# Patient Record
Sex: MALE | Race: WHITE | NOT HISPANIC OR LATINO | Employment: OTHER | URBAN - NONMETROPOLITAN AREA
[De-identification: names, ages, dates, MRNs, and addresses within clinical notes are randomized per-mention and may not be internally consistent; named-entity substitution may affect disease eponyms.]

---

## 2017-01-06 RX ORDER — LISINOPRIL 5 MG/1
TABLET ORAL
Qty: 30 TABLET | Refills: 3 | Status: SHIPPED | OUTPATIENT
Start: 2017-01-06 | End: 2017-04-10 | Stop reason: SDUPTHER

## 2017-01-16 RX ORDER — MELATONIN
1000 DAILY
COMMUNITY
End: 2020-09-25 | Stop reason: SDUPTHER

## 2017-01-16 RX ORDER — ASCORBIC ACID 500 MG
1000 TABLET ORAL DAILY
COMMUNITY
End: 2020-09-24

## 2017-01-16 RX ORDER — TAMSULOSIN HYDROCHLORIDE 0.4 MG/1
1 CAPSULE ORAL DAILY
COMMUNITY
End: 2021-09-20

## 2017-01-16 RX ORDER — LISINOPRIL 10 MG/1
10 TABLET ORAL DAILY
COMMUNITY
End: 2021-12-09

## 2017-01-16 RX ORDER — LOVASTATIN 40 MG/1
40 TABLET ORAL NIGHTLY
COMMUNITY
End: 2022-06-17 | Stop reason: ALTCHOICE

## 2017-01-19 ENCOUNTER — OFFICE VISIT (OUTPATIENT)
Dept: UROLOGY | Facility: CLINIC | Age: 77
End: 2017-01-19

## 2017-01-19 VITALS
DIASTOLIC BLOOD PRESSURE: 75 MMHG | RESPIRATION RATE: 16 BRPM | HEART RATE: 84 BPM | TEMPERATURE: 98.7 F | SYSTOLIC BLOOD PRESSURE: 136 MMHG | OXYGEN SATURATION: 97 %

## 2017-01-19 DIAGNOSIS — G47.33 OBSTRUCTIVE SLEEP APNEA: ICD-10-CM

## 2017-01-19 DIAGNOSIS — N40.0 ENLARGED PROSTATE: Primary | ICD-10-CM

## 2017-01-19 DIAGNOSIS — R35.1 NOCTURIA MORE THAN TWICE PER NIGHT: ICD-10-CM

## 2017-01-19 DIAGNOSIS — R60.0 LOCALIZED EDEMA: ICD-10-CM

## 2017-01-19 DIAGNOSIS — N13.8 BPH (BENIGN PROSTATIC HYPERTROPHY) WITH URINARY OBSTRUCTION: ICD-10-CM

## 2017-01-19 DIAGNOSIS — N40.1 BPH (BENIGN PROSTATIC HYPERTROPHY) WITH URINARY OBSTRUCTION: ICD-10-CM

## 2017-01-19 LAB
BILIRUB BLD-MCNC: NEGATIVE MG/DL
CLARITY, POC: CLEAR
COLOR UR: YELLOW
GLUCOSE UR STRIP-MCNC: NEGATIVE MG/DL
KETONES UR QL: NEGATIVE
LEUKOCYTE EST, POC: NEGATIVE
NITRITE UR-MCNC: NEGATIVE MG/ML
PH UR: 6 [PH] (ref 5–8)
PROT UR STRIP-MCNC: NEGATIVE MG/DL
PSA SERPL-MCNC: 3.9 NG/ML (ref 0–4)
RBC # UR STRIP: NEGATIVE /UL
SP GR UR: 1.02 (ref 1–1.03)
UROBILINOGEN UR QL: NORMAL

## 2017-01-19 PROCEDURE — 84153 ASSAY OF PSA TOTAL: CPT | Performed by: UROLOGY

## 2017-01-19 PROCEDURE — 99213 OFFICE O/P EST LOW 20 MIN: CPT | Performed by: UROLOGY

## 2017-01-19 PROCEDURE — 81003 URINALYSIS AUTO W/O SCOPE: CPT | Performed by: UROLOGY

## 2017-01-19 PROCEDURE — 36415 COLL VENOUS BLD VENIPUNCTURE: CPT | Performed by: UROLOGY

## 2017-01-19 RX ORDER — FINASTERIDE 5 MG/1
5 TABLET, FILM COATED ORAL DAILY
Qty: 30 TABLET | Refills: 2 | Status: SHIPPED | OUTPATIENT
Start: 2017-01-19 | End: 2018-01-19

## 2017-01-19 NOTE — PROGRESS NOTES
Chief Complaint  Follow-up (PATIENT IS HERE FOR  A YEARLY FUP.)        KUSUM Camp is a 76 y.o. male who returns today for annual checkup with a known enlarged prostate and difficulty voiding.  He derived inadequate benefit from oral medication but is been voiding better during the day after the microwave treatment to shrink his prostate.  He is currently taking Flomax but is asking about Superbeta  prostate he saw ever advertised and I suggested Proscar which would be more effective and less expensive.    Vitals:    01/19/17 0922   BP: 136/75   Pulse: 84   Resp: 16   Temp: 98.7 °F (37.1 °C)   SpO2: 97%       Past Medical History  Past Medical History   Diagnosis Date   • ED (erectile dysfunction)    • Enlarged prostate with lower urinary tract symptoms (LUTS)    • Renal disorder    • Stone in kidney        Past Surgical History  Past Surgical History   Procedure Laterality Date   • Nephrectomy     • Knee surgery         Medications  has a current medication list which includes the following prescription(s): amlodipine besy-benazepril hcl, amlodipine besylate, aspirin, cholecalciferol, hydrocodone-homatropine, lisinopril, lovastatin, nifedipine, oxybutynin, tamsulosin, and vitamin c.      Allergies  No Known Allergies    Social History  Social History     Social History Narrative       Family History  He has no family history of bladder or kidney cancer  He has no family history of kidney stones      AUA Symptom Score:      Review of Systems  Review of Systems    Physical Exam  Physical Exam   Constitutional: He is oriented to person, place, and time. He appears well-developed and well-nourished.   HENT:   Head: Normocephalic and atraumatic.   Pulmonary/Chest: Effort normal. No respiratory distress.   Abdominal: Soft. He exhibits no distension and no mass. There is no tenderness. No hernia.   Genitourinary: Rectum normal, prostate normal and penis normal.   Musculoskeletal: Normal range of motion. He exhibits  edema.   Neurological: He is alert and oriented to person, place, and time.   Skin: Skin is warm and dry.   Psychiatric: He has a normal mood and affect. His behavior is normal.   Vitals reviewed.      Labs Recent and today in the office:  Results for orders placed or performed in visit on 01/19/17   POC Urinalysis Dipstick, Automated   Result Value Ref Range    Color Yellow Yellow, Straw, Dark Yellow, Vibha    Clarity, UA Clear Clear    Glucose, UA Negative Negative, 1000 mg/dL (3+) mg/dL    Bilirubin Negative Negative    Ketones, UA Negative Negative    Specific Gravity  1.025 1.005 - 1.030    Blood, UA Negative Negative    pH, Urine 6.0 5.0 - 8.0    Protein, POC Negative Negative mg/dL    Urobilinogen, UA Normal Normal    Leukocytes Negative Negative    Nitrite, UA Negative Negative         Assessment & Plan  His chief complaint today is nocturia.  He voids adequately during the day on Flomax.  He admits to snoring since his wife has been complaining but he doesn't want to go to sleep clinic because he says he knows he cannot tolerate the CPAP device.  This is certainly a component of his nocturia.  He also has 2+ edema in his ankles at 9:00 in the morning so this is a contributing factor to his nocturia as well.  He is encouraged to limit the salt in his diet and keep his feet propped up in the evenings.  Proscar as prescribed and a PSA is obtained.  He can return on an annual basis.

## 2017-01-19 NOTE — MR AVS SNAPSHOT
Ankur Camp   1/19/2017 9:00 AM   Office Visit    Dept Phone:  [phone]   Encounter #:  [id number]    Provider:  Tomás Leyva MD   Department:  [facility] UROLOGY                Your Full Care Plan              Your Updated Medication List          This list is accurate as of: 1/19/17  9:47 AM.  Always use your most recent med list.                AMLODIPINE BESYLATE PO       ASPIRIN EC LO-DOSE PO       cholecalciferol 1000 UNITS tablet   Commonly known as:  VITAMIN D3       HYDROcodone-homatropine 5-1.5 MG tablet tablet   Commonly known as:  HYCODAN       lisinopril 20 MG tablet   Commonly known as:  PRINIVIL,ZESTRIL       LOTREL PO       lovastatin 40 MG tablet   Commonly known as:  MEVACOR       NIFEDIPINE PO       oxybutynin 5 MG tablet   Commonly known as:  DITROPAN   TAKE ONE TABLET BY MOUTH DAILY AT BEDTIME       tamsulosin 0.4 MG capsule 24 hr capsule   Commonly known as:  FLOMAX       vitamin C 500 MG tablet   Commonly known as:  ASCORBIC ACID               We Performed the Following     POC Urinalysis Dipstick, Automated       You Were Diagnosed With        Codes Comments    Enlarged prostate    -  Primary ICD-10-CM: N40.0  ICD-9-CM: 600.00     BPH (benign prostatic hypertrophy) with urinary obstruction     ICD-10-CM: N40.1, N13.8  ICD-9-CM: 600.01, 599.69     Localized edema     ICD-10-CM: R60.0  ICD-9-CM: 782.3     Obstructive sleep apnea     ICD-10-CM: G47.33  ICD-9-CM: 327.23     Nocturia more than twice per night     ICD-10-CM: R35.1  ICD-9-CM: 788.43       Instructions     None    Patient Instructions History      Upcoming Appointments     Visit Type Date Time Department    FOLLOW UP 1/19/2017  9:00 AM [facility] UROLOGY [place]      [employer] Signup     [facility] [employer] allows you to send messages to your doctor, view your test results, renew your prescriptions, schedule appointments, and more. To sign up, go to [employer] and  click on the Sign Up Now link in the New User? box. Enter your [employer] Activation Code exactly as it appears below along with the last four digits of your Social Security Number and your Date of Birth () to complete the sign-up process. If you do not sign up before the expiration date, you must request a new code.    [employer] Activation Code: [email or web address]  Expires: 2017  9:47 AM    If you have questions, you can email [email or web address] or call [phone] to talk to our [employer] staff. Remember, [employer] is NOT to be used for urgent needs. For medical emergencies, dial 911.               Other Info from Your Visit           Allergies     No Known Allergies      Reason for Visit     Follow-up PATIENT IS HERE FOR  A YEARLY FUP.      Vital Signs     Blood Pressure Pulse Temperature Respirations Oxygen Saturation Smoking Status    136/75 84 98.7 °F (37.1 °C) (Temporal Artery ) 16 97% Never Smoker      Problems and Diagnoses Noted     Benign prostatic hypertrophy without urinary obstruction    High cholesterol or triglycerides    High blood pressure    Type 2 diabetes    Vitamin D deficiency    Enlarged prostate    -  Primary    Enlarged prostate with urinary obstruction        Accumulation of fluid in tissues        Sleep apnea        Nocturia more than twice per night          Results     POC Urinalysis Dipstick, Automated      Component Value Standard Range & Units    Color Yellow Yellow, Straw, Dark Yellow, Vibha    Clarity, UA Clear Clear    Glucose, UA Negative Negative, 1000 mg/dL (3+) mg/dL    Bilirubin Negative Negative    Ketones, UA Negative Negative    Specific Gravity  1.025 1.005 - 1.030    Blood, UA Negative Negative    pH, Urine 6.0 5.0 - 8.0    Protein, POC Negative Negative mg/dL    Urobilinogen, UA Normal Normal    Leukocytes Negative Negative    Nitrite, UA Negative Negative

## 2017-04-04 ENCOUNTER — HOSPITAL ENCOUNTER (OUTPATIENT)
Dept: OTHER | Age: 77
Discharge: HOME | End: 2017-04-04
Attending: NURSE PRACTITIONER | Admitting: NURSE PRACTITIONER

## 2017-04-04 DIAGNOSIS — E78.00 PURE HYPERCHOLESTEROLEMIA: ICD-10-CM

## 2017-04-04 DIAGNOSIS — E11.9 TYPE 2 DIABETES MELLITUS WITHOUT COMPLICATION, WITHOUT LONG-TERM CURRENT USE OF INSULIN (HCC): ICD-10-CM

## 2017-04-04 DIAGNOSIS — E55.9 VITAMIN D DEFICIENCY: ICD-10-CM

## 2017-04-04 LAB
A/G RATIO: 2.3 (ref 0.8–2)
ALBUMIN SERPL-MCNC: 4.4 G/DL (ref 3.4–4.8)
ALP BLD-CCNC: 96 U/L (ref 25–100)
ALT SERPL-CCNC: 15 U/L (ref 4–36)
ANION GAP SERPL CALCULATED.3IONS-SCNC: 12 MMOL/L (ref 3–16)
AST SERPL-CCNC: 18 U/L (ref 8–33)
BILIRUB SERPL-MCNC: 0.6 MG/DL (ref 0.3–1.2)
BUN BLDV-MCNC: 17 MG/DL (ref 6–20)
CALCIUM SERPL-MCNC: 9.3 MG/DL (ref 8.5–10.5)
CHLORIDE BLD-SCNC: 104 MMOL/L (ref 98–107)
CHOLESTEROL, TOTAL: 168 MG/DL (ref 0–200)
CO2: 29 MMOL/L (ref 20–30)
CREAT SERPL-MCNC: 1 MG/DL (ref 0.4–1.2)
CREATININE URINE: 141.3 MG/DL (ref 1.5–300)
FOLATE: >20 NG/ML
GFR AFRICAN AMERICAN: >59
GFR NON-AFRICAN AMERICAN: >59
GLOBULIN: 1.9 G/DL
GLUCOSE BLD-MCNC: 153 MG/DL (ref 74–106)
HBA1C MFR BLD: 6.5 %
HCT VFR BLD CALC: 45.9 % (ref 40–54)
HDLC SERPL-MCNC: 49 MG/DL (ref 40–60)
HEMOGLOBIN: 16.1 G/DL (ref 13–18)
LDL CHOLESTEROL CALCULATED: 80 MG/DL
MCH RBC QN AUTO: 32.5 PG (ref 27–32)
MCHC RBC AUTO-ENTMCNC: 35.1 G/DL (ref 31–35)
MCV RBC AUTO: 92.5 FL (ref 80–100)
MICROALBUMIN UR-MCNC: <1.2 MG/DL (ref 0–22)
MICROALBUMIN/CREAT UR-RTO: NORMAL MG/G (ref 0–30)
PDW BLD-RTO: 14.1 % (ref 11–16)
PLATELET # BLD: 160 K/UL (ref 150–400)
PMV BLD AUTO: 11.6 FL (ref 6–10)
POTASSIUM SERPL-SCNC: 4.2 MMOL/L (ref 3.4–5.1)
RBC # BLD: 4.96 M/UL (ref 4.5–6)
SODIUM BLD-SCNC: 145 MMOL/L (ref 136–145)
TOTAL PROTEIN: 6.3 G/DL (ref 6.4–8.3)
TRIGL SERPL-MCNC: 193 MG/DL (ref 0–249)
VITAMIN B-12: 1458 PG/ML (ref 211–911)
VITAMIN D 25-HYDROXY: 49.8 (ref 32–100)
VLDLC SERPL CALC-MCNC: 39 MG/DL
WBC # BLD: 5.9 K/UL (ref 4–11)

## 2017-04-10 ENCOUNTER — OFFICE VISIT (OUTPATIENT)
Dept: PRIMARY CARE CLINIC | Age: 77
End: 2017-04-10
Payer: MEDICARE

## 2017-04-10 VITALS
HEIGHT: 72 IN | SYSTOLIC BLOOD PRESSURE: 120 MMHG | BODY MASS INDEX: 25.19 KG/M2 | WEIGHT: 186 LBS | HEART RATE: 74 BPM | DIASTOLIC BLOOD PRESSURE: 74 MMHG | RESPIRATION RATE: 20 BRPM | OXYGEN SATURATION: 97 %

## 2017-04-10 DIAGNOSIS — Z13.29 THYROID DISORDER SCREEN: ICD-10-CM

## 2017-04-10 DIAGNOSIS — N40.0 BENIGN PROSTATIC HYPERPLASIA WITHOUT LOWER URINARY TRACT SYMPTOMS, UNSPECIFIED MORPHOLOGY: ICD-10-CM

## 2017-04-10 DIAGNOSIS — E78.00 PURE HYPERCHOLESTEROLEMIA: ICD-10-CM

## 2017-04-10 DIAGNOSIS — E11.9 TYPE 2 DIABETES MELLITUS WITHOUT COMPLICATION, WITHOUT LONG-TERM CURRENT USE OF INSULIN (HCC): Primary | ICD-10-CM

## 2017-04-10 DIAGNOSIS — Z23 NEED FOR PNEUMOCOCCAL VACCINATION: ICD-10-CM

## 2017-04-10 DIAGNOSIS — I10 ESSENTIAL HYPERTENSION: ICD-10-CM

## 2017-04-10 PROCEDURE — 3017F COLORECTAL CA SCREEN DOC REV: CPT | Performed by: NURSE PRACTITIONER

## 2017-04-10 PROCEDURE — 4004F PT TOBACCO SCREEN RCVD TLK: CPT | Performed by: NURSE PRACTITIONER

## 2017-04-10 PROCEDURE — G8427 DOCREV CUR MEDS BY ELIG CLIN: HCPCS | Performed by: NURSE PRACTITIONER

## 2017-04-10 PROCEDURE — G8420 CALC BMI NORM PARAMETERS: HCPCS | Performed by: NURSE PRACTITIONER

## 2017-04-10 PROCEDURE — 1123F ACP DISCUSS/DSCN MKR DOCD: CPT | Performed by: NURSE PRACTITIONER

## 2017-04-10 PROCEDURE — 90670 PCV13 VACCINE IM: CPT | Performed by: NURSE PRACTITIONER

## 2017-04-10 PROCEDURE — 4040F PNEUMOC VAC/ADMIN/RCVD: CPT | Performed by: NURSE PRACTITIONER

## 2017-04-10 PROCEDURE — G0009 ADMIN PNEUMOCOCCAL VACCINE: HCPCS | Performed by: NURSE PRACTITIONER

## 2017-04-10 PROCEDURE — 99213 OFFICE O/P EST LOW 20 MIN: CPT | Performed by: NURSE PRACTITIONER

## 2017-04-10 PROCEDURE — 3044F HG A1C LEVEL LT 7.0%: CPT | Performed by: NURSE PRACTITIONER

## 2017-04-10 RX ORDER — LOVASTATIN 40 MG/1
40 TABLET ORAL NIGHTLY
Qty: 90 TABLET | Refills: 3 | Status: SHIPPED | OUTPATIENT
Start: 2017-04-10 | End: 2018-05-07 | Stop reason: SDUPTHER

## 2017-04-10 RX ORDER — AMLODIPINE BESYLATE 10 MG/1
10 TABLET ORAL NIGHTLY
Qty: 90 TABLET | Refills: 3 | Status: SHIPPED | OUTPATIENT
Start: 2017-04-10 | End: 2018-05-16 | Stop reason: SDUPTHER

## 2017-04-10 RX ORDER — TAMSULOSIN HYDROCHLORIDE 0.4 MG/1
CAPSULE ORAL
Qty: 90 CAPSULE | Refills: 3 | Status: SHIPPED | OUTPATIENT
Start: 2017-04-10 | End: 2018-05-31 | Stop reason: SDUPTHER

## 2017-04-10 ASSESSMENT — ENCOUNTER SYMPTOMS
RHINORRHEA: 0
EYE DISCHARGE: 0
SHORTNESS OF BREATH: 0
CONSTIPATION: 0
EYE ITCHING: 0
VOMITING: 0
NAUSEA: 0
EYE REDNESS: 0
DIARRHEA: 0
SORE THROAT: 0
ABDOMINAL PAIN: 0
COUGH: 0

## 2017-05-04 RX ORDER — LISINOPRIL 5 MG/1
TABLET ORAL
Qty: 30 TABLET | Refills: 3 | Status: SHIPPED | OUTPATIENT
Start: 2017-05-04 | End: 2017-10-11 | Stop reason: SDUPTHER

## 2017-06-07 DIAGNOSIS — N32.81 OAB (OVERACTIVE BLADDER): Primary | ICD-10-CM

## 2017-06-07 RX ORDER — OXYBUTYNIN CHLORIDE 5 MG/1
TABLET ORAL
Qty: 30 TABLET | Refills: 6 | Status: SHIPPED | OUTPATIENT
Start: 2017-06-07 | End: 2018-03-20 | Stop reason: SDUPTHER

## 2017-10-02 ENCOUNTER — HOSPITAL ENCOUNTER (OUTPATIENT)
Dept: OTHER | Age: 77
Discharge: HOME | End: 2017-10-02
Attending: NURSE PRACTITIONER | Admitting: NURSE PRACTITIONER

## 2017-10-02 DIAGNOSIS — E78.00 PURE HYPERCHOLESTEROLEMIA: ICD-10-CM

## 2017-10-02 DIAGNOSIS — E11.9 TYPE 2 DIABETES MELLITUS WITHOUT COMPLICATION, WITHOUT LONG-TERM CURRENT USE OF INSULIN (HCC): ICD-10-CM

## 2017-10-02 DIAGNOSIS — Z13.29 THYROID DISORDER SCREEN: ICD-10-CM

## 2017-10-02 LAB
A/G RATIO: 1.9 (ref 0.8–2)
ALBUMIN SERPL-MCNC: 4.1 G/DL (ref 3.4–4.8)
ALP BLD-CCNC: 77 U/L (ref 25–100)
ALT SERPL-CCNC: 12 U/L (ref 4–36)
ANION GAP SERPL CALCULATED.3IONS-SCNC: 10 MMOL/L (ref 3–16)
AST SERPL-CCNC: 14 U/L (ref 8–33)
BILIRUB SERPL-MCNC: 0.6 MG/DL (ref 0.3–1.2)
BUN BLDV-MCNC: 20 MG/DL (ref 6–20)
CALCIUM SERPL-MCNC: 9.2 MG/DL (ref 8.5–10.5)
CHLORIDE BLD-SCNC: 106 MMOL/L (ref 98–107)
CHOLESTEROL, TOTAL: 162 MG/DL (ref 0–200)
CO2: 29 MMOL/L (ref 20–30)
CREAT SERPL-MCNC: 0.9 MG/DL (ref 0.4–1.2)
GFR AFRICAN AMERICAN: >59
GFR NON-AFRICAN AMERICAN: >59
GLOBULIN: 2.2 G/DL
GLUCOSE BLD-MCNC: 130 MG/DL (ref 74–106)
HBA1C MFR BLD: 6.1 %
HCT VFR BLD CALC: 44.1 % (ref 40–54)
HDLC SERPL-MCNC: 54 MG/DL (ref 40–60)
HEMOGLOBIN: 15.1 G/DL (ref 13–18)
LDL CHOLESTEROL CALCULATED: 88 MG/DL
MCH RBC QN AUTO: 32.4 PG (ref 27–32)
MCHC RBC AUTO-ENTMCNC: 34.2 G/DL (ref 31–35)
MCV RBC AUTO: 94.6 FL (ref 80–100)
PDW BLD-RTO: 13.4 % (ref 11–16)
PLATELET # BLD: 164 K/UL (ref 150–400)
PMV BLD AUTO: 11.2 FL (ref 6–10)
POTASSIUM SERPL-SCNC: 4.4 MMOL/L (ref 3.4–5.1)
RBC # BLD: 4.66 M/UL (ref 4.5–6)
SODIUM BLD-SCNC: 145 MMOL/L (ref 136–145)
TOTAL PROTEIN: 6.3 G/DL (ref 6.4–8.3)
TRIGL SERPL-MCNC: 100 MG/DL (ref 0–249)
TSH SERPL DL<=0.05 MIU/L-ACNC: 1.17 UIU/ML (ref 0.35–5.5)
VLDLC SERPL CALC-MCNC: 20 MG/DL
WBC # BLD: 5.5 K/UL (ref 4–11)

## 2017-10-11 ENCOUNTER — OFFICE VISIT (OUTPATIENT)
Dept: PRIMARY CARE CLINIC | Age: 77
End: 2017-10-11
Payer: MEDICARE

## 2017-10-11 VITALS
HEIGHT: 72 IN | DIASTOLIC BLOOD PRESSURE: 72 MMHG | OXYGEN SATURATION: 97 % | BODY MASS INDEX: 24.03 KG/M2 | RESPIRATION RATE: 20 BRPM | SYSTOLIC BLOOD PRESSURE: 120 MMHG | WEIGHT: 177.4 LBS | HEART RATE: 74 BPM

## 2017-10-11 DIAGNOSIS — E78.00 PURE HYPERCHOLESTEROLEMIA: ICD-10-CM

## 2017-10-11 DIAGNOSIS — I10 ESSENTIAL HYPERTENSION: ICD-10-CM

## 2017-10-11 DIAGNOSIS — E55.9 VITAMIN D DEFICIENCY: ICD-10-CM

## 2017-10-11 DIAGNOSIS — E11.9 TYPE 2 DIABETES MELLITUS WITHOUT COMPLICATION, WITHOUT LONG-TERM CURRENT USE OF INSULIN (HCC): Primary | ICD-10-CM

## 2017-10-11 PROCEDURE — 4004F PT TOBACCO SCREEN RCVD TLK: CPT | Performed by: NURSE PRACTITIONER

## 2017-10-11 PROCEDURE — 1123F ACP DISCUSS/DSCN MKR DOCD: CPT | Performed by: NURSE PRACTITIONER

## 2017-10-11 PROCEDURE — 3017F COLORECTAL CA SCREEN DOC REV: CPT | Performed by: NURSE PRACTITIONER

## 2017-10-11 PROCEDURE — 3046F HEMOGLOBIN A1C LEVEL >9.0%: CPT | Performed by: NURSE PRACTITIONER

## 2017-10-11 PROCEDURE — 4040F PNEUMOC VAC/ADMIN/RCVD: CPT | Performed by: NURSE PRACTITIONER

## 2017-10-11 PROCEDURE — G8427 DOCREV CUR MEDS BY ELIG CLIN: HCPCS | Performed by: NURSE PRACTITIONER

## 2017-10-11 PROCEDURE — G8420 CALC BMI NORM PARAMETERS: HCPCS | Performed by: NURSE PRACTITIONER

## 2017-10-11 PROCEDURE — G8484 FLU IMMUNIZE NO ADMIN: HCPCS | Performed by: NURSE PRACTITIONER

## 2017-10-11 PROCEDURE — 99214 OFFICE O/P EST MOD 30 MIN: CPT | Performed by: NURSE PRACTITIONER

## 2017-10-11 ASSESSMENT — ENCOUNTER SYMPTOMS
RESPIRATORY NEGATIVE: 1
EYES NEGATIVE: 1
GASTROINTESTINAL NEGATIVE: 1

## 2017-10-11 ASSESSMENT — PATIENT HEALTH QUESTIONNAIRE - PHQ9
SUM OF ALL RESPONSES TO PHQ9 QUESTIONS 1 & 2: 1
2. FEELING DOWN, DEPRESSED OR HOPELESS: 0
1. LITTLE INTEREST OR PLEASURE IN DOING THINGS: 1
SUM OF ALL RESPONSES TO PHQ QUESTIONS 1-9: 1

## 2017-10-11 NOTE — PROGRESS NOTES
SUBJECTIVE:    Patient ID: Ramirez Olmos is a 77 y.o. male. Chief Complaint   Patient presents with    Diabetes    Hypertension    Discuss Labs         HPI:  He returns about his dm,htn and had labs. He says his feet are doing well but his knees give out some time. He says his sugar was 130 the last he checked it. He is taking hsi metformin once a day. He takes his blood pressure med without any problems. He goes next week for a shot in his right eye for retinapathy. Patient's medications, allergies, past medical, surgical, social and family histories were reviewed and updated as appropriate. Current Outpatient Prescriptions:     tamsulosin (FLOMAX) 0.4 MG capsule, TAKE 1 CAPSULE BY MOUTH DAILY, Disp: 90 capsule, Rfl: 3    amLODIPine (NORVASC) 10 MG tablet, Take 1 tablet by mouth nightly, Disp: 90 tablet, Rfl: 3    metFORMIN (GLUCOPHAGE) 500 MG tablet, TAKE 1 TABLET BY MOUTH DAILY (WITH BREAKFAST), Disp: 90 tablet, Rfl: 3    lovastatin (MEVACOR) 40 MG tablet, Take 1 tablet by mouth nightly, Disp: 90 tablet, Rfl: 3    oxybutynin (DITROPAN) 5 MG tablet, Take 5 mg by mouth daily, Disp: , Rfl: 6    fluticasone (FLONASE) 50 MCG/ACT nasal spray, 1 spray by Nasal route daily, Disp: 1 Bottle, Rfl: 3    Multiple Vitamins-Minerals (RA VISION-STONEY PRESERVE PO), Take by mouth 2 times daily Drops bid, Disp: , Rfl:     glucose blood VI test strips (EXACTECH TEST) strip, 1 each by In Vitro route daily Daily testing, DX:250.00, Disp: 100 each, Rfl: 3    lisinopril (PRINIVIL;ZESTRIL) 5 MG tablet, Take 1 tablet by mouth daily, Disp: 90 tablet, Rfl: 3    aspirin 81 MG tablet, Take 81 mg by mouth daily. , Disp: , Rfl:     vitamin B-12 (CYANOCOBALAMIN) 1000 MCG tablet, Take 1,000 mcg by mouth daily. , Disp: , Rfl:     Vitamin D (CHOLECALCIFEROL) 1000 UNITS CAPS capsule, Take 1,000 Units by mouth daily. , Disp: , Rfl:     Ascorbic Acid (VITAMIN C) 500 MG tablet, Take 1,000 mg by mouth daily. , Disp: , Rfl: Review of Systems   Constitutional: Negative. HENT: Negative. Eyes: Negative. Respiratory: Negative. Cardiovascular: Negative. Gastrointestinal: Negative. Genitourinary: Negative. Musculoskeletal: Positive for arthralgias (knee weakness). Skin: Negative. Neurological: Negative. Psychiatric/Behavioral: Negative. OBJECTIVE:  /72   Pulse 74   Resp 20   Ht 6' (1.829 m)   Wt 177 lb 6.4 oz (80.5 kg)   SpO2 97%   BMI 24.06 kg/m²    Physical Exam   Constitutional: He is oriented to person, place, and time. He appears well-developed and well-nourished. HENT:   Head: Normocephalic and atraumatic. Eyes: Conjunctivae and EOM are normal. Pupils are equal, round, and reactive to light. Neck: Normal range of motion. Neck supple. No JVD present. No thyromegaly present. Cardiovascular: Normal rate and regular rhythm. Exam reveals no gallop and no friction rub. No murmur heard. Pulmonary/Chest: Effort normal and breath sounds normal. No respiratory distress. He has no wheezes. He has no rales. Abdominal: Soft. Bowel sounds are normal. He exhibits no distension. There is no tenderness. There is no guarding. Musculoskeletal: He exhibits no edema or tenderness. Neurological: He is alert and oriented to person, place, and time. Skin: Skin is warm and dry. No rash noted. Psychiatric: He has a normal mood and affect. Judgment normal.   Nursing note and vitals reviewed.       Results in Past 30 Days  Result Component Current Result Ref Range Previous Result Ref Range   Alb 4.1 (10/2/2017) 3.4 - 4.8 g/dL Not in Time Range    Albumin/Globulin Ratio 1.9 (10/2/2017) 0.8 - 2.0 Not in Time Range    Alkaline Phosphatase 77 (10/2/2017) 25 - 100 U/L Not in Time Range    ALT 12 (10/2/2017) 4 - 36 U/L Not in Time Range    AST 14 (10/2/2017) 8 - 33 U/L Not in Time Range    BUN 20 (10/2/2017) 6 - 20 mg/dL Not in Time Range    Calcium 9.2 (10/2/2017) 8.5 - 10.5 mg/dL Not in Time Range    Chloride 106 (10/2/2017) 98 - 107 mmol/L Not in Time Range    CO2 29 (10/2/2017) 20 - 30 mmol/L Not in Time Range    CREATININE 0.9 (10/2/2017) 0.4 - 1.2 mg/dL Not in Time Range    GFR  >59 (10/2/2017) >59 Not in Time Range    GFR Non- >59 (10/2/2017) >59 Not in Time Range    Globulin 2.2 (10/2/2017) g/dL Not in Time Range    Glucose 130 (H) (10/2/2017) 74 - 106 mg/dL Not in Time Range    Potassium 4.4 (10/2/2017) 3.4 - 5.1 mmol/L Not in Time Range    Sodium 145 (10/2/2017) 136 - 145 mmol/L Not in Time Range    Total Bilirubin 0.6 (10/2/2017) 0.3 - 1.2 mg/dL Not in Time Range    Total Protein 6.3 (L) (10/2/2017) 6.4 - 8.3 g/dL Not in Time Range        Hemoglobin A1C (%)   Date Value   10/02/2017 6.1 (H)     MICROALBUMIN, RANDOM URINE (mg/dL)   Date Value   04/04/2017 <1.20     LDL Calculated (mg/dL)   Date Value   10/02/2017 88         Lab Results   Component Value Date    WBC 5.5 10/02/2017    HGB 15.1 10/02/2017    HCT 44.1 10/02/2017    MCV 94.6 10/02/2017     10/02/2017       Lab Results   Component Value Date    TSH 1.17 10/02/2017         ASSESSMENT/PLAN:     1. Type 2 diabetes mellitus without complication, without long-term current use of insulin (HCC)  Stable. I advised him regarding diabetic diet, exercise and weight control. Also, I advised him to stay on the current medication, monitor his fingerstick closely. I am going to check the A1c every few months. I will check microalbumin on a yearly basis. I have also advised him to have a yearly eye exam and to monitor his feet closely. Along with instruction of possible complications related to diabetes, even with good control. A1C will be checked  in few months. Lab Results   Component Value Date    LABA1C 6.1 (H) 10/02/2017          -  DIABETES FOOT EXAM  - CBC; Future  - Comprehensive Metabolic Panel; Future  - Hemoglobin A1C; Future    2.  Pure hypercholesterolemia  I have advised him on low-fat

## 2017-10-11 NOTE — PROGRESS NOTES
Have you seen any other physician or provider since your last visit? no    Have you had any other diagnostic tests since your last visit? yes - labs    Have you changed or stopped any medications since your last visit including any over-the-counter medicines, vitamins, or herbal medicines? no     Are you taking all your prescribed medications? Yes  If NO, why? -  N/A    Patient here for follow up on dm, htn and he had labs to discuss. He states that he is doing well.

## 2017-11-25 ENCOUNTER — HOSPITAL ENCOUNTER (EMERGENCY)
Facility: HOSPITAL | Age: 77
Discharge: HOME | End: 2017-11-25
Attending: EMERGENCY MEDICINE | Admitting: EMERGENCY MEDICINE

## 2017-11-25 ENCOUNTER — APPOINTMENT (OUTPATIENT)
Dept: GENERAL RADIOLOGY | Facility: HOSPITAL | Age: 77
End: 2017-11-25

## 2017-11-25 ENCOUNTER — APPOINTMENT (OUTPATIENT)
Dept: CT IMAGING | Facility: HOSPITAL | Age: 77
End: 2017-11-25

## 2017-11-25 VITALS
OXYGEN SATURATION: 96 % | HEART RATE: 70 BPM | SYSTOLIC BLOOD PRESSURE: 139 MMHG | WEIGHT: 170 LBS | HEIGHT: 72 IN | TEMPERATURE: 97.9 F | DIASTOLIC BLOOD PRESSURE: 85 MMHG | RESPIRATION RATE: 18 BRPM | BODY MASS INDEX: 23.03 KG/M2

## 2017-11-25 DIAGNOSIS — S40.012A CONTUSION OF LEFT SHOULDER, INITIAL ENCOUNTER: Primary | ICD-10-CM

## 2017-11-25 DIAGNOSIS — S00.83XA CONTUSION OF FACE, INITIAL ENCOUNTER: ICD-10-CM

## 2017-11-25 PROCEDURE — 99283 EMERGENCY DEPT VISIT LOW MDM: CPT

## 2017-11-25 PROCEDURE — 73060 X-RAY EXAM OF HUMERUS: CPT

## 2017-11-25 PROCEDURE — 25010000002 TDAP 5-2.5-18.5 LF-MCG/0.5 SUSPENSION: Performed by: PHYSICIAN ASSISTANT

## 2017-11-25 PROCEDURE — 73030 X-RAY EXAM OF SHOULDER: CPT

## 2017-11-25 PROCEDURE — 70450 CT HEAD/BRAIN W/O DYE: CPT

## 2017-11-25 PROCEDURE — 90715 TDAP VACCINE 7 YRS/> IM: CPT | Performed by: PHYSICIAN ASSISTANT

## 2017-11-25 PROCEDURE — 72125 CT NECK SPINE W/O DYE: CPT

## 2017-11-25 PROCEDURE — 90471 IMMUNIZATION ADMIN: CPT | Performed by: PHYSICIAN ASSISTANT

## 2017-11-25 RX ADMIN — TETANUS TOXOID, REDUCED DIPHTHERIA TOXOID AND ACELLULAR PERTUSSIS VACCINE, ADSORBED 0.5 ML: 5; 2.5; 8; 8; 2.5 SUSPENSION INTRAMUSCULAR at 15:21

## 2017-11-25 NOTE — ED PROVIDER NOTES
Subjective   HPI Comments: Patient is 77-year-old male who is here today complaining of left shoulder pain.  Patient a proximally 3 hours prior fell from the second rung of a ladder landing on his left shoulder.  Patient does not complain of back pain, headache, denies any loss of consciousness.  Does not complain of hip pain shortness of breath, chest pain.  Mechanical fall.  No Previous history of left shoulder injury      History provided by:  Patient and spouse      Review of Systems   Musculoskeletal:        Shoulder pain, left   Skin:        Abrasion of skin left upper eyebrow   All other systems reviewed and are negative.      Past Medical History:   Diagnosis Date   • Diabetes mellitus    • ED (erectile dysfunction)    • Enlarged prostate with lower urinary tract symptoms (LUTS)    • Renal disorder    • Stone in kidney        No Known Allergies    Past Surgical History:   Procedure Laterality Date   • KNEE SURGERY     • NEPHRECTOMY         History reviewed. No pertinent family history.    Social History     Social History   • Marital status:      Spouse name: N/A   • Number of children: N/A   • Years of education: N/A     Social History Main Topics   • Smoking status: Never Smoker   • Smokeless tobacco: Never Used   • Alcohol use No   • Drug use: No   • Sexual activity: Defer     Other Topics Concern   • None     Social History Narrative   • None           Objective   Physical Exam   Constitutional: He is oriented to person, place, and time. He appears well-developed and well-nourished.   HENT:   Soft tissue swelling over the left eyebrow.  Midface stable, no stepoffs rodríguez structure of face.  +dentures, no hemotympanum bilaterally. No soft tissue swelling of the scalp.   Eyes: Conjunctivae and EOM are normal. Pupils are equal, round, and reactive to light.   Neck: Normal range of motion. Neck supple.   No tenderness of the cervical, thoracic, lumbar spine    Cardiovascular: Normal rate.     Pulmonary/Chest: Effort normal.   Abdominal: Soft.   Musculoskeletal:   Decreased and painful motion at the left shoulder, tender to palpation over the head of the humerus.  No hip pain, no hand pain. No snuffbox tenderness   Neurological: He is alert and oriented to person, place, and time. No cranial nerve deficit. Coordination normal.   Skin: Skin is warm.   Abrasion left eyebrow, bilateral hands   Psychiatric: He has a normal mood and affect.   Nursing note and vitals reviewed.      Procedures         ED Course  ED Course      Patient was stable throughout the emergency room course.  Pain mainly with movement.  We will give the patient a sling for comfort.  Patient will take ibuprofen and Tylenol for muscle tenderness.  Patient will follow with primary care next week.  Return for worsening symptoms.            MDM    Final diagnoses:   Contusion of left shoulder, initial encounter   Contusion of face, initial encounter            Zakiya Patel PA-C  11/25/17 5568

## 2017-11-25 NOTE — DISCHARGE INSTRUCTIONS
600 mg of ibuprofen q6 hours, tylonel.  Warm compresses.  Sling for comfort.  Follow with primary care next week.  Return for worsening symptoms.

## 2017-11-28 ENCOUNTER — OFFICE VISIT (OUTPATIENT)
Dept: ORTHOPEDIC SURGERY | Facility: CLINIC | Age: 77
End: 2017-11-28

## 2017-11-28 VITALS — BODY MASS INDEX: 24.87 KG/M2 | RESPIRATION RATE: 16 BRPM | HEIGHT: 72 IN | WEIGHT: 183.6 LBS

## 2017-11-28 DIAGNOSIS — S46.912A SHOULDER STRAIN, LEFT, INITIAL ENCOUNTER: Primary | ICD-10-CM

## 2017-11-28 PROCEDURE — 99204 OFFICE O/P NEW MOD 45 MIN: CPT | Performed by: ORTHOPAEDIC SURGERY

## 2017-11-28 RX ORDER — AMLODIPINE BESYLATE 10 MG/1
10 TABLET ORAL DAILY
COMMUNITY
Start: 2017-04-10 | End: 2020-09-24

## 2017-11-28 RX ORDER — FAMOTIDINE 20 MG
TABLET ORAL
COMMUNITY
End: 2018-02-19 | Stop reason: SDUPTHER

## 2017-11-28 RX ORDER — TAMSULOSIN HYDROCHLORIDE 0.4 MG/1
CAPSULE ORAL
COMMUNITY
Start: 2017-04-10 | End: 2018-02-19 | Stop reason: SDUPTHER

## 2017-11-28 RX ORDER — LOVASTATIN 40 MG/1
40 TABLET ORAL
COMMUNITY
Start: 2017-04-10 | End: 2019-02-21 | Stop reason: SDUPTHER

## 2017-11-28 RX ORDER — LISINOPRIL 5 MG/1
TABLET ORAL
Refills: 3 | COMMUNITY
Start: 2017-11-18 | End: 2018-02-19 | Stop reason: SDUPTHER

## 2017-11-28 RX ORDER — FLUTICASONE PROPIONATE 50 MCG
SPRAY, SUSPENSION (ML) NASAL
COMMUNITY
Start: 2016-12-21 | End: 2019-04-03

## 2017-11-28 RX ORDER — AMLODIPINE BESYLATE 10 MG/1
TABLET ORAL
Refills: 3 | COMMUNITY
Start: 2017-11-06 | End: 2018-02-19 | Stop reason: SDUPTHER

## 2017-11-28 RX ORDER — LANOLIN ALCOHOL/MO/W.PET/CERES
1000 CREAM (GRAM) TOPICAL DAILY
COMMUNITY

## 2017-11-28 RX ORDER — LISINOPRIL 5 MG/1
5 TABLET ORAL DAILY
COMMUNITY
Start: 2015-05-13 | End: 2020-09-24

## 2017-11-28 RX ORDER — ASCORBIC ACID 500 MG
1000 TABLET ORAL
COMMUNITY
End: 2018-02-19 | Stop reason: SDUPTHER

## 2017-11-28 RX ORDER — OXYBUTYNIN CHLORIDE 5 MG/1
5 TABLET ORAL
COMMUNITY
Start: 2016-12-17 | End: 2018-02-19 | Stop reason: SDUPTHER

## 2017-11-28 NOTE — PROGRESS NOTES
Subjective   Patient ID: Ankur Camp is a 77 y.o. male  Pain of the Left Shoulder (Patient sustained injury on 11/25/17 while working at home fell off ladder. Patient is right hand dominant.)             History of Present Illness    Right-hand-dominant male who was working on the siding of his building fell over landing on his left side directly hitting the pavement had pain initially laterally at the upper shoulder on the left denies neck pain did hit his face dental laceration above his eyebrow and bruising around his eye but denies any visual disturbance or significant neck pain loss of consciousness gait disturbance or headache.    He does of a history many years ago for right shoulder surgery does not currently complain of any right shoulder pain but has chronic weakness and loss of use in the right shoulder ever since his shoulder surgery was done.    has history of some arthritis in his right knee he thinks that the reason why he fell off the second step of a ladder landing on left side of the shoulder in this recent fall.    Review of Systems   Constitutional: Negative for diaphoresis, fever and unexpected weight change.   HENT: Negative for dental problem and sore throat.    Eyes: Negative for visual disturbance.   Respiratory: Negative for shortness of breath.    Cardiovascular: Negative for chest pain.   Gastrointestinal: Negative for abdominal pain, constipation, diarrhea, nausea and vomiting.   Genitourinary: Negative for difficulty urinating and frequency.   Musculoskeletal: Positive for arthralgias.   Neurological: Negative for headaches.   Hematological: Does not bruise/bleed easily.   All other systems reviewed and are negative.      Past Medical History:   Diagnosis Date   • Diabetes mellitus    • ED (erectile dysfunction)    • Enlarged prostate with lower urinary tract symptoms (LUTS)    • Renal disorder    • Stone in kidney         Past Surgical History:   Procedure Laterality Date   •  "KNEE SURGERY     • NEPHRECTOMY         Family History   Problem Relation Age of Onset   • No Known Problems Mother    • No Known Problems Father        Social History     Social History   • Marital status:      Spouse name: N/A   • Number of children: N/A   • Years of education: N/A     Occupational History   • Not on file.     Social History Main Topics   • Smoking status: Never Smoker   • Smokeless tobacco: Never Used   • Alcohol use No   • Drug use: No   • Sexual activity: Defer     Other Topics Concern   • Not on file     Social History Narrative       I have reviewed all of the above social hx, family hx, surgical hx, medications, allergies & ROS and confirm that it is accurate.    No Known Allergies    Objective   Resp 16  Ht 72\" (182.9 cm)  Wt 183 lb 9.6 oz (83.3 kg)  BMI 24.9 kg/m2   Physical Exam  Constitutional: He is oriented to person, place, and time. He appears well-developed and well-nourished.   HENT:   Head: Normocephalic and atraumatic.   Eyes: EOM are normal. Pupils are equal, round, and reactive to light.   Neck: Normal range of motion. Neck supple.   Cardiovascular: Normal rate.    Pulmonary/Chest: Effort normal and breath sounds normal.   Abdominal: Soft.   Neurological: He is alert and oriented to person, place, and time.   Skin: Skin is warm and dry.   Psychiatric: He has a normal mood and affect.   Nursing note and vitals reviewed.       Ortho Exam   Left shoulder with slight tenderness the proximal humeral area minimal swelling no ecchymosis, no tenderness acromial clavicle joint, biceps tendon appears clinically intact, minimal supraspinatus atrophy, left elbow nontender, left arm forearm wrist and hand neurovascularly intact    Assessment/Plan   Review of Radiographic Studies:    No new imaging done today.      Procedures     Ankur was seen today for pain.    Diagnoses and all orders for this visit:    Shoulder strain, left, initial encounter     Risk, benefits, and merits of " treatment alternatives reviewed with the patient and questions answered and Use brace as instructed      Recommendations/Plan:   Work/Activity Status: No use of involved extremity    Patient agreeable to call or return sooner for any concerns.             Impression:  Left shoulder strain possible rotator cuff tear and/or occult impaction humeral head  Plan:  Sling ice continue use of OTC NSAIDs recheck 2 weeks if not improved refer for MRI at that time

## 2017-12-12 ENCOUNTER — OFFICE VISIT (OUTPATIENT)
Dept: ORTHOPEDIC SURGERY | Facility: CLINIC | Age: 77
End: 2017-12-12

## 2017-12-12 VITALS — WEIGHT: 186 LBS | HEIGHT: 72 IN | BODY MASS INDEX: 25.19 KG/M2 | RESPIRATION RATE: 16 BRPM

## 2017-12-12 DIAGNOSIS — S46.912A SHOULDER STRAIN, LEFT, INITIAL ENCOUNTER: Primary | ICD-10-CM

## 2017-12-12 PROCEDURE — 99213 OFFICE O/P EST LOW 20 MIN: CPT | Performed by: ORTHOPAEDIC SURGERY

## 2017-12-12 NOTE — PROGRESS NOTES
Subjective   Patient ID: Ankur Camp is a 77 y.o. male  Follow-up of the Left Shoulder             History of Present Illness    Pain continues at the left upper shoulder with decreased use, has difficulty lifting the arm beyond chest level, does not have night pain, bruising has improved ice did not help the pain currently taking Aleve and Tylenol.    Review of Systems   Constitutional: Negative for diaphoresis, fever and unexpected weight change.   HENT: Negative for dental problem and sore throat.    Eyes: Negative for visual disturbance.   Respiratory: Negative for shortness of breath.    Cardiovascular: Negative for chest pain.   Gastrointestinal: Negative for abdominal pain, constipation, diarrhea, nausea and vomiting.   Genitourinary: Negative for difficulty urinating and frequency.   Musculoskeletal: Positive for arthralgias.   Neurological: Negative for headaches.   Hematological: Does not bruise/bleed easily.       Past Medical History:   Diagnosis Date   • Diabetes mellitus    • ED (erectile dysfunction)    • Enlarged prostate with lower urinary tract symptoms (LUTS)    • Renal disorder    • Stone in kidney         Past Surgical History:   Procedure Laterality Date   • KNEE SURGERY     • NEPHRECTOMY         Family History   Problem Relation Age of Onset   • No Known Problems Mother    • No Known Problems Father        Social History     Social History   • Marital status:      Spouse name: N/A   • Number of children: N/A   • Years of education: N/A     Occupational History   • Not on file.     Social History Main Topics   • Smoking status: Never Smoker   • Smokeless tobacco: Never Used   • Alcohol use No   • Drug use: No   • Sexual activity: Defer     Other Topics Concern   • Not on file     Social History Narrative       I have reviewed all of the above social hx, family hx, surgical hx, medications, allergies & ROS and confirm that it is accurate.    No Known Allergies    Objective   Resp  "16  Ht 182.9 cm (72\")  Wt 84.4 kg (186 lb)  BMI 25.23 kg/m2   Physical Exam  Constitutional: He is oriented to person, place, and time. He appears well-developed and well-nourished.   HENT:   Head: Normocephalic and atraumatic.   Eyes: EOM are normal. Pupils are equal, round, and reactive to light.   Neck: Normal range of motion. Neck supple.   Cardiovascular: Normal rate.    Pulmonary/Chest: Effort normal and breath sounds normal.   Abdominal: Soft.   Neurological: He is alert and oriented to person, place, and time.   Skin: Skin is warm and dry.   Psychiatric: He has a normal mood and affect.   Nursing note and vitals reviewed.       Ortho Exam     Left shoulder positive tenderness the proximal humeral area, forward active elevation only 60°, active abduction only 60° with pain anterolaterally at the left shoulder, no tenderness acromial clavicular joint arm is neurovascularly intact  Assessment/Plan   Review of Radiographic Studies:    No new imaging done today.      Procedures     Ankur was seen today for follow-up.    Diagnoses and all orders for this visit:    Shoulder strain, left, initial encounter  -     MRI Shoulder Left Without Contrast; Future     Physical therapy referral given      Recommendations/Plan:   Test/Studies: MRI left shoulder    Patient agreeable to call or return sooner for any concerns.             Impression:  Continued pain with loss of active motion left shoulder rule out rotator cuff tear and/or fracture  Plan:  Continue with ice Tylenol Aleve MRI to rule out fracture or rotator cuff tear  "

## 2017-12-15 ENCOUNTER — HOSPITAL ENCOUNTER (OUTPATIENT)
Dept: MRI IMAGING | Facility: HOSPITAL | Age: 77
Discharge: HOME | End: 2017-12-15
Attending: ORTHOPAEDIC SURGERY | Admitting: ORTHOPAEDIC SURGERY

## 2017-12-15 DIAGNOSIS — S46.912A SHOULDER STRAIN, LEFT, INITIAL ENCOUNTER: ICD-10-CM

## 2017-12-15 PROCEDURE — 73221 MRI JOINT UPR EXTREM W/O DYE: CPT

## 2017-12-19 ENCOUNTER — OFFICE VISIT (OUTPATIENT)
Dept: ORTHOPEDIC SURGERY | Facility: CLINIC | Age: 77
End: 2017-12-19

## 2017-12-19 VITALS — RESPIRATION RATE: 16 BRPM | HEIGHT: 72 IN | BODY MASS INDEX: 25.15 KG/M2 | WEIGHT: 185.7 LBS

## 2017-12-19 DIAGNOSIS — S46.912A SHOULDER STRAIN, LEFT, INITIAL ENCOUNTER: Primary | ICD-10-CM

## 2017-12-19 DIAGNOSIS — M75.122 COMPLETE TEAR OF LEFT ROTATOR CUFF: ICD-10-CM

## 2017-12-19 PROCEDURE — 99214 OFFICE O/P EST MOD 30 MIN: CPT | Performed by: ORTHOPAEDIC SURGERY

## 2017-12-19 NOTE — PROGRESS NOTES
Subjective   Patient ID: Ankur Camp is a 77 y.o. male  Follow-up and Results of the Left Shoulder (MRI results)             History of Present Illness     Right-hand-dominant male with improving the left shoulder pain, no rest pain during the day, only has pain at night laying on his left side, no change in active range of motion left shoulder, recent MR study showed chronic rotator cuff arthropathic changes with complete tears and retraction super space in for space and degenerative labral findings.    Review of Systems   Constitutional: Negative for diaphoresis, fever and unexpected weight change.   HENT: Negative for dental problem and sore throat.    Eyes: Negative for visual disturbance.   Respiratory: Negative for shortness of breath.    Cardiovascular: Negative for chest pain.   Gastrointestinal: Negative for abdominal pain, constipation, diarrhea, nausea and vomiting.   Genitourinary: Negative for difficulty urinating and frequency.   Musculoskeletal: Positive for arthralgias.   Neurological: Negative for headaches.   Hematological: Does not bruise/bleed easily.   All other systems reviewed and are negative.      Past Medical History:   Diagnosis Date   • Diabetes mellitus    • ED (erectile dysfunction)    • Enlarged prostate with lower urinary tract symptoms (LUTS)    • Renal disorder    • Stone in kidney         Past Surgical History:   Procedure Laterality Date   • KNEE SURGERY     • NEPHRECTOMY         Family History   Problem Relation Age of Onset   • No Known Problems Mother    • No Known Problems Father        Social History     Social History   • Marital status:      Spouse name: N/A   • Number of children: N/A   • Years of education: N/A     Occupational History   • Not on file.     Social History Main Topics   • Smoking status: Never Smoker   • Smokeless tobacco: Never Used   • Alcohol use No   • Drug use: No   • Sexual activity: Defer     Other Topics Concern   • Not on file  "    Social History Narrative       I have reviewed all of the above social hx, family hx, surgical hx, medications, allergies & ROS and confirm that it is accurate.    No Known Allergies    Objective   Resp 16  Ht 182.9 cm (72.01\")  Wt 84.2 kg (185 lb 11.2 oz)  BMI 25.18 kg/m2   Physical Exam  Constitutional: Patient is oriented to person, place, and time. Patient appears well-developed and well-nourished.   HENT:Head: Normocephalic and atraumatic.   Eyes: EOM are normal. Pupils are equal, round, and reactive to light.   Neck: Normal range of motion. Neck supple.   Cardiovascular: Normal rate.    Pulmonary/Chest: Effort normal and breath sounds normal.   Abdominal: Soft.   Neurological: Patient is alert and oriented to person, place, and time.   Skin: Skin is warm and dry.   Psychiatric: Patient has a normal mood and affect.   Nursing note and vitals reviewed.       Ortho Exam   Left shoulder forward active elevation 150° active abduction only 50° minimal pain of the left anterolateral acromial area no change in neurovascular status    Assessment/Plan   Review of Radiographic Studies:    No new imaging done today.      Procedures     Ankur was seen today for follow-up and results.    Diagnoses and all orders for this visit:    Shoulder strain, left, initial encounter  -     Ambulatory Referral to Physical Therapy Evaluate and treat    Complete tear of left rotator cuff  -     Ambulatory Referral to Physical Therapy Evaluate and treat     Physical therapy referral given      Recommendations/Plan:   Exercise, medications, injections, other patient advice, and return appointment as noted.    Patient agreeable to call or return sooner for any concerns.       Discussed findings of MRI with physical exam findings left shoulder with patient and his wife, discussed options of surgical care versus nonsurgical care and treatment alternatives pros and cons risks and complications and expectations for improvement pain level " and functional use.  Given the size of his tear chronicity of the problem and associated arthropathic changes recommendation made for total shoulder reverse type arthroplasty left shoulder in the future if pain level worsens or functional use of the left arm is not acceptable for daily living.  He does do repetitive lifting of heavy 50 pound bags and I am concerned about postoperative complications with his activity level should he undergo a left reverse shoulder arthroplasty.      Impression:  Rotator cuff arthropathy left nondominant shoulder with acute strain superimposed, recent MR confirm complete tear supraspinous infraspinous and degenerative labral tearing  Plan:  Nonsurgical treatment advised this time, rehabilitation to maximize deltoid function, consider left reverse total shoulder arthroplasty in 4-6 months if functional use of the arm not improved or pain at  rest occur

## 2017-12-27 ENCOUNTER — HOSPITAL ENCOUNTER (OUTPATIENT)
Dept: PHYSICAL THERAPY | Age: 77
Discharge: HOME | End: 2017-12-31

## 2017-12-27 ASSESSMENT — PAIN DESCRIPTION - PAIN TYPE: TYPE: ACUTE PAIN

## 2017-12-27 ASSESSMENT — PAIN DESCRIPTION - ONSET: ONSET: ON-GOING

## 2017-12-27 ASSESSMENT — PAIN DESCRIPTION - PROGRESSION: CLINICAL_PROGRESSION: NOT CHANGED

## 2017-12-27 ASSESSMENT — PAIN DESCRIPTION - FREQUENCY: FREQUENCY: INTERMITTENT

## 2017-12-27 ASSESSMENT — PAIN DESCRIPTION - ORIENTATION: ORIENTATION: LEFT

## 2017-12-27 ASSESSMENT — PAIN DESCRIPTION - LOCATION: LOCATION: SHOULDER

## 2017-12-27 ASSESSMENT — PAIN SCALES - GENERAL: PAINLEVEL_OUTOF10: 3

## 2017-12-27 ASSESSMENT — PAIN DESCRIPTION - DESCRIPTORS: DESCRIPTORS: ACHING;DULL;SHARP

## 2017-12-27 NOTE — PROGRESS NOTES
Physical Therapy  Initial Assessment  Date: 2017  Patient Name: Elmer Motta  MRN: [id number]  : 1940     Treatment Diagnosis: Left RTC treat; pain, stiffness, muscle weakness    Restrictions  Restrictions/Precautions  Restrictions/Precautions: General Precautions  Required Braces or Orthoses?: No    Subjective   General  Chart Reviewed: No  Patient assessed for rehabilitation services?: Yes  Family / Caregiver Present: No  Referring Practitioner: Bin Snow MD  Diagnosis: Left shoulder pain  Follows Commands: Within Functional Limits  PT Visit Information  PT Insurance Information: Medicare, [facility] [place]  Subjective  Subjective: Injury to left shoulder - had a fall off a ladder about 3 weeks ago; went to ER = x-rays = no fractures; had MRI = left RTC complete tear + severe OA; complaints consist of pain with movement, AROM; unable to reach or lift. Pain Screening  Patient Currently in Pain: Yes  Pain Assessment  Pain Assessment: 0-10  Pain Level: 3  Pain Type: Acute pain  Pain Location: Shoulder  Pain Orientation: Left  Pain Descriptors: Aching;Dull; Sharp  Pain Frequency: Intermittent  Pain Onset: On-going  Clinical Progression: Not changed  Vital Signs  Patient Currently in Pain: Yes    Vision/Hearing  Vision  Vision: Within Functional Limits  Hearing  Hearing: Within functional limits    Orientation  Orientation  Overall Orientation Status: Within Normal Limits    Social/Functional History  Social/Functional History  Lives With: Spouse  ADL Assistance: Independent  Ambulation Assistance: Independent  Transfer Assistance: Independent  Active : Yes  Mode of Transportation: Car  Occupation: Unemployed; Retired  Leisure & Hobbies: tries to stay active  Objective     Observation/Palpation  Posture: Fair  Palpation: Tenderness left shoulder: proximal biceps, lateral deltoid, upper trap/supraspinatus  Observation: rounded shoulders, increased thoracic kyphosis, GH, SC, AC joints intact    AROM LUE (degrees)  L Shoulder Flexion 0-180: 40 / 65  L Shoulder Extension 0-45: 20  L Shoulder ABduction 0-180: 45 / 50  L Shoulder Int Rotation  0-70: 60  L Shoulder Ext Rotation  0-90: 30 / 40    Strength LUE  L Shoulder Flexion: 2-/5  L Shoulder Extension: 4-/5  L Shoulder ABduction: 2-/5  L Shoulder Internal Rotation: 3+/5  L Shoulder External Rotation: 2+/5  Tone RLE  RLE Tone: Normotonic  Tone LLE  LLE Tone: Normotonic  Motor Control  Gross Motor?: WFL  Additional Measures  Special Tests: (+) drop arm test  Other: Quick DASH = 36           Ambulation  Ambulation?: Yes  Ambulation 1  Surface: level tile  Device: No Device  Assistance: Independent     Exercises  Exercise 1: scap squeezes x 15  Exercise 2: gentle bent over row x 15  Exercise 3: supine self FF AAROM x 15  Exercise 4: FF table slide OR big ball roll on table x 15  Exercise 6: Begin: gentle pulleys: FF  Exercise 7: Manual: grade 1-3 mobs, PROM left shoulder                      Assessment   Conditions Requiring Skilled Therapeutic Intervention  Body structures, Functions, Activity limitations: Decreased ADL status; Decreased ROM; Decreased strength;Decreased high-level IADLs  Assessment: Left RTC treat; pain, stiffness, muscle weakness  Treatment Diagnosis: Left RTC treat; pain, stiffness, muscle weakness  Prognosis: Good  Decision Making: Low Complexity  Patient Education: HEP, activity modification, self-care.   REQUIRES PT FOLLOW UP: Yes  Treatment Initiated : manual therapy, HEP  Activity Tolerance  Activity Tolerance: Patient Tolerated treatment well         Plan   Plan  Times per week: 2 x week  Plan weeks: 8 weeks  Current Treatment Recommendations: Strengthening, ROM, Manual Therapy - Joint Manipulation, Neuromuscular Re-education, Home Exercise Program, Modalities, Manual Therapy - Soft Tissue Mobilization    G-Code  PT G-Codes  Functional Assessment Tool Used: Quick DASH  Score: 36  Functional Limitation: Carrying, moving and handling Date:

## 2018-01-01 ENCOUNTER — HOSPITAL ENCOUNTER (OUTPATIENT)
Dept: OTHER | Age: 78
Discharge: HOME | End: 2018-01-31

## 2018-01-02 ENCOUNTER — HOSPITAL ENCOUNTER (OUTPATIENT)
Dept: PHYSICAL THERAPY | Age: 78
Discharge: HOME | End: 2018-01-02

## 2018-01-02 NOTE — FLOWSHEET NOTE
Physical Therapy Daily Treatment Note   Date:  2018    TIme In:    6504                  Time Out: 517    Patient Name:  Mckayla Notice    :  1940  MRN: [id number]    Restrictions/Precautions:    Pertinent Medical History:  Medical/Treatment Diagnosis Information:    Left RTC treat; pain, stiffness, muscle weakness  ·    ·    Insurance/Certification information:    Medicare, [facility] [place]   Physician Information:    Severiano Charnley, MD  Plan of care signed (Y/N):    Visit# / total visits:     2/    G-Code (if applicable):      Date / Visit # G-Code Applied:         Progress Note: []  Yes  [x]  No  Next due by: Visit #10      Pain level:   0/10  Subjective: Pt reports his sh done ok after last visit but it did feel irritated after todays session. Objective:  Observation:   Test measurements:    Palpation:    Exercises:  Exercise Resistance/Repetitions Other comments   Pulley: FF x3' 2   Scap squeezes x15 2   FF table slides or big ball roll on table  x15 2   Bent over row x15 2   Supine self FF AROM x15 2                                        Other Therapeutic Activities:      Manual Treatments:  grade 1-3 mobs, PROM left shoulder x15'. Modalities:        Timed Code Treatment Minutes:  35       Total Treatment Minutes:  40    Treatment/Activity Tolerance:  [x] Patient tolerated treatment well [] Patient limited by fatigue  [] Patient limited by pain  [] Patient limited by other medical complications  [x] Other: Pt completed tx with mild irritation and tolerated manual tx well with verbal cueing to relax throughout.     Pain after treatment:      \"its in there\"/10    Prognosis: [x] Good [] Fair  [] Poor    Patient Requires Follow-up: [x] Yes  [] No    Plan:   [x] Continue per plan of care [] Alter current plan (see comments)  [] Plan of care initiated [] Hold pending MD visit [] Discharge    Plan for Next Session:        Electronically signed by:  Rosa Maria Saleh PTA

## 2018-01-04 ENCOUNTER — HOSPITAL ENCOUNTER (OUTPATIENT)
Dept: PHYSICAL THERAPY | Age: 78
Discharge: HOME | End: 2018-01-04

## 2018-01-09 ENCOUNTER — HOSPITAL ENCOUNTER (OUTPATIENT)
Dept: PHYSICAL THERAPY | Age: 78
Discharge: HOME | End: 2018-01-09

## 2018-01-11 ENCOUNTER — HOSPITAL ENCOUNTER (OUTPATIENT)
Dept: PHYSICAL THERAPY | Age: 78
Discharge: HOME | End: 2018-01-11

## 2018-01-16 ENCOUNTER — HOSPITAL ENCOUNTER (OUTPATIENT)
Dept: PHYSICAL THERAPY | Age: 78
Discharge: HOME | End: 2018-01-16

## 2018-01-17 ENCOUNTER — LAB (OUTPATIENT)
Dept: UROLOGY | Facility: CLINIC | Age: 78
End: 2018-01-17

## 2018-01-17 DIAGNOSIS — N40.0 BENIGN PROSTATIC HYPERPLASIA WITHOUT URINARY OBSTRUCTION: Primary | ICD-10-CM

## 2018-01-17 LAB — PSA SERPL-MCNC: 5.71 NG/ML (ref 0.06–4)

## 2018-01-18 ENCOUNTER — HOSPITAL ENCOUNTER (OUTPATIENT)
Dept: PHYSICAL THERAPY | Age: 78
Discharge: HOME | End: 2018-01-18

## 2018-01-18 ENCOUNTER — OFFICE VISIT (OUTPATIENT)
Dept: PRIMARY CARE CLINIC | Age: 78
End: 2018-01-18
Payer: MEDICARE

## 2018-01-18 VITALS
OXYGEN SATURATION: 98 % | WEIGHT: 184.2 LBS | SYSTOLIC BLOOD PRESSURE: 146 MMHG | RESPIRATION RATE: 18 BRPM | BODY MASS INDEX: 24.98 KG/M2 | TEMPERATURE: 97.9 F | DIASTOLIC BLOOD PRESSURE: 72 MMHG | HEART RATE: 82 BPM

## 2018-01-18 DIAGNOSIS — T33.90XA FROSTBITE, INITIAL ENCOUNTER: Primary | ICD-10-CM

## 2018-01-18 PROCEDURE — G8420 CALC BMI NORM PARAMETERS: HCPCS | Performed by: NURSE PRACTITIONER

## 2018-01-18 PROCEDURE — 4040F PNEUMOC VAC/ADMIN/RCVD: CPT | Performed by: NURSE PRACTITIONER

## 2018-01-18 PROCEDURE — G8427 DOCREV CUR MEDS BY ELIG CLIN: HCPCS | Performed by: NURSE PRACTITIONER

## 2018-01-18 PROCEDURE — 4004F PT TOBACCO SCREEN RCVD TLK: CPT | Performed by: NURSE PRACTITIONER

## 2018-01-18 PROCEDURE — 1123F ACP DISCUSS/DSCN MKR DOCD: CPT | Performed by: NURSE PRACTITIONER

## 2018-01-18 PROCEDURE — G8484 FLU IMMUNIZE NO ADMIN: HCPCS | Performed by: NURSE PRACTITIONER

## 2018-01-18 PROCEDURE — 99213 OFFICE O/P EST LOW 20 MIN: CPT | Performed by: NURSE PRACTITIONER

## 2018-01-18 NOTE — FLOWSHEET NOTE
Physical Therapy Daily Treatment Note   Date:  2018    TIme In:   1015                   Time Out:     3085    Patient Name:  Nikita Graham    :  1940  MRN: [id number]    Restrictions/Precautions:    Pertinent Medical History:  Medical/Treatment Diagnosis Information:    Left RTC treat; pain, stiffness, muscle weakness  ·       Insurance/Certification information:    Medicare, [facility] [place]   Physician Information:    Pernell Milan MD  Plan of care signed (Y/N):    Visit# / total visits:     7/    G-Code (if applicable):      Date / Visit # G-Code Applied:         Progress Note: []  Yes  [x]  No  Next due by: Visit #10      Pain level:   0/10    Subjective:    Pt reports his shoulder seems to be doing better; no new c/o. Objective:  Observation:   Test measurements:    Palpation:     Exercises:  Exercise Resistance/Repetitions Other comments   Pulley: FF x3' 18   Scap squeezes 2x10 18   FF table slides or big ball roll on table  x15 18   Bent over row x15 18   Mid/Low rows x20 GTB 18   Wand ER/abd x15 18   Wand flex supine x15 18   Pendulum(cw/ccw) 2x15 18   UBE 5' 18   TB mid/low rows 2x10 each Red 18               Other Therapeutic Activities:      Manual Treatments:  grade 1-3 mobs, PROM left shoulder x15'. Modalities:        Timed Code Treatment Minutes:  55       Total Treatment Minutes:  60    Treatment/Activity Tolerance:  [x] Patient tolerated treatment well [] Patient limited by fatigue  [] Patient limited by pain  [] Patient limited by other medical complications  [x] Other: Pt demonstrated an increase in ROM today during manual tx.     Pain after treatment:    0/10    Prognosis: [x] Good [] Fair  [] Poor    Patient Requires Follow-up: [x] Yes  [] No    Plan:   [x] Continue per plan of care [] Alter current plan (see comments)  [] Plan of care initiated [] Hold pending MD visit [] Discharge    Plan for Next Session:        Electronically signed by:  Electronically signed by Pedro Florez Emilee, PTA on 1/18/2018 at 10:49 AM

## 2018-01-18 NOTE — PROGRESS NOTES
Have you seen any other physician or provider since your last visit ? Eye exam    Have you had any other diagnostic tests since your last visit? no    Have you changed or stopped any medications since your last visit? no         Pt is here co of  fingers on right hand burning and tingling since Tuesday he was salting the driveway thinks they are frost bit.
MCV 94.6 10/02/2017     10/02/2017       Lab Results   Component Value Date    TSH 1.17 10/02/2017         ASSESSMENT/PLAN:     1. Frostbite, initial encounter  Treated with burn cream and dressing applied. Advised to change dressing bid. Return if sign or symptoms of infection. - silver sulfADIAZINE (SILVADENE) 1 % cream; Apply topically daily.   Dispense: 50 g; Refill: 0

## 2018-01-19 ENCOUNTER — OFFICE VISIT (OUTPATIENT)
Dept: UROLOGY | Facility: CLINIC | Age: 78
End: 2018-01-19

## 2018-01-19 VITALS
HEIGHT: 72 IN | OXYGEN SATURATION: 98 % | SYSTOLIC BLOOD PRESSURE: 130 MMHG | HEART RATE: 76 BPM | BODY MASS INDEX: 25.06 KG/M2 | TEMPERATURE: 98.4 F | WEIGHT: 185 LBS | DIASTOLIC BLOOD PRESSURE: 60 MMHG

## 2018-01-19 DIAGNOSIS — N40.0 ENLARGED PROSTATE: Primary | ICD-10-CM

## 2018-01-19 DIAGNOSIS — R97.20 ELEVATED PROSTATE SPECIFIC ANTIGEN (PSA): ICD-10-CM

## 2018-01-19 LAB
BILIRUB BLD-MCNC: NEGATIVE MG/DL
CLARITY, POC: CLEAR
COLOR UR: YELLOW
GLUCOSE UR STRIP-MCNC: ABNORMAL MG/DL
KETONES UR QL: NEGATIVE
LEUKOCYTE EST, POC: NEGATIVE
NITRITE UR-MCNC: NEGATIVE MG/ML
PH UR: 7 [PH] (ref 5–8)
PROT UR STRIP-MCNC: NEGATIVE MG/DL
RBC # UR STRIP: NEGATIVE /UL
SP GR UR: 1.02 (ref 1–1.03)
UROBILINOGEN UR QL: NORMAL

## 2018-01-19 PROCEDURE — 81003 URINALYSIS AUTO W/O SCOPE: CPT | Performed by: UROLOGY

## 2018-01-19 PROCEDURE — 99214 OFFICE O/P EST MOD 30 MIN: CPT | Performed by: UROLOGY

## 2018-01-19 NOTE — PROGRESS NOTES
Chief Complaint  Benign Prostatic Hypertrophy (yearly exam)        KUSUM Camp is a 77 y.o. male who returns today with a known enlarged prostate and difficulty voiding.  He had moderate symptoms of outlet obstruction on Flomax and Proscar was added on his last visit.  He is a little vague on the details but I think he is improved.  He continues to take Ditropan at night to prevent nocturia but this is actually based on lower extremity edema and sleep apnea within prostatic obstruction.  He's declined the offer of referral to sleep clinic see states he is not wearing his CPAP device.    A new problem today is elevated PSA that shows a significant risk of prostate cancer.  At the age of 77 he states he's already outlived most members of his family.       Vitals:    01/19/18 0918   BP: 130/60   Pulse: 76   Temp: 98.4 °F (36.9 °C)   SpO2: 98%       Past Medical History  Past Medical History:   Diagnosis Date   • Diabetes mellitus    • ED (erectile dysfunction)    • Enlarged prostate with lower urinary tract symptoms (LUTS)    • Renal disorder    • Stone in kidney        Past Surgical History  Past Surgical History:   Procedure Laterality Date   • KNEE SURGERY     • NEPHRECTOMY         Medications  has a current medication list which includes the following prescription(s): amlodipine, amlodipine, amlodipine besy-benazepril hcl, amlodipine besylate, aspirin, aspirin, cholecalciferol, finasteride, fluticasone, glucose blood, hydrocodone-homatropine, lisinopril, lisinopril, lisinopril, lovastatin, lovastatin, metformin, metformin, multiple vitamins-minerals, nifedipine, oxybutynin, oxybutynin, tamsulosin, tamsulosin, vitamin b-12, vitamin c, vitamin c, and vitamin d (cholecalciferol).      Allergies  No Known Allergies    Social History  Social History     Social History Narrative       Family History  He has no family history of bladder or kidney cancer  He has no family history of kidney stones      AUA Symptom  Score:      Review of Systems  Review of Systems    Physical Exam  Physical Exam   Constitutional: He is oriented to person, place, and time. He appears well-developed and well-nourished.   HENT:   Head: Normocephalic and atraumatic.   Neck: Normal range of motion.   Pulmonary/Chest: Effort normal. No respiratory distress.   Abdominal: Soft. He exhibits no distension and no mass. There is no tenderness. No hernia.   Genitourinary: Rectum normal and prostate normal.   Musculoskeletal: Normal range of motion.   Lymphadenopathy:     He has no cervical adenopathy.   Neurological: He is alert and oriented to person, place, and time.   Skin: Skin is warm and dry.   Psychiatric: He has a normal mood and affect. His behavior is normal.   Vitals reviewed.      Labs Recent and today in the office:  Results for orders placed or performed in visit on 01/19/18   POC Urinalysis Dipstick, Automated   Result Value Ref Range    Color Yellow Yellow, Straw, Dark Yellow, [place]    Clarity, UA Clear Clear    Glucose, UA Trace (A) Negative, 1000 mg/dL (3+) mg/dL    Bilirubin Negative Negative    Ketones, UA Negative Negative    Specific Gravity  1.020 1.005 - 1.030    Blood, UA Negative Negative    pH, Urine 7.0 5.0 - 8.0    Protein, POC Negative Negative mg/dL    Urobilinogen, UA Normal Normal    Leukocytes Negative Negative    Nitrite, UA Negative Negative         Assessment & Plan  BPH: Currently voiding adequately on Proscar Flomax and Ditropan    Elevated PSA: Digital rectal exam today reveals modest enlargement but no suspicious nodularity.  I suggested a total to free PSA ratio and if in a high risk category we need to proceed with a biopsy.  Therefore stool culture is submitted to rule out organism was resistant to Cipro.  He will return in 1 month for decision on biopsy.

## 2018-01-20 LAB
PSA FREE MFR SERPL: 15.9 %
PSA FREE SERPL-MCNC: 0.97 NG/ML
PSA SERPL-MCNC: 6.1 NG/ML (ref 0–4)

## 2018-01-23 ENCOUNTER — HOSPITAL ENCOUNTER (OUTPATIENT)
Dept: PHYSICAL THERAPY | Age: 78
Discharge: HOME | End: 2018-01-23

## 2018-01-23 LAB
BACTERIA ANAL CULT: NORMAL
BACTERIA ANAL CULT: NORMAL

## 2018-01-25 ENCOUNTER — HOSPITAL ENCOUNTER (OUTPATIENT)
Dept: PHYSICAL THERAPY | Age: 78
Discharge: HOME | End: 2018-01-25

## 2018-01-27 DIAGNOSIS — E11.9 TYPE 2 DIABETES MELLITUS WITHOUT COMPLICATION, WITHOUT LONG-TERM CURRENT USE OF INSULIN (HCC): ICD-10-CM

## 2018-01-28 ASSESSMENT — ENCOUNTER SYMPTOMS
EYES NEGATIVE: 1
GASTROINTESTINAL NEGATIVE: 1
COLOR CHANGE: 1
RESPIRATORY NEGATIVE: 1

## 2018-01-29 ENCOUNTER — OFFICE VISIT (OUTPATIENT)
Dept: PRIMARY CARE CLINIC | Age: 78
End: 2018-01-29
Payer: MEDICARE

## 2018-01-29 VITALS
WEIGHT: 184 LBS | TEMPERATURE: 99 F | SYSTOLIC BLOOD PRESSURE: 136 MMHG | BODY MASS INDEX: 24.92 KG/M2 | DIASTOLIC BLOOD PRESSURE: 78 MMHG | HEART RATE: 76 BPM | RESPIRATION RATE: 20 BRPM | HEIGHT: 72 IN | OXYGEN SATURATION: 94 %

## 2018-01-29 DIAGNOSIS — J10.1 INFLUENZA B: Primary | ICD-10-CM

## 2018-01-29 DIAGNOSIS — R52 BODY ACHES: ICD-10-CM

## 2018-01-29 LAB
INFLUENZA A ANTIBODY: ABNORMAL
INFLUENZA B ANTIBODY: POSITIVE

## 2018-01-29 PROCEDURE — G8420 CALC BMI NORM PARAMETERS: HCPCS | Performed by: FAMILY MEDICINE

## 2018-01-29 PROCEDURE — 4040F PNEUMOC VAC/ADMIN/RCVD: CPT | Performed by: FAMILY MEDICINE

## 2018-01-29 PROCEDURE — 1123F ACP DISCUSS/DSCN MKR DOCD: CPT | Performed by: FAMILY MEDICINE

## 2018-01-29 PROCEDURE — 4004F PT TOBACCO SCREEN RCVD TLK: CPT | Performed by: FAMILY MEDICINE

## 2018-01-29 PROCEDURE — 87804 INFLUENZA ASSAY W/OPTIC: CPT | Performed by: FAMILY MEDICINE

## 2018-01-29 PROCEDURE — G8427 DOCREV CUR MEDS BY ELIG CLIN: HCPCS | Performed by: FAMILY MEDICINE

## 2018-01-29 PROCEDURE — G8484 FLU IMMUNIZE NO ADMIN: HCPCS | Performed by: FAMILY MEDICINE

## 2018-01-29 PROCEDURE — 99213 OFFICE O/P EST LOW 20 MIN: CPT | Performed by: FAMILY MEDICINE

## 2018-01-29 RX ORDER — OSELTAMIVIR PHOSPHATE 75 MG/1
75 CAPSULE ORAL 2 TIMES DAILY
Qty: 10 CAPSULE | Refills: 0 | Status: SHIPPED | OUTPATIENT
Start: 2018-01-29 | End: 2018-02-03

## 2018-01-29 ASSESSMENT — ENCOUNTER SYMPTOMS
CONSTIPATION: 0
RHINORRHEA: 0
COUGH: 1
EYE ITCHING: 0
DIARRHEA: 0
ABDOMINAL PAIN: 0
NAUSEA: 0
EYE REDNESS: 0
SORE THROAT: 0
VOMITING: 0
SHORTNESS OF BREATH: 0
EYE DISCHARGE: 0

## 2018-01-29 NOTE — PATIENT INSTRUCTIONS
· Continue christina selzter and tylenol when needed with tamiflu  · Keep a list of your medicines with you. List all of the prescription medicines, nonprescription medicines, supplements, natural remedies, and vitamins that you take. Tell your healthcare providers who treat you about all of the products you are taking. Your provider can provide you with a form to keep track of them. Just ask. · Follow the directions that come with your medicine, including information about food or alcohol. Make sure you know how and when to take your medicine. Do not take more or less than you are supposed to take. · Keep all medicines out of the reach of children. · Store medicines according to the directions on the label. · Monitor yourself. Learn to know how your body reacts to your new medicine and keep track of how it makes you feel before attempting (If your provider has allowed you to do so) to drive or go to work. · Seek emergency medical attention if you think you have used too much of this medicine. An overdose of any prescription medicine can be fatal. Overdose symptoms may include extreme drowsiness, muscle weakness, confusion, cold and clammy skin, pinpoint pupils, shallow breathing, slow heart rate, fainting, or coma. · Don't share prescription medicines with others, even when they seem to have the same symptoms. What may be good for you may be harmful to others. · If you are no longer taking a prescribed medication and you have pills left please take your pills out of their original containers. Mix crushed pills with an undesirable substance, such as cat litter or used coffee grounds. Put the mixture into a disposable container with a lid, such as an empty margarine tub, or into a sealable bag. Cover up or remove any of your personal information on the empty containers by covering it with black permanent marker or duct tape.   Place the sealed container with the mixture, and the empty drug containers, in the

## 2018-01-30 ENCOUNTER — OFFICE VISIT (OUTPATIENT)
Dept: ORTHOPEDIC SURGERY | Facility: CLINIC | Age: 78
End: 2018-01-30

## 2018-01-30 VITALS — RESPIRATION RATE: 16 BRPM | WEIGHT: 183.9 LBS | BODY MASS INDEX: 24.91 KG/M2 | HEIGHT: 72 IN

## 2018-01-30 DIAGNOSIS — M75.122 COMPLETE TEAR OF LEFT ROTATOR CUFF: Primary | ICD-10-CM

## 2018-01-30 PROCEDURE — 99213 OFFICE O/P EST LOW 20 MIN: CPT | Performed by: ORTHOPAEDIC SURGERY

## 2018-01-30 RX ORDER — OSELTAMIVIR PHOSPHATE 75 MG/1
75 CAPSULE ORAL
COMMUNITY
Start: 2018-01-29 | End: 2018-02-03

## 2018-01-30 RX ORDER — OSELTAMIVIR PHOSPHATE 75 MG/1
CAPSULE ORAL
COMMUNITY
Start: 2018-01-29 | End: 2018-02-19
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
## 2018-01-31 ENCOUNTER — HOSPITAL ENCOUNTER (OUTPATIENT)
Dept: PHYSICAL THERAPY | Age: 78
Discharge: HOME | End: 2018-02-03

## 2018-01-31 NOTE — FLOWSHEET NOTE
Physical Therapy Daily Treatment Note   Date:  2018    TIme In:   08                  Time Out:   940     Patient Name:  Arnoldo Macias    :  1940  MRN: [id number]    Restrictions/Precautions:    Pertinent Medical History:  Medical/Treatment Diagnosis Information:  ·   Left RTC treat; pain, stiffness, muscle weakness      Insurance/Certification information:    Medicare, [de-identified] [place]   Physician Information:    Dewitte Pallas, MD  Plan of care signed (Y/N):    Visit# / total visits:     9/    G-Code (if applicable):      Date / Visit # G-Code Applied:         Progress Note: []  Yes  [x]  No  Next due by: Visit #10      Pain level:  0/10    Subjective:    Pt reports he saw his orthopedic surgeon and was instructed to perform 6 more weeks of PT. Pt presents with a new script with diagnosis of complete tear of L RC. Objective:  Observation:   Test measurements:  Quick DASH: 55%, Left sh PROM: flex:83 deg, abd: 134 deg, ER:56 deg, IR: 60 deg. Right sh MMT: flex/abd/ER: 2+/5 with limited range and pain, IR: 4+/5. Palpation:     Exercises:  Exercise Resistance/Repetitions Other comments   Pulley: FF x3' 31   Scap squeezes 2x10 31   FF table slides or ball roll on table  x20 31   Bent over row x15 31   Mid/Low rows x20 GTB 31   Wand ER/abd x15 25   Wand flex supine x20  1# bar 31   Pendulum(cw/ccw) 2x15 31   UBE L3x6' 25   Wall posture 15x3\" 25                    Other Therapeutic Activities:      Manual Treatments:  grade 1-3 mobs, PROM left shoulder, gravity eliminated shoulder flexion in sidelying with manual cues x 20 on side table. 90 deg stabilization holds, 3x30\"   x25 min. Pt was issued a written HEP today and instructed to perform 1-2x/day. Modalities:  -    Goals  Short term goals  Time Frame for Short term goals: 4 weeks  Short term goal 1: Achieve left shoulder pain at or less than 3/10 with routine ADL's and I-ADL's.   Short term goal 2: Achieve left shoulder AROM: FF = 75, ABD

## 2018-02-01 ENCOUNTER — HOSPITAL ENCOUNTER (OUTPATIENT)
Dept: OTHER | Age: 78
Discharge: HOME | End: 2018-02-28

## 2018-02-05 ENCOUNTER — HOSPITAL ENCOUNTER (OUTPATIENT)
Dept: PHYSICAL THERAPY | Age: 78
Discharge: HOME | End: 2018-02-08

## 2018-02-07 ENCOUNTER — HOSPITAL ENCOUNTER (OUTPATIENT)
Dept: PHYSICAL THERAPY | Age: 78
Discharge: HOME | End: 2018-02-10

## 2018-02-07 NOTE — FLOWSHEET NOTE
Physical Therapy Daily Treatment Note   Date:  2018    TIme In:       5869              Time Out:   9159    Patient Name:  Yesy Barahona    :  1940  MRN: [id number]    Restrictions/Precautions:    Pertinent Medical History:  Medical/Treatment Diagnosis Information:  ·   Left RTC treat; pain, stiffness, muscle weakness      Insurance/Certification information:    Medicare, [de-identified] [place]   Physician Information:    Kaci Pandya MD  Plan of care signed (Y/N):    Visit# / total visits:     11/    G-Code (if applicable):      Date / Visit # G-Code Applied:         Progress Note: []  Yes  [x]  No  Next due by: Visit #10      Pain level:  0/10    Subjective:    Pt with no new complaints; expresses frustration with functional limitations of left UE. Objective:  Observation:   Test measurements:  Quick DASH: 55%, Left sh PROM: flex:83 deg, abd: 134 deg, ER:56 deg, IR: 60 deg. Right sh MMT: flex/abd/ER: 2+/5 with limited range and pain, IR: 4+/5. Palpation:     Exercises:  Exercise Resistance/Repetitions Other comments   Pulley: FF x3' 6   Scap squeezes 2x10 6   FF table slides or ball roll on table  x20 6   Bent over row x15 6   Mid/Low rows x20 GTB 6   Wand ER/abd x15 6   Wand flex supine x20  1# bar 6   Pendulum(cw/ccw) 2x15 6   UBE L3x6' 6   Wall posture 15x3\" 6                    Other Therapeutic Activities:      Manual Treatments:  grade 1-3 mobs, PROM left shoulder, gravity eliminated shoulder flexion in sidelying with manual cues x 20 on side table. 90 deg stabilization holds, 3x30\"   x15 min. .     Modalities:  - declined    Goals  Short term goals  Time Frame for Short term goals: 4 weeks  Short term goal 1: Achieve left shoulder pain at or less than 3/10 with routine ADL's and I-ADL's. Short term goal 2: Achieve left shoulder AROM: FF = 75, ABD = 65, ER = 60. Short term goal 3: Achieve a Quick DASH score of 30 or less. Short term goal 4: Independent with HEP.   Long term goals  Time Frame

## 2018-02-12 ENCOUNTER — HOSPITAL ENCOUNTER (OUTPATIENT)
Dept: PHYSICAL THERAPY | Age: 78
Discharge: HOME | End: 2018-02-15

## 2018-02-12 NOTE — PROGRESS NOTES
Treatments:  grade 1-3 mobs, PROM left shoulder, gravity eliminated shoulder flexion in sidelying with manual cues x 20 on side table. 90 deg stabilization holds, 3x30\"   x15 min. .     Modalities:  - declined    Goals  Short term goals  Time Frame for Short term goals: 4 weeks  Short term goal 1: Achieve left shoulder pain at or less than 3/10 with routine ADL's and I-ADL's. - met  Short term goal 2: Achieve left shoulder AROM: FF = 75, ABD = 65, ER = 60. - not met  Short term goal 3: Achieve a Quick DASH score of 30 or less. - not met, but improved  Short term goal 4: Independent with HEP. - met  Long term goals  Time Frame for Long term goals : 8 weeks  Long term goal 1: Achieve left shoulder pain at or less than 3/10 with routine ADL's and I-ADL's. Long term goal 2: Achieve left shoulder AROM: FF = 75, ABD = 65, ER = 60. Long term goal 3: Achieve 4-/5 left shoulder strength in all planes for use with ADL's and I-ADL's. Long term goal 4: Achieve a Quick DASH score of 25 or less. Timed Code Treatment Minutes:  61'        Total Treatment Minutes:    67'    Treatment/Activity Tolerance:  [x] Patient tolerated treatment well [] Patient limited by fatigue  [] Patient limited by pain  [] Patient limited by other medical complications  [x] Other:  Pt tolerated tx well. Pain after treatment:    0/10     Prognosis: [x] Good [] Fair  [] Poor    Patient Requires Follow-up: [x] Yes  [] No    Plan:   [x] Continue per plan of care [x] Alter current plan (see comments)  [] Plan of care initiated [] Hold pending MD visit [] Discharge    After this week, will decrease frequency of treatment to 1 x week.     Plan for Next Session:        Electronically signed by:    Electronically signed by Tata Caballero PT on 2/12/2018 at 1:11 PM

## 2018-02-14 ENCOUNTER — HOSPITAL ENCOUNTER (OUTPATIENT)
Dept: PHYSICAL THERAPY | Age: 78
Discharge: HOME | End: 2018-02-17

## 2018-02-19 ENCOUNTER — OFFICE VISIT (OUTPATIENT)
Dept: UROLOGY | Facility: CLINIC | Age: 78
End: 2018-02-19

## 2018-02-19 VITALS
HEART RATE: 71 BPM | HEIGHT: 72 IN | BODY MASS INDEX: 24.79 KG/M2 | OXYGEN SATURATION: 97 % | WEIGHT: 183 LBS | DIASTOLIC BLOOD PRESSURE: 74 MMHG | SYSTOLIC BLOOD PRESSURE: 132 MMHG | TEMPERATURE: 98.5 F

## 2018-02-19 DIAGNOSIS — N13.8 BPH WITH URINARY OBSTRUCTION: ICD-10-CM

## 2018-02-19 DIAGNOSIS — R97.20 ELEVATED PROSTATE SPECIFIC ANTIGEN (PSA): ICD-10-CM

## 2018-02-19 DIAGNOSIS — N40.0 ENLARGED PROSTATE: Primary | ICD-10-CM

## 2018-02-19 DIAGNOSIS — N40.1 BPH WITH URINARY OBSTRUCTION: ICD-10-CM

## 2018-02-19 LAB
BILIRUB BLD-MCNC: NEGATIVE MG/DL
CLARITY, POC: CLEAR
COLOR UR: YELLOW
GLUCOSE UR STRIP-MCNC: NEGATIVE MG/DL
KETONES UR QL: NEGATIVE
LEUKOCYTE EST, POC: NEGATIVE
NITRITE UR-MCNC: NEGATIVE MG/ML
PH UR: 6 [PH] (ref 5–8)
PROT UR STRIP-MCNC: NEGATIVE MG/DL
RBC # UR STRIP: NEGATIVE /UL
SP GR UR: 1.03 (ref 1–1.03)
UROBILINOGEN UR QL: NORMAL

## 2018-02-19 PROCEDURE — 81003 URINALYSIS AUTO W/O SCOPE: CPT | Performed by: UROLOGY

## 2018-02-19 PROCEDURE — 99213 OFFICE O/P EST LOW 20 MIN: CPT | Performed by: UROLOGY

## 2018-02-19 NOTE — PROGRESS NOTES
Chief Complaint  Benign Prostatic Hypertrophy (enlarged prostate.)        KUSUM Camp is a 77 y.o. male who returns today for follow-up of his prostate after a total free ratio suggested about a 25-30% chance of prostate cancer.  He has benign prostate on digital rectal exam but it is significantly enlarged and most likely explains the elevated PSA.  He's informed that if he has a longevity of 10 years it would be best for the biopsy to rule out any high-grade cancer.  Even if he has cancer if his low-grade he could be monitored on a program of active surveillance.    Currently his chief complaint is pain and immobility in his shoulder and left arm.  He is probably facing a total joint replacement in the near future since he's had 2 different rounds of physical therapy without much benefit.    Vitals:    02/19/18 1011   BP: 132/74   Pulse: 71   Temp: 98.5 °F (36.9 °C)   SpO2: 97%       Past Medical History  Past Medical History:   Diagnosis Date   • Diabetes mellitus    • ED (erectile dysfunction)    • Enlarged prostate with lower urinary tract symptoms (LUTS)    • Renal disorder    • Stone in kidney        Past Surgical History  Past Surgical History:   Procedure Laterality Date   • KNEE SURGERY     • NEPHRECTOMY         Medications  has a current medication list which includes the following prescription(s): amlodipine, aspirin, cholecalciferol, fluticasone, glucose blood, hydrocodone-homatropine, lisinopril, lisinopril, lovastatin, lovastatin, multiple vitamins-minerals, nifedipine, oxybutynin, silver sulfadiazine, tamsulosin, vitamin b-12, vitamin c, amlodipine besy-benazepril hcl, and metformin.      Allergies  No Known Allergies    Social History  Social History     Social History Narrative       Family History  He has no family history of bladder or kidney cancer  He has no family history of kidney stones      AUA Symptom Score:      Review of Systems  Review of Systems   Constitutional: Negative.     Genitourinary: Negative.    Musculoskeletal: Positive for arthralgias.   All other systems reviewed and are negative.      Physical Exam  Physical Exam    Labs Recent and today in the office:  Results for orders placed or performed in visit on 02/19/18   POC Urinalysis Dipstick, Automated   Result Value Ref Range    Color Yellow Yellow, Straw, Dark Yellow, [place]    Clarity, UA Clear Clear    Glucose, UA Negative Negative, 1000 mg/dL (3+) mg/dL    Bilirubin Negative Negative    Ketones, UA Negative Negative    Specific Gravity  1.030 1.005 - 1.030    Blood, UA Negative Negative    pH, Urine 6.0 5.0 - 8.0    Protein, POC Negative Negative mg/dL    Urobilinogen, UA Normal Normal    Leukocytes Negative Negative    Nitrite, UA Negative Negative         Assessment & Plan  #1 BPH: He is currently voiding adequately on Proscar Flomax and Ditropan except for nocturia and this is associated with lower extremity edema and obstructive sleep apnea in a patient who cannot wear the CPAP device.      #2 elevated PSA: I've suggested a prostate biopsy be performed electively but he wants to have the shoulder repaired first.  He'll return here in 6 months.

## 2018-03-01 ENCOUNTER — HOSPITAL ENCOUNTER (OUTPATIENT)
Dept: OTHER | Age: 78
Discharge: HOME | End: 2018-03-31

## 2018-03-05 ENCOUNTER — OFFICE VISIT (OUTPATIENT)
Dept: ORTHOPEDIC SURGERY | Facility: CLINIC | Age: 78
End: 2018-03-05

## 2018-03-05 VITALS — BODY MASS INDEX: 24.88 KG/M2 | WEIGHT: 183.7 LBS | RESPIRATION RATE: 16 BRPM | HEIGHT: 72 IN

## 2018-03-05 DIAGNOSIS — M75.122 COMPLETE TEAR OF LEFT ROTATOR CUFF: Primary | ICD-10-CM

## 2018-03-05 DIAGNOSIS — M19.012 PRIMARY LOCALIZED OSTEOARTHROSIS OF LEFT SHOULDER REGION: ICD-10-CM

## 2018-03-05 PROCEDURE — 99214 OFFICE O/P EST MOD 30 MIN: CPT | Performed by: ORTHOPAEDIC SURGERY

## 2018-03-05 NOTE — PROGRESS NOTES
Subjective   Patient ID: Ankur Camp is a 77 y.o. male  Follow-up of the Left Shoulder             History of Present Illness    He continues to have pain at rest limited use of the left arm pain occurs anteriorly posteriorly at the left shoulder he has difficulty putting on shirts and positive pain at night, is followed by Dr. Leyva for kidney issues.  As PSA levels rising, recent medical valve was positive for basal cell carcinoma on his nose, he is due to be seen by PCP April 11.    Review of Systems   Constitutional: Negative for diaphoresis, fever and unexpected weight change.   HENT: Negative for dental problem and sore throat.    Eyes: Negative for visual disturbance.   Respiratory: Negative for shortness of breath.    Cardiovascular: Negative for chest pain.   Gastrointestinal: Negative for abdominal pain, constipation, diarrhea, nausea and vomiting.   Genitourinary: Negative for difficulty urinating and frequency.   Musculoskeletal: Positive for arthralgias.   Neurological: Negative for headaches.   Hematological: Does not bruise/bleed easily.   All other systems reviewed and are negative.      Past Medical History:   Diagnosis Date   • Diabetes mellitus    • ED (erectile dysfunction)    • Enlarged prostate with lower urinary tract symptoms (LUTS)    • Renal disorder    • Stone in kidney         Past Surgical History:   Procedure Laterality Date   • KNEE SURGERY     • NEPHRECTOMY         Family History   Problem Relation Age of Onset   • No Known Problems Mother    • No Known Problems Father        Social History     Social History   • Marital status:      Spouse name: N/A   • Number of children: N/A   • Years of education: N/A     Occupational History   • Not on file.     Social History Main Topics   • Smoking status: Never Smoker   • Smokeless tobacco: Never Used   • Alcohol use No   • Drug use: No   • Sexual activity: Defer     Other Topics Concern   • Not on file     Social History  "Narrative       I have reviewed all of the above social hx, family hx, surgical hx, medications, allergies & ROS and confirm that it is accurate.    No Known Allergies    Objective   Resp 16  Ht 182.9 cm (72.01\")  Wt 83.3 kg (183 lb 11.2 oz)  BMI 24.91 kg/m2   Physical Exam  Constitutional: Patient is oriented to person, place, and time. Patient appears well-developed and well-nourished.   HENT:Head: Normocephalic and atraumatic.   Eyes: EOM are normal. Pupils are equal, round, and reactive to light.   Neck: Normal range of motion. Neck supple.   Cardiovascular: Normal rate.    Pulmonary/Chest: Effort normal and breath sounds normal.   Abdominal: Soft.   Neurological: Patient is alert and oriented to person, place, and time.   Skin: Skin is warm and dry.   Psychiatric: Patient has a normal mood and affect.   Nursing note and vitals reviewed.       Ortho Exam   Left shoulder with pain with forward elevation limited actively to 30° active abduction only 30° pain with internal/external rotation left shoulder consistent with rotator cuff arthropathy otherwise left arm neurovascularly intact    Assessment/Plan   Review of Radiographic Studies:    No new imaging done today.      Procedures     Ankur was seen today for follow-up.    Diagnoses and all orders for this visit:    Complete tear of left rotator cuff    Primary localized osteoarthrosis of left shoulder region     Risk, benefits, and merits of treatment alternatives reviewed with the patient and questions answered and The nature of the proposed surgery reviewed with the patient including risks, benefits, rehabilitation, recovery timeframe, and outcome expectations      Recommendations/Plan:   Work/Activity Status: May perform usual activities as tolerated    Patient agreeable to call or return sooner for any concerns.         Reviewed options of treatment for left shoulder rotator cuff arthropathy reviewed pros and cons of arthroscopic surgery versus " arthroplasty options.  Given his demands for pain relief and function have recommended arthroplasty option as surgical preference.  He understands risks competitions associate with surgery.  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      Impression: Recheck end of April, continue with medical care for elevated PSA level, PCP to follow for associated medical comorbidities    Plan:

## 2018-03-20 DIAGNOSIS — N32.81 OAB (OVERACTIVE BLADDER): ICD-10-CM

## 2018-03-22 RX ORDER — OXYBUTYNIN CHLORIDE 5 MG/1
TABLET ORAL
Qty: 30 TABLET | Refills: 6 | Status: SHIPPED | OUTPATIENT
Start: 2018-03-22 | End: 2018-11-27 | Stop reason: SDUPTHER

## 2018-04-02 ENCOUNTER — HOSPITAL ENCOUNTER (OUTPATIENT)
Dept: OTHER | Age: 78
Discharge: HOME | End: 2018-04-02
Attending: NURSE PRACTITIONER | Admitting: NURSE PRACTITIONER

## 2018-04-02 DIAGNOSIS — I10 ESSENTIAL HYPERTENSION: ICD-10-CM

## 2018-04-02 DIAGNOSIS — E55.9 VITAMIN D DEFICIENCY: ICD-10-CM

## 2018-04-02 DIAGNOSIS — E11.9 TYPE 2 DIABETES MELLITUS WITHOUT COMPLICATION, WITHOUT LONG-TERM CURRENT USE OF INSULIN (HCC): ICD-10-CM

## 2018-04-02 LAB
A/G RATIO: 2.4 (ref 0.8–2)
ALBUMIN SERPL-MCNC: 4.3 G/DL (ref 3.4–4.8)
ALP BLD-CCNC: 87 U/L (ref 25–100)
ALT SERPL-CCNC: 13 U/L (ref 4–36)
ANION GAP SERPL CALCULATED.3IONS-SCNC: 14 MMOL/L (ref 3–16)
AST SERPL-CCNC: 14 U/L (ref 8–33)
BILIRUB SERPL-MCNC: 0.7 MG/DL (ref 0.3–1.2)
BUN BLDV-MCNC: 17 MG/DL (ref 6–20)
CALCIUM SERPL-MCNC: 9.1 MG/DL (ref 8.5–10.5)
CHLORIDE BLD-SCNC: 103 MMOL/L (ref 98–107)
CHOLESTEROL, TOTAL: 162 MG/DL (ref 0–200)
CO2: 28 MMOL/L (ref 20–30)
CREAT SERPL-MCNC: 0.9 MG/DL (ref 0.4–1.2)
FOLATE: >20 NG/ML
GFR AFRICAN AMERICAN: >59
GFR NON-AFRICAN AMERICAN: >59
GLOBULIN: 1.8 G/DL
GLUCOSE BLD-MCNC: 145 MG/DL (ref 74–106)
HBA1C MFR BLD: 6.7 %
HCT VFR BLD CALC: 44.4 % (ref 40–54)
HDLC SERPL-MCNC: 60 MG/DL (ref 40–60)
HEMOGLOBIN: 14.6 G/DL (ref 13–18)
LDL CHOLESTEROL CALCULATED: 83 MG/DL
MCH RBC QN AUTO: 31.4 PG (ref 27–32)
MCHC RBC AUTO-ENTMCNC: 32.9 G/DL (ref 31–35)
MCV RBC AUTO: 95.5 FL (ref 80–100)
PDW BLD-RTO: 13.4 % (ref 11–16)
PLATELET # BLD: 169 K/UL (ref 150–400)
PMV BLD AUTO: 11 FL (ref 6–10)
POTASSIUM SERPL-SCNC: 4 MMOL/L (ref 3.4–5.1)
RBC # BLD: 4.65 M/UL (ref 4.5–6)
SODIUM BLD-SCNC: 145 MMOL/L (ref 136–145)
TOTAL PROTEIN: 6.1 G/DL (ref 6.4–8.3)
TRIGL SERPL-MCNC: 93 MG/DL (ref 0–249)
VITAMIN B-12: 1165 PG/ML (ref 211–911)
VITAMIN D 25-HYDROXY: 50.3 (ref 32–100)
VLDLC SERPL CALC-MCNC: 19 MG/DL
WBC # BLD: 5.2 K/UL (ref 4–11)

## 2018-04-11 ENCOUNTER — OFFICE VISIT (OUTPATIENT)
Dept: PRIMARY CARE CLINIC | Age: 78
End: 2018-04-11
Payer: MEDICARE

## 2018-04-11 VITALS
DIASTOLIC BLOOD PRESSURE: 76 MMHG | TEMPERATURE: 97.7 F | WEIGHT: 186.2 LBS | HEART RATE: 67 BPM | RESPIRATION RATE: 16 BRPM | OXYGEN SATURATION: 97 % | BODY MASS INDEX: 25.22 KG/M2 | SYSTOLIC BLOOD PRESSURE: 132 MMHG | HEIGHT: 72 IN

## 2018-04-11 DIAGNOSIS — E55.9 VITAMIN D DEFICIENCY: ICD-10-CM

## 2018-04-11 DIAGNOSIS — I10 ESSENTIAL HYPERTENSION: ICD-10-CM

## 2018-04-11 DIAGNOSIS — E78.00 PURE HYPERCHOLESTEROLEMIA: ICD-10-CM

## 2018-04-11 DIAGNOSIS — E11.9 TYPE 2 DIABETES MELLITUS WITHOUT COMPLICATION, WITHOUT LONG-TERM CURRENT USE OF INSULIN (HCC): Primary | ICD-10-CM

## 2018-04-11 PROCEDURE — 99214 OFFICE O/P EST MOD 30 MIN: CPT | Performed by: NURSE PRACTITIONER

## 2018-04-11 PROCEDURE — 1123F ACP DISCUSS/DSCN MKR DOCD: CPT | Performed by: NURSE PRACTITIONER

## 2018-04-11 PROCEDURE — 4004F PT TOBACCO SCREEN RCVD TLK: CPT | Performed by: NURSE PRACTITIONER

## 2018-04-11 PROCEDURE — 4040F PNEUMOC VAC/ADMIN/RCVD: CPT | Performed by: NURSE PRACTITIONER

## 2018-04-11 PROCEDURE — G8427 DOCREV CUR MEDS BY ELIG CLIN: HCPCS | Performed by: NURSE PRACTITIONER

## 2018-04-11 PROCEDURE — G8419 CALC BMI OUT NRM PARAM NOF/U: HCPCS | Performed by: NURSE PRACTITIONER

## 2018-04-11 ASSESSMENT — ENCOUNTER SYMPTOMS
RESPIRATORY NEGATIVE: 1
GASTROINTESTINAL NEGATIVE: 1
EYES NEGATIVE: 1

## 2018-04-19 ENCOUNTER — OFFICE VISIT (OUTPATIENT)
Dept: PRIMARY CARE CLINIC | Age: 78
End: 2018-04-19
Payer: MEDICARE

## 2018-04-19 VITALS
TEMPERATURE: 97.9 F | BODY MASS INDEX: 25.44 KG/M2 | WEIGHT: 187.8 LBS | HEART RATE: 64 BPM | HEIGHT: 72 IN | DIASTOLIC BLOOD PRESSURE: 68 MMHG | RESPIRATION RATE: 16 BRPM | OXYGEN SATURATION: 97 % | SYSTOLIC BLOOD PRESSURE: 126 MMHG

## 2018-04-19 DIAGNOSIS — J20.9 ACUTE BRONCHITIS, UNSPECIFIED ORGANISM: Primary | ICD-10-CM

## 2018-04-19 PROCEDURE — 4004F PT TOBACCO SCREEN RCVD TLK: CPT | Performed by: NURSE PRACTITIONER

## 2018-04-19 PROCEDURE — G8427 DOCREV CUR MEDS BY ELIG CLIN: HCPCS | Performed by: NURSE PRACTITIONER

## 2018-04-19 PROCEDURE — 1123F ACP DISCUSS/DSCN MKR DOCD: CPT | Performed by: NURSE PRACTITIONER

## 2018-04-19 PROCEDURE — 4040F PNEUMOC VAC/ADMIN/RCVD: CPT | Performed by: NURSE PRACTITIONER

## 2018-04-19 PROCEDURE — 99213 OFFICE O/P EST LOW 20 MIN: CPT | Performed by: NURSE PRACTITIONER

## 2018-04-19 PROCEDURE — G8419 CALC BMI OUT NRM PARAM NOF/U: HCPCS | Performed by: NURSE PRACTITIONER

## 2018-04-19 RX ORDER — AMOXICILLIN 500 MG/1
500 CAPSULE ORAL 3 TIMES DAILY
Qty: 21 CAPSULE | Refills: 0 | Status: SHIPPED | OUTPATIENT
Start: 2018-04-19 | End: 2018-04-26

## 2018-04-19 RX ORDER — DEXTROMETHORPHAN HYDROBROMIDE AND PROMETHAZINE HYDROCHLORIDE 15; 6.25 MG/5ML; MG/5ML
5 SYRUP ORAL 4 TIMES DAILY PRN
Qty: 120 ML | Refills: 0 | Status: SHIPPED | OUTPATIENT
Start: 2018-04-19 | End: 2018-04-26

## 2018-04-19 ASSESSMENT — ENCOUNTER SYMPTOMS
EYES NEGATIVE: 1
GASTROINTESTINAL NEGATIVE: 1
COUGH: 1

## 2018-04-21 ENCOUNTER — OFFICE VISIT (OUTPATIENT)
Dept: PRIMARY CARE CLINIC | Age: 78
End: 2018-04-21
Payer: MEDICARE

## 2018-04-21 VITALS — HEART RATE: 87 BPM | OXYGEN SATURATION: 99 % | BODY MASS INDEX: 25.09 KG/M2 | WEIGHT: 185 LBS | TEMPERATURE: 98.8 F

## 2018-04-21 DIAGNOSIS — H10.32 ACUTE BACTERIAL CONJUNCTIVITIS OF LEFT EYE: Primary | ICD-10-CM

## 2018-04-21 PROCEDURE — G8427 DOCREV CUR MEDS BY ELIG CLIN: HCPCS | Performed by: NURSE PRACTITIONER

## 2018-04-21 PROCEDURE — 99173 VISUAL ACUITY SCREEN: CPT | Performed by: NURSE PRACTITIONER

## 2018-04-21 PROCEDURE — G8419 CALC BMI OUT NRM PARAM NOF/U: HCPCS | Performed by: NURSE PRACTITIONER

## 2018-04-21 PROCEDURE — 99213 OFFICE O/P EST LOW 20 MIN: CPT | Performed by: NURSE PRACTITIONER

## 2018-04-21 PROCEDURE — 4004F PT TOBACCO SCREEN RCVD TLK: CPT | Performed by: NURSE PRACTITIONER

## 2018-04-21 PROCEDURE — 1123F ACP DISCUSS/DSCN MKR DOCD: CPT | Performed by: NURSE PRACTITIONER

## 2018-04-21 PROCEDURE — 4040F PNEUMOC VAC/ADMIN/RCVD: CPT | Performed by: NURSE PRACTITIONER

## 2018-04-21 RX ORDER — POLYMYXIN B SULFATE AND TRIMETHOPRIM 1; 10000 MG/ML; [USP'U]/ML
1 SOLUTION OPHTHALMIC EVERY 6 HOURS
Qty: 10 ML | Refills: 0 | Status: SHIPPED | OUTPATIENT
Start: 2018-04-21 | End: 2018-04-28

## 2018-04-21 ASSESSMENT — ENCOUNTER SYMPTOMS
BLURRED VISION: 1
PHOTOPHOBIA: 1
EYE REDNESS: 1
SORE THROAT: 1
EYE DISCHARGE: 1
COUGH: 1
EYE PAIN: 0

## 2018-04-26 ENCOUNTER — OFFICE VISIT (OUTPATIENT)
Dept: ORTHOPEDIC SURGERY | Facility: CLINIC | Age: 78
End: 2018-04-26

## 2018-04-26 VITALS — BODY MASS INDEX: 24.79 KG/M2 | RESPIRATION RATE: 18 BRPM | WEIGHT: 183 LBS | HEIGHT: 72 IN

## 2018-04-26 DIAGNOSIS — M19.012 PRIMARY LOCALIZED OSTEOARTHROSIS OF LEFT SHOULDER REGION: ICD-10-CM

## 2018-04-26 DIAGNOSIS — M75.122 COMPLETE TEAR OF LEFT ROTATOR CUFF: Primary | ICD-10-CM

## 2018-04-26 PROCEDURE — 99214 OFFICE O/P EST MOD 30 MIN: CPT | Performed by: ORTHOPAEDIC SURGERY

## 2018-04-26 RX ORDER — DEXTROMETHORPHAN HYDROBROMIDE AND PROMETHAZINE HYDROCHLORIDE 15; 6.25 MG/5ML; MG/5ML
5 SYRUP ORAL
COMMUNITY
Start: 2018-04-19 | End: 2018-04-26

## 2018-04-26 RX ORDER — AMOXICILLIN 500 MG/1
500 CAPSULE ORAL
COMMUNITY
Start: 2018-04-19 | End: 2018-04-26

## 2018-04-26 RX ORDER — POLYMYXIN B SULFATE AND TRIMETHOPRIM 1; 10000 MG/ML; [USP'U]/ML
SOLUTION OPHTHALMIC
COMMUNITY
Start: 2018-04-21 | End: 2018-04-28

## 2018-04-26 NOTE — PROGRESS NOTES
Subjective   Patient ID: Ankur Camp is a 77 y.o. male  Follow-up and Pain of the Left Shoulder (Patient states his arm is not better and won't go up unless he makes it go up, but his shoulder will go out some. 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  He is able to sleep has no lateral shoulder pain he's able to passively move his left arm with minimal pain today as well.      Review of Systems   Constitutional: Negative for fever.   HENT: Negative for voice change.    Eyes: Negative for visual disturbance.   Respiratory: Negative for shortness of breath.    Cardiovascular: Negative for chest pain.   Gastrointestinal: Negative for abdominal distention and abdominal pain.   Genitourinary: Negative for dysuria.   Musculoskeletal: Positive for arthralgias. Negative for gait problem and joint swelling.   Skin: Negative for rash.   Neurological: Negative for speech difficulty.   Hematological: Does not bruise/bleed easily.   Psychiatric/Behavioral: Negative for confusion.       Past Medical History:   Diagnosis Date   • Diabetes mellitus    • ED (erectile dysfunction)    • Enlarged prostate with lower urinary tract symptoms (LUTS)    • Renal disorder    • Stone in kidney         Past Surgical History:   Procedure Laterality Date   • KNEE SURGERY     • NEPHRECTOMY         Family History   Problem Relation Age of Onset   • No Known Problems Mother    • No Known Problems Father        Social History     Social History   • Marital status:      Spouse name: N/A   • Number of children: N/A   • Years of education: N/A     Occupational History   • Not on file.     Social History Main Topics   • Smoking status: Never Smoker   • Smokeless tobacco: Never Used   • Alcohol use No   • Drug use: No   • Sexual  "activity: Defer     Other Topics Concern   • Not on file     Social History Narrative   • No narrative on file       I have reviewed all of the above social hx, family hx, surgical hx, medications, allergies & ROS and confirm that it is accurate.    No Known Allergies    Objective   Resp 18   Ht 182.9 cm (72.01\")   Wt 83 kg (183 lb)   BMI 24.81 kg/m²    Physical Exam  Constitutional: Patient is oriented to person, place, and time. Patient appears well-developed and well-nourished.   HENT:Head: Normocephalic and atraumatic.   Eyes: EOM are normal. Pupils are equal, round, and reactive to light.   Neck: Normal range of motion. Neck supple.   Cardiovascular: Normal rate.    Pulmonary/Chest: Effort normal and breath sounds normal.   Abdominal: Soft.   Neurological: Patient is alert and oriented to person, place, and time.   Skin: Skin is warm and dry.   Psychiatric: Patient has a normal mood and affect.   Nursing note and vitals reviewed.       Ortho Exam     Left shoulder passive external rotation 20/30 degrees with no pain forward elevation E 90° passively with no pain active forward elevation 40° abduction actively 30°.  Neurovascularly intact  Assessment/Plan   Review of Radiographic Studies:    No new imaging done today.      Procedures     Ankur was seen today for follow-up and pain.    Diagnoses and all orders for this visit:    Complete tear of left rotator cuff    Primary localized osteoarthrosis of left shoulder region       Orthopedic activities reviewed and patient expressed appreciation and Risk, benefits, and merits of treatment alternatives reviewed with the patient and questions answered      Recommendations/Plan:   Work/Activity Status: May perform usual activities as tolerated    Patient agreeable to call or return sooner for any concerns.         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  Given his current lack of shoulder pain I do not recommend shoulder arthroplasty, I did offer to refer him to a specialist in [place] for second opinion purposes but he is comfortable with the idea of continuing with nonsurgical care at this time.    Impression:  Rotator cuff arthropathy left shoulder with loss of active range of motion but no rest pain or pain with movement left shoulder  Plan:  Nonsurgical treatment advised at this time, advised to modify home activities, recheck with me in the summer

## 2018-05-06 ASSESSMENT — ENCOUNTER SYMPTOMS
SINUS PRESSURE: 0
SINUS PAIN: 0

## 2018-05-07 DIAGNOSIS — I10 ESSENTIAL HYPERTENSION: ICD-10-CM

## 2018-05-10 RX ORDER — LOVASTATIN 40 MG/1
TABLET ORAL
Qty: 90 TABLET | Refills: 3 | Status: SHIPPED | OUTPATIENT
Start: 2018-05-10 | End: 2019-03-18 | Stop reason: SDUPTHER

## 2018-05-15 DIAGNOSIS — I10 ESSENTIAL HYPERTENSION: ICD-10-CM

## 2018-05-15 RX ORDER — AMLODIPINE BESYLATE 10 MG/1
TABLET ORAL
Qty: 90 TABLET | Refills: 5 | OUTPATIENT
Start: 2018-05-15

## 2018-05-16 DIAGNOSIS — I10 ESSENTIAL HYPERTENSION: ICD-10-CM

## 2018-05-16 RX ORDER — AMLODIPINE BESYLATE 10 MG/1
TABLET ORAL
Qty: 90 TABLET | Refills: 3 | Status: SHIPPED | OUTPATIENT
Start: 2018-05-16 | End: 2019-03-25 | Stop reason: SDUPTHER

## 2018-05-31 DIAGNOSIS — N40.0 BENIGN PROSTATIC HYPERPLASIA WITHOUT LOWER URINARY TRACT SYMPTOMS: ICD-10-CM

## 2018-05-31 RX ORDER — LISINOPRIL 5 MG/1
TABLET ORAL
Qty: 30 TABLET | Refills: 0 | Status: SHIPPED | OUTPATIENT
Start: 2018-05-31 | End: 2018-06-25 | Stop reason: SDUPTHER

## 2018-06-01 RX ORDER — TAMSULOSIN HYDROCHLORIDE 0.4 MG/1
CAPSULE ORAL
Qty: 90 CAPSULE | Refills: 3 | Status: SHIPPED | OUTPATIENT
Start: 2018-06-01 | End: 2019-03-18 | Stop reason: SDUPTHER

## 2018-06-28 RX ORDER — LISINOPRIL 5 MG/1
TABLET ORAL
Qty: 30 TABLET | Refills: 0 | Status: SHIPPED | OUTPATIENT
Start: 2018-06-28 | End: 2018-08-03 | Stop reason: SDUPTHER

## 2018-08-03 RX ORDER — LISINOPRIL 5 MG/1
TABLET ORAL
Qty: 30 TABLET | Refills: 0 | Status: SHIPPED | OUTPATIENT
Start: 2018-08-03 | End: 2018-09-13 | Stop reason: SDUPTHER

## 2018-08-20 ENCOUNTER — OFFICE VISIT (OUTPATIENT)
Dept: UROLOGY | Facility: CLINIC | Age: 78
End: 2018-08-20

## 2018-08-20 VITALS
TEMPERATURE: 97.7 F | SYSTOLIC BLOOD PRESSURE: 132 MMHG | OXYGEN SATURATION: 96 % | HEIGHT: 72 IN | WEIGHT: 183 LBS | DIASTOLIC BLOOD PRESSURE: 68 MMHG | BODY MASS INDEX: 24.79 KG/M2 | HEART RATE: 64 BPM

## 2018-08-20 DIAGNOSIS — N40.0 BENIGN PROSTATIC HYPERPLASIA WITHOUT URINARY OBSTRUCTION: Primary | ICD-10-CM

## 2018-08-20 DIAGNOSIS — R97.20 ELEVATED PROSTATE SPECIFIC ANTIGEN (PSA): ICD-10-CM

## 2018-08-20 LAB
BILIRUB BLD-MCNC: NEGATIVE MG/DL
CLARITY, POC: CLEAR
COLOR UR: YELLOW
GLUCOSE UR STRIP-MCNC: NEGATIVE MG/DL
KETONES UR QL: NEGATIVE
LEUKOCYTE EST, POC: NEGATIVE
NITRITE UR-MCNC: NEGATIVE MG/ML
PH UR: 6 [PH] (ref 5–8)
PROT UR STRIP-MCNC: ABNORMAL MG/DL
RBC # UR STRIP: NEGATIVE /UL
SP GR UR: 1.03 (ref 1–1.03)
UROBILINOGEN UR QL: NORMAL

## 2018-08-20 PROCEDURE — 81003 URINALYSIS AUTO W/O SCOPE: CPT | Performed by: UROLOGY

## 2018-08-20 PROCEDURE — 99213 OFFICE O/P EST LOW 20 MIN: CPT | Performed by: UROLOGY

## 2018-08-20 NOTE — PROGRESS NOTES
Chief Complaint  Elevated PSA (6 month fup with PSA.) and Benign Prostatic Hypertrophy        HPI  Zoë is a 77 y.o. male who returns today for surveillance of his prostate in light of an elevated PSA.  There was a significant jump last year since then it's been fairly stable.  He is always had a benign prostate on digital rectal exam and has minimal difficulty voiding.  Total free PSA ratio indicated a 23% chance of cancer but of course this includes low-grade ones that don't require treatment at his age.  He continues to have a very slow clinical decline with more trouble falling and difficulty walking.  He is decided to postpone his shoulder surgery since currently he is in no pain even know he can't use his left arm.    Vitals:    08/20/18 0848   BP: 132/68   Pulse: 64   Temp: 97.7 °F (36.5 °C)   SpO2: 96%       Past Medical History  Past Medical History:   Diagnosis Date   • Diabetes mellitus (CMS/HCC)    • ED (erectile dysfunction)    • Enlarged prostate with lower urinary tract symptoms (LUTS)    • Renal disorder    • Stone in kidney        Past Surgical History  Past Surgical History:   Procedure Laterality Date   • KNEE SURGERY     • NEPHRECTOMY         Medications  has a current medication list which includes the following prescription(s): amlodipine, amlodipine besy-benazepril hcl, aspirin, cholecalciferol, fluticasone, glucose blood, lisinopril, lisinopril, lovastatin, lovastatin, metformin, multiple vitamins-minerals, nifedipine, oxybutynin, silver sulfadiazine, tamsulosin, vitamin b-12, vitamin c, and hydrocodone-homatropine.      Allergies  No Known Allergies    Social History  Social History     Social History Narrative   • No narrative on file       Family History  He has no family history of bladder or kidney cancer  He has no family history of kidney stones      AUA Symptom Score:      Review of Systems  Review of Systems    Physical Exam  Physical Exam   Constitutional: He is oriented to  person, place, and time. He appears well-developed and well-nourished.   HENT:   Head: Normocephalic and atraumatic.   Neck: Normal range of motion.   Pulmonary/Chest: Effort normal. No respiratory distress.   Abdominal: Soft. He exhibits no distension and no mass. There is no tenderness. No hernia.   Genitourinary: Rectum normal.         Musculoskeletal: Normal range of motion.   Lymphadenopathy:     He has no cervical adenopathy.   Neurological: He is alert and oriented to person, place, and time.   Skin: Skin is warm and dry.   Psychiatric: He has a normal mood and affect. His behavior is normal.   Vitals reviewed.      Labs Recent and today in the office:  Results for orders placed or performed in visit on 08/20/18   POC Urinalysis Dipstick, Automated   Result Value Ref Range    Color Yellow Yellow, Straw, Dark Yellow, Vibha    Clarity, UA Clear Clear    Specific Gravity  1.030 1.005 - 1.030    pH, Urine 6.0 5.0 - 8.0    Leukocytes Negative Negative    Nitrite, UA Negative Negative    Protein, POC Trace (A) Negative mg/dL    Glucose, UA Negative Negative, 1000 mg/dL (3+) mg/dL    Ketones, UA Negative Negative    Urobilinogen, UA Normal Normal    Bilirubin Negative Negative    Blood, UA Negative Negative         Assessment & Plan  Elevated PSA: We've discussed proceeding with prostate biopsy to know for sure if there is any cancer but he decides on close surveillance at this time.  He'll return in 6 months with PSA.

## 2018-09-13 RX ORDER — LISINOPRIL 5 MG/1
TABLET ORAL
Qty: 30 TABLET | Refills: 0 | Status: SHIPPED | OUTPATIENT
Start: 2018-09-13 | End: 2018-10-11 | Stop reason: SDUPTHER

## 2018-09-26 NOTE — DISCHARGE SUMMARY
Physical Therapy Discharge Note   Date:  2018    TIme In:       0901              Time Out:    1013    Patient Name:  Santos Peterson    :  1940  MRN: [id number]    Restrictions/Precautions:    Pertinent Medical History:  Medical/Treatment Diagnosis Information:  ·   Left RTC treat; pain, stiffness, muscle weakness      Insurance/Certification information:    Medicare, [de-identified] [employer]   Physician Information:    Alanis Chatterjee MD  Plan of care signed (Y/N):    Visit# / total visits:     12/    G-Code (if applicable):      Date / Visit # G-Code Applied:    PT G-Codes  Functional Assessment Tool Used: Quick DASH  Score: 32  Functional Limitation: Carrying, moving and handling objects  Carrying, Moving and Handling Objects Current Status (): At least 40 percent but less than 60 percent impaired, limited or restricted  Carrying, Moving and Handling Objects Goal Status (): At least 1 percent but less than 20 percent impaired, limited or restricted    Progress Note: [x]  Yes  []  No  Next due by: Visit #10      Pain level:  0/10    Subjective:    Pt with no new complaints; expresses frustration with functional limitations of left UE, such as inability to lift it on his own; has pain essentially when trying to raise their arm, noting it \"catches\"; states functionally he is not any better. Objective:  Observation:   Test measurements:  Quick DASH: 32 deg. Right sh MMT: flex/abd/ER: 2+/5 with limited range and pain, IR: 4+/5. Left shoulder A/PROM: FF = 55/130, ABD = 45 / 75, ER = 55/65, IR = 60/70.   Palpation:     Exercises:  Exercise Resistance/Repetitions Other comments   Pulley: FF x3' 12   Scap squeezes 2x10 12   FF table slides or ball roll on table  x20 12   Bent over row x15 12   Mid/Low rows x20 GTB 12   Wand ER/abd x15 12   Wand flex supine x20  1# bar 12   Pendulum(cw/ccw) 2x15 12   UBE L3x6' 12   Wall posture 15x3\" 12                    Other Therapeutic Activities:      Manual Treatments:  grade 1-3 mobs, PROM left shoulder, gravity eliminated shoulder flexion in sidelying with manual cues x 20 on side table. 90 deg stabilization holds, 3x30\"   x15 min. .     Modalities:  - declined    Goals  Short term goals  Time Frame for Short term goals: 4 weeks  Short term goal 1: Achieve left shoulder pain at or less than 3/10 with routine ADL's and I-ADL's. - met  Short term goal 2: Achieve left shoulder AROM: FF = 75, ABD = 65, ER = 60. - not met  Short term goal 3: Achieve a Quick DASH score of 30 or less. - not met, but improved  Short term goal 4: Independent with HEP. - met  Long term goals  Time Frame for Long term goals : 8 weeks  Long term goal 1: Achieve left shoulder pain at or less than 3/10 with routine ADL's and I-ADL's. Long term goal 2: Achieve left shoulder AROM: FF = 75, ABD = 65, ER = 60. Long term goal 3: Achieve 4-/5 left shoulder strength in all planes for use with ADL's and I-ADL's. Long term goal 4: Achieve a Quick DASH score of 25 or less. Timed Code Treatment Minutes:  61'        Total Treatment Minutes:    67'    Treatment/Activity Tolerance:  [x] Patient tolerated treatment well [] Patient limited by fatigue  [] Patient limited by pain  [] Patient limited by other medical complications  [x] Other:  Pt tolerated tx well. Pain after treatment:    0/10     Prognosis: [x] Good [] Fair  [] Poor    Patient Requires Follow-up: [x] Yes  [] No    Plan:   [x] Continue per plan of care [x] Alter current plan (see comments)  [] Plan of care initiated [] Hold pending MD visit [] Discharge    After this week, will decrease frequency of treatment to 1 x week.     Plan for Next Session:        Electronically signed by:    Electronically signed by Ezio Myers PT on 9/26/2018 at 4:26 PM

## 2018-10-11 ENCOUNTER — OFFICE VISIT (OUTPATIENT)
Dept: PRIMARY CARE CLINIC | Age: 78
End: 2018-10-11
Payer: MEDICARE

## 2018-10-11 ENCOUNTER — HOSPITAL ENCOUNTER (OUTPATIENT)
Facility: HOSPITAL | Age: 78
Discharge: HOME | End: 2018-10-11
Payer: MEDICARE

## 2018-10-11 VITALS
OXYGEN SATURATION: 94 % | RESPIRATION RATE: 16 BRPM | WEIGHT: 173.6 LBS | DIASTOLIC BLOOD PRESSURE: 70 MMHG | HEIGHT: 72 IN | TEMPERATURE: 97.6 F | HEART RATE: 59 BPM | SYSTOLIC BLOOD PRESSURE: 128 MMHG | BODY MASS INDEX: 23.51 KG/M2

## 2018-10-11 DIAGNOSIS — E78.00 PURE HYPERCHOLESTEROLEMIA: ICD-10-CM

## 2018-10-11 DIAGNOSIS — E11.9 TYPE 2 DIABETES MELLITUS WITHOUT COMPLICATION, WITHOUT LONG-TERM CURRENT USE OF INSULIN (HCC): ICD-10-CM

## 2018-10-11 DIAGNOSIS — I10 ESSENTIAL HYPERTENSION: ICD-10-CM

## 2018-10-11 DIAGNOSIS — E11.9 TYPE 2 DIABETES MELLITUS WITHOUT COMPLICATION, WITHOUT LONG-TERM CURRENT USE OF INSULIN (HCC): Primary | ICD-10-CM

## 2018-10-11 LAB
A/G RATIO: 2.1 (ref 0.8–2)
ALBUMIN SERPL-MCNC: 4.5 G/DL (ref 3.4–4.8)
ALP BLD-CCNC: 86 U/L (ref 25–100)
ALT SERPL-CCNC: 10 U/L (ref 4–36)
ANION GAP SERPL CALCULATED.3IONS-SCNC: 13 MMOL/L (ref 3–16)
AST SERPL-CCNC: 16 U/L (ref 8–33)
BILIRUB SERPL-MCNC: 0.7 MG/DL (ref 0.3–1.2)
BUN BLDV-MCNC: 17 MG/DL (ref 6–20)
CALCIUM SERPL-MCNC: 9.5 MG/DL (ref 8.5–10.5)
CHLORIDE BLD-SCNC: 105 MMOL/L (ref 98–107)
CHOLESTEROL, TOTAL: 141 MG/DL (ref 0–200)
CO2: 27 MMOL/L (ref 20–30)
CREAT SERPL-MCNC: 0.8 MG/DL (ref 0.4–1.2)
CREATININE URINE: 174.5 MG/DL (ref 1.5–300)
GFR AFRICAN AMERICAN: >59
GFR NON-AFRICAN AMERICAN: >59
GLOBULIN: 2.1 G/DL
GLUCOSE BLD-MCNC: 122 MG/DL (ref 74–106)
HBA1C MFR BLD: 6.1 %
HCT VFR BLD CALC: 44.6 % (ref 40–54)
HDLC SERPL-MCNC: 57 MG/DL (ref 40–60)
HEMOGLOBIN: 14.6 G/DL (ref 13–18)
LDL CHOLESTEROL CALCULATED: 67 MG/DL
MCH RBC QN AUTO: 31.7 PG (ref 27–32)
MCHC RBC AUTO-ENTMCNC: 32.7 G/DL (ref 31–35)
MCV RBC AUTO: 97 FL (ref 80–100)
MICROALBUMIN UR-MCNC: <1.2 MG/DL (ref 0–22)
MICROALBUMIN/CREAT UR-RTO: NORMAL MG/G (ref 0–30)
PDW BLD-RTO: 13.1 % (ref 11–16)
PLATELET # BLD: 178 K/UL (ref 150–400)
PMV BLD AUTO: 11 FL (ref 6–10)
POTASSIUM SERPL-SCNC: 3.6 MMOL/L (ref 3.4–5.1)
RBC # BLD: 4.6 M/UL (ref 4.5–6)
SODIUM BLD-SCNC: 145 MMOL/L (ref 136–145)
TOTAL PROTEIN: 6.6 G/DL (ref 6.4–8.3)
TRIGL SERPL-MCNC: 86 MG/DL (ref 0–249)
VLDLC SERPL CALC-MCNC: 17 MG/DL
WBC # BLD: 6.3 K/UL (ref 4–11)

## 2018-10-11 PROCEDURE — 1123F ACP DISCUSS/DSCN MKR DOCD: CPT | Performed by: NURSE PRACTITIONER

## 2018-10-11 PROCEDURE — 36415 COLL VENOUS BLD VENIPUNCTURE: CPT

## 2018-10-11 PROCEDURE — 85027 COMPLETE CBC AUTOMATED: CPT

## 2018-10-11 PROCEDURE — 82043 UR ALBUMIN QUANTITATIVE: CPT

## 2018-10-11 PROCEDURE — 1101F PT FALLS ASSESS-DOCD LE1/YR: CPT | Performed by: NURSE PRACTITIONER

## 2018-10-11 PROCEDURE — 4040F PNEUMOC VAC/ADMIN/RCVD: CPT | Performed by: NURSE PRACTITIONER

## 2018-10-11 PROCEDURE — 4004F PT TOBACCO SCREEN RCVD TLK: CPT | Performed by: NURSE PRACTITIONER

## 2018-10-11 PROCEDURE — 82570 ASSAY OF URINE CREATININE: CPT

## 2018-10-11 PROCEDURE — 83036 HEMOGLOBIN GLYCOSYLATED A1C: CPT

## 2018-10-11 PROCEDURE — 80053 COMPREHEN METABOLIC PANEL: CPT

## 2018-10-11 PROCEDURE — G8420 CALC BMI NORM PARAMETERS: HCPCS | Performed by: NURSE PRACTITIONER

## 2018-10-11 PROCEDURE — 80061 LIPID PANEL: CPT

## 2018-10-11 PROCEDURE — G8484 FLU IMMUNIZE NO ADMIN: HCPCS | Performed by: NURSE PRACTITIONER

## 2018-10-11 PROCEDURE — G8427 DOCREV CUR MEDS BY ELIG CLIN: HCPCS | Performed by: NURSE PRACTITIONER

## 2018-10-11 PROCEDURE — 99213 OFFICE O/P EST LOW 20 MIN: CPT | Performed by: NURSE PRACTITIONER

## 2018-10-11 ASSESSMENT — PATIENT HEALTH QUESTIONNAIRE - PHQ9
SUM OF ALL RESPONSES TO PHQ9 QUESTIONS 1 & 2: 0
1. LITTLE INTEREST OR PLEASURE IN DOING THINGS: 0
2. FEELING DOWN, DEPRESSED OR HOPELESS: 0
SUM OF ALL RESPONSES TO PHQ QUESTIONS 1-9: 0
SUM OF ALL RESPONSES TO PHQ QUESTIONS 1-9: 0

## 2018-10-11 ASSESSMENT — ENCOUNTER SYMPTOMS
GASTROINTESTINAL NEGATIVE: 1
RESPIRATORY NEGATIVE: 1
EYES NEGATIVE: 1

## 2018-10-16 RX ORDER — LISINOPRIL 5 MG/1
TABLET ORAL
Qty: 30 TABLET | Refills: 0 | Status: SHIPPED | OUTPATIENT
Start: 2018-10-16 | End: 2018-11-12 | Stop reason: SDUPTHER

## 2018-11-12 RX ORDER — LISINOPRIL 5 MG/1
TABLET ORAL
Qty: 90 TABLET | Refills: 3 | Status: SHIPPED | OUTPATIENT
Start: 2018-11-12 | End: 2019-04-26 | Stop reason: ALTCHOICE

## 2018-11-27 DIAGNOSIS — N32.81 OAB (OVERACTIVE BLADDER): ICD-10-CM

## 2018-11-27 RX ORDER — OXYBUTYNIN CHLORIDE 5 MG/1
TABLET ORAL
Qty: 30 TABLET | Refills: 6 | Status: SHIPPED | OUTPATIENT
Start: 2018-11-27 | End: 2019-01-02 | Stop reason: SDUPTHER

## 2018-12-05 ENCOUNTER — HOSPITAL ENCOUNTER (OUTPATIENT)
Dept: GENERAL RADIOLOGY | Facility: HOSPITAL | Age: 78
Discharge: HOME | End: 2018-12-05
Payer: MEDICARE

## 2018-12-05 ENCOUNTER — OFFICE VISIT (OUTPATIENT)
Dept: PRIMARY CARE CLINIC | Age: 78
End: 2018-12-05
Payer: MEDICARE

## 2018-12-05 VITALS
BODY MASS INDEX: 23.86 KG/M2 | OXYGEN SATURATION: 97 % | HEART RATE: 72 BPM | RESPIRATION RATE: 16 BRPM | DIASTOLIC BLOOD PRESSURE: 73 MMHG | TEMPERATURE: 96.9 F | SYSTOLIC BLOOD PRESSURE: 132 MMHG | HEIGHT: 73 IN | WEIGHT: 180 LBS

## 2018-12-05 DIAGNOSIS — Z91.81 STATUS POST FALL: ICD-10-CM

## 2018-12-05 DIAGNOSIS — R07.89 CHEST WALL PAIN: ICD-10-CM

## 2018-12-05 DIAGNOSIS — Z91.81 AT HIGH RISK FOR FALLS: ICD-10-CM

## 2018-12-05 DIAGNOSIS — Z91.81 PERSONAL HISTORY OF FALL: ICD-10-CM

## 2018-12-05 DIAGNOSIS — R07.89 CHEST WALL PAIN: Primary | ICD-10-CM

## 2018-12-05 DIAGNOSIS — Z23 NEED FOR PROPHYLACTIC VACCINATION AGAINST STREPTOCOCCUS PNEUMONIAE (PNEUMOCOCCUS): ICD-10-CM

## 2018-12-05 PROCEDURE — G0009 ADMIN PNEUMOCOCCAL VACCINE: HCPCS | Performed by: NURSE PRACTITIONER

## 2018-12-05 PROCEDURE — 71111 X-RAY EXAM RIBS/CHEST4/> VWS: CPT

## 2018-12-05 PROCEDURE — 71046 X-RAY EXAM CHEST 2 VIEWS: CPT

## 2018-12-05 PROCEDURE — 1101F PT FALLS ASSESS-DOCD LE1/YR: CPT | Performed by: NURSE PRACTITIONER

## 2018-12-05 PROCEDURE — G8484 FLU IMMUNIZE NO ADMIN: HCPCS | Performed by: NURSE PRACTITIONER

## 2018-12-05 PROCEDURE — G8427 DOCREV CUR MEDS BY ELIG CLIN: HCPCS | Performed by: NURSE PRACTITIONER

## 2018-12-05 PROCEDURE — 4004F PT TOBACCO SCREEN RCVD TLK: CPT | Performed by: NURSE PRACTITIONER

## 2018-12-05 PROCEDURE — G8420 CALC BMI NORM PARAMETERS: HCPCS | Performed by: NURSE PRACTITIONER

## 2018-12-05 PROCEDURE — 4040F PNEUMOC VAC/ADMIN/RCVD: CPT | Performed by: NURSE PRACTITIONER

## 2018-12-05 PROCEDURE — 90732 PPSV23 VACC 2 YRS+ SUBQ/IM: CPT | Performed by: NURSE PRACTITIONER

## 2018-12-05 PROCEDURE — 1123F ACP DISCUSS/DSCN MKR DOCD: CPT | Performed by: NURSE PRACTITIONER

## 2018-12-05 PROCEDURE — 99213 OFFICE O/P EST LOW 20 MIN: CPT | Performed by: NURSE PRACTITIONER

## 2018-12-05 ASSESSMENT — ENCOUNTER SYMPTOMS
GASTROINTESTINAL NEGATIVE: 1
EYES NEGATIVE: 1

## 2018-12-05 NOTE — PROGRESS NOTES
SUBJECTIVE:    Patient ID: Abdiaziz Crowley is a 69 y.o. male. Chief Complaint   Patient presents with    Fall     3 weeks ago    Chest Pain     rib pain x 3 weeks         HPI:  He had a recent fall. He was trying to walk his daughter dog. He tripped and hit his chest into a picnic table. He did not get any x rays. He says as long as he doesn't do a lot he is ok. He says he can not lay on his side. It happened 2 weeks ago. He feel like he cant breath deep. He took some aleve for the pain. He is taking his blood pressure medication. He is not going to have surgery on his shoulder. He is doing well with his sugar. Patient's medications,allergies, past medical, surgical, social and family histories were reviewed and updated as appropriate. .  Current Outpatient Prescriptions on File Prior to Visit   Medication Sig Dispense Refill    lisinopril (PRINIVIL;ZESTRIL) 5 MG tablet TAKE ONE TABLET BY MOUTH EVERY DAY 90 tablet 3    tamsulosin (FLOMAX) 0.4 MG capsule TAKE ONE CAPSULE BY MOUTH EVERY DAY 90 capsule 3    amLODIPine (NORVASC) 10 MG tablet TAKE ONE TABLET BY MOUTH NIGHTLY 90 tablet 3    lovastatin (MEVACOR) 40 MG tablet TAKE ONE TABLET BY MOUTH NIGHTLY 90 tablet 3    metFORMIN (GLUCOPHAGE) 500 MG tablet TAKE ONE TABLET BY MOUTH DAILY WITH BREAKFAST 90 tablet 3    oxybutynin (DITROPAN) 5 MG tablet Take 5 mg by mouth daily  6    Multiple Vitamins-Minerals (RA VISION-STONEY PRESERVE PO) Take by mouth 2 times daily Drops bid      glucose blood VI test strips (EXACTECH TEST) strip 1 each by In Vitro route daily Daily testing, DX:250.00 100 each 3    lisinopril (PRINIVIL;ZESTRIL) 5 MG tablet Take 1 tablet by mouth daily 90 tablet 3    aspirin 81 MG tablet Take 81 mg by mouth daily.  vitamin B-12 (CYANOCOBALAMIN) 1000 MCG tablet Take 1,000 mcg by mouth daily.  Vitamin D (CHOLECALCIFEROL) 1000 UNITS CAPS capsule Take 1,000 Units by mouth daily.       Ascorbic Acid (VITAMIN C) 500 MG

## 2018-12-05 NOTE — PATIENT INSTRUCTIONS
yourself or someone else. Estimate the cost in terms of packs of cigarettes, and put the money aside to buy these presents. Keep very busy on the big day. Go to the movies, exercise, take long walks, or go bike riding. Remind your family and friends that this is your quit date, and ask them to help you over the rough spots of the first couple of days and weeks. Buy yourself a treat or do something special to celebrate. Telephone and Internet Support   Telephone, web-, and computer-based programs can offer you the support that you need to quit and to stay smoke-free. You can find many programs online, like the [employer] Association's [place] from Smoking . Immediately After Quitting   Develop a clean, fresh, nonsmoking environment around yourselfat work and at home. Buy yourself flowersyou may be surprised how much you can enjoy their scent now. The first few days after you quit, spend as much free time as possible in places where smoking isn't allowed, such as [address] [place] [address], museums, theaters, department stores, and churches. Drink large quantities of water and fruit juice (but avoid sodas that contain caffeine). Try to avoid alcohol, coffee, and other beverages that you associate with cigarette smoking. Strike up conversation instead of a match for a cigarette. If you miss the sensation of having a cigarette in your hand, play with something elsea pencil, a paper clip, a marble. If you miss having something in your mouth, try toothpicks or a fake cigarette.

## 2018-12-15 ASSESSMENT — ENCOUNTER SYMPTOMS: SHORTNESS OF BREATH: 1

## 2018-12-29 DIAGNOSIS — N32.81 OAB (OVERACTIVE BLADDER): ICD-10-CM

## 2019-01-02 DIAGNOSIS — N32.81 OAB (OVERACTIVE BLADDER): ICD-10-CM

## 2019-01-02 RX ORDER — OXYBUTYNIN CHLORIDE 5 MG/1
TABLET ORAL
Qty: 30 TABLET | Refills: 6 | Status: SHIPPED | OUTPATIENT
Start: 2019-01-02 | End: 2020-09-24

## 2019-01-02 RX ORDER — OXYBUTYNIN CHLORIDE 5 MG/1
5 TABLET ORAL
Qty: 90 TABLET | Refills: 3 | Status: SHIPPED | OUTPATIENT
Start: 2019-01-02 | End: 2019-02-21 | Stop reason: SDUPTHER

## 2019-01-26 DIAGNOSIS — E11.9 TYPE 2 DIABETES MELLITUS WITHOUT COMPLICATION, WITHOUT LONG-TERM CURRENT USE OF INSULIN (HCC): ICD-10-CM

## 2019-02-21 ENCOUNTER — OFFICE VISIT (OUTPATIENT)
Dept: UROLOGY | Facility: CLINIC | Age: 79
End: 2019-02-21

## 2019-02-21 VITALS
HEART RATE: 57 BPM | DIASTOLIC BLOOD PRESSURE: 74 MMHG | HEIGHT: 72 IN | OXYGEN SATURATION: 96 % | BODY MASS INDEX: 24.79 KG/M2 | SYSTOLIC BLOOD PRESSURE: 136 MMHG | TEMPERATURE: 98.5 F | WEIGHT: 183 LBS

## 2019-02-21 DIAGNOSIS — R97.20 ELEVATED PROSTATE SPECIFIC ANTIGEN (PSA): Primary | ICD-10-CM

## 2019-02-21 DIAGNOSIS — N40.0 BENIGN NON-NODULAR PROSTATIC HYPERPLASIA WITHOUT LOWER URINARY TRACT SYMPTOMS: ICD-10-CM

## 2019-02-21 LAB
BILIRUB BLD-MCNC: NEGATIVE MG/DL
CLARITY, POC: CLEAR
COLOR UR: YELLOW
GLUCOSE UR STRIP-MCNC: NEGATIVE MG/DL
KETONES UR QL: NEGATIVE
LEUKOCYTE EST, POC: NEGATIVE
NITRITE UR-MCNC: NEGATIVE MG/ML
PH UR: 6.5 [PH] (ref 5–8)
PROT UR STRIP-MCNC: NEGATIVE MG/DL
RBC # UR STRIP: NEGATIVE /UL
SP GR UR: 1.02 (ref 1–1.03)
UROBILINOGEN UR QL: NORMAL

## 2019-02-21 PROCEDURE — 99213 OFFICE O/P EST LOW 20 MIN: CPT | Performed by: UROLOGY

## 2019-02-21 PROCEDURE — 81003 URINALYSIS AUTO W/O SCOPE: CPT | Performed by: UROLOGY

## 2019-02-21 RX ORDER — HEPATITIS A VACCINE 1440 [IU]/ML
INJECTION, SUSPENSION INTRAMUSCULAR
Refills: 1 | COMMUNITY
Start: 2018-11-21 | End: 2020-09-24

## 2019-02-21 NOTE — PROGRESS NOTES
Chief Complaint  Elevated PSA (6 month fup with PSA.)        KUSUM Camp is a 78 y.o. male who returns today for follow-up of his prostate in light of an elevated PSA.  He is always had a benign prostate on digital rectal exam and has very little difficulty voiding.  He has clear urine today without blood.    He is probably close to a candidate for palliative therapy only.  He's lost the use of his left shoulder but declines surgery because it is not causing him a lot of pain.        Vitals:    02/21/19 0847   BP: 136/74   Pulse: 57   Temp: 98.5 °F (36.9 °C)   SpO2: 96%       Past Medical History  Past Medical History:   Diagnosis Date   • Diabetes mellitus (CMS/HCC)    • ED (erectile dysfunction)    • Enlarged prostate with lower urinary tract symptoms (LUTS)    • Renal disorder    • Stone in kidney        Past Surgical History  Past Surgical History:   Procedure Laterality Date   • KNEE SURGERY     • NEPHRECTOMY         Medications  has a current medication list which includes the following prescription(s): amlodipine besy-benazepril hcl, aspirin, cholecalciferol, fluticasone, glucose blood, havrix, lisinopril, lovastatin, metformin, multiple vitamins-minerals, nifedipine, oxybutynin, tamsulosin, vitamin b-12, vitamin c, amlodipine, hydrocodone-homatropine, lisinopril, and silver sulfadiazine.      Allergies  No Known Allergies    Social History  Social History     Social History Narrative   • Not on file       Family History  He has no family history of bladder or kidney cancer  He has no family history of kidney stones      AUA Symptom Score:      Review of Systems  Review of Systems    Physical Exam  Physical Exam   Constitutional: He is oriented to person, place, and time. He appears well-developed and well-nourished.   HENT:   Head: Normocephalic and atraumatic.   Neck: Normal range of motion.   Pulmonary/Chest: Effort normal. No respiratory distress.   Abdominal: Soft. He exhibits no distension and no mass.  There is no tenderness. No hernia.   Genitourinary: Rectum normal and prostate normal.   Musculoskeletal: Normal range of motion.   Lymphadenopathy:     He has no cervical adenopathy.   Neurological: He is alert and oriented to person, place, and time.   Skin: Skin is warm and dry.   Psychiatric: He has a normal mood and affect. His behavior is normal.   Vitals reviewed.      Labs Recent and today in the office:  Results for orders placed or performed in visit on 02/21/19   POC Urinalysis Dipstick, Automated   Result Value Ref Range    Color Yellow Yellow, Straw, Dark Yellow, Vibha    Clarity, UA Clear Clear    Specific Gravity  1.020 (A) 1.005 - 1.030    pH, Urine 6.5 5.0 - 8.0    Leukocytes Negative Negative    Nitrite, UA Negative Negative    Protein, POC Negative Negative mg/dL    Glucose, UA Negative Negative, 1000 mg/dL (3+) mg/dL    Ketones, UA Negative Negative    Urobilinogen, UA Normal Normal    Bilirubin Negative Negative    Blood, UA Negative Negative         Assessment & Plan  #1 elevated PSA: His PSA is actually declined during the last 6 months from 5.93 down to 5.7.  Digital rectal exam once again reveals no suspicious nodularity.  I suggested there is a 25% chance that cancer is present and have discussed prostate biopsy.  He declines however and will refer to return in 6 months for surveillance.

## 2019-02-26 ENCOUNTER — TELEPHONE (OUTPATIENT)
Dept: SURGERY | Facility: CLINIC | Age: 79
End: 2019-02-26

## 2019-02-26 RX ORDER — GLUCOSAMINE HCL/CHONDROITIN SU 500-400 MG
CAPSULE ORAL
Qty: 100 STRIP | Refills: 3 | Status: SHIPPED | OUTPATIENT
Start: 2019-02-26

## 2019-03-01 RX ORDER — POLYETHYLENE GLYCOL 3350 17 G/17G
238 POWDER, FOR SOLUTION ORAL ONCE
Qty: 238 EACH | Refills: 0 | Status: SHIPPED | OUTPATIENT
Start: 2019-03-01 | End: 2019-03-01

## 2019-03-01 RX ORDER — BISACODYL 5 MG/1
TABLET, DELAYED RELEASE ORAL
Qty: 4 TABLET | Refills: 0 | Status: SHIPPED | OUTPATIENT
Start: 2019-03-01 | End: 2020-09-25

## 2019-03-04 ENCOUNTER — PREP FOR SURGERY (OUTPATIENT)
Dept: OTHER | Facility: HOSPITAL | Age: 79
End: 2019-03-04

## 2019-03-04 DIAGNOSIS — Z12.11 COLON CANCER SCREENING: Primary | ICD-10-CM

## 2019-03-18 DIAGNOSIS — N40.0 BENIGN PROSTATIC HYPERPLASIA WITHOUT LOWER URINARY TRACT SYMPTOMS: ICD-10-CM

## 2019-03-18 DIAGNOSIS — I10 ESSENTIAL HYPERTENSION: ICD-10-CM

## 2019-03-18 RX ORDER — TAMSULOSIN HYDROCHLORIDE 0.4 MG/1
CAPSULE ORAL
Qty: 90 CAPSULE | Refills: 3 | Status: SHIPPED | OUTPATIENT
Start: 2019-03-18 | End: 2020-06-23

## 2019-03-18 RX ORDER — LOVASTATIN 40 MG/1
TABLET ORAL
Qty: 90 TABLET | Refills: 3 | Status: SHIPPED | OUTPATIENT
Start: 2019-03-18 | End: 2020-03-26

## 2019-03-25 DIAGNOSIS — I10 ESSENTIAL HYPERTENSION: ICD-10-CM

## 2019-03-25 RX ORDER — AMLODIPINE BESYLATE 10 MG/1
TABLET ORAL
Qty: 90 TABLET | Refills: 3 | Status: SHIPPED | OUTPATIENT
Start: 2019-03-25 | End: 2020-02-14 | Stop reason: ALTCHOICE

## 2019-04-04 ENCOUNTER — TELEPHONE (OUTPATIENT)
Dept: SURGERY | Facility: CLINIC | Age: 79
End: 2019-04-04

## 2019-04-08 ENCOUNTER — ANESTHESIA EVENT (OUTPATIENT)
Dept: GASTROENTEROLOGY | Facility: HOSPITAL | Age: 79
End: 2019-04-08

## 2019-04-08 ENCOUNTER — HOSPITAL ENCOUNTER (OUTPATIENT)
Facility: HOSPITAL | Age: 79
Setting detail: HOSPITAL OUTPATIENT SURGERY
Discharge: HOME | End: 2019-04-08
Attending: SURGERY | Admitting: SURGERY

## 2019-04-08 ENCOUNTER — ANESTHESIA (OUTPATIENT)
Dept: GASTROENTEROLOGY | Facility: HOSPITAL | Age: 79
End: 2019-04-08

## 2019-04-08 VITALS
OXYGEN SATURATION: 99 % | HEIGHT: 72 IN | HEART RATE: 54 BPM | SYSTOLIC BLOOD PRESSURE: 145 MMHG | TEMPERATURE: 97.6 F | BODY MASS INDEX: 24.38 KG/M2 | WEIGHT: 180 LBS | RESPIRATION RATE: 18 BRPM | DIASTOLIC BLOOD PRESSURE: 77 MMHG

## 2019-04-08 LAB — GLUCOSE BLDC GLUCOMTR-MCNC: 145 MG/DL (ref 70–130)

## 2019-04-08 PROCEDURE — 82962 GLUCOSE BLOOD TEST: CPT

## 2019-04-08 PROCEDURE — 25010000002 PROPOFOL 200 MG/20ML EMULSION: Performed by: NURSE ANESTHETIST, CERTIFIED REGISTERED

## 2019-04-08 PROCEDURE — S0260 H&P FOR SURGERY: HCPCS | Performed by: SURGERY

## 2019-04-08 RX ORDER — MAGNESIUM HYDROXIDE 1200 MG/15ML
LIQUID ORAL AS NEEDED
Status: DISCONTINUED | OUTPATIENT
Start: 2019-04-08 | End: 2019-04-08 | Stop reason: HOSPADM

## 2019-04-08 RX ORDER — SODIUM CHLORIDE 0.9 % (FLUSH) 0.9 %
3 SYRINGE (ML) INJECTION AS NEEDED
Status: DISCONTINUED | OUTPATIENT
Start: 2019-04-08 | End: 2019-04-08 | Stop reason: HOSPADM

## 2019-04-08 RX ORDER — KETAMINE HYDROCHLORIDE 50 MG/ML
INJECTION, SOLUTION, CONCENTRATE INTRAMUSCULAR; INTRAVENOUS AS NEEDED
Status: DISCONTINUED | OUTPATIENT
Start: 2019-04-08 | End: 2019-04-08 | Stop reason: SURG

## 2019-04-08 RX ORDER — ONDANSETRON 2 MG/ML
4 INJECTION INTRAMUSCULAR; INTRAVENOUS ONCE AS NEEDED
Status: DISCONTINUED | OUTPATIENT
Start: 2019-04-08 | End: 2019-04-08 | Stop reason: HOSPADM

## 2019-04-08 RX ORDER — PROPOFOL 10 MG/ML
INJECTION, EMULSION INTRAVENOUS AS NEEDED
Status: DISCONTINUED | OUTPATIENT
Start: 2019-04-08 | End: 2019-04-08 | Stop reason: SURG

## 2019-04-08 RX ORDER — SODIUM CHLORIDE, SODIUM LACTATE, POTASSIUM CHLORIDE, CALCIUM CHLORIDE 600; 310; 30; 20 MG/100ML; MG/100ML; MG/100ML; MG/100ML
1000 INJECTION, SOLUTION INTRAVENOUS CONTINUOUS
Status: DISCONTINUED | OUTPATIENT
Start: 2019-04-08 | End: 2019-04-08 | Stop reason: HOSPADM

## 2019-04-08 RX ADMIN — PROPOFOL 100 MG: 10 INJECTION, EMULSION INTRAVENOUS at 10:01

## 2019-04-08 RX ADMIN — PROPOFOL 50 MG: 10 INJECTION, EMULSION INTRAVENOUS at 10:10

## 2019-04-08 RX ADMIN — SODIUM CHLORIDE, POTASSIUM CHLORIDE, SODIUM LACTATE AND CALCIUM CHLORIDE 1000 ML: 600; 310; 30; 20 INJECTION, SOLUTION INTRAVENOUS at 07:59

## 2019-04-08 RX ADMIN — KETAMINE HYDROCHLORIDE 20 MG: 50 INJECTION, SOLUTION INTRAMUSCULAR; INTRAVENOUS at 10:02

## 2019-04-08 NOTE — ANESTHESIA PREPROCEDURE EVALUATION
Anesthesia Evaluation     Patient summary reviewed and Nursing notes reviewed   no history of anesthetic complications:  NPO Solid Status: > 8 hours  NPO Liquid Status: > 8 hours           Airway   Mallampati: II  TM distance: >3 FB  Neck ROM: full  no difficulty expected  Dental - normal exam     Pulmonary - normal exam   (+) a smoker Current Smoked day of surgery,   Cardiovascular - normal exam    Rhythm: regular  Rate: normal    (+) hypertension 2 medications or greater, hyperlipidemia,       Neuro/Psych- negative ROS  GI/Hepatic/Renal/Endo    (+)   diabetes mellitus type 2,     Musculoskeletal     Abdominal    Substance History - negative use     OB/GYN negative ob/gyn ROS         Other   (+) arthritis                     Anesthesia Plan    ASA 3     MAC   (Pt told that intravenous sedation will be used as the primary anesthetic along with local anesthesia if necessary. Every effort will be made to make sure the patient is comfortable.     The patient was told they may or may not have recall for the procedure. It was further explained that if the MAC was not adequate that a general anesthetic with either an LMA or endotracheal tube would be required.     Will proceed with the plan of care.)  intravenous induction   Anesthetic plan, all risks, benefits, and alternatives have been provided, discussed and informed consent has been obtained with: patient.

## 2019-04-08 NOTE — H&P
[facility]   HISTORY AND PHYSICAL      Name:  Ankur Camp   Age:  78 y.o.  Sex:  male  :  1940  MRN:  [id number]   Visit Number:  [id number]  Admission Date:  2019  Date Of Service:  19  Primary Care Physician:  Magaly Prabhakar APRN    Chief Complaint:     Colon cancer screening    History Of Presenting Illness:      Patient here for routine colonoscopy, last one 5 years ago.  No bleeding or symptoms.  Personal history of colon polyps.    Review Of Systems:     General ROS: Patient denies any fevers, chills or loss of consciousness.  No complaints of generalized weakness  Psychological ROS: Denies any hallucinations and delusions.  Ophthalmic ROS: no transient loss of vision.  ENT ROS: Denies sore throat, nasal congestion or ear pain.   Allergy and Immunology ROS: Denies rash or itching.  Hematological and Lymphatic ROS: Denies neck swelling or easy bleeding.  Endocrine ROS: Denies any recent unintentional weight gain or loss.  Breast ROS: Denies any pain or swelling.  Respiratory ROS: Denies cough or shortness of breath.   Cardiovascular ROS: Denies chest pain or palpitations. No history of exertional chest pain.   Gastrointestinal ROS: Denies nausea and vomiting. Denies any abdominal pain. No diarrhea.   Genito-Urinary ROS: Denies dysuria or hematuria.  Musculoskeletal ROS: no back pain. No muscle pain. No calf pain.   Neurological ROS: Denies any focal weakness. No loss of consciousness. Denies any numbness.   Dermatological ROS: Denies any redness or pruritis.     Past Medical History:    Past Medical History:   Diagnosis Date   • Arthritis    • Diabetes mellitus (CMS/HCC)    • ED (erectile dysfunction)    • Enlarged prostate with lower urinary tract symptoms (LUTS)    • Full dentures    • History of stress test    • [place] (hard of hearing)     Bilateral hearing aids    • Renal disorder    • Stone in kidney        Past Surgical history:    Past Surgical  History:   Procedure Laterality Date   • COLONOSCOPY     • EYE SURGERY Right     cataract removal with IOL implant   • HERNIA REPAIR Right     multiple inguinal hernia repairs   • KNEE SURGERY Right    • NEPHRECTOMY Right     partial right nephrectomy due to kidney stone       Social History:    Social History     Socioeconomic History   • Marital status:      Spouse name: Not on file   • Number of children: Not on file   • Years of education: Not on file   • Highest education level: Not on file   Tobacco Use   • Smoking status: Current Every Day Smoker     Years: 20.00     Types: Pipe   • Smokeless tobacco: Never Used   Substance and Sexual Activity   • Alcohol use: No   • Drug use: No   • Sexual activity: Defer       Family History:    Family History   Problem Relation Age of Onset   • No Known Problems Mother    • No Known Problems Father        Allergies:      Patient has no known allergies.    Home Medications:    Prior to Admission Medications     Prescriptions Last Dose Informant Patient Reported? Taking?    amLODIPine (NORVASC) 10 MG tablet 4/7/2019 Spouse/Significant Other Yes Yes    Take 10 mg by mouth Daily.    aspirin 81 MG tablet 4/6/2019 Spouse/Significant Other Yes Yes    Take 81 mg by mouth Daily.    bisacodyl (DULCOLAX) 5 MG EC tablet 4/7/2019  No Yes    As directed/colon prep    cholecalciferol (VITAMIN D3) 1000 UNITS tablet 4/7/2019 Spouse/Significant Other Yes Yes    Take 1,000 Units by mouth Daily.    glucose blood test strip Past Week  Yes Yes    1 each by In Vitro route daily Daily testing, DX:250.00    lisinopril (PRINIVIL,ZESTRIL) 5 MG tablet 4/7/2019 Spouse/Significant Other Yes Yes    Take 5 mg by mouth Daily.    lovastatin (MEVACOR) 40 MG tablet 4/7/2019 Spouse/Significant Other Yes Yes    Take 40 mg by mouth Every Night.    metFORMIN (GLUCOPHAGE) 500 MG tablet 4/7/2019 Spouse/Significant Other Yes Yes    Take 500 mg by mouth Daily With Breakfast.    MULTIPLE VITAMINS-MINERALS ER  PO 4/7/2019 Spouse/Significant Other Yes Yes    Take 1 capsule by mouth 2 (Two) Times a Day With Meals.    oxybutynin (DITROPAN) 5 MG tablet 4/7/2019 Spouse/Significant Other No Yes    TAKE 1 TABLET BY MOUTH AT BEDTIME    tamsulosin (FLOMAX) 0.4 MG capsule 24 hr capsule 4/7/2019 Spouse/Significant Other Yes Yes    Take 1 capsule by mouth Daily.    vitamin B-12 (CYANOCOBALAMIN) 1000 MCG tablet 4/7/2019 Spouse/Significant Other Yes Yes    Take 1,000 mcg by mouth Daily.    vitamin C (ASCORBIC ACID) 500 MG tablet 4/7/2019 Spouse/Significant Other Yes Yes    Take 1,000 mg by mouth Daily.    Amlodipine Besy-Benazepril HCl (LOTREL PO)   Yes No    Take  by mouth.    HAVRIX 1440 EL U/ML vaccine More than a month  Yes No    PER MD    lisinopril (PRINIVIL,ZESTRIL) 20 MG tablet   Yes No    Take 20 mg by mouth Daily.    NIFEDIPINE PO   Yes No    Take  by mouth.             ED Medications:    Medications   lactated ringers infusion 1,000 mL ( Intravenous Anesthesia Volume Adjustment 4/8/19 1016)       Vital Signs:    Temp:  [98.3 °F (36.8 °C)] 98.3 °F (36.8 °C)  Heart Rate:  [71] 71  Resp:  [18] 18  BP: (145)/(75) 145/75        04/03/19  1118   Weight: 81.6 kg (180 lb)       Body mass index is 24.41 kg/m².    Physical Exam:      General Appearance:  Alert and cooperative, not in any acute distress.   Head:  Atraumatic and normocephalic, without obvious abnormality.   Eyes:          PERRLA, conjunctivae and sclerae normal, no Icterus. No pallor. Extra-occular movements are within normal limits.   Ears:  Ears appear intact with no abnormalities noted.   Throat: No oral lesions, no thrush, oral mucosa moist.   Neck: Supple, trachea midline, no thyromegaly, no carotid bruit.       Respiratory/Lungs:   Breath sounds heard bilaterally equally.  No crackles or wheezing. No Pleural rub or bronchial breathing. Normal respiratory effort.    Cardiovascular/Heart:  Normal S1 and S2, no murmur. No edema   GI/Abdomen:   No masses, no  hepatosplenomegaly. Soft, non-tender, non-distended, no hernia                 Musculoskeletal/ Extremities:   Moves all extremities well   Pulses: Pulses palpable and equal bilaterally   Skin: No bleeding, bruising or rash, no induration   Lymph nodes: No palpable adenopathy   Psychiatric : Alert and oriented ×3.  No depression or anxiety            EKG:      None    Labs:    Lab Results (last 24 hours)     Procedure Component Value Units Date/Time    POC Glucose Once [[id number]]  (Abnormal) Collected:  04/08/19 0736    Specimen:  Blood Updated:  04/08/19 0740     Glucose 145 mg/dL      Comment: Serial Number: [id number]:  [id number]             Radiology:    Imaging Results (last 72 hours)     ** No results found for the last 72 hours. **          Assessment:    Colon cancer screening, history of polyps    Plan:     Colonoscopy today, risk of bleeding perforation discussed and patient agreeable    Onesimo Arnold MD  04/08/19  10:19 AM

## 2019-04-08 NOTE — ANESTHESIA POSTPROCEDURE EVALUATION
Patient: Ankur Camp    Procedure Summary     Date:  04/08/19 Room / Location:  [facility] ENDOSCOPY 2 / [facility] ENDOSCOPY    Anesthesia Start:  0958 Anesthesia Stop:  1018    Procedure:  COLONOSCOPY (N/A ) Diagnosis:       Colon cancer screening      (Colon cancer screening [Z12.11])    Surgeon:  Onesimo Arnold MD Provider:  Librado Cook CRNA    Anesthesia Type:  MAC ASA Status:  3          Anesthesia Type: MAC  Last vitals  BP   145/77 (04/08/19 1055)   Temp   97.6 °F (36.4 °C) (04/08/19 1020)   Pulse   54 (04/08/19 1055)   Resp   18 (04/08/19 1055)     SpO2   99 % (04/08/19 1055)     Post Anesthesia Care and Evaluation    Patient location during evaluation: bedside  Patient participation: complete - patient participated  Level of consciousness: awake and alert  Pain score: 0  Pain management: satisfactory to patient  Airway patency: patent  Anesthetic complications: No anesthetic complications  PONV Status: none  Cardiovascular status: acceptable and hemodynamically stable  Respiratory status: acceptable  Hydration status: acceptable

## 2019-04-08 NOTE — DISCHARGE INSTRUCTIONS
Rest today  No pushing,pulling,tugging,heavy lifting, or strenuous activity   No major decision making,driving,or drinking alcoholic beverages for 24 hours due to the sedation you received  Always use good hand hygiene/washing technique  No driving on pain medication.    To assist you in voiding:  Drink plenty of fluids  Listen to running water while attempting to void.    If you are unable to urinate and you have an uncomfortable urge to void or it has been   6 hours since you were discharged, return to the Emergency Room.

## 2019-04-22 ENCOUNTER — HOSPITAL ENCOUNTER (OUTPATIENT)
Facility: HOSPITAL | Age: 79
Discharge: HOME | End: 2019-04-22
Payer: MEDICARE

## 2019-04-22 DIAGNOSIS — E78.00 PURE HYPERCHOLESTEROLEMIA: ICD-10-CM

## 2019-04-22 DIAGNOSIS — E11.9 TYPE 2 DIABETES MELLITUS WITHOUT COMPLICATION, WITHOUT LONG-TERM CURRENT USE OF INSULIN (HCC): Primary | ICD-10-CM

## 2019-04-22 DIAGNOSIS — I10 ESSENTIAL HYPERTENSION: ICD-10-CM

## 2019-04-22 DIAGNOSIS — E11.9 TYPE 2 DIABETES MELLITUS WITHOUT COMPLICATION, WITHOUT LONG-TERM CURRENT USE OF INSULIN (HCC): ICD-10-CM

## 2019-04-22 LAB
A/G RATIO: 2.2 (ref 0.8–2)
ALBUMIN SERPL-MCNC: 4.3 G/DL (ref 3.4–4.8)
ALP BLD-CCNC: 95 U/L (ref 25–100)
ALT SERPL-CCNC: 15 U/L (ref 4–36)
ANION GAP SERPL CALCULATED.3IONS-SCNC: 9 MMOL/L (ref 3–16)
AST SERPL-CCNC: 17 U/L (ref 8–33)
BILIRUB SERPL-MCNC: 0.6 MG/DL (ref 0.3–1.2)
BUN BLDV-MCNC: 18 MG/DL (ref 6–20)
CALCIUM SERPL-MCNC: 9.1 MG/DL (ref 8.5–10.5)
CHLORIDE BLD-SCNC: 107 MMOL/L (ref 98–107)
CHOLESTEROL, TOTAL: 132 MG/DL (ref 0–200)
CO2: 27 MMOL/L (ref 20–30)
CREAT SERPL-MCNC: 0.9 MG/DL (ref 0.4–1.2)
CREATININE URINE: 156.6 MG/DL (ref 1.5–300)
GFR AFRICAN AMERICAN: >59
GFR NON-AFRICAN AMERICAN: >59
GLOBULIN: 2 G/DL
GLUCOSE BLD-MCNC: 150 MG/DL (ref 74–106)
HBA1C MFR BLD: 6.7 %
HCT VFR BLD CALC: 44.3 % (ref 40–54)
HDLC SERPL-MCNC: 47 MG/DL (ref 40–60)
HEMOGLOBIN: 14.8 G/DL (ref 13–18)
LDL CHOLESTEROL CALCULATED: 63 MG/DL
MCH RBC QN AUTO: 31.7 PG (ref 27–32)
MCHC RBC AUTO-ENTMCNC: 33.4 G/DL (ref 31–35)
MCV RBC AUTO: 94.9 FL (ref 80–100)
MICROALBUMIN UR-MCNC: <1.2 MG/DL (ref 0–22)
MICROALBUMIN/CREAT UR-RTO: NORMAL MG/G (ref 0–30)
PDW BLD-RTO: 13.2 % (ref 11–16)
PLATELET # BLD: 162 K/UL (ref 150–400)
PMV BLD AUTO: 11.2 FL (ref 6–10)
POTASSIUM SERPL-SCNC: 3.9 MMOL/L (ref 3.4–5.1)
RBC # BLD: 4.67 M/UL (ref 4.5–6)
SODIUM BLD-SCNC: 143 MMOL/L (ref 136–145)
TOTAL PROTEIN: 6.3 G/DL (ref 6.4–8.3)
TRIGL SERPL-MCNC: 111 MG/DL (ref 0–249)
VLDLC SERPL CALC-MCNC: 22 MG/DL
WBC # BLD: 5.2 K/UL (ref 4–11)

## 2019-04-22 PROCEDURE — 82570 ASSAY OF URINE CREATININE: CPT

## 2019-04-22 PROCEDURE — 80053 COMPREHEN METABOLIC PANEL: CPT

## 2019-04-22 PROCEDURE — 82043 UR ALBUMIN QUANTITATIVE: CPT

## 2019-04-22 PROCEDURE — 83036 HEMOGLOBIN GLYCOSYLATED A1C: CPT

## 2019-04-22 PROCEDURE — 80061 LIPID PANEL: CPT

## 2019-04-22 PROCEDURE — 85027 COMPLETE CBC AUTOMATED: CPT

## 2019-04-26 ENCOUNTER — OFFICE VISIT (OUTPATIENT)
Dept: PRIMARY CARE CLINIC | Age: 79
End: 2019-04-26
Payer: MEDICARE

## 2019-04-26 VITALS
TEMPERATURE: 97.7 F | SYSTOLIC BLOOD PRESSURE: 129 MMHG | BODY MASS INDEX: 23.33 KG/M2 | RESPIRATION RATE: 18 BRPM | HEART RATE: 65 BPM | OXYGEN SATURATION: 96 % | DIASTOLIC BLOOD PRESSURE: 63 MMHG | WEIGHT: 176.8 LBS

## 2019-04-26 DIAGNOSIS — E78.00 PURE HYPERCHOLESTEROLEMIA: ICD-10-CM

## 2019-04-26 DIAGNOSIS — M17.0 PRIMARY OSTEOARTHRITIS OF BOTH KNEES: ICD-10-CM

## 2019-04-26 DIAGNOSIS — E11.9 TYPE 2 DIABETES MELLITUS WITHOUT COMPLICATION, WITHOUT LONG-TERM CURRENT USE OF INSULIN (HCC): Primary | ICD-10-CM

## 2019-04-26 DIAGNOSIS — Z87.891 PERSONAL HISTORY OF TOBACCO USE: ICD-10-CM

## 2019-04-26 DIAGNOSIS — I10 ESSENTIAL HYPERTENSION: ICD-10-CM

## 2019-04-26 PROCEDURE — 99214 OFFICE O/P EST MOD 30 MIN: CPT | Performed by: NURSE PRACTITIONER

## 2019-04-26 PROCEDURE — G8427 DOCREV CUR MEDS BY ELIG CLIN: HCPCS | Performed by: NURSE PRACTITIONER

## 2019-04-26 PROCEDURE — 4040F PNEUMOC VAC/ADMIN/RCVD: CPT | Performed by: NURSE PRACTITIONER

## 2019-04-26 PROCEDURE — G8420 CALC BMI NORM PARAMETERS: HCPCS | Performed by: NURSE PRACTITIONER

## 2019-04-26 PROCEDURE — G0296 VISIT TO DETERM LDCT ELIG: HCPCS | Performed by: NURSE PRACTITIONER

## 2019-04-26 PROCEDURE — 4004F PT TOBACCO SCREEN RCVD TLK: CPT | Performed by: NURSE PRACTITIONER

## 2019-04-26 PROCEDURE — 1123F ACP DISCUSS/DSCN MKR DOCD: CPT | Performed by: NURSE PRACTITIONER

## 2019-04-26 ASSESSMENT — PATIENT HEALTH QUESTIONNAIRE - PHQ9
SUM OF ALL RESPONSES TO PHQ QUESTIONS 1-9: 0
SUM OF ALL RESPONSES TO PHQ QUESTIONS 1-9: 0
SUM OF ALL RESPONSES TO PHQ9 QUESTIONS 1 & 2: 0
1. LITTLE INTEREST OR PLEASURE IN DOING THINGS: 0
2. FEELING DOWN, DEPRESSED OR HOPELESS: 0

## 2019-04-26 ASSESSMENT — ENCOUNTER SYMPTOMS
GASTROINTESTINAL NEGATIVE: 1
EYES NEGATIVE: 1
RESPIRATORY NEGATIVE: 1

## 2019-04-26 NOTE — PROGRESS NOTES
current facility-administered medications on file prior to visit. Review of Systems   Constitutional: Negative. HENT: Negative. Eyes: Negative. Respiratory: Negative. Cardiovascular: Negative. Gastrointestinal: Negative. Genitourinary: Negative. Musculoskeletal: Positive for arthralgias (knee pain). Skin: Negative. Neurological: Negative. Psychiatric/Behavioral: Negative. OBJECTIVE:  /63 (Site: Right Upper Arm, Position: Sitting, Cuff Size: Medium Adult)   Pulse 65   Temp 97.7 °F (36.5 °C) (Oral)   Resp 18   Wt 176 lb 12.8 oz (80.2 kg)   SpO2 96%   BMI 23.33 kg/m²    Physical Exam   Constitutional: He is oriented to person, place, and time. He appears well-developed and well-nourished. HENT:   Head: Normocephalic and atraumatic. Eyes: Pupils are equal, round, and reactive to light. Conjunctivae and EOM are normal.   Neck: Normal range of motion. Neck supple. No JVD present. No thyromegaly present. Cardiovascular: Normal rate and regular rhythm. Exam reveals no gallop and no friction rub. No murmur heard. Pulmonary/Chest: Effort normal and breath sounds normal. No respiratory distress. He has no wheezes. He has no rales. Abdominal: Soft. Bowel sounds are normal. He exhibits no distension. There is no tenderness. There is no guarding. Musculoskeletal: He exhibits no edema. Right knee: Tenderness found. Medial joint line tenderness noted. Left knee: Tenderness found. Medial joint line tenderness noted. Neurological: He is alert and oriented to person, place, and time. Skin: Skin is warm and dry. No rash noted. Psychiatric: He has a normal mood and affect. Judgment normal.   Nursing note and vitals reviewed.       Results in Past 30 Days  Result Component Current Result Ref Range Previous Result Ref Range   Alb 4.3 (4/22/2019) 3.4 - 4.8 g/dL Not in Time Range    Albumin/Globulin Ratio 2.2 (H) (4/22/2019) 0.8 - 2.0 Not in Time Range Alkaline Phosphatase 95 (4/22/2019) 25 - 100 U/L Not in Time Range    ALT 15 (4/22/2019) 4 - 36 U/L Not in Time Range    AST 17 (4/22/2019) 8 - 33 U/L Not in Time Range    BUN 18 (4/22/2019) 6 - 20 mg/dL Not in Time Range    Calcium 9.1 (4/22/2019) 8.5 - 10.5 mg/dL Not in Time Range    Chloride 107 (4/22/2019) 98 - 107 mmol/L Not in Time Range    CO2 27 (4/22/2019) 20 - 30 mmol/L Not in Time Range    CREATININE 0.9 (4/22/2019) 0.4 - 1.2 mg/dL Not in Time Range    GFR  >59 (4/22/2019) >59 Not in Time Range    GFR Non- >59 (4/22/2019) >59 Not in Time Range    Globulin 2.0 (4/22/2019) g/dL Not in Time Range    Glucose 150 (H) (4/22/2019) 74 - 106 mg/dL Not in Time Range    Potassium 3.9 (4/22/2019) 3.4 - 5.1 mmol/L Not in Time Range    Sodium 143 (4/22/2019) 136 - 145 mmol/L Not in Time Range    Total Bilirubin 0.6 (4/22/2019) 0.3 - 1.2 mg/dL Not in Time Range    Total Protein 6.3 (L) (4/22/2019) 6.4 - 8.3 g/dL Not in Time Range        Hemoglobin A1C (%)   Date Value   04/22/2019 6.7 (H)     Microalbumin, Random Urine (mg/dL)   Date Value   04/22/2019 <1.20     LDL Calculated (mg/dL)   Date Value   04/22/2019 63         Lab Results   Component Value Date    WBC 5.2 04/22/2019    HGB 14.8 04/22/2019    HCT 44.3 04/22/2019    MCV 94.9 04/22/2019     04/22/2019       Lab Results   Component Value Date    TSH 1.17 10/02/2017         ASSESSMENT/PLAN:     Ivonne Kumar was seen today for diabetes. Diagnoses and all orders for this visit:    Type 2 diabetes mellitus without complication, without long-term current use of insulin ([facility])  -     CBC; Future  -     Comprehensive Metabolic Panel; Future  -     Hemoglobin A1C; Future  Stable. I advised him regarding diabetic diet, exercise and weight control. Also, I advised him to stay on the current medication, monitor his fingerstick closely. I am going to check the A1c every few months. I will check microalbumin on a yearly basis.   I have also advised him to have a yearly eye exam and to monitor his feet closely. Along with instruction of possible complications related to diabetes, even with good control. A1C will be checked  in few months. Lab Results   Component Value Date    LABA1C 6.7 (H) 04/22/2019       Essential hypertension  Stable on current medication. Pure hypercholesterolemia  -     Lipid Panel; Future    Primary osteoarthritis of both knees  He is going to the specialist for injections. Personal history of tobacco use  -     HI VISIT TO DISCUSS LUNG CA SCREEN W LDCT  -     CT Lung Screening; Future      Medications Discontinued During This Encounter   Medication Reason    lisinopril (PRINIVIL;ZESTRIL) 5 MG tablet Therapy completed     Low Dose CT (LDCT) Lung Screening criteria met   Age 50-69   Pack year smoking >30   Still smoking or less than 15 year since quit   No sign or symptoms of lung cancer   > 11 months since last LDCT     Risks and benefits of lung cancer screening with LDCT scans discussed:    Significance of positive screen - False-positive LDCT results often occur. 95% of all positive results do not lead to a diagnosis of cancer. Usually further imaging can resolve most false-positive results; however, some patients may require invasive procedures. Over diagnosis risk - 10% to 12% of screen-detected lung cancer cases are over diagnosed--that is, the cancer would not have been detected in the patient's lifetime without the screening. Need for follow up screens annually to continue lung cancer screening effectiveness     Risks associated with radiation from annual LDCT- Radiation exposure is about the same as for a mammogram, which is about 1/3 of the annual background radiation exposure from everyday life. Starting screening at age 54 is not likely to increase cancer risk from radiation exposure.     Patients with comorbidities resulting in life expectancy of < 10 years, or that would preclude treatment of an

## 2019-05-03 ENCOUNTER — HOSPITAL ENCOUNTER (OUTPATIENT)
Dept: CT IMAGING | Facility: HOSPITAL | Age: 79
Discharge: HOME | End: 2019-05-03
Payer: MEDICARE

## 2019-05-03 ENCOUNTER — TELEPHONE (OUTPATIENT)
Dept: PRIMARY CARE CLINIC | Age: 79
End: 2019-05-03

## 2019-05-03 DIAGNOSIS — Z87.891 PERSONAL HISTORY OF TOBACCO USE: ICD-10-CM

## 2019-05-03 PROCEDURE — G0297 LDCT FOR LUNG CA SCREEN: HCPCS

## 2019-05-03 NOTE — TELEPHONE ENCOUNTER
Called and left message for patient to return our call. Please inform if patient calls back.        Patient has an appointment 05/09/19

## 2019-05-09 ENCOUNTER — OFFICE VISIT (OUTPATIENT)
Dept: PRIMARY CARE CLINIC | Age: 79
End: 2019-05-09
Payer: MEDICARE

## 2019-05-09 VITALS
HEART RATE: 69 BPM | RESPIRATION RATE: 16 BRPM | SYSTOLIC BLOOD PRESSURE: 128 MMHG | BODY MASS INDEX: 23.72 KG/M2 | DIASTOLIC BLOOD PRESSURE: 68 MMHG | HEIGHT: 73 IN | OXYGEN SATURATION: 97 % | WEIGHT: 179 LBS | TEMPERATURE: 98.1 F

## 2019-05-09 DIAGNOSIS — Z00.00 ROUTINE GENERAL MEDICAL EXAMINATION AT A HEALTH CARE FACILITY: Primary | ICD-10-CM

## 2019-05-09 DIAGNOSIS — Z72.0 TOBACCO ABUSE: ICD-10-CM

## 2019-05-09 PROCEDURE — G0438 PPPS, INITIAL VISIT: HCPCS | Performed by: NURSE PRACTITIONER

## 2019-05-09 PROCEDURE — 1123F ACP DISCUSS/DSCN MKR DOCD: CPT | Performed by: NURSE PRACTITIONER

## 2019-05-09 PROCEDURE — 4040F PNEUMOC VAC/ADMIN/RCVD: CPT | Performed by: NURSE PRACTITIONER

## 2019-05-09 ASSESSMENT — PATIENT HEALTH QUESTIONNAIRE - PHQ9
SUM OF ALL RESPONSES TO PHQ QUESTIONS 1-9: 0
SUM OF ALL RESPONSES TO PHQ QUESTIONS 1-9: 0

## 2019-05-09 ASSESSMENT — LIFESTYLE VARIABLES: HOW OFTEN DO YOU HAVE A DRINK CONTAINING ALCOHOL: 0

## 2019-05-09 ASSESSMENT — ANXIETY QUESTIONNAIRES: GAD7 TOTAL SCORE: 0

## 2019-05-09 NOTE — PROGRESS NOTES
Medicare Annual Wellness Visit  Name: Sandoval Coates Date: 2019   MRN: [id number] Sex: Male   Age: 69 y.o. Ethnicity: Non-/Non    : 1940 Race: Annie Carrillo is here for Pepper DENNISON  he presents for wellness visit  He had 5 shots in his right knee. So far he states he is moving better. He denies any falls. Screenings for behavioral, psychosocial and functional/safety risks, and cognitive dysfunction are all negative except as indicated below. These results, as well as other patient data from the [address] form, are documented in Flowsheets linked to this Encounter. No Known Allergies    Prior to Visit Medications    Medication Sig Taking? Authorizing Provider   amLODIPine (NORVASC) 10 MG tablet TAKE ONE TABLET BY MOUTH EVERY EVENING AT BEDTIME Yes SINDY Alfaro   lovastatin (MEVACOR) 40 MG tablet TAKE ONE TABLET BY MOUTH NIGHTLY Yes SINDY Alfaro   tamsulosin (FLOMAX) 0.4 MG capsule TAKE ONE CAPSULE BY MOUTH EVERY DAY Yes SINDY Alfaro   blood glucose monitor strips Test 3 times a day & as needed for symptoms of irregular blood glucose. Dx E11. 9. Breeze 2 Yes SINDY Mcrae   metFORMIN (GLUCOPHAGE) 500 MG tablet TAKE ONE TABLET BY MOUTH DAILY WITH BREAKFAST Yes SINDY Alfaro   oxybutynin (DITROPAN) 5 MG tablet Take 5 mg by mouth daily Yes Historical Provider, MD   Multiple Vitamins-Minerals (RA VISION-STONEY PRESERVE PO) Take by mouth 2 times daily Drops bid Yes Historical Provider, MD   glucose blood VI test strips (EXACTECH TEST) strip 1 each by In Vitro route daily Daily testing, DX:250.00 Yes SINDY Alfaro   lisinopril (PRINIVIL;ZESTRIL) 5 MG tablet Take 1 tablet by mouth daily Yes SINDY Alfaro   aspirin 81 MG tablet Take 81 mg by mouth daily. Yes Historical Provider, MD   vitamin B-12 (CYANOCOBALAMIN) 1000 MCG tablet Take 1,000 mcg by mouth daily.  Yes Historical Provider, MD   Vitamin D (CHOLECALCIFEROL) 1000 UNITS CAPS capsule Take 1,000 Units by mouth daily. Yes Historical Provider, MD   Ascorbic Acid (VITAMIN C) 500 MG tablet Take 1,000 mg by mouth daily. 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       Family History   Problem Relation Age of Onset    Cancer Mother         breast    Diabetes Father     High Blood Pressure Father     Diabetes Brother        CareTeam (Including outside providers/suppliers regularly involved in providing care):   Patient Care Team:  SINDY Duncan as PCP - General (Family Nurse Practitioner)  SINDY Duncan as PCP - [facility] Attributed Provider    Wt Readings from Last 3 Encounters:   05/09/19 179 lb (81.2 kg)   04/26/19 176 lb 12.8 oz (80.2 kg)   12/05/18 180 lb (81.6 kg)     Vitals:    05/09/19 1540   BP: 128/68   Site: Right Upper Arm   Position: Sitting   Cuff Size: Medium Adult   Pulse: 69   Resp: 16   Temp: 98.1 °F (36.7 °C)   TempSrc: Oral   SpO2: 97%   Weight: 179 lb (81.2 kg)   Height: 6' 1\" (1.854 m)     Body mass index is 23.62 kg/m². Based upon direct observation of the patient, evaluation of cognition reveals recent and remote memory intact.     General Appearance: alert and oriented to person, place and time, well developed and well- nourished, in no acute distress  Skin: warm and dry, no rash or erythema  Head: normocephalic and atraumatic  Eyes: pupils equal, round, and reactive to light, extraocular eye movements intact, conjunctivae normal  ENT: tympanic membrane, external ear and ear canal normal bilaterally, nose without deformity, nasal mucosa and turbinates normal without polyps  Neck: supple and non-tender without mass, no thyromegaly or thyroid nodules, no cervical lymphadenopathy  Pulmonary/Chest: clear to auscultation bilaterally- no wheezes, rales or rhonchi, normal air movement, no respiratory distress  Cardiovascular: normal rate, regular rhythm, normal S1 and S2, no murmurs, rubs, clicks, or gallops, distal pulses intact, no carotid bruits  Abdomen: soft, non-tender, non-distended, normal bowel sounds, no masses or organomegaly  Extremities: no cyanosis, clubbing or edema  Musculoskeletal: normal range of motion, no joint swelling, deformity or tenderness  Neurologic: reflexes normal and symmetric, no cranial nerve deficit, gait, coordination and speech normal    Patient's complete Health Risk Assessment and screening values have been reviewed and are found in Flowsheets. The following problems were reviewed today and where indicated follow up appointments were made and/or referrals ordered. Positive Risk Factor Screenings with Interventions:     Substance Abuse:  Social History     Tobacco History     Smoking Status  Current Every Day Smoker Smoking Frequency  1 pack/day for 40 years (40 pk yrs) Smoking Tobacco Type  Pipe, Cigars    Smokeless Tobacco Use  Never Used          Alcohol History     Alcohol Use Status  No          Drug Use     Drug Use Status  No          Sexual Activity     Sexually Active  Not Asked               Audit Questionnaire: Screen for Alcohol Misuse  How often do you have a drink containing alcohol?: Never  Substance Abuse Interventions:  · Tobacco abuse:  he dod get his ct lung screen but is not ready to quit. Health Habits/Nutrition:  Health Habits/Nutrition  Do you exercise for at least 20 minutes 2-3 times per week?: (!) No  Have you lost any weight without trying in the past 3 months?: No  Do you eat fewer than 2 meals per day?: No  Have you seen a dentist within the past year?: Yes  Body mass index is 23.62 kg/m².   Health Habits/Nutrition Interventions:  · he has dentures on the

## 2019-05-09 NOTE — PATIENT INSTRUCTIONS
· Keep a list of your medicines with you. List all of the prescription medicines, nonprescription medicines, supplements, natural remedies, and vitamins that you take. Tell your healthcare providers who treat you about all of the products you are taking. Your provider can provide you with a form to keep track of them. Just ask. · Follow the directions that come with your medicine, including information about food or alcohol. Make sure you know how and when to take your medicine. Do not take more or less than you are supposed to take. · Keep all medicines out of the reach of children. · Store medicines according to the directions on the label. · Monitor yourself. Learn to know how your body reacts to your new medicine and keep track of how it makes you feel before attempting (If your provider has allowed you to do so) to drive or go to work. · Seek emergency medical attention if you think you have used too much of this medicine. An overdose of any prescription medicine can be fatal. Overdose symptoms may include extreme drowsiness, muscle weakness, confusion, cold and clammy skin, pinpoint pupils, shallow breathing, slow heart rate, fainting, or coma. · Don't share prescription medicines with others, even when they seem to have the same symptoms. What may be good for you may be harmful to others. · If you are no longer taking a prescribed medication and you have pills left please take your pills out of their original containers. Mix crushed pills with an undesirable substance, such as cat litter or used coffee grounds. Put the mixture into a disposable container with a lid, such as an empty margarine tub, or into a sealable bag. Cover up or remove any of your personal information on the empty containers by covering it with black permanent marker or duct tape. Place the sealed container with the mixture, and the empty drug containers, in the trash.    · If you use a medication that is in the form of a patch, dispose of used patches by folding them in half so that the sticky sides meet, and then flushing them down a toilet. They should not be placed in the household trash where children or pets can find them. · If you have any questions, ask your provider or pharmacist for more information. · Be sure to keep all appointments for provider visits or tests. We are committed to providing you with the best care possible. In order to help us achieve these goals please remember to bring all medications, herbal products, and over the counter supplements with you to each visit. If your provider has ordered testing for you, please be sure to follow up with our office if you have not received results within 7 days after the testing took place. *If you receive a survey after visiting one of our offices, please take time to share your experience concerning your physician office visit. These surveys are confidential and no health information about you is shared. We are eager to improve for you and we are counting on your feedback to help make that happen. ips to Help You Stop Smoking       Cigarette smoking is a preventable cause of death in the [place]. If you have thought about quitting but haven't been able to, here are some reasons why you should and some ways to do it. Here's Why   Quitting smoking now can decrease your risk of getting smoking-related illnesses like:   Heart disease   Stroke   Several types of cancer, including:   Lung   Mouth   Esophagus   Larynx   Bladder   Pancreas   Kidney   Chronic lung diseases:   Bronchitis   Emphysema   Asthma   Cataracts   Macular degeneration   Thyroid conditions   Hearing loss   Erectile dysfunction   Dementia   Osteoporosis   Here's How   Once you've decided to quit smoking, set your target quit date a few weeks away.  In the time leading up to your quit day, try some of these ideas offered by the [address] [place] [address] [place] to help you successfully quit smoking. For the best results, work with your doctor. Together, you can test your lung function and compare the results to those of a nonsmoking person. The results can be given to you as your lung age. Finding out your lung age right after having the test done may help you to stop smoking. Your doctor can also discuss with you all of your options and refer you to smoking-cessation support groups. You may wish to use nicotine replacement (gum, patches, inhaler) or one of the prescription medications that have been shown to increase quit rates and prolong abstinence from smoking. But whatever you and your doctor decide on these matters, it will still be you who decides when an how to quit. Here are some techniques:   Switch Brands   Switch to a brand you find distasteful. Change to a brand that is low in tar and nicotine a couple of weeks before your target quit date. This will help change your smoking behavior. However, do not smoke more cigarettes, inhale them more often or more deeply, or place your fingertips over the holes in the filters. All of these actions will increase your nicotine intake, and the idea is to get your body used to functioning without nicotine. Cut Down the Number of Cigarettes You Smoke   Smoke only half of each cigarette. Each day, postpone the lighting of your first cigarette by one hour. Decide you'll only smoke during odd or even hours of the day. Decide beforehand how many cigarettes you'll smoke during the day. For each additional cigarette, give a dollar to your favorite aamir. Change your eating habits to help you cut down. For example, drink milk, which many people consider incompatible with smoking. End meals or snacks with something that won't lead to a cigarette. Reach for a glass of juice instead of a cigarette for a \"pick-me-up. \"   Remember: Cutting down can help you quit, but it's not a substitute for quitting.  If you're down to about seven cigarettes a day, it's time to set your target quit date, and get ready to stick to it. Don't Smoke \"Automatically\"   Smoke only those cigarettes you really want. Catch yourself before you light up a cigarette out of pure habit. Don't empty your ashtrays. This will remind you of how many cigarettes you've smoked each day, and the sight and the smell of stale cigarettes butts will be very unpleasant. Make yourself aware of each cigarette by using the opposite hand or putting cigarettes in an unfamiliar location or a different pocket to break the automatic reach. If you light up many times during the day without even thinking about it, try to look in a mirror each time you put a match to your cigarette. You may decide you don't need it. Make Smoking Inconvenient   Stop buying cigarettes by the carton. Wait until one pack is empty before you buy another. Stop carrying cigarettes with you at home or at work. Make them difficult to get to. Make Smoking Unpleasant   Smoke only under circumstances that aren't especially pleasurable for you. If you like to smoke with others, smoke alone. Turn your chair to an empty corner and focus only on the cigarette you are smoking and all its many negative effects. Collect all your cigarette butts in one large glass container as a visual reminder of the filth made by smoking. Just Before Quitting   Practice going without cigarettes. Don't think of never smoking again. Think of quitting in terms of one day at a time . Tell yourself you won't smoke today, and then don't. Clean your clothes to rid them of the cigarette smell, which can linger a long time. On the Day You Quit   Throw away all your cigarettes and matches. Hide your lighters and ashtrays. Visit the dentist and have your teeth cleaned to get rid of tobacco stains. Notice how nice they look and resolve to keep them that way.    Make a list of things you'd like to buy for yourself or someone else. Estimate the cost in terms of packs of cigarettes, and put the money aside to buy these presents. Keep very busy on the big day. Go to the movies, exercise, take long walks, or go bike riding. Remind your family and friends that this is your quit date, and ask them to help you over the rough spots of the first couple of days and weeks. Buy yourself a treat or do something special to celebrate. Telephone and Internet Support   Telephone, web-, and computer-based programs can offer you the support that you need to quit and to stay smoke-free. You can find many programs online, like the American [employer] Association's [place] from Smoking . Immediately After Quitting   Develop a clean, fresh, nonsmoking environment around yourselfat work and at home. Buy yourself flowersyou may be surprised how much you can enjoy their scent now. The first few days after you quit, spend as much free time as possible in places where smoking isn't allowed, such as [address] [place] [address], museums, theaters, department stores, and churches. Drink large quantities of water and fruit juice (but avoid sodas that contain caffeine). Try to avoid alcohol, coffee, and other beverages that you associate with cigarette smoking. Strike up conversation instead of a match for a cigarette. If you miss the sensation of having a cigarette in your hand, play with something elsea pencil, a paper clip, a marble. If you miss having something in your mouth, try toothpicks or a fake cigarette. Personalized Preventive Plan for Jatinder Corona - 5/9/2019  Medicare offers a range of preventive health benefits. Some of the tests and screenings are paid in full while other may be subject to a deductible, co-insurance, and/or copay.     Some of these benefits include a comprehensive review of your medical history including lifestyle, illnesses that may run in your family, and various assessments and screenings as appropriate. After reviewing your medical record and screening and assessments performed today your provider may have ordered immunizations, labs, imaging, and/or referrals for you. A list of these orders (if applicable) as well as your Preventive Care list are included within your After Visit Summary for your review. Other Preventive Recommendations:    · A preventive eye exam performed by an eye specialist is recommended every 1-2 years to screen for glaucoma; cataracts, macular degeneration, and other eye disorders. · A preventive dental visit is recommended every 6 months. · Try to get at least 150 minutes of exercise per week or 10,000 steps per day on a pedometer . · Order or download the FREE \"Exercise & Physical Activity: Your Everyday Guide\" from [employer]. Call 5-113.397.3501 or search [employer] online. · You need 6435-9564 mg of calcium and 7226-5531 IU of vitamin D per day. It is possible to meet your calcium requirement with diet alone, but a vitamin D supplement is usually necessary to meet this goal.  · When exposed to the sun, use a sunscreen that protects against both UVA and UVB radiation with an SPF of 30 or greater. Reapply every 2 to 3 hours or after sweating, drying off with a towel, or swimming. · Always wear a seat belt when traveling in a car. Always wear a helmet when riding a bicycle or motorcycle.

## 2019-05-09 NOTE — PROGRESS NOTES
Chief Complaint   Patient presents with    Medicare AWV         Have you seen any other physician or provider since your last visit yes Dr Alondra Gallegos eye doctor    Have you had any other diagnostic tests since your last visit? Yes lung screening    Have you changed or stopped any medications since your last visit? no   Goals      Blood Pressure < 140/90      HEMOGLOBIN A1C < 8.0      LDL CALC < 100         Patient is here for his AWV. He is still having problems with stress incontinence and prostate. He gets up 3 times a night to urinate. Patient had an injection in his right eye.

## 2019-05-15 ENCOUNTER — TELEPHONE (OUTPATIENT)
Dept: PRIMARY CARE CLINIC | Age: 79
End: 2019-05-15

## 2019-05-15 ENCOUNTER — HOSPITAL ENCOUNTER (OUTPATIENT)
Dept: ULTRASOUND IMAGING | Facility: HOSPITAL | Age: 79
Discharge: HOME | End: 2019-05-15
Payer: MEDICARE

## 2019-05-15 DIAGNOSIS — Z72.0 TOBACCO ABUSE: ICD-10-CM

## 2019-05-15 DIAGNOSIS — Z00.00 ROUTINE GENERAL MEDICAL EXAMINATION AT A HEALTH CARE FACILITY: ICD-10-CM

## 2019-05-15 PROCEDURE — 76775 US EXAM ABDO BACK WALL LIM: CPT

## 2019-06-28 DIAGNOSIS — N40.0 BENIGN PROSTATIC HYPERPLASIA WITHOUT LOWER URINARY TRACT SYMPTOMS: ICD-10-CM

## 2019-06-28 RX ORDER — TAMSULOSIN HYDROCHLORIDE 0.4 MG/1
CAPSULE ORAL
Qty: 90 CAPSULE | Refills: 3 | Status: SHIPPED | OUTPATIENT
Start: 2019-06-28 | End: 2019-09-18 | Stop reason: SDUPTHER

## 2019-07-23 DIAGNOSIS — N32.81 OAB (OVERACTIVE BLADDER): ICD-10-CM

## 2019-07-23 RX ORDER — OXYBUTYNIN CHLORIDE 5 MG/1
TABLET ORAL
Qty: 30 TABLET | Refills: 6 | Status: SHIPPED | OUTPATIENT
Start: 2019-07-23 | End: 2020-06-10

## 2019-08-20 ENCOUNTER — HOSPITAL ENCOUNTER (OUTPATIENT)
Facility: HOSPITAL | Age: 79
Discharge: HOME | End: 2019-08-20
Payer: MEDICARE

## 2019-08-20 DIAGNOSIS — E78.00 PURE HYPERCHOLESTEROLEMIA: ICD-10-CM

## 2019-08-20 DIAGNOSIS — E11.9 TYPE 2 DIABETES MELLITUS WITHOUT COMPLICATION, WITHOUT LONG-TERM CURRENT USE OF INSULIN (HCC): ICD-10-CM

## 2019-08-20 DIAGNOSIS — R97.20 ELEVATED PROSTATE SPECIFIC ANTIGEN (PSA): Primary | ICD-10-CM

## 2019-08-20 LAB
A/G RATIO: 2.3 (ref 0.8–2)
ALBUMIN SERPL-MCNC: 4.3 G/DL (ref 3.4–4.8)
ALP BLD-CCNC: 85 U/L (ref 25–100)
ALT SERPL-CCNC: 11 U/L (ref 4–36)
ANION GAP SERPL CALCULATED.3IONS-SCNC: 11 MMOL/L (ref 3–16)
AST SERPL-CCNC: 13 U/L (ref 8–33)
BILIRUB SERPL-MCNC: 0.7 MG/DL (ref 0.3–1.2)
BUN BLDV-MCNC: 21 MG/DL (ref 6–20)
CALCIUM SERPL-MCNC: 9.4 MG/DL (ref 8.5–10.5)
CHLORIDE BLD-SCNC: 106 MMOL/L (ref 98–107)
CHOLESTEROL, TOTAL: 135 MG/DL (ref 0–200)
CO2: 27 MMOL/L (ref 20–30)
CREAT SERPL-MCNC: 0.9 MG/DL (ref 0.4–1.2)
GFR AFRICAN AMERICAN: >59
GFR NON-AFRICAN AMERICAN: >59
GLOBULIN: 1.9 G/DL
GLUCOSE BLD-MCNC: 133 MG/DL (ref 74–106)
HBA1C MFR BLD: 6.6 %
HCT VFR BLD CALC: 41.9 % (ref 40–54)
HDLC SERPL-MCNC: 46 MG/DL (ref 40–60)
HEMOGLOBIN: 14.2 G/DL (ref 13–18)
LDL CHOLESTEROL CALCULATED: 68 MG/DL
MCH RBC QN AUTO: 32.3 PG (ref 27–32)
MCHC RBC AUTO-ENTMCNC: 33.9 G/DL (ref 31–35)
MCV RBC AUTO: 95.4 FL (ref 80–100)
PDW BLD-RTO: 13.1 % (ref 11–16)
PLATELET # BLD: 171 K/UL (ref 150–400)
PMV BLD AUTO: 11.1 FL (ref 6–10)
POTASSIUM SERPL-SCNC: 3.9 MMOL/L (ref 3.4–5.1)
RBC # BLD: 4.39 M/UL (ref 4.5–6)
SODIUM BLD-SCNC: 144 MMOL/L (ref 136–145)
TOTAL PROTEIN: 6.2 G/DL (ref 6.4–8.3)
TRIGL SERPL-MCNC: 107 MG/DL (ref 0–249)
VLDLC SERPL CALC-MCNC: 21 MG/DL
WBC # BLD: 5.8 K/UL (ref 4–11)

## 2019-08-20 PROCEDURE — 85027 COMPLETE CBC AUTOMATED: CPT

## 2019-08-20 PROCEDURE — 80061 LIPID PANEL: CPT

## 2019-08-20 PROCEDURE — 83036 HEMOGLOBIN GLYCOSYLATED A1C: CPT

## 2019-08-20 PROCEDURE — 80053 COMPREHEN METABOLIC PANEL: CPT

## 2019-08-21 LAB — PSA SERPL-MCNC: 7.51 NG/ML (ref 0–4)

## 2019-08-22 ENCOUNTER — OFFICE VISIT (OUTPATIENT)
Dept: UROLOGY | Facility: CLINIC | Age: 79
End: 2019-08-22

## 2019-08-22 VITALS
HEIGHT: 72 IN | BODY MASS INDEX: 24.38 KG/M2 | SYSTOLIC BLOOD PRESSURE: 126 MMHG | WEIGHT: 180 LBS | HEART RATE: 61 BPM | DIASTOLIC BLOOD PRESSURE: 66 MMHG | OXYGEN SATURATION: 97 %

## 2019-08-22 DIAGNOSIS — R97.20 ELEVATED PROSTATE SPECIFIC ANTIGEN (PSA): Primary | ICD-10-CM

## 2019-08-22 LAB
BILIRUB BLD-MCNC: NEGATIVE MG/DL
CLARITY, POC: CLEAR
COLOR UR: YELLOW
GLUCOSE UR STRIP-MCNC: NEGATIVE MG/DL
KETONES UR QL: NEGATIVE
LEUKOCYTE EST, POC: NEGATIVE
NITRITE UR-MCNC: NEGATIVE MG/ML
PH UR: 5.5 [PH] (ref 5–8)
PROT UR STRIP-MCNC: NEGATIVE MG/DL
RBC # UR STRIP: NEGATIVE /UL
SP GR UR: 1.03 (ref 1–1.03)
UROBILINOGEN UR QL: NORMAL

## 2019-08-22 PROCEDURE — 81003 URINALYSIS AUTO W/O SCOPE: CPT | Performed by: UROLOGY

## 2019-08-22 PROCEDURE — 99213 OFFICE O/P EST LOW 20 MIN: CPT | Performed by: UROLOGY

## 2019-08-22 NOTE — PROGRESS NOTES
Chief Complaint  Elevated PSA (6 month follow up.)        KUSUM Camp is a 78 y.o. male who returns today for follow-up with a known enlarged prostate and an elevated PSA that is progressive.  He is been informed in the past he has a 25% chance of prostate cancer but is always declined my recommendation of prostate biopsy.  He states most of his family members  about the same age he is now and does not expect longevity.  He continues to use tobacco.  He if states he tries to quit he goes crazy.  He denies any difficulty voiding and has clear urine today.    Vitals:    19 0844   BP: 126/66   Pulse: 61   SpO2: 97%       Past Medical History  Past Medical History:   Diagnosis Date   • Arthritis    • Diabetes mellitus (CMS/HCC)    • ED (erectile dysfunction)    • Enlarged prostate with lower urinary tract symptoms (LUTS)    • Full dentures    • History of stress test    • [place] (hard of hearing)     Bilateral hearing aids    • Renal disorder    • Stone in kidney        Past Surgical History  Past Surgical History:   Procedure Laterality Date   • COLONOSCOPY     • COLONOSCOPY N/A 2019    Procedure: COLONOSCOPY;  Surgeon: Onesimo Arnold MD;  Location: [facility] ENDOSCOPY;  Service: Gastroenterology   • EYE SURGERY Right     cataract removal with IOL implant   • HERNIA REPAIR Right     multiple inguinal hernia repairs   • KNEE SURGERY Right    • NEPHRECTOMY Right     partial right nephrectomy due to kidney stone       Medications  has a current medication list which includes the following prescription(s): amlodipine, amlodipine besy-benazepril hcl, aspirin, bisacodyl, cholecalciferol, cholecalciferol, glucose blood, havrix, lisinopril, lovastatin, metformin, multiple vitamins-minerals, nifedipine, oxybutynin, oxybutynin, tamsulosin, vitamin b-12, vitamin c, and lisinopril.      Allergies  No Known Allergies    Social History  Social History     Social History Narrative   • Not on file       Family History  He  has no family history of bladder or kidney cancer  He has no family history of kidney stones      AUA Symptom Score:      Review of Systems  Review of Systems   Constitutional: Negative for activity change, appetite change, chills, fatigue, fever, unexpected weight gain and unexpected weight loss.   Respiratory: Negative for apnea, cough, chest tightness, shortness of breath, wheezing and stridor.    Cardiovascular: Negative for chest pain, palpitations and leg swelling.   Gastrointestinal: Negative for abdominal distention, abdominal pain, anal bleeding, blood in stool, constipation, diarrhea, nausea, rectal pain, vomiting, GERD and indigestion.   Genitourinary: Negative for decreased libido, decreased urine volume, difficulty urinating, discharge, dysuria, flank pain, frequency, genital sores, hematuria, nocturia, penile pain, erectile dysfunction, penile swelling, scrotal swelling, testicular pain, urgency and urinary incontinence.   Musculoskeletal: Negative for back pain and joint swelling.   Neurological: Negative for tremors, seizures, speech difficulty, weakness and numbness.   Psychiatric/Behavioral: Negative for agitation, decreased concentration, sleep disturbance, depressed mood and stress. The patient is not nervous/anxious.    All other systems reviewed and are negative.      Physical Exam  Physical Exam   Constitutional: He is oriented to person, place, and time. He appears well-developed and well-nourished.   HENT:   Head: Normocephalic and atraumatic.   Neck: Normal range of motion.   Pulmonary/Chest: Effort normal. No respiratory distress.   Abdominal: Soft. He exhibits no distension and no mass. There is no tenderness. No hernia.   Genitourinary: Rectum normal.       Prostate is enlarged.   Musculoskeletal: Normal range of motion.   Lymphadenopathy:     He has no cervical adenopathy.   Neurological: He is alert and oriented to person, place, and time.   Skin: Skin is warm and dry.   Psychiatric:  He has a normal mood and affect. His behavior is normal.   Vitals reviewed.      Labs Recent and today in the office:  Results for orders placed or performed in visit on 08/22/19   POC Urinalysis Dipstick, Automated   Result Value Ref Range    Color Yellow Yellow, Straw, Dark Yellow, V[place]ha    Clarity, UA Clear Clear    Specific Gravity  1.030 1.005 - 1.030    pH, Urine 5.5 5.0 - 8.0    Leukocytes Negative Negative    Nitrite, UA Negative Negative    Protein, POC Negative Negative mg/dL    Glucose, UA Negative Negative, 1000 mg/dL (3+) mg/dL    Ketones, UA Negative Negative    Urobilinogen, UA Normal Normal    Bilirubin Negative Negative    Blood, UA Negative Negative         Assessment & Plan  Elevated PSA: Digital rectal exam reveals no obvious changes of cancer but with a progressive PSA I have recommended prostate biopsy to be sure.  If he has an aggressive disease I would then recommend treatment with radiation therapy.  He seems to be rather fatalistic and declines additional work-up.  He does agree to return in 6 months.

## 2019-09-17 NOTE — PATIENT INSTRUCTIONS
[place] to help you successfully quit smoking. For the best results, work with your doctor. Together, you can test your lung function and compare the results to those of a nonsmoking person. The results can be given to you as your lung age. Finding out your lung age right after having the test done may help you to stop smoking. Your doctor can also discuss with you all of your options and refer you to smoking-cessation support groups. You may wish to use nicotine replacement (gum, patches, inhaler) or one of the prescription medications that have been shown to increase quit rates and prolong abstinence from smoking. But whatever you and your doctor decide on these matters, it will still be you who decides when an how to quit. Here are some techniques:   Switch Brands   Switch to a brand you find distasteful. Change to a brand that is low in tar and nicotine a couple of weeks before your target quit date. This will help change your smoking behavior. However, do not smoke more cigarettes, inhale them more often or more deeply, or place your fingertips over the holes in the filters. All of these actions will increase your nicotine intake, and the idea is to get your body used to functioning without nicotine. Cut Down the Number of Cigarettes You Smoke   Smoke only half of each cigarette. Each day, postpone the lighting of your first cigarette by one hour. Decide you'll only smoke during odd or even hours of the day. Decide beforehand how many cigarettes you'll smoke during the day. For each additional cigarette, give a dollar to your favorite aamir. Change your eating habits to help you cut down. For example, drink milk, which many people consider incompatible with smoking. End meals or snacks with something that won't lead to a cigarette. Reach for a glass of juice instead of a cigarette for a \"pick-me-up. \"   Remember: Cutting down can help you quit, but it's not a substitute for quitting.  If you're down to about seven cigarettes a day, it's time to set your target quit date, and get ready to stick to it. Don't Smoke \"Automatically\"   Smoke only those cigarettes you really want. Catch yourself before you light up a cigarette out of pure habit. Don't empty your ashtrays. This will remind you of how many cigarettes you've smoked each day, and the sight and the smell of stale cigarettes butts will be very unpleasant. Make yourself aware of each cigarette by using the opposite hand or putting cigarettes in an unfamiliar location or a different pocket to break the automatic reach. If you light up many times during the day without even thinking about it, try to look in a mirror each time you put a match to your cigarette. You may decide you don't need it. Make Smoking Inconvenient   Stop buying cigarettes by the carton. Wait until one pack is empty before you buy another. Stop carrying cigarettes with you at home or at work. Make them difficult to get to. Make Smoking Unpleasant   Smoke only under circumstances that aren't especially pleasurable for you. If you like to smoke with others, smoke alone. Turn your chair to an empty corner and focus only on the cigarette you are smoking and all its many negative effects. Collect all your cigarette butts in one large glass container as a visual reminder of the filth made by smoking. Just Before Quitting   Practice going without cigarettes. Don't think of never smoking again. Think of quitting in terms of one day at a time . Tell yourself you won't smoke today, and then don't. Clean your clothes to rid them of the cigarette smell, which can linger a long time. On the Day You Quit   Throw away all your cigarettes and matches. Hide your lighters and ashtrays. Visit the dentist and have your teeth cleaned to get rid of tobacco stains. Notice how nice they look and resolve to keep them that way.    Make a list of things you'd like to buy for

## 2019-09-18 ENCOUNTER — OFFICE VISIT (OUTPATIENT)
Dept: PRIMARY CARE CLINIC | Age: 79
End: 2019-09-18
Payer: MEDICARE

## 2019-09-18 VITALS
HEART RATE: 59 BPM | BODY MASS INDEX: 23.17 KG/M2 | WEIGHT: 174.8 LBS | OXYGEN SATURATION: 97 % | SYSTOLIC BLOOD PRESSURE: 114 MMHG | HEIGHT: 73 IN | DIASTOLIC BLOOD PRESSURE: 59 MMHG | RESPIRATION RATE: 16 BRPM | TEMPERATURE: 97 F

## 2019-09-18 DIAGNOSIS — E78.00 PURE HYPERCHOLESTEROLEMIA: ICD-10-CM

## 2019-09-18 DIAGNOSIS — E11.9 TYPE 2 DIABETES MELLITUS WITHOUT COMPLICATION, WITHOUT LONG-TERM CURRENT USE OF INSULIN (HCC): Primary | ICD-10-CM

## 2019-09-18 DIAGNOSIS — I10 ESSENTIAL HYPERTENSION: ICD-10-CM

## 2019-09-18 DIAGNOSIS — E55.9 VITAMIN D DEFICIENCY: ICD-10-CM

## 2019-09-18 DIAGNOSIS — Z12.5 ENCOUNTER FOR SCREENING FOR MALIGNANT NEOPLASM OF PROSTATE: ICD-10-CM

## 2019-09-18 DIAGNOSIS — R35.1 NOCTURIA: ICD-10-CM

## 2019-09-18 PROCEDURE — G8420 CALC BMI NORM PARAMETERS: HCPCS | Performed by: NURSE PRACTITIONER

## 2019-09-18 PROCEDURE — 4004F PT TOBACCO SCREEN RCVD TLK: CPT | Performed by: NURSE PRACTITIONER

## 2019-09-18 PROCEDURE — 99214 OFFICE O/P EST MOD 30 MIN: CPT | Performed by: NURSE PRACTITIONER

## 2019-09-18 PROCEDURE — 1123F ACP DISCUSS/DSCN MKR DOCD: CPT | Performed by: NURSE PRACTITIONER

## 2019-09-18 PROCEDURE — 4040F PNEUMOC VAC/ADMIN/RCVD: CPT | Performed by: NURSE PRACTITIONER

## 2019-09-18 PROCEDURE — G8427 DOCREV CUR MEDS BY ELIG CLIN: HCPCS | Performed by: NURSE PRACTITIONER

## 2019-09-18 ASSESSMENT — ENCOUNTER SYMPTOMS
RESPIRATORY NEGATIVE: 1
GASTROINTESTINAL NEGATIVE: 1
EYES NEGATIVE: 1

## 2019-09-18 NOTE — PROGRESS NOTES
Follow up lab results from 08/20/19
(CHOLECALCIFEROL) 1000 UNITS CAPS capsule Take 1,000 Units by mouth daily. No current facility-administered medications on file prior to visit. Review of Systems   Constitutional: Negative. HENT: Negative. Eyes: Negative. Respiratory: Negative. Cardiovascular: Negative. Gastrointestinal: Negative. Genitourinary: Negative. Musculoskeletal: Negative. Skin: Negative. Neurological: Negative. Psychiatric/Behavioral: Negative. OBJECTIVE:  BP (!) 114/59 (Site: Right Upper Arm, Position: Sitting, Cuff Size: Medium Adult)   Pulse 59   Temp 97 °F (36.1 °C) (Oral)   Resp 16   Ht 6' 1\" (1.854 m)   Wt 174 lb 12.8 oz (79.3 kg)   SpO2 97% Comment: room air  BMI 23.06 kg/m²    Physical Exam   Constitutional: He is oriented to person, place, and time. He appears well-developed and well-nourished. HENT:   Head: Normocephalic and atraumatic. Eyes: Pupils are equal, round, and reactive to light. Conjunctivae and EOM are normal.   Neck: Normal range of motion. Neck supple. No JVD present. No thyromegaly present. Cardiovascular: Normal rate and regular rhythm. Exam reveals no gallop and no friction rub. No murmur heard. Pulmonary/Chest: Effort normal and breath sounds normal. No respiratory distress. He has no wheezes. He has no rales. Abdominal: Soft. Bowel sounds are normal. He exhibits no distension. There is no tenderness. There is no guarding. Musculoskeletal: He exhibits no edema or tenderness. Neurological: He is alert and oriented to person, place, and time. Skin: Skin is warm and dry. No rash noted. Psychiatric: He has a normal mood and affect. Judgment normal.   Nursing note and vitals reviewed.       Results in Past 30 Days  Result Component Current Result Ref Range Previous Result Ref Range   Alb 4.3 (8/20/2019) 3.4 - 4.8 g/dL Not in Time Range    Albumin/Globulin Ratio 2.3 (H) (8/20/2019) 0.8 - 2.0 Not in Time Range    Alkaline Phosphatase 85

## 2019-11-14 ENCOUNTER — APPOINTMENT (OUTPATIENT)
Dept: GENERAL RADIOLOGY | Facility: HOSPITAL | Age: 79
End: 2019-11-14
Payer: MEDICARE

## 2019-11-14 ENCOUNTER — APPOINTMENT (OUTPATIENT)
Dept: CT IMAGING | Facility: HOSPITAL | Age: 79
End: 2019-11-14
Payer: MEDICARE

## 2019-11-14 ENCOUNTER — HOSPITAL ENCOUNTER (EMERGENCY)
Facility: HOSPITAL | Age: 79
Discharge: ANOTHER HOSPITAL | End: 2019-11-14
Attending: FAMILY MEDICINE
Payer: MEDICARE

## 2019-11-14 VITALS
RESPIRATION RATE: 20 BRPM | HEART RATE: 110 BPM | OXYGEN SATURATION: 97 % | DIASTOLIC BLOOD PRESSURE: 57 MMHG | SYSTOLIC BLOOD PRESSURE: 100 MMHG | TEMPERATURE: 97 F

## 2019-11-14 DIAGNOSIS — S22.41XA CLOSED TRAUMATIC FRACTURE OF RIBS OF RIGHT SIDE WITH PNEUMOTHORAX, INITIAL ENCOUNTER: Primary | ICD-10-CM

## 2019-11-14 DIAGNOSIS — R07.9 CHEST PAIN, UNSPECIFIED TYPE: ICD-10-CM

## 2019-11-14 DIAGNOSIS — S32.509A CLOSED FRACTURE OF PUBIS, UNSPECIFIED PORTION OF PUBIS, UNSPECIFIED LATERALITY, INITIAL ENCOUNTER (HCC): ICD-10-CM

## 2019-11-14 DIAGNOSIS — R10.9 ABDOMINAL PAIN, UNSPECIFIED ABDOMINAL LOCATION: ICD-10-CM

## 2019-11-14 DIAGNOSIS — S09.90XA CLOSED HEAD INJURY, INITIAL ENCOUNTER: ICD-10-CM

## 2019-11-14 DIAGNOSIS — S27.0XXA CLOSED TRAUMATIC FRACTURE OF RIBS OF RIGHT SIDE WITH PNEUMOTHORAX, INITIAL ENCOUNTER: Primary | ICD-10-CM

## 2019-11-14 LAB
A/G RATIO: 1.8 (ref 0.8–2)
ALBUMIN SERPL-MCNC: 4 G/DL (ref 3.4–4.8)
ALP BLD-CCNC: 94 U/L (ref 25–100)
ALT SERPL-CCNC: 147 U/L (ref 4–36)
ANION GAP SERPL CALCULATED.3IONS-SCNC: 20 MMOL/L (ref 3–16)
AST SERPL-CCNC: 202 U/L (ref 8–33)
BASOPHILS ABSOLUTE: 0 K/UL (ref 0–0.1)
BASOPHILS RELATIVE PERCENT: 0.2 %
BILIRUB SERPL-MCNC: 0.5 MG/DL (ref 0.3–1.2)
BUN BLDV-MCNC: 23 MG/DL (ref 6–20)
CALCIUM SERPL-MCNC: 9.4 MG/DL (ref 8.5–10.5)
CHLORIDE BLD-SCNC: 103 MMOL/L (ref 98–107)
CO2: 20 MMOL/L (ref 20–30)
CREAT SERPL-MCNC: 1.4 MG/DL (ref 0.4–1.2)
EOSINOPHILS ABSOLUTE: 0 K/UL (ref 0–0.4)
EOSINOPHILS RELATIVE PERCENT: 0.1 %
GFR AFRICAN AMERICAN: >59
GFR NON-AFRICAN AMERICAN: 49
GLOBULIN: 2.2 G/DL
GLUCOSE BLD-MCNC: 245 MG/DL (ref 74–106)
GLUCOSE BLD-MCNC: 247 MG/DL (ref 74–106)
HCT VFR BLD CALC: 40.6 % (ref 40–54)
HEMOGLOBIN: 13.4 G/DL (ref 13–18)
IMMATURE GRANULOCYTES #: 0.2 K/UL
IMMATURE GRANULOCYTES %: 1 % (ref 0–5)
LYMPHOCYTES ABSOLUTE: 1.2 K/UL (ref 1.5–4)
LYMPHOCYTES RELATIVE PERCENT: 6.9 %
MCH RBC QN AUTO: 32.3 PG (ref 27–32)
MCHC RBC AUTO-ENTMCNC: 33 G/DL (ref 31–35)
MCV RBC AUTO: 97.8 FL (ref 80–100)
MONOCYTES ABSOLUTE: 1.1 K/UL (ref 0.2–0.8)
MONOCYTES RELATIVE PERCENT: 5.9 %
NEUTROPHILS ABSOLUTE: 15.3 K/UL (ref 2–7.5)
NEUTROPHILS RELATIVE PERCENT: 85.9 %
PDW BLD-RTO: 13.2 % (ref 11–16)
PERFORMED ON: ABNORMAL
PLATELET # BLD: 167 K/UL (ref 150–400)
PMV BLD AUTO: 10.3 FL (ref 6–10)
POTASSIUM SERPL-SCNC: 3.5 MMOL/L (ref 3.4–5.1)
RBC # BLD: 4.15 M/UL (ref 4.5–6)
SODIUM BLD-SCNC: 143 MMOL/L (ref 136–145)
TOTAL PROTEIN: 6.2 G/DL (ref 6.4–8.3)
TROPONIN: <0.3 NG/ML
WBC # BLD: 17.9 K/UL (ref 4–11)

## 2019-11-14 PROCEDURE — 6360000002 HC RX W HCPCS: Performed by: FAMILY MEDICINE

## 2019-11-14 PROCEDURE — 71045 X-RAY EXAM CHEST 1 VIEW: CPT

## 2019-11-14 PROCEDURE — 99291 CRITICAL CARE FIRST HOUR: CPT

## 2019-11-14 PROCEDURE — 96374 THER/PROPH/DIAG INJ IV PUSH: CPT

## 2019-11-14 PROCEDURE — 96361 HYDRATE IV INFUSION ADD-ON: CPT

## 2019-11-14 PROCEDURE — 6360000002 HC RX W HCPCS

## 2019-11-14 PROCEDURE — 96375 TX/PRO/DX INJ NEW DRUG ADDON: CPT

## 2019-11-14 PROCEDURE — 71250 CT THORAX DX C-: CPT

## 2019-11-14 PROCEDURE — 74176 CT ABD & PELVIS W/O CONTRAST: CPT

## 2019-11-14 PROCEDURE — 36415 COLL VENOUS BLD VENIPUNCTURE: CPT

## 2019-11-14 PROCEDURE — 2580000003 HC RX 258: Performed by: FAMILY MEDICINE

## 2019-11-14 PROCEDURE — 2500000003 HC RX 250 WO HCPCS

## 2019-11-14 PROCEDURE — 32551 INSERTION OF CHEST TUBE: CPT

## 2019-11-14 PROCEDURE — 96376 TX/PRO/DX INJ SAME DRUG ADON: CPT

## 2019-11-14 PROCEDURE — 80053 COMPREHEN METABOLIC PANEL: CPT

## 2019-11-14 PROCEDURE — 72170 X-RAY EXAM OF PELVIS: CPT

## 2019-11-14 PROCEDURE — 70450 CT HEAD/BRAIN W/O DYE: CPT

## 2019-11-14 PROCEDURE — 85025 COMPLETE CBC W/AUTO DIFF WBC: CPT

## 2019-11-14 PROCEDURE — 84484 ASSAY OF TROPONIN QUANT: CPT

## 2019-11-14 RX ORDER — MIDAZOLAM HYDROCHLORIDE 5 MG/ML
INJECTION INTRAMUSCULAR; INTRAVENOUS
Status: COMPLETED
Start: 2019-11-14 | End: 2019-11-14

## 2019-11-14 RX ORDER — LIDOCAINE HYDROCHLORIDE 10 MG/ML
5 INJECTION, SOLUTION INFILTRATION; PERINEURAL ONCE
Status: COMPLETED | OUTPATIENT
Start: 2019-11-14 | End: 2019-11-14

## 2019-11-14 RX ORDER — MIDAZOLAM HYDROCHLORIDE 1 MG/ML
1 INJECTION INTRAMUSCULAR; INTRAVENOUS ONCE
Status: DISCONTINUED | OUTPATIENT
Start: 2019-11-14 | End: 2019-11-15 | Stop reason: HOSPADM

## 2019-11-14 RX ORDER — ONDANSETRON 2 MG/ML
4 INJECTION INTRAMUSCULAR; INTRAVENOUS ONCE
Status: COMPLETED | OUTPATIENT
Start: 2019-11-14 | End: 2019-11-14

## 2019-11-14 RX ORDER — 0.9 % SODIUM CHLORIDE 0.9 %
1000 INTRAVENOUS SOLUTION INTRAVENOUS ONCE
Status: COMPLETED | OUTPATIENT
Start: 2019-11-14 | End: 2019-11-14

## 2019-11-14 RX ORDER — FENTANYL CITRATE 50 UG/ML
25 INJECTION, SOLUTION INTRAMUSCULAR; INTRAVENOUS ONCE
Status: COMPLETED | OUTPATIENT
Start: 2019-11-14 | End: 2019-11-14

## 2019-11-14 RX ORDER — SODIUM CHLORIDE 9 MG/ML
INJECTION, SOLUTION INTRAVENOUS CONTINUOUS
Status: DISCONTINUED | OUTPATIENT
Start: 2019-11-14 | End: 2019-11-15 | Stop reason: HOSPADM

## 2019-11-14 RX ORDER — FENTANYL CITRATE 50 UG/ML
INJECTION, SOLUTION INTRAMUSCULAR; INTRAVENOUS
Status: COMPLETED
Start: 2019-11-14 | End: 2019-11-14

## 2019-11-14 RX ORDER — LIDOCAINE HYDROCHLORIDE 10 MG/ML
INJECTION, SOLUTION INFILTRATION; PERINEURAL
Status: COMPLETED
Start: 2019-11-14 | End: 2019-11-14

## 2019-11-14 RX ADMIN — FENTANYL CITRATE 25 MCG: 50 INJECTION INTRAMUSCULAR; INTRAVENOUS at 21:57

## 2019-11-14 RX ADMIN — MIDAZOLAM HYDROCHLORIDE 1 MG: 5 INJECTION INTRAMUSCULAR; INTRAVENOUS at 21:56

## 2019-11-14 RX ADMIN — LIDOCAINE HYDROCHLORIDE 5 ML: 10 INJECTION, SOLUTION INFILTRATION; PERINEURAL at 22:35

## 2019-11-14 RX ADMIN — SODIUM CHLORIDE 1000 ML: 9 INJECTION, SOLUTION INTRAVENOUS at 20:47

## 2019-11-14 RX ADMIN — SODIUM CHLORIDE: 9 INJECTION, SOLUTION INTRAVENOUS at 21:30

## 2019-11-14 RX ADMIN — FENTANYL CITRATE 25 MCG: 50 INJECTION, SOLUTION INTRAMUSCULAR; INTRAVENOUS at 21:57

## 2019-11-14 RX ADMIN — ONDANSETRON 4 MG: 2 INJECTION INTRAMUSCULAR; INTRAVENOUS at 20:53

## 2019-11-14 RX ADMIN — FENTANYL CITRATE 25 MCG: 50 INJECTION INTRAMUSCULAR; INTRAVENOUS at 20:52

## 2019-11-14 ASSESSMENT — ENCOUNTER SYMPTOMS
ABDOMINAL PAIN: 1
BACK PAIN: 1
ROS SKIN COMMENTS: HEAD LACERATION

## 2019-11-14 ASSESSMENT — PAIN SCALES - GENERAL
PAINLEVEL_OUTOF10: 10

## 2019-12-20 ENCOUNTER — CARE COORDINATION (OUTPATIENT)
Dept: CARE COORDINATION | Age: 79
End: 2019-12-20

## 2019-12-26 ENCOUNTER — OFFICE VISIT (OUTPATIENT)
Dept: PRIMARY CARE CLINIC | Age: 79
End: 2019-12-26
Payer: MEDICARE

## 2019-12-26 VITALS
RESPIRATION RATE: 16 BRPM | TEMPERATURE: 97.4 F | HEIGHT: 72 IN | HEART RATE: 90 BPM | OXYGEN SATURATION: 98 % | WEIGHT: 167 LBS | BODY MASS INDEX: 22.62 KG/M2 | DIASTOLIC BLOOD PRESSURE: 64 MMHG | SYSTOLIC BLOOD PRESSURE: 126 MMHG

## 2019-12-26 DIAGNOSIS — J01.90 ACUTE BACTERIAL SINUSITIS: ICD-10-CM

## 2019-12-26 DIAGNOSIS — R39.15 URINARY URGENCY: ICD-10-CM

## 2019-12-26 DIAGNOSIS — S32.811A MULTIPLE CLOSED FRACTURES OF PELVIS WITH UNSTABLE DISRUPTION OF PELVIC RING, INITIAL ENCOUNTER (HCC): Primary | ICD-10-CM

## 2019-12-26 DIAGNOSIS — N30.00 ACUTE CYSTITIS WITHOUT HEMATURIA: ICD-10-CM

## 2019-12-26 DIAGNOSIS — B96.89 ACUTE BACTERIAL SINUSITIS: ICD-10-CM

## 2019-12-26 LAB
BILIRUBIN, POC: ABNORMAL
BLOOD URINE, POC: ABNORMAL
CLARITY, POC: ABNORMAL
COLOR, POC: YELLOW
GLUCOSE URINE, POC: ABNORMAL
KETONES, POC: ABNORMAL
LEUKOCYTE EST, POC: ABNORMAL
NITRITE, POC: POSITIVE
PH, POC: 6
PROTEIN, POC: ABNORMAL
SPECIFIC GRAVITY, POC: 1.02
UROBILINOGEN, POC: 0.2

## 2019-12-26 PROCEDURE — 87186 SC STD MICRODIL/AGAR DIL: CPT

## 2019-12-26 PROCEDURE — 99214 OFFICE O/P EST MOD 30 MIN: CPT | Performed by: NURSE PRACTITIONER

## 2019-12-26 PROCEDURE — 81002 URINALYSIS NONAUTO W/O SCOPE: CPT | Performed by: NURSE PRACTITIONER

## 2019-12-26 PROCEDURE — 87086 URINE CULTURE/COLONY COUNT: CPT

## 2019-12-26 PROCEDURE — G8427 DOCREV CUR MEDS BY ELIG CLIN: HCPCS | Performed by: NURSE PRACTITIONER

## 2019-12-26 PROCEDURE — 4040F PNEUMOC VAC/ADMIN/RCVD: CPT | Performed by: NURSE PRACTITIONER

## 2019-12-26 PROCEDURE — 1123F ACP DISCUSS/DSCN MKR DOCD: CPT | Performed by: NURSE PRACTITIONER

## 2019-12-26 PROCEDURE — 4004F PT TOBACCO SCREEN RCVD TLK: CPT | Performed by: NURSE PRACTITIONER

## 2019-12-26 PROCEDURE — G8420 CALC BMI NORM PARAMETERS: HCPCS | Performed by: NURSE PRACTITIONER

## 2019-12-26 PROCEDURE — 87077 CULTURE AEROBIC IDENTIFY: CPT

## 2019-12-26 PROCEDURE — G8484 FLU IMMUNIZE NO ADMIN: HCPCS | Performed by: NURSE PRACTITIONER

## 2019-12-26 RX ORDER — FUROSEMIDE 40 MG/1
40 TABLET ORAL DAILY
COMMUNITY
Start: 2019-12-16 | End: 2020-02-14 | Stop reason: ALTCHOICE

## 2019-12-26 RX ORDER — CIPROFLOXACIN 500 MG/1
500 TABLET, FILM COATED ORAL 2 TIMES DAILY
Qty: 14 TABLET | Refills: 0 | Status: SHIPPED | OUTPATIENT
Start: 2019-12-26 | End: 2020-01-02

## 2019-12-26 RX ORDER — LANOLIN ALCOHOL/MO/W.PET/CERES
3 CREAM (GRAM) TOPICAL NIGHTLY PRN
COMMUNITY
End: 2020-02-14 | Stop reason: ALTCHOICE

## 2019-12-26 RX ORDER — METHOCARBAMOL 750 MG/1
750 TABLET, FILM COATED ORAL 4 TIMES DAILY PRN
COMMUNITY
End: 2020-01-17 | Stop reason: ALTCHOICE

## 2019-12-26 ASSESSMENT — ENCOUNTER SYMPTOMS
BACK PAIN: 1
EYES NEGATIVE: 1
RHINORRHEA: 1
GASTROINTESTINAL NEGATIVE: 1
RESPIRATORY NEGATIVE: 1

## 2019-12-27 ENCOUNTER — HOSPITAL ENCOUNTER (OUTPATIENT)
Facility: HOSPITAL | Age: 79
Discharge: HOME | End: 2019-12-27
Payer: MEDICARE

## 2019-12-27 DIAGNOSIS — N30.00 ACUTE CYSTITIS WITHOUT HEMATURIA: ICD-10-CM

## 2019-12-30 LAB
ORGANISM: ABNORMAL
URINE CULTURE, ROUTINE: ABNORMAL

## 2020-01-03 ENCOUNTER — TELEPHONE (OUTPATIENT)
Dept: UROLOGY | Facility: CLINIC | Age: 80
End: 2020-01-03

## 2020-01-03 NOTE — TELEPHONE ENCOUNTER
Home health nurse called stating patient has had multiple fractures and while in the hospital ditropan was stopped, they want to know if patient can go back on it? Patient is having frequency and incontinence and also very unsteady per home health nurse.  Please Advise.

## 2020-01-17 ENCOUNTER — OFFICE VISIT (OUTPATIENT)
Dept: PRIMARY CARE CLINIC | Age: 80
End: 2020-01-17
Payer: MEDICARE

## 2020-01-17 VITALS
OXYGEN SATURATION: 99 % | TEMPERATURE: 97.8 F | RESPIRATION RATE: 16 BRPM | SYSTOLIC BLOOD PRESSURE: 134 MMHG | DIASTOLIC BLOOD PRESSURE: 88 MMHG | WEIGHT: 169.2 LBS | HEIGHT: 72 IN | BODY MASS INDEX: 22.92 KG/M2 | HEART RATE: 61 BPM

## 2020-01-17 LAB
BILIRUBIN, POC: NORMAL
BLOOD URINE, POC: NORMAL
CLARITY, POC: CLEAR
COLOR, POC: NORMAL
GLUCOSE URINE, POC: NORMAL
KETONES, POC: NORMAL
LEUKOCYTE EST, POC: NORMAL
NITRITE, POC: NORMAL
PH, POC: 7.5
PROTEIN, POC: NORMAL
SPECIFIC GRAVITY, POC: 1.01
UROBILINOGEN, POC: 0.2

## 2020-01-17 PROCEDURE — G8484 FLU IMMUNIZE NO ADMIN: HCPCS | Performed by: NURSE PRACTITIONER

## 2020-01-17 PROCEDURE — 99214 OFFICE O/P EST MOD 30 MIN: CPT | Performed by: NURSE PRACTITIONER

## 2020-01-17 PROCEDURE — 4040F PNEUMOC VAC/ADMIN/RCVD: CPT | Performed by: NURSE PRACTITIONER

## 2020-01-17 PROCEDURE — 1123F ACP DISCUSS/DSCN MKR DOCD: CPT | Performed by: NURSE PRACTITIONER

## 2020-01-17 PROCEDURE — G8420 CALC BMI NORM PARAMETERS: HCPCS | Performed by: NURSE PRACTITIONER

## 2020-01-17 PROCEDURE — G8427 DOCREV CUR MEDS BY ELIG CLIN: HCPCS | Performed by: NURSE PRACTITIONER

## 2020-01-17 PROCEDURE — 81002 URINALYSIS NONAUTO W/O SCOPE: CPT | Performed by: NURSE PRACTITIONER

## 2020-01-17 PROCEDURE — 4004F PT TOBACCO SCREEN RCVD TLK: CPT | Performed by: NURSE PRACTITIONER

## 2020-01-17 ASSESSMENT — PATIENT HEALTH QUESTIONNAIRE - PHQ9
1. LITTLE INTEREST OR PLEASURE IN DOING THINGS: 0
SUM OF ALL RESPONSES TO PHQ QUESTIONS 1-9: 0
SUM OF ALL RESPONSES TO PHQ9 QUESTIONS 1 & 2: 0
SUM OF ALL RESPONSES TO PHQ QUESTIONS 1-9: 0
2. FEELING DOWN, DEPRESSED OR HOPELESS: 0

## 2020-01-17 ASSESSMENT — ENCOUNTER SYMPTOMS
EYES NEGATIVE: 1
RESPIRATORY NEGATIVE: 1
GASTROINTESTINAL NEGATIVE: 1

## 2020-01-17 NOTE — PROGRESS NOTES
Chief Complaint   Patient presents with    Follow-up     3 weeks    Medication Management    Diabetes          Have you seen any other physician or provider since your last visit yes - orthopedic,eye doctor    Have you had any other diagnostic tests since your last visit? yes - x ray at orthopedic    Have you changed or stopped any medications since your last visit? no     Patient is here for a 3 week follow up on medication management. He was seen by orthopedic surgery and had an x ray. He was told his pelvic was healing. He no longer needs to take pain medication or stool softener. He had a right pneumothorax and thinks he may still have a stitch. He has an appointment with Dr Guillermo Valerio next month and c/o urine frequency.

## 2020-01-17 NOTE — PROGRESS NOTES
SUBJECTIVE:    Patient ID: Fadia Cr is a 67 y.o. male. Chief Complaint   Patient presents with    Follow-up     3 weeks    Medication Management    Diabetes         HPI:  He is following up after he had been hospitalized for a fall. He says he feels a lot better since taking the antibiotic for UTI. He is back on the oxybutinin for bladder spasms. He has an appointment with the Urologist Dr. Faith Bradshaw. He is still not driving. He is feeling a lot better. He is still walking with a walker and not driving yet. He did go get a steroid injection in his right knee yesterday. He says he an area from where he had a chest tube that he thinks is a stitch. He is doing well with his blood pressure medication  Patient's medications,allergies, past medical, surgical, social and family histories were reviewed and updated as appropriate. .  Current Outpatient Medications on File Prior to Visit   Medication Sig Dispense Refill    furosemide (LASIX) 40 MG tablet Take 40 mg by mouth daily       melatonin 3 MG TABS tablet Take 3 mg by mouth nightly as needed      amLODIPine (NORVASC) 10 MG tablet TAKE ONE TABLET BY MOUTH EVERY EVENING AT BEDTIME 90 tablet 3    lovastatin (MEVACOR) 40 MG tablet TAKE ONE TABLET BY MOUTH NIGHTLY 90 tablet 3    tamsulosin (FLOMAX) 0.4 MG capsule TAKE ONE CAPSULE BY MOUTH EVERY DAY 90 capsule 3    blood glucose monitor strips Test 3 times a day & as needed for symptoms of irregular blood glucose. Dx E11. 9. Breeze 2 100 strip 3    metFORMIN (GLUCOPHAGE) 500 MG tablet TAKE ONE TABLET BY MOUTH DAILY WITH BREAKFAST 90 tablet 3    oxybutynin (DITROPAN) 5 MG tablet Take 5 mg by mouth daily  6    Multiple Vitamins-Minerals (RA VISION-STONEY PRESERVE PO) Take by mouth 2 times daily Drops bid      glucose blood VI test strips (EXACTECH TEST) strip 1 each by In Vitro route daily Daily testing, DX:250.00 100 each 3    lisinopril (PRINIVIL;ZESTRIL) 5 MG tablet Take 1 tablet by mouth daily 90 tablet 3    aspirin 81 MG tablet Take 81 mg by mouth daily.  vitamin B-12 (CYANOCOBALAMIN) 1000 MCG tablet Take 1,000 mcg by mouth daily.  Vitamin D (CHOLECALCIFEROL) 1000 UNITS CAPS capsule Take 1,000 Units by mouth daily.  Ascorbic Acid (VITAMIN C) 500 MG tablet Take 1,000 mg by mouth daily. No current facility-administered medications on file prior to visit. Review of Systems   Constitutional: Negative. HENT: Negative. Eyes: Negative. Respiratory: Negative. Cardiovascular: Negative. Gastrointestinal: Negative. Genitourinary: Negative. Musculoskeletal: Negative. Skin: Negative. Stitch     Neurological: Negative. Psychiatric/Behavioral: Negative. OBJECTIVE:  /88 (Site: Left Upper Arm, Position: Sitting, Cuff Size: Medium Adult)   Pulse 61   Temp 97.8 °F (36.6 °C) (Oral)   Resp 16   Ht 6' (1.829 m)   Wt 169 lb 3.2 oz (76.7 kg)   SpO2 99% Comment: ROOM AIR  BMI 22.95 kg/m²    Physical Exam  Vitals signs and nursing note reviewed. Constitutional:       Appearance: He is well-developed. HENT:      Head: Normocephalic and atraumatic. Eyes:      Conjunctiva/sclera: Conjunctivae normal.      Pupils: Pupils are equal, round, and reactive to light. Neck:      Musculoskeletal: Normal range of motion and neck supple. Thyroid: No thyromegaly. Vascular: No JVD. Cardiovascular:      Rate and Rhythm: Normal rate and regular rhythm. Heart sounds: No murmur. No friction rub. No gallop. Pulmonary:      Effort: Pulmonary effort is normal. No respiratory distress. Breath sounds: Normal breath sounds. No wheezing or rales. Abdominal:      General: Bowel sounds are normal. There is no distension. Palpations: Abdomen is soft. Tenderness: There is no tenderness. There is no guarding. Musculoskeletal:         General: No tenderness. Comments: Walking with a walker but much better.     Skin:     General: Skin is warm and dry. Findings: No rash. Neurological:      Mental Status: He is alert and oriented to person, place, and time. Psychiatric:         Judgment: Judgment normal.         No results found for requested labs within last 30 days. Hemoglobin A1C (%)   Date Value   08/20/2019 6.6 (H)     Microalbumin, Random Urine (mg/dL)   Date Value   04/22/2019 <1.20     LDL Calculated (mg/dL)   Date Value   08/20/2019 68           Lab Results   Component Value Date    TSH 1.17 10/02/2017         ASSESSMENT/PLAN:     Iggy Cardona was seen today for follow-up, medication management and diabetes. Diagnoses and all orders for this visit:    Multiple closed fractures of pelvis with unstable disruption of pelvic ring, initial encounter [facility])  Doing better   Stitch was removed without difficulty. Essential hypertension  Blood pressure is table. At high risk for falls  Continue with physical therapy and continue with walker. Urine frequency  -     POCT Urinalysis no Micro  Urine was clear. Medications Discontinued During This Encounter   Medication Reason    methocarbamol (ROBAXIN) 750 MG tablet Therapy completed     On the basis of positive falls risk screening, assessment and plan is as follows: referral to physical therapy provided for strength and balance training.

## 2020-02-14 ENCOUNTER — TELEPHONE (OUTPATIENT)
Dept: PRIMARY CARE CLINIC | Age: 80
End: 2020-02-14

## 2020-02-14 ENCOUNTER — HOSPITAL ENCOUNTER (OUTPATIENT)
Facility: HOSPITAL | Age: 80
Discharge: HOME | End: 2020-02-14
Payer: MEDICARE

## 2020-02-14 ENCOUNTER — OFFICE VISIT (OUTPATIENT)
Dept: PRIMARY CARE CLINIC | Age: 80
End: 2020-02-14
Payer: MEDICARE

## 2020-02-14 VITALS
TEMPERATURE: 97 F | OXYGEN SATURATION: 98 % | SYSTOLIC BLOOD PRESSURE: 154 MMHG | HEART RATE: 57 BPM | HEIGHT: 72 IN | RESPIRATION RATE: 16 BRPM | WEIGHT: 173.5 LBS | DIASTOLIC BLOOD PRESSURE: 77 MMHG | BODY MASS INDEX: 23.5 KG/M2

## 2020-02-14 LAB — HBA1C MFR BLD: 6.8 %

## 2020-02-14 PROCEDURE — 99213 OFFICE O/P EST LOW 20 MIN: CPT | Performed by: NURSE PRACTITIONER

## 2020-02-14 PROCEDURE — G8484 FLU IMMUNIZE NO ADMIN: HCPCS | Performed by: NURSE PRACTITIONER

## 2020-02-14 PROCEDURE — 4040F PNEUMOC VAC/ADMIN/RCVD: CPT | Performed by: NURSE PRACTITIONER

## 2020-02-14 PROCEDURE — 83036 HEMOGLOBIN GLYCOSYLATED A1C: CPT | Performed by: NURSE PRACTITIONER

## 2020-02-14 PROCEDURE — 1123F ACP DISCUSS/DSCN MKR DOCD: CPT | Performed by: NURSE PRACTITIONER

## 2020-02-14 PROCEDURE — G8420 CALC BMI NORM PARAMETERS: HCPCS | Performed by: NURSE PRACTITIONER

## 2020-02-14 PROCEDURE — 4004F PT TOBACCO SCREEN RCVD TLK: CPT | Performed by: NURSE PRACTITIONER

## 2020-02-14 PROCEDURE — G8427 DOCREV CUR MEDS BY ELIG CLIN: HCPCS | Performed by: NURSE PRACTITIONER

## 2020-02-14 ASSESSMENT — ENCOUNTER SYMPTOMS
RESPIRATORY NEGATIVE: 1
EYES NEGATIVE: 1
GASTROINTESTINAL NEGATIVE: 1

## 2020-02-14 NOTE — PATIENT INSTRUCTIONS
dispose of used patches by folding them in half so that the sticky sides meet, and then flushing them down a toilet. They should not be placed in the household trash where children or pets can find them. · If you have any questions, ask your provider or pharmacist for more information. · Be sure to keep all appointments for provider visits or tests. We are committed to providing you with the best care possible. In order to help us achieve these goals please remember to bring all medications, herbal products, and over the counter supplements with you to each visit. If your provider has ordered testing for you, please be sure to follow up with our office if you have not received results within 7 days after the testing took place. *If you receive a survey after visiting one of our offices, please take time to share your experience concerning your physician office visit. These surveys are confidential and no health information about you is shared. We are eager to improve for you and we are counting on your feedback to help make that happen. ips to Help You Stop Smoking       Cigarette smoking is a preventable cause of death in the [place]. If you have thought about quitting but haven't been able to, here are some reasons why you should and some ways to do it. Here's Why   Quitting smoking now can decrease your risk of getting smoking-related illnesses like:   Heart disease   Stroke   Several types of cancer, including:   Lung   Mouth   Esophagus   Larynx   Bladder   Pancreas   Kidney   Chronic lung diseases:   Bronchitis   Emphysema   Asthma   Cataracts   Macular degeneration   Thyroid conditions   Hearing loss   Erectile dysfunction   Dementia   Osteoporosis   Here's How   Once you've decided to quit smoking, set your target quit date a few weeks away.  In the time leading up to your quit day, try some of these ideas offered by the [address] [place] [address] [place] to help you successfully quit smoking. For the best results, work with your doctor. Together, you can test your lung function and compare the results to those of a nonsmoking person. The results can be given to you as your lung age. Finding out your lung age right after having the test done may help you to stop smoking. Your doctor can also discuss with you all of your options and refer you to smoking-cessation support groups. You may wish to use nicotine replacement (gum, patches, inhaler) or one of the prescription medications that have been shown to increase quit rates and prolong abstinence from smoking. But whatever you and your doctor decide on these matters, it will still be you who decides when an how to quit. Here are some techniques:   Switch Brands   Switch to a brand you find distasteful. Change to a brand that is low in tar and nicotine a couple of weeks before your target quit date. This will help change your smoking behavior. However, do not smoke more cigarettes, inhale them more often or more deeply, or place your fingertips over the holes in the filters. All of these actions will increase your nicotine intake, and the idea is to get your body used to functioning without nicotine. Cut Down the Number of Cigarettes You Smoke   Smoke only half of each cigarette. Each day, postpone the lighting of your first cigarette by one hour. Decide you'll only smoke during odd or even hours of the day. Decide beforehand how many cigarettes you'll smoke during the day. For each additional cigarette, give a dollar to your favorite aamir. Change your eating habits to help you cut down. For example, drink milk, which many people consider incompatible with smoking. End meals or snacks with something that won't lead to a cigarette. Reach for a glass of juice instead of a cigarette for a \"pick-me-up. \"   Remember: Cutting down can help you quit, but it's not a substitute for quitting.  If yourself or someone else. Estimate the cost in terms of packs of cigarettes, and put the money aside to buy these presents. Keep very busy on the big day. Go to the movies, exercise, take long walks, or go bike riding. Remind your family and friends that this is your quit date, and ask them to help you over the rough spots of the first couple of days and weeks. Buy yourself a treat or do something special to celebrate. Telephone and Internet Support   Telephone, web-, and computer-based programs can offer you the support that you need to quit and to stay smoke-free. You can find many programs online, like the American [employer] Association's [place] from Smoking . Immediately After Quitting   Develop a clean, fresh, nonsmoking environment around yourselfat work and at home. Buy yourself flowersyou may be surprised how much you can enjoy their scent now. The first few days after you quit, spend as much free time as possible in places where smoking isn't allowed, such as [address] [place] [address], museums, theaters, department stores, and churches. Drink large quantities of water and fruit juice (but avoid sodas that contain caffeine). Try to avoid alcohol, coffee, and other beverages that you associate with cigarette smoking. Strike up conversation instead of a match for a cigarette. If you miss the sensation of having a cigarette in your hand, play with something elsea pencil, a paper clip, a marble. If you miss having something in your mouth, try toothpicks or a fake cigarette.

## 2020-02-14 NOTE — PROGRESS NOTES
SUBJECTIVE:    Patient ID: Bhargavi Alonzo is a 67 y.o. male. Chief Complaint   Patient presents with    1 Month Follow-Up    Diabetes    Discuss Labs         HPI:  He is following up on his diabetes and goes back to the orthopedist about his pelvic fracture. He was treated for a UTI last visit. The urgency and frequency is better but still having frequency. He has an appt with DR. Fern Gaitan on Monday. He is on Oxybutynin. Nikhil Guardadoy He has been off from his blood pressure medication but now his blood pressure is going up. Patient's medications,allergies, past medical, surgical, social and family histories were reviewed and updated as appropriate. .  Current Outpatient Medications on File Prior to Visit   Medication Sig Dispense Refill    lovastatin (MEVACOR) 40 MG tablet TAKE ONE TABLET BY MOUTH NIGHTLY 90 tablet 3    tamsulosin (FLOMAX) 0.4 MG capsule TAKE ONE CAPSULE BY MOUTH EVERY DAY 90 capsule 3    blood glucose monitor strips Test 3 times a day & as needed for symptoms of irregular blood glucose. Dx E11. 9. Breeze 2 100 strip 3    metFORMIN (GLUCOPHAGE) 500 MG tablet TAKE ONE TABLET BY MOUTH DAILY WITH BREAKFAST 90 tablet 3    oxybutynin (DITROPAN) 5 MG tablet Take 5 mg by mouth daily  6    Multiple Vitamins-Minerals (RA VISION-STONEY PRESERVE PO) Take by mouth 2 times daily Drops bid      glucose blood VI test strips (EXACTECH TEST) strip 1 each by In Vitro route daily Daily testing, DX:250.00 100 each 3    aspirin 81 MG tablet Take 81 mg by mouth daily.  vitamin B-12 (CYANOCOBALAMIN) 1000 MCG tablet Take 1,000 mcg by mouth daily.  Vitamin D (CHOLECALCIFEROL) 1000 UNITS CAPS capsule Take 1,000 Units by mouth daily.  Ascorbic Acid (VITAMIN C) 500 MG tablet Take 1,000 mg by mouth daily. No current facility-administered medications on file prior to visit. Review of Systems   Constitutional: Negative. HENT: Negative. Eyes: Negative. Respiratory: Negative.

## 2020-02-17 ENCOUNTER — OFFICE VISIT (OUTPATIENT)
Dept: UROLOGY | Facility: CLINIC | Age: 80
End: 2020-02-17

## 2020-02-17 VITALS
HEART RATE: 84 BPM | BODY MASS INDEX: 24.41 KG/M2 | TEMPERATURE: 98.3 F | SYSTOLIC BLOOD PRESSURE: 126 MMHG | HEIGHT: 72 IN | DIASTOLIC BLOOD PRESSURE: 89 MMHG | RESPIRATION RATE: 16 BRPM | OXYGEN SATURATION: 96 %

## 2020-02-17 DIAGNOSIS — R97.20 ELEVATED PROSTATE SPECIFIC ANTIGEN (PSA): Primary | ICD-10-CM

## 2020-02-17 DIAGNOSIS — N40.0 BENIGN PROSTATIC HYPERPLASIA, UNSPECIFIED WHETHER LOWER URINARY TRACT SYMPTOMS PRESENT: ICD-10-CM

## 2020-02-17 LAB
BILIRUB BLD-MCNC: NEGATIVE MG/DL
CLARITY, POC: CLEAR
COLOR UR: YELLOW
GLUCOSE UR STRIP-MCNC: NEGATIVE MG/DL
KETONES UR QL: NEGATIVE
LEUKOCYTE EST, POC: NEGATIVE
NITRITE UR-MCNC: NEGATIVE MG/ML
PH UR: 6 [PH] (ref 5–8)
PROT UR STRIP-MCNC: NEGATIVE MG/DL
RBC # UR STRIP: NEGATIVE /UL
SP GR UR: 1.02 (ref 1–1.03)
UROBILINOGEN UR QL: NORMAL

## 2020-02-17 PROCEDURE — 81003 URINALYSIS AUTO W/O SCOPE: CPT | Performed by: UROLOGY

## 2020-02-17 PROCEDURE — 99213 OFFICE O/P EST LOW 20 MIN: CPT | Performed by: UROLOGY

## 2020-02-17 RX ORDER — FUROSEMIDE 20 MG/1
20 TABLET ORAL 2 TIMES DAILY
COMMUNITY
End: 2020-09-24

## 2020-02-17 NOTE — PROGRESS NOTES
Chief Complaint  Elevated PSA and Benign Prostatic Hypertrophy        KUSUM Camp is a 79 y.o. male who returns today for follow-up describing a catastrophic experience he is been through since his last visit.  Apparently he fell off a ladder crack several ribs and possibly punctured a lung that prompted medevac to [place] where he was in the ICU for weeks.  He was not expected to live but returns today doing much better and voiding with minimal difficulty on Flomax and Ditropan.  He still has a problem with Nocturia that improved on late night fluid restriction     The previous concern of elevated PSA seems less important at this point since he is probably a candidate for palliative therapy only.  He has a benign prostate on digital rectal exam and his latest PSA is actually a little improved.    Vitals:    02/17/20 0910   BP: 126/89   Pulse: 84   Resp: 16   Temp: 98.3 °F (36.8 °C)   SpO2: 96%       Past Medical History  Past Medical History:   Diagnosis Date   • Arthritis    • Diabetes mellitus (CMS/HCC)    • ED (erectile dysfunction)    • Enlarged prostate with lower urinary tract symptoms (LUTS)    • Full dentures    • History of stress test    • [place] (hard of hearing)     Bilateral hearing aids    • Renal disorder    • Stone in kidney        Past Surgical History  Past Surgical History:   Procedure Laterality Date   • COLONOSCOPY     • COLONOSCOPY N/A 4/8/2019    Procedure: COLONOSCOPY;  Surgeon: Onesimo Arnold MD;  Location: [facility] ENDOSCOPY;  Service: Gastroenterology   • EYE SURGERY Right     cataract removal with IOL implant   • HERNIA REPAIR Right     multiple inguinal hernia repairs   • KNEE SURGERY Right    • NEPHRECTOMY Right     partial right nephrectomy due to kidney stone       Medications  has a current medication list which includes the following prescription(s): amlodipine, amlodipine besy-benazepril hcl, aspirin, bisacodyl, cholecalciferol, cholecalciferol, furosemide, glucose blood, havrix,  lisinopril, lisinopril, lovastatin, metformin, multiple vitamins-minerals, nifedipine, oxybutynin, tamsulosin, vitamin b-12, vitamin c, and oxybutynin.      Allergies  No Known Allergies    Social History  Social History     Socioeconomic History   • Marital status:      Spouse name: Not on file   • Number of children: Not on file   • Years of education: Not on file   • Highest education level: Not on file   Tobacco Use   • Smoking status: Current Every Day Smoker     Years: 20.00     Types: Pipe   • Smokeless tobacco: Never Used   Substance and Sexual Activity   • Alcohol use: No   • Drug use: No   • Sexual activity: Defer       Family History  He has no family history of bladder or kidney cancer  He has no family history of kidney stones      AUA Symptom Score:      Review of Systems  Review of Systems   Constitutional: Negative for activity change, appetite change, chills, fatigue, fever, unexpected weight gain and unexpected weight loss.   Respiratory: Negative for apnea, cough, chest tightness, shortness of breath, wheezing and stridor.    Cardiovascular: Negative for chest pain, palpitations and leg swelling.   Gastrointestinal: Negative for abdominal distention, abdominal pain, anal bleeding, blood in stool, constipation, diarrhea, nausea, rectal pain, vomiting, GERD and indigestion.   Genitourinary: Positive for difficulty urinating and nocturia. Negative for decreased libido, decreased urine volume, discharge, dysuria, flank pain, frequency, genital sores, hematuria, penile pain, erectile dysfunction, penile swelling, scrotal swelling, testicular pain, urgency and urinary incontinence.   Musculoskeletal: Negative for back pain and joint swelling.   Neurological: Negative for tremors, seizures, speech difficulty, weakness and numbness.   Psychiatric/Behavioral: Negative for agitation, decreased concentration, sleep disturbance, depressed mood and stress. The patient is not nervous/anxious.         Physical Exam  Physical Exam   Constitutional: He is oriented to person, place, and time. He appears well-developed and well-nourished.   HENT:   Head: Normocephalic and atraumatic.   Neck: Normal range of motion.   Pulmonary/Chest: Effort normal. No respiratory distress.   Abdominal: Soft. He exhibits no distension and no mass. There is no tenderness. No hernia.   Musculoskeletal: Normal range of motion.   Lymphadenopathy:     He has no cervical adenopathy.   Neurological: He is alert and oriented to person, place, and time.   Skin: Skin is warm and dry.   Psychiatric: He has a normal mood and affect. His behavior is normal.   Vitals reviewed.      Labs Recent and today in the office:  Results for orders placed or performed in visit on 02/17/20   POC Urinalysis Dipstick, Automated   Result Value Ref Range    Color Yellow Yellow, Straw, Dark Yellow, Vibha    Clarity, UA Clear Clear    Specific Gravity  1.025 1.005 - 1.030    pH, Urine 6.0 5.0 - 8.0    Leukocytes Negative Negative    Nitrite, UA Negative Negative    Protein, POC Negative Negative mg/dL    Glucose, UA Negative Negative, 1000 mg/dL (3+) mg/dL    Ketones, UA Negative Negative    Urobilinogen, UA Normal Normal    Bilirubin Negative Negative    Blood, UA Negative Negative         Assessment & Plan  BPH with outlet obstruction/nocturia: He will continue the Flomax and Ditropan along with late night fluid restriction and return in 6 months for LINDSEY.    Elevated PSA: At this point he is a candidate for palliative therapy only but he will return for digital rectal exam.

## 2020-02-19 ENCOUNTER — TELEPHONE (OUTPATIENT)
Dept: PRIMARY CARE CLINIC | Age: 80
End: 2020-02-19

## 2020-03-26 RX ORDER — LOVASTATIN 40 MG/1
TABLET ORAL
Qty: 90 TABLET | Refills: 3 | Status: SHIPPED | OUTPATIENT
Start: 2020-03-26 | End: 2021-03-15

## 2020-03-30 ENCOUNTER — TELEPHONE (OUTPATIENT)
Dept: PRIMARY CARE CLINIC | Age: 80
End: 2020-03-30

## 2020-03-31 RX ORDER — LANCETS 30 GAUGE
1 EACH MISCELLANEOUS 2 TIMES DAILY
Qty: 100 EACH | Refills: 5 | Status: SHIPPED | OUTPATIENT
Start: 2020-03-31

## 2020-04-06 RX ORDER — LISINOPRIL 5 MG/1
TABLET ORAL
Qty: 90 TABLET | Refills: 3 | Status: SHIPPED
Start: 2020-04-06 | End: 2020-09-03 | Stop reason: DRUGHIGH

## 2020-05-11 ENCOUNTER — TELEPHONE (OUTPATIENT)
Dept: PRIMARY CARE CLINIC | Age: 80
End: 2020-05-11

## 2020-06-05 ENCOUNTER — HOSPITAL ENCOUNTER (OUTPATIENT)
Dept: CT IMAGING | Facility: HOSPITAL | Age: 80
Discharge: HOME | End: 2020-06-05
Payer: MEDICARE

## 2020-06-05 PROCEDURE — G0297 LDCT FOR LUNG CA SCREEN: HCPCS

## 2020-06-06 DIAGNOSIS — N32.81 OAB (OVERACTIVE BLADDER): ICD-10-CM

## 2020-06-10 RX ORDER — OXYBUTYNIN CHLORIDE 5 MG/1
TABLET ORAL
Qty: 30 TABLET | Refills: 6 | Status: SHIPPED | OUTPATIENT
Start: 2020-06-10 | End: 2020-10-10

## 2020-06-23 RX ORDER — TAMSULOSIN HYDROCHLORIDE 0.4 MG/1
CAPSULE ORAL
Qty: 90 CAPSULE | Refills: 3 | Status: SHIPPED | OUTPATIENT
Start: 2020-06-23 | End: 2021-07-07

## 2020-07-01 ENCOUNTER — HOSPITAL ENCOUNTER (OUTPATIENT)
Facility: HOSPITAL | Age: 80
Discharge: HOME | End: 2020-07-01
Payer: MEDICARE

## 2020-07-01 ENCOUNTER — OFFICE VISIT (OUTPATIENT)
Dept: PRIMARY CARE CLINIC | Age: 80
End: 2020-07-01
Payer: MEDICARE

## 2020-07-01 VITALS
WEIGHT: 167.4 LBS | OXYGEN SATURATION: 97 % | BODY MASS INDEX: 22.67 KG/M2 | TEMPERATURE: 98.1 F | RESPIRATION RATE: 16 BRPM | DIASTOLIC BLOOD PRESSURE: 72 MMHG | HEIGHT: 72 IN | HEART RATE: 57 BPM | SYSTOLIC BLOOD PRESSURE: 150 MMHG

## 2020-07-01 LAB
A/G RATIO: 2.4 (ref 0.8–2)
ALBUMIN SERPL-MCNC: 4.4 G/DL (ref 3.4–4.8)
ALP BLD-CCNC: 98 U/L (ref 25–100)
ALT SERPL-CCNC: 17 U/L (ref 4–36)
ANION GAP SERPL CALCULATED.3IONS-SCNC: 11 MMOL/L (ref 3–16)
AST SERPL-CCNC: 18 U/L (ref 8–33)
BILIRUB SERPL-MCNC: 0.7 MG/DL (ref 0.3–1.2)
BUN BLDV-MCNC: 15 MG/DL (ref 6–20)
CALCIUM SERPL-MCNC: 9.1 MG/DL (ref 8.5–10.5)
CHLORIDE BLD-SCNC: 109 MMOL/L (ref 98–107)
CO2: 28 MMOL/L (ref 20–30)
CREAT SERPL-MCNC: 0.8 MG/DL (ref 0.4–1.2)
FOLATE: 18.81 NG/ML
GFR AFRICAN AMERICAN: >59
GFR NON-AFRICAN AMERICAN: >59
GLOBULIN: 1.8 G/DL
GLUCOSE BLD-MCNC: 144 MG/DL (ref 74–106)
HBA1C MFR BLD: 6.4 %
HCT VFR BLD CALC: 42.1 % (ref 40–54)
HEMOGLOBIN: 14.4 G/DL (ref 13–18)
MCH RBC QN AUTO: 32.4 PG (ref 27–32)
MCHC RBC AUTO-ENTMCNC: 34.2 G/DL (ref 31–35)
MCV RBC AUTO: 94.8 FL (ref 80–100)
PDW BLD-RTO: 13.4 % (ref 11–16)
PLATELET # BLD: 147 K/UL (ref 150–400)
PMV BLD AUTO: 10.9 FL (ref 6–10)
POTASSIUM SERPL-SCNC: 3.9 MMOL/L (ref 3.4–5.1)
RBC # BLD: 4.44 M/UL (ref 4.5–6)
SODIUM BLD-SCNC: 148 MMOL/L (ref 136–145)
TOTAL PROTEIN: 6.2 G/DL (ref 6.4–8.3)
VITAMIN B-12: 1448 PG/ML (ref 211–911)
VITAMIN D 25-HYDROXY: 51.6 (ref 32–100)
WBC # BLD: 4.6 K/UL (ref 4–11)

## 2020-07-01 PROCEDURE — 85027 COMPLETE CBC AUTOMATED: CPT

## 2020-07-01 PROCEDURE — 4004F PT TOBACCO SCREEN RCVD TLK: CPT | Performed by: NURSE PRACTITIONER

## 2020-07-01 PROCEDURE — 80053 COMPREHEN METABOLIC PANEL: CPT

## 2020-07-01 PROCEDURE — 4040F PNEUMOC VAC/ADMIN/RCVD: CPT | Performed by: NURSE PRACTITIONER

## 2020-07-01 PROCEDURE — 82306 VITAMIN D 25 HYDROXY: CPT

## 2020-07-01 PROCEDURE — 99214 OFFICE O/P EST MOD 30 MIN: CPT | Performed by: NURSE PRACTITIONER

## 2020-07-01 PROCEDURE — 1123F ACP DISCUSS/DSCN MKR DOCD: CPT | Performed by: NURSE PRACTITIONER

## 2020-07-01 PROCEDURE — 82746 ASSAY OF FOLIC ACID SERUM: CPT

## 2020-07-01 PROCEDURE — 83036 HEMOGLOBIN GLYCOSYLATED A1C: CPT

## 2020-07-01 PROCEDURE — 82607 VITAMIN B-12: CPT

## 2020-07-01 PROCEDURE — G8420 CALC BMI NORM PARAMETERS: HCPCS | Performed by: NURSE PRACTITIONER

## 2020-07-01 PROCEDURE — G8427 DOCREV CUR MEDS BY ELIG CLIN: HCPCS | Performed by: NURSE PRACTITIONER

## 2020-07-01 RX ORDER — LISINOPRIL 10 MG/1
10 TABLET ORAL DAILY
Qty: 90 TABLET | Refills: 1 | Status: SHIPPED | OUTPATIENT
Start: 2020-07-01 | End: 2020-10-06

## 2020-07-01 ASSESSMENT — ENCOUNTER SYMPTOMS
RESPIRATORY NEGATIVE: 1
GASTROINTESTINAL NEGATIVE: 1
EYES NEGATIVE: 1

## 2020-07-01 NOTE — PROGRESS NOTES
Chief Complaint   Patient presents with    Follow-up     4 months    Hypertension       Have you seen any other physician or provider since your last visit no    Have you had any other diagnostic tests since your last visit? no    Have you changed or stopped any medications since your last visit? no     Diabetic retinal exam completed this year? No                       * If yes please have patient sign a records release to obtain record to update Health Maintenance                       * If no, please order referral for patient to be scheduled    Patient is here to follow up on blood pressure. His systolic is elevated this morning.

## 2020-08-18 ENCOUNTER — OFFICE VISIT (OUTPATIENT)
Dept: UROLOGY | Facility: CLINIC | Age: 80
End: 2020-08-18

## 2020-08-18 VITALS
HEART RATE: 69 BPM | HEIGHT: 72 IN | SYSTOLIC BLOOD PRESSURE: 122 MMHG | OXYGEN SATURATION: 98 % | BODY MASS INDEX: 24.41 KG/M2 | RESPIRATION RATE: 14 BRPM | TEMPERATURE: 97.8 F | DIASTOLIC BLOOD PRESSURE: 84 MMHG

## 2020-08-18 DIAGNOSIS — R97.20 ELEVATED PROSTATE SPECIFIC ANTIGEN (PSA): Primary | ICD-10-CM

## 2020-08-18 PROCEDURE — 81003 URINALYSIS AUTO W/O SCOPE: CPT | Performed by: UROLOGY

## 2020-08-18 PROCEDURE — 99214 OFFICE O/P EST MOD 30 MIN: CPT | Performed by: UROLOGY

## 2020-08-18 RX ORDER — MULTIVIT WITH MINERALS/LUTEIN
1000 TABLET ORAL DAILY
COMMUNITY

## 2020-08-18 NOTE — PROGRESS NOTES
Chief Complaint  Elevated PSA (6 month follow up.)        KUSUM Camp is a 79 y.o. male who returns today for follow-up with a long history of elevated PSA and my repeated recommendations for prostate biopsy which he is always declined.  Surprisingly today when I mention biopsy he states while you are the doctor what ever you recommend.  We will do a total to free PSA ratio and if he is in a high risk category we will schedule him for a biopsy.  A stool culture submitted to rule out organisms resistant to Cipro.  He is voiding fairly well and his urine today is clear.  He is told me repeatedly that he is already outlived most members of his family and does not expect a normal longevity.    Vitals:    08/18/20 0859   BP: 122/84   Pulse: 69   Resp: 14   Temp: 97.8 °F (36.6 °C)   SpO2: 98%       Past Medical History  Past Medical History:   Diagnosis Date   • Arthritis    • Diabetes mellitus (CMS/HCC)    • ED (erectile dysfunction)    • Enlarged prostate with lower urinary tract symptoms (LUTS)    • Full dentures    • History of stress test    • [place] (hard of hearing)     Bilateral hearing aids    • Renal disorder    • Stone in kidney        Past Surgical History  Past Surgical History:   Procedure Laterality Date   • COLONOSCOPY     • COLONOSCOPY N/A 4/8/2019    Procedure: COLONOSCOPY;  Surgeon: Onesimo Arnold MD;  Location: [facility] ENDOSCOPY;  Service: Gastroenterology   • EYE SURGERY Right     cataract removal with IOL implant   • HERNIA REPAIR Right     multiple inguinal hernia repairs   • KNEE SURGERY Right    • NEPHRECTOMY Right     partial right nephrectomy due to kidney stone       Medications  has a current medication list which includes the following prescription(s): amlodipine besy-benazepril hcl, aspirin, bisacodyl, cholecalciferol, furosemide, glucose blood, havrix, lisinopril, lovastatin, metformin, multiple vitamins-minerals, nifedipine, oxybutynin, oxybutynin, tamsulosin, vitamin b-12, vitamin c,  vitamin c, amlodipine, cholecalciferol, and lisinopril.      Allergies  No Known Allergies    Social History  Social History     Socioeconomic History   • Marital status:      Spouse name: Not on file   • Number of children: Not on file   • Years of education: Not on file   • Highest education level: Not on file   Tobacco Use   • Smoking status: Current Every Day Smoker     Years: 20.00     Types: Pipe   • Smokeless tobacco: Never Used   Substance and Sexual Activity   • Alcohol use: No   • Drug use: No   • Sexual activity: Defer       Family History  He has no family history of bladder or kidney cancer  He has no family history of kidney stones      AUA Symptom Score:      Review of Systems  Review of Systems   Constitutional: Negative for activity change, appetite change, chills, fatigue, fever, unexpected weight gain and unexpected weight loss.   Respiratory: Negative for apnea, cough, chest tightness, shortness of breath, wheezing and stridor.    Cardiovascular: Negative for chest pain, palpitations and leg swelling.   Gastrointestinal: Negative for abdominal distention, abdominal pain, anal bleeding, blood in stool, constipation, diarrhea, nausea, rectal pain, vomiting, GERD and indigestion.   Genitourinary: Negative for decreased libido, decreased urine volume, difficulty urinating, discharge, dysuria, flank pain, frequency, genital sores, hematuria, nocturia, penile pain, erectile dysfunction, penile swelling, scrotal swelling, testicular pain, urgency and urinary incontinence.   Musculoskeletal: Negative for back pain and joint swelling.   Neurological: Negative for tremors, seizures, speech difficulty, weakness and numbness.   Psychiatric/Behavioral: Negative for agitation, decreased concentration, sleep disturbance, depressed mood and stress. The patient is not nervous/anxious.        Physical Exam  Physical Exam   Constitutional: He is oriented to person, place, and time. He appears well-developed  and well-nourished.   HENT:   Head: Normocephalic and atraumatic.   Neck: Normal range of motion.   Pulmonary/Chest: Effort normal. No respiratory distress.   Abdominal: Soft. He exhibits no distension and no mass. There is no tenderness. No hernia.   Genitourinary: Rectum normal and prostate normal.         Musculoskeletal: Normal range of motion.   Lymphadenopathy:     He has no cervical adenopathy.   Neurological: He is alert and oriented to person, place, and time.   Skin: Skin is warm and dry.   Psychiatric: He has a normal mood and affect. His behavior is normal.   Vitals reviewed.      Labs Recent and today in the office:  Results for orders placed or performed in visit on 08/18/20   POC Urinalysis Dipstick, Automated   Result Value Ref Range    Color Yellow Yellow, Straw, Dark Yellow, Vibha    Clarity, UA Clear Clear    Specific Gravity  1.030 1.005 - 1.030    pH, Urine 6.0 5.0 - 8.0    Leukocytes Negative Negative    Nitrite, UA Negative Negative    Protein, POC Negative Negative mg/dL    Glucose, UA Negative Negative, 1000 mg/dL (3+) mg/dL    Ketones, UA Negative Negative    Urobilinogen, UA Normal Normal    Bilirubin Negative Negative    Blood, UA Negative Negative         Assessment & Plan  Elevated PSA: Once again digital rectal exam is benign so a total to free PSA ratio will be obtained and if he is in a high risk category he will return for biopsy.  Otherwise he can return in 6 months.

## 2020-08-22 LAB
BACTERIA ANAL CULT: NORMAL
BACTERIA ANAL CULT: NORMAL

## 2020-08-27 LAB
PSA FREE MFR SERPL: 10.6 %
PSA FREE SERPL-MCNC: 0.87 NG/ML
PSA SERPL-MCNC: 8.2 NG/ML (ref 0–4)

## 2020-08-28 ENCOUNTER — TELEPHONE (OUTPATIENT)
Dept: UROLOGY | Facility: CLINIC | Age: 80
End: 2020-08-28

## 2020-08-28 NOTE — TELEPHONE ENCOUNTER
Per  schedule patient for prostate biopsy, this has been scheduled on September 16 at 3:30pm. Patient needs instructions explanation that his PSA came back elevated and patient is to have a biopsy as  and he discussed.  AR/CMA

## 2020-09-03 ENCOUNTER — OFFICE VISIT (OUTPATIENT)
Dept: PRIMARY CARE CLINIC | Age: 80
End: 2020-09-03
Payer: MEDICARE

## 2020-09-03 VITALS
DIASTOLIC BLOOD PRESSURE: 82 MMHG | OXYGEN SATURATION: 98 % | RESPIRATION RATE: 16 BRPM | TEMPERATURE: 98.6 F | BODY MASS INDEX: 22.54 KG/M2 | HEIGHT: 72 IN | SYSTOLIC BLOOD PRESSURE: 150 MMHG | HEART RATE: 52 BPM | WEIGHT: 166.4 LBS

## 2020-09-03 PROCEDURE — 4040F PNEUMOC VAC/ADMIN/RCVD: CPT | Performed by: NURSE PRACTITIONER

## 2020-09-03 PROCEDURE — 4004F PT TOBACCO SCREEN RCVD TLK: CPT | Performed by: NURSE PRACTITIONER

## 2020-09-03 PROCEDURE — 99213 OFFICE O/P EST LOW 20 MIN: CPT | Performed by: NURSE PRACTITIONER

## 2020-09-03 PROCEDURE — G8420 CALC BMI NORM PARAMETERS: HCPCS | Performed by: NURSE PRACTITIONER

## 2020-09-03 PROCEDURE — G8427 DOCREV CUR MEDS BY ELIG CLIN: HCPCS | Performed by: NURSE PRACTITIONER

## 2020-09-03 PROCEDURE — 1123F ACP DISCUSS/DSCN MKR DOCD: CPT | Performed by: NURSE PRACTITIONER

## 2020-09-03 ASSESSMENT — ENCOUNTER SYMPTOMS
GASTROINTESTINAL NEGATIVE: 1
RESPIRATORY NEGATIVE: 1
EYES NEGATIVE: 1
BACK PAIN: 1

## 2020-09-03 NOTE — PATIENT INSTRUCTIONS
· Keep a list of your medicines with you. List all of the prescription medicines, nonprescription medicines, supplements, natural remedies, and vitamins that you take. Tell your healthcare providers who treat you about all of the products you are taking. Your provider can provide you with a form to keep track of them. Just ask. · Follow the directions that come with your medicine, including information about food or alcohol. Make sure you know how and when to take your medicine. Do not take more or less than you are supposed to take. · Keep all medicines out of the reach of children. · Store medicines according to the directions on the label. · Monitor yourself. Learn to know how your body reacts to your new medicine and keep track of how it makes you feel before attempting (If your provider has allowed you to do so) to drive or go to work. · Seek emergency medical attention if you think you have used too much of this medicine. An overdose of any prescription medicine can be fatal. Overdose symptoms may include extreme drowsiness, muscle weakness, confusion, cold and clammy skin, pinpoint pupils, shallow breathing, slow heart rate, fainting, or coma. · Don't share prescription medicines with others, even when they seem to have the same symptoms. What may be good for you may be harmful to others. · If you are no longer taking a prescribed medication and you have pills left please take your pills out of their original containers. Mix crushed pills with an undesirable substance, such as cat litter or used coffee grounds. Put the mixture into a disposable container with a lid, such as an empty margarine tub, or into a sealable bag. Cover up or remove any of your personal information on the empty containers by covering it with black permanent marker or duct tape. Place the sealed container with the mixture, and the empty drug containers, in the trash.    · If you use a medication that is in the form of a patch, [place] to help you successfully quit smoking. For the best results, work with your doctor. Together, you can test your lung function and compare the results to those of a nonsmoking person. The results can be given to you as your lung age. Finding out your lung age right after having the test done may help you to stop smoking. Your doctor can also discuss with you all of your options and refer you to smoking-cessation support groups. You may wish to use nicotine replacement (gum, patches, inhaler) or one of the prescription medications that have been shown to increase quit rates and prolong abstinence from smoking. But whatever you and your doctor decide on these matters, it will still be you who decides when an how to quit. Here are some techniques:   Switch Brands   Switch to a brand you find distasteful. Change to a brand that is low in tar and nicotine a couple of weeks before your target quit date. This will help change your smoking behavior. However, do not smoke more cigarettes, inhale them more often or more deeply, or place your fingertips over the holes in the filters. All of these actions will increase your nicotine intake, and the idea is to get your body used to functioning without nicotine. Cut Down the Number of Cigarettes You Smoke   Smoke only half of each cigarette. Each day, postpone the lighting of your first cigarette by one hour. Decide you'll only smoke during odd or even hours of the day. Decide beforehand how many cigarettes you'll smoke during the day. For each additional cigarette, give a dollar to your favorite aamir. Change your eating habits to help you cut down. For example, drink milk, which many people consider incompatible with smoking. End meals or snacks with something that won't lead to a cigarette. Reach for a glass of juice instead of a cigarette for a \"pick-me-up. \"   Remember: Cutting down can help you quit, but it's not a substitute for quitting.  If you're down to about seven cigarettes a day, it's time to set your target quit date, and get ready to stick to it. Don't Smoke \"Automatically\"   Smoke only those cigarettes you really want. Catch yourself before you light up a cigarette out of pure habit. Don't empty your ashtrays. This will remind you of how many cigarettes you've smoked each day, and the sight and the smell of stale cigarettes butts will be very unpleasant. Make yourself aware of each cigarette by using the opposite hand or putting cigarettes in an unfamiliar location or a different pocket to break the automatic reach. If you light up many times during the day without even thinking about it, try to look in a mirror each time you put a match to your cigarette. You may decide you don't need it. Make Smoking Inconvenient   Stop buying cigarettes by the carton. Wait until one pack is empty before you buy another. Stop carrying cigarettes with you at home or at work. Make them difficult to get to. Make Smoking Unpleasant   Smoke only under circumstances that aren't especially pleasurable for you. If you like to smoke with others, smoke alone. Turn your chair to an empty corner and focus only on the cigarette you are smoking and all its many negative effects. Collect all your cigarette butts in one large glass container as a visual reminder of the filth made by smoking. Just Before Quitting   Practice going without cigarettes. Don't think of never smoking again. Think of quitting in terms of one day at a time . Tell yourself you won't smoke today, and then don't. Clean your clothes to rid them of the cigarette smell, which can linger a long time. On the Day You Quit   Throw away all your cigarettes and matches. Hide your lighters and ashtrays. Visit the dentist and have your teeth cleaned to get rid of tobacco stains. Notice how nice they look and resolve to keep them that way.    Make a list of things you'd like to buy for yourself or someone else. Estimate the cost in terms of packs of cigarettes, and put the money aside to buy these presents. Keep very busy on the big day. Go to the movies, exercise, take long walks, or go bike riding. Remind your family and friends that this is your quit date, and ask them to help you over the rough spots of the first couple of days and weeks. Buy yourself a treat or do something special to celebrate. Telephone and Internet Support   Telephone, web-, and computer-based programs can offer you the support that you need to quit and to stay smoke-free. You can find many programs online, like the American [employer] Association's [place] from Smoking . Immediately After Quitting   Develop a clean, fresh, nonsmoking environment around yourselfat work and at home. Buy yourself flowersyou may be surprised how much you can enjoy their scent now. The first few days after you quit, spend as much free time as possible in places where smoking isn't allowed, such as [address] [place] [address], museums, theaters, department stores, and churches. Drink large quantities of water and fruit juice (but avoid sodas that contain caffeine). Try to avoid alcohol, coffee, and other beverages that you associate with cigarette smoking. Strike up conversation instead of a match for a cigarette. If you miss the sensation of having a cigarette in your hand, play with something elsea pencil, a paper clip, a marble. If you miss having something in your mouth, try toothpicks or a fake cigarette.

## 2020-09-03 NOTE — PROGRESS NOTES
each 3    aspirin 81 MG tablet Take 81 mg by mouth daily.  vitamin B-12 (CYANOCOBALAMIN) 1000 MCG tablet Take 1,000 mcg by mouth daily.  Vitamin D (CHOLECALCIFEROL) 1000 UNITS CAPS capsule Take 1,000 Units by mouth daily.  Ascorbic Acid (VITAMIN C) 500 MG tablet Take 1,000 mg by mouth daily. No current facility-administered medications on file prior to visit. Review of Systems   Constitutional: Negative. HENT: Negative. Eyes: Negative. Respiratory: Negative. Cardiovascular: Negative. Gastrointestinal: Negative. Genitourinary: Negative. Musculoskeletal: Positive for arthralgias (right knee pain, shoulder pain), back pain and gait problem. Skin: Negative. Psychiatric/Behavioral: Negative. OBJECTIVE:  BP (!) 150/82 (Site: Left Upper Arm, Position: Sitting, Cuff Size: Medium Adult)   Pulse 52   Temp 98.6 °F (37 °C) (Temporal)   Resp 16   Ht 6' (1.829 m)   Wt 166 lb 6.4 oz (75.5 kg)   SpO2 98% Comment: room air  BMI 22.57 kg/m²    Physical Exam  Vitals signs and nursing note reviewed. Constitutional:       Appearance: He is well-developed. HENT:      Head: Normocephalic and atraumatic. Eyes:      Conjunctiva/sclera: Conjunctivae normal.      Pupils: Pupils are equal, round, and reactive to light. Neck:      Musculoskeletal: Normal range of motion and neck supple. Thyroid: No thyromegaly. Vascular: No JVD. Cardiovascular:      Rate and Rhythm: Normal rate and regular rhythm. Heart sounds: No murmur. No friction rub. No gallop. Pulmonary:      Effort: Pulmonary effort is normal. No respiratory distress. Breath sounds: Normal breath sounds. No wheezing or rales. Abdominal:      General: Bowel sounds are normal. There is no distension. Palpations: Abdomen is soft. Tenderness: There is no abdominal tenderness. There is no guarding. Musculoskeletal:      Right knee: Tenderness found.  Medial joint line tenderness noted.   Skin:     General: Skin is warm and dry. Findings: No rash. Neurological:      Mental Status: He is alert and oriented to person, place, and time. Psychiatric:         Judgment: Judgment normal.         No results found for requested labs within last 30 days. Hemoglobin A1C (%)   Date Value   07/01/2020 6.4 (H)     Microalbumin, Random Urine (mg/dL)   Date Value   04/22/2019 <1.20     LDL Calculated (mg/dL)   Date Value   08/20/2019 68           Lab Results   Component Value Date    TSH 1.17 10/02/2017         ASSESSMENT/PLAN:     Guerda Blancas was seen today for pre-op exam.    Diagnoses and all orders for this visit:    Pre-operative clearance  -     External Referral To Cardiology  When he gets surgery date will get labs for clearance. Essential hypertension  Stable on current medication. Type 2 diabetes mellitus without complication, without long-term current use of insulin (HCC)  Stable. I advised him regarding diabetic diet, exercise and weight control. Also, I advised him to stay on the current medication, monitor his fingerstick closely. I am going to check the A1c every few months. I will check microalbumin on a yearly basis. I have also advised him to have a yearly eye exam and to monitor his feet closely. Along with instruction of possible complications related to diabetes, even with good control. A1C will be checked  in few months. Lab Results   Component Value Date    LABA1C 6.4 (H) 07/01/2020       Pure hypercholesterolemia  I have advised him on low-fat diet, exercise and weight control. I am going to continue on current medication. I have also advised him on the possible side effects from the medication. I will monitor his liver functions and lipid profile every few months. Lipid well controlled. Lab Results   Component Value Date    LDLCALC 68 08/20/2019       Vitamin D deficiency  Vit d supplement.    Benign prostatic hyperplasia without lower urinary tract symptoms  He is going to Dr. Marissa Bear    Medications Discontinued During This Encounter   Medication Reason    lisinopril (PRINIVIL;ZESTRIL) 5 MG tablet DOSE ADJUSTMENT

## 2020-09-03 NOTE — PROGRESS NOTES
Chief Complaint   Patient presents with    Pre-op Exam     right knee replacement       Have you seen any other physician or provider since your last visit yes Dr Falcon [place] eye doctor    Have you had any other diagnostic tests since your last visit? Yes CT lung    Have you changed or stopped any medications since your last visit? no     Patient is here for a pre op clearance for TRK with Dr Pawel Roger. He has an appointment next week to see Mechelle to schedule. His last EKG was 11/15/2019.

## 2020-09-16 ENCOUNTER — PROCEDURE VISIT (OUTPATIENT)
Dept: UROLOGY | Facility: CLINIC | Age: 80
End: 2020-09-16

## 2020-09-16 DIAGNOSIS — R97.20 ELEVATED PROSTATE SPECIFIC ANTIGEN (PSA): Primary | ICD-10-CM

## 2020-09-16 PROCEDURE — 99024 POSTOP FOLLOW-UP VISIT: CPT | Performed by: UROLOGY

## 2020-09-16 NOTE — PROGRESS NOTES
Chief Complaint  Prostate Biopsy        HPI  Zoë is a 79 y.o. male who presents today for prostate biopsy in light of a progressive elevation in his PSA.  I have actually been recommending this for several years and he always declined but somehow decided to have it done today.  I am not sure he has a realistic picture of his longevity and may be a candidate for palliative therapy only.    There were no vitals filed for this visit.    Past Medical History  Past Medical History:   Diagnosis Date   • Arthritis    • Diabetes mellitus (CMS/HCC)    • ED (erectile dysfunction)    • Enlarged prostate with lower urinary tract symptoms (LUTS)    • Full dentures    • History of stress test    • [place] (hard of hearing)     Bilateral hearing aids    • Renal disorder    • Stone in kidney        Past Surgical History  Past Surgical History:   Procedure Laterality Date   • COLONOSCOPY     • COLONOSCOPY N/A 4/8/2019    Procedure: COLONOSCOPY;  Surgeon: Onesimo Arnold MD;  Location: [facility] ENDOSCOPY;  Service: Gastroenterology   • EYE SURGERY Right     cataract removal with IOL implant   • HERNIA REPAIR Right     multiple inguinal hernia repairs   • KNEE SURGERY Right    • NEPHRECTOMY Right     partial right nephrectomy due to kidney stone       Medications  has a current medication list which includes the following prescription(s): amlodipine, amlodipine besy-benazepril hcl, aspirin, bisacodyl, cholecalciferol, cholecalciferol, furosemide, glucose blood, havrix, lisinopril, lisinopril, lovastatin, metformin, multiple vitamins-minerals, nifedipine, oxybutynin, oxybutynin, tamsulosin, vitamin b-12, vitamin c, and vitamin c.      Allergies  No Known Allergies    Social History  Social History     Socioeconomic History   • Marital status:      Spouse name: Not on file   • Number of children: Not on file   • Years of education: Not on file   • Highest education level: Not on file   Tobacco Use   • Smoking status: Current Every  Day Smoker     Years: 20.00     Types: Pipe   • Smokeless tobacco: Never Used   Substance and Sexual Activity   • Alcohol use: No   • Drug use: No   • Sexual activity: Defer       Family History  He has no family history of bladder or kidney cancer  He has no family history of kidney stones      AUA Symptom Score:      Review of Systems  Review of Systems    Physical Exam  Physical Exam    Labs Recent and today in the office:  Results for orders placed or performed in visit on 08/18/20   Fluoroquinolone Resist GNR Cul - ,    RE   Result Value Ref Range    Fluoroquinolone Resist GNR Cul Final report    Culture Result - ,   Result Value Ref Range    Culture Result 1 Comment      Attempted prostate biopsy: The patient underwent transrectal ultrasound of the prostate and received an injection of Novocain for nerve block in preparation for the biopsy.  Unfortunately at that point we had a malfunction of the biopsy gun and despite several attempts I was unable to remove any tissue from the prostate.  I did learn that his gland is very heterogeneous with extensive calcifications and no definitely suspicious hypo-echo lucent lesions were noted.  He was noted to have a 40.62 g gland with a PSA density calculated at 0.20 which is in the suspicious category.    Assessment & Plan  Elevated PSA/suspicious PSA density: Due to equipment malfunction we were unable to perform the biopsy today so he will return in 1 month to reconsider.  He is actually having elective knee surgery before then.

## 2020-09-24 ENCOUNTER — HOSPITAL ENCOUNTER (OUTPATIENT)
Facility: HOSPITAL | Age: 80
Discharge: HOME | End: 2020-09-24
Payer: MEDICARE

## 2020-09-24 ENCOUNTER — CONSULT (OUTPATIENT)
Dept: CARDIOLOGY | Facility: CLINIC | Age: 80
End: 2020-09-24

## 2020-09-24 VITALS
OXYGEN SATURATION: 97 % | HEIGHT: 72 IN | SYSTOLIC BLOOD PRESSURE: 110 MMHG | BODY MASS INDEX: 23.08 KG/M2 | HEART RATE: 65 BPM | WEIGHT: 170.4 LBS | DIASTOLIC BLOOD PRESSURE: 78 MMHG

## 2020-09-24 DIAGNOSIS — I25.84 CORONARY ARTERY CALCIFICATION: ICD-10-CM

## 2020-09-24 DIAGNOSIS — I25.10 CORONARY ARTERY CALCIFICATION: ICD-10-CM

## 2020-09-24 DIAGNOSIS — R06.09 DYSPNEA ON EXERTION: Primary | ICD-10-CM

## 2020-09-24 DIAGNOSIS — I51.7 LEFT VENTRICULAR ENLARGEMENT: ICD-10-CM

## 2020-09-24 DIAGNOSIS — I71.20 THORACIC AORTIC ANEURYSM WITHOUT RUPTURE (HCC): ICD-10-CM

## 2020-09-24 PROCEDURE — 93005 ELECTROCARDIOGRAM TRACING: CPT

## 2020-09-24 PROCEDURE — 93000 ELECTROCARDIOGRAM COMPLETE: CPT | Performed by: INTERNAL MEDICINE

## 2020-09-24 PROCEDURE — 99204 OFFICE O/P NEW MOD 45 MIN: CPT | Performed by: INTERNAL MEDICINE

## 2020-09-24 NOTE — PROGRESS NOTES
[facility]  Consultation H&P  Ankur Camp  1940  [phone]  There is no work phone number on file..    VISIT DATE:  09/24/2020    PCP: Magaly Prabhakar APRN [address] [place] [address]    CC:  Chief Complaint   Patient presents with   • Hypertension   • Hyperlipidemia   • Surgical Clearance     Right knee replacement     Previous cardiac studies and procedures:  June 2020 CT chest: Marked coronary calcifications.  Ascending aorta measures 3.8 x 3.8 cm.  Left ventricular enlargement. left ventricular enlargement.    ASSESSMENT:   Diagnosis Plan   1. Coronary artery calcification     2. Thoracic aortic aneurysm without rupture (CMS/HCC)     3. Left ventricular enlargement           PLAN:  Preoperative cardiac evaluation: Transthoracic echo pending to assess underlying myocardial structure and function, recommending Shantell scan myocardial perfusion imaging for ischemia evaluation.    Thoracic ascending aortic aneurysm: Small.  Afterload well controlled, goal less than 130/80 mmHg, ideally less than 120/80 mmHg.  Recommending yearly surveillance with CT imaging.    Coronary calcification: Continue aggressive risk factor modification.  Goal LDL less than 100.  Continue current medical therapy.  Ischemia evaluation pending.    LV enlargement: Echo pending to assess underlying myocardial structure and function.    Abnormal EKG with right bundle branch block: Echo pending to assess underlying myocardial structure and function.    History of Present Illness   79-year-old active smoker with a history of hypertension, dyslipidemia and prediabetes presents for evaluation prior to potential total knee replacement.  He appears to be fairly limited due to knee discomfort.  Does not typically complete at least 4 METS of exertion on a regular basis.  Denies chest discomfort.  Mild shortness of breath with exertion.  Denies sustained palpitations, presyncope or syncope.   "Twelve-lead EKG today reveals normal sinus rhythm with right bundle branch block.  Currently smokes pipes, has smoked previously cigars and cigarettes intermittently over 40 years.  Underwent a screening CT chest earlier this year which revealed small ascending aortic aneurysm, marked coronary calcification LV enlargement.      PHYSICAL EXAMINATION:  Vitals:    09/24/20 0858   BP: 110/78   BP Location: Left arm   Patient Position: Sitting   Pulse: 65   SpO2: 97%   Weight: 77.3 kg (170 lb 6.4 oz)   Height: 182.9 cm (72\")     General Appearance:    Alert, cooperative, no distress, appears stated age   Head:    Normocephalic, without obvious abnormality, atraumatic   Eyes:    conjunctiva/corneas clear, EOM's intact, fundi     benign, both eyes   Ears:    Normal TM's and external ear canals, both ears   Nose:   Nares normal, septum midline, mucosa normal, no drainage    or sinus tenderness   Throat:   Lips, mucosa, and tongue normal; teeth and gums normal   Neck:   Supple, symmetrical, trachea midline, no adenopathy;     thyroid:  no enlargement/tenderness/nodules; no carotid    bruit or JVD   Back:     Symmetric, no curvature, ROM normal, no CVA tenderness   Lungs:     Clear to auscultation bilaterally, respirations unlabored   Chest Wall:    No tenderness or deformity    Heart:    Regular rate and rhythm, S1 and S2 normal, no murmur, rub   or gallop, normal carotid impulse bilaterally without bruit.   Abdomen:     Soft, non-tender, bowel sounds active all four quadrants,     no masses, no organomegaly   Extremities:   Extremities normal, atraumatic, no cyanosis or edema   Pulses:   2+ and symmetric all extremities   Skin:   Skin color, texture, turgor normal, no rashes or lesions   Lymph nodes:   Cervical, supraclavicular, and axillary nodes normal   Neurologic:   normal strength, sensation intact     throughout       Diagnostic Data:    ECG 12 Lead    Date/Time: 9/24/2020 9:15 AM  Performed by: Cliff Lema III, " MD  Authorized by: Cliff Lema III, MD   Previous ECG: no previous ECG available  Rhythm: sinus rhythm  Conduction: right bundle branch block    Clinical impression: abnormal EKG          Lab Results   Component Value Date    CHLPL 135 08/20/2019    TRIG 107 08/20/2019    HDL 46 08/20/2019     No results found for: GLUCOSE, BUN, CREATININE, NA, K, CL, CO2, CREATININE, BUN  Lab Results   Component Value Date    HGBA1C 6.4 (H) 07/01/2020     Lab Results   Component Value Date    WBC 4.6 07/01/2020    HGB 14.4 07/01/2020    HCT 42.1 07/01/2020     (L) 07/01/2020       PROBLEM LIST:  Patient Active Problem List   Diagnosis   • Benign prostatic hypertrophy without urinary obstruction   • Hypertension   • Hyperlipidemia   • Type 2 diabetes mellitus (CMS/HCC)   • Vitamin D deficiency   • Colon cancer screening   • Coronary artery calcification   • Thoracic aortic aneurysm (TAA) (CMS/HCC)   • Left ventricular enlargement       PAST MEDICAL HX  Past Medical History:   Diagnosis Date   • Arthritis    • Diabetes mellitus (CMS/HCC)    • ED (erectile dysfunction)    • Enlarged prostate with lower urinary tract symptoms (LUTS)    • Full dentures    • History of stress test    • [place] (hard of hearing)     Bilateral hearing aids    • Renal disorder    • Stone in kidney        Allergies  No Known Allergies    Current Medications    Current Outpatient Medications:   •  ascorbic acid (VITAMIN C) 1000 MG tablet, Take 1,000 mg by mouth Daily., Disp: , Rfl:   •  aspirin 81 MG tablet, Take 81 mg by mouth Daily., Disp: , Rfl:   •  bisacodyl (DULCOLAX) 5 MG EC tablet, As directed/colon prep (Patient taking differently: Take 5 mg by mouth As Needed. As directed/colon prep), Disp: 4 tablet, Rfl: 0  •  Cholecalciferol (D 1000) 1000 units capsule, Take 1,000 Units by mouth., Disp: , Rfl:   •  cholecalciferol (VITAMIN D3) 1000 UNITS tablet, Take 1,000 Units by mouth Daily., Disp: , Rfl:   •  glucose blood test strip, 1 each by In Vitro  route daily Daily testing, DX:250.00, Disp: , Rfl:   •  lisinopril (PRINIVIL,ZESTRIL) 10 MG tablet, Take 10 mg by mouth Daily., Disp: , Rfl:   •  lovastatin (MEVACOR) 40 MG tablet, Take 40 mg by mouth Every Night., Disp: , Rfl:   •  metFORMIN (GLUCOPHAGE) 500 MG tablet, Take 500 mg by mouth Daily With Breakfast., Disp: , Rfl:   •  Multiple Vitamins-Minerals (PRESERVISION AREDS 2 PO), Take 1 tablet by mouth 2 (two) times a day., Disp: , Rfl:   •  oxybutynin (DITROPAN) 5 MG tablet, TAKE ONE TABLET BY MOUTH EVERY EVENING AT BEDTIME, Disp: 30 tablet, Rfl: 6  •  tamsulosin (FLOMAX) 0.4 MG capsule 24 hr capsule, Take 1 capsule by mouth Daily., Disp: , Rfl:   •  vitamin B-12 (CYANOCOBALAMIN) 1000 MCG tablet, Take 1,000 mcg by mouth Daily., Disp: , Rfl:          ROS  Review of Systems   Constitution: Positive for weight loss.   Cardiovascular: Negative for chest pain.   Respiratory: Positive for shortness of breath.    Musculoskeletal: Positive for arthritis and joint pain.       All other body systems reviewed and are negative    SOCIAL HX  Social History     Socioeconomic History   • Marital status:      Spouse name: Not on file   • Number of children: Not on file   • Years of education: Not on file   • Highest education level: Not on file   Tobacco Use   • Smoking status: Current Every Day Smoker     Years: 20.00     Types: Pipe   • Smokeless tobacco: Never Used   • Tobacco comment: smokes his pipe daily   Substance and Sexual Activity   • Alcohol use: No   • Drug use: No   • Sexual activity: Defer       FAMILY HX  Family History   Problem Relation Age of Onset   • Breast cancer Mother    • Cancer Sister    • Cancer Brother    • Cancer Sister    • Dementia Brother    • No Known Problems Brother    • No Known Problems Brother              Cliff Lema III, MD, FACC

## 2020-09-25 ENCOUNTER — HOSPITAL ENCOUNTER (OUTPATIENT)
Facility: HOSPITAL | Age: 80
Discharge: HOME | End: 2020-09-25
Payer: MEDICARE

## 2020-09-25 ENCOUNTER — OFFICE VISIT (OUTPATIENT)
Dept: ORTHOPEDIC SURGERY | Facility: CLINIC | Age: 80
End: 2020-09-25

## 2020-09-25 ENCOUNTER — HOSPITAL ENCOUNTER (OUTPATIENT)
Dept: GENERAL RADIOLOGY | Facility: HOSPITAL | Age: 80
Discharge: HOME | End: 2020-09-25
Payer: MEDICARE

## 2020-09-25 VITALS — HEIGHT: 72 IN | BODY MASS INDEX: 23.24 KG/M2 | WEIGHT: 171.6 LBS | RESPIRATION RATE: 18 BRPM

## 2020-09-25 DIAGNOSIS — M25.551 ARTHRALGIA OF RIGHT HIP: Primary | ICD-10-CM

## 2020-09-25 DIAGNOSIS — Z87.81 HISTORY OF PELVIC FRACTURE: ICD-10-CM

## 2020-09-25 DIAGNOSIS — M25.561 ARTHRALGIA OF RIGHT KNEE: ICD-10-CM

## 2020-09-25 PROCEDURE — 99213 OFFICE O/P EST LOW 20 MIN: CPT | Performed by: ORTHOPAEDIC SURGERY

## 2020-09-25 PROCEDURE — 72170 X-RAY EXAM OF PELVIS: CPT

## 2020-10-02 DIAGNOSIS — N32.81 OAB (OVERACTIVE BLADDER): ICD-10-CM

## 2020-10-06 ENCOUNTER — HOSPITAL ENCOUNTER (OUTPATIENT)
Dept: NON INVASIVE DIAGNOSTICS | Facility: HOSPITAL | Age: 80
Discharge: HOME | End: 2020-10-06
Payer: MEDICARE

## 2020-10-06 LAB
LV EF: 63 %
LVEF MODALITY: NORMAL

## 2020-10-06 PROCEDURE — 93306 TTE W/DOPPLER COMPLETE: CPT

## 2020-10-06 RX ORDER — LISINOPRIL 10 MG/1
TABLET ORAL
Qty: 90 TABLET | Refills: 1 | Status: SHIPPED | OUTPATIENT
Start: 2020-10-06 | End: 2020-10-15 | Stop reason: SDUPTHER

## 2020-10-07 ENCOUNTER — OUTSIDE FACILITY SERVICE (OUTPATIENT)
Dept: CARDIOLOGY | Facility: CLINIC | Age: 80
End: 2020-10-07

## 2020-10-08 ENCOUNTER — HOSPITAL ENCOUNTER (OUTPATIENT)
Dept: NON INVASIVE DIAGNOSTICS | Facility: HOSPITAL | Age: 80
Discharge: HOME | End: 2020-10-08
Payer: MEDICARE

## 2020-10-08 ENCOUNTER — OUTSIDE FACILITY SERVICE (OUTPATIENT)
Dept: CARDIOLOGY | Facility: CLINIC | Age: 80
End: 2020-10-08

## 2020-10-08 LAB
LV EF: 63 %
LVEF MODALITY: NORMAL

## 2020-10-08 PROCEDURE — 6360000002 HC RX W HCPCS: Performed by: INTERNAL MEDICINE

## 2020-10-08 PROCEDURE — 3430000000 HC RX DIAGNOSTIC RADIOPHARMACEUTICAL: Performed by: INTERNAL MEDICINE

## 2020-10-08 PROCEDURE — 96374 THER/PROPH/DIAG INJ IV PUSH: CPT

## 2020-10-08 PROCEDURE — 93017 CV STRESS TEST TRACING ONLY: CPT

## 2020-10-08 PROCEDURE — 78452 HT MUSCLE IMAGE SPECT MULT: CPT

## 2020-10-08 PROCEDURE — 78452 HT MUSCLE IMAGE SPECT MULT: CPT | Performed by: INTERNAL MEDICINE

## 2020-10-08 PROCEDURE — A9500 TC99M SESTAMIBI: HCPCS | Performed by: INTERNAL MEDICINE

## 2020-10-08 PROCEDURE — 93018 CV STRESS TEST I&R ONLY: CPT | Performed by: INTERNAL MEDICINE

## 2020-10-08 PROCEDURE — 93307 TTE W/O DOPPLER COMPLETE: CPT | Performed by: INTERNAL MEDICINE

## 2020-10-08 RX ADMIN — TETRAKIS(2-METHOXYISOBUTYLISOCYANIDE)COPPER(I) TETRAFLUOROBORATE 10.2 MILLICURIE: 1 INJECTION, POWDER, LYOPHILIZED, FOR SOLUTION INTRAVENOUS at 09:04

## 2020-10-08 RX ADMIN — TETRAKIS(2-METHOXYISOBUTYLISOCYANIDE)COPPER(I) TETRAFLUOROBORATE 34.6 MILLICURIE: 1 INJECTION, POWDER, LYOPHILIZED, FOR SOLUTION INTRAVENOUS at 09:55

## 2020-10-08 RX ADMIN — REGADENOSON 0.4 MG: 0.08 INJECTION, SOLUTION INTRAVENOUS at 09:45

## 2020-10-10 RX ORDER — OXYBUTYNIN CHLORIDE 5 MG/1
TABLET ORAL
Qty: 30 TABLET | Refills: 6 | Status: SHIPPED | OUTPATIENT
Start: 2020-10-10 | End: 2021-05-18

## 2020-10-15 ENCOUNTER — OFFICE VISIT (OUTPATIENT)
Dept: PRIMARY CARE CLINIC | Age: 80
End: 2020-10-15
Payer: MEDICARE

## 2020-10-15 VITALS
HEART RATE: 61 BPM | DIASTOLIC BLOOD PRESSURE: 70 MMHG | OXYGEN SATURATION: 97 % | TEMPERATURE: 98.8 F | WEIGHT: 169 LBS | BODY MASS INDEX: 22.92 KG/M2 | SYSTOLIC BLOOD PRESSURE: 144 MMHG

## 2020-10-15 PROBLEM — I71.20 THORACIC AORTIC ANEURYSM WITHOUT RUPTURE: Status: ACTIVE | Noted: 2020-10-15

## 2020-10-15 PROCEDURE — G8482 FLU IMMUNIZE ORDER/ADMIN: HCPCS | Performed by: NURSE PRACTITIONER

## 2020-10-15 PROCEDURE — 90688 IIV4 VACCINE SPLT 0.5 ML IM: CPT | Performed by: NURSE PRACTITIONER

## 2020-10-15 PROCEDURE — G0008 ADMIN INFLUENZA VIRUS VAC: HCPCS | Performed by: NURSE PRACTITIONER

## 2020-10-15 PROCEDURE — 4004F PT TOBACCO SCREEN RCVD TLK: CPT | Performed by: NURSE PRACTITIONER

## 2020-10-15 PROCEDURE — 4040F PNEUMOC VAC/ADMIN/RCVD: CPT | Performed by: NURSE PRACTITIONER

## 2020-10-15 PROCEDURE — 1123F ACP DISCUSS/DSCN MKR DOCD: CPT | Performed by: NURSE PRACTITIONER

## 2020-10-15 PROCEDURE — G8427 DOCREV CUR MEDS BY ELIG CLIN: HCPCS | Performed by: NURSE PRACTITIONER

## 2020-10-15 PROCEDURE — G8420 CALC BMI NORM PARAMETERS: HCPCS | Performed by: NURSE PRACTITIONER

## 2020-10-15 PROCEDURE — 99214 OFFICE O/P EST MOD 30 MIN: CPT | Performed by: NURSE PRACTITIONER

## 2020-10-15 RX ORDER — LISINOPRIL 10 MG/1
TABLET ORAL
Qty: 90 TABLET | Refills: 3 | Status: SHIPPED | OUTPATIENT
Start: 2020-10-15 | End: 2021-10-22

## 2020-10-15 ASSESSMENT — ENCOUNTER SYMPTOMS
GASTROINTESTINAL NEGATIVE: 1
BACK PAIN: 1
EYES NEGATIVE: 1
RESPIRATORY NEGATIVE: 1

## 2020-10-15 ASSESSMENT — PATIENT HEALTH QUESTIONNAIRE - PHQ9
2. FEELING DOWN, DEPRESSED OR HOPELESS: 0
SUM OF ALL RESPONSES TO PHQ QUESTIONS 1-9: 0
SUM OF ALL RESPONSES TO PHQ9 QUESTIONS 1 & 2: 0
SUM OF ALL RESPONSES TO PHQ QUESTIONS 1-9: 0
1. LITTLE INTEREST OR PLEASURE IN DOING THINGS: 0
SUM OF ALL RESPONSES TO PHQ QUESTIONS 1-9: 0

## 2020-10-15 NOTE — PROGRESS NOTES
Vaccine Information Sheet, \"Influenza - Inactivated\"  given to Delfino Sicks, or parent/legal guardian of  Delfino Cummings and verbalized understanding. Patient responses:    Have you ever had a reaction to a flu vaccine? No  Do you have any current illness? No  Have you ever had [place] Syndrome? No  Do you have a serious allergy to any of the follow: Neomycin, Polymyxin, Thimerosal, eggs or egg products? No    Flu vaccine given per order. Please see immunization tab. Risks and benefits explained. Current VIS given.

## 2020-10-15 NOTE — PROGRESS NOTES
Chief Complaint   Patient presents with    Hypertension     follow up        Have you seen any other physician or provider since your last visit yes - Dr. Lucy Galvez was going to a biopsy but didn't get to due to a technical issue and he plans to do it next week. Have you had any other diagnostic tests since your last visit?  yes    Have you changed or stopped any medications since your last visit? no

## 2020-10-23 ENCOUNTER — OFFICE VISIT (OUTPATIENT)
Dept: ORTHOPEDIC SURGERY | Facility: CLINIC | Age: 80
End: 2020-10-23

## 2020-10-23 VITALS — BODY MASS INDEX: 23 KG/M2 | WEIGHT: 169.8 LBS | RESPIRATION RATE: 18 BRPM | HEIGHT: 72 IN

## 2020-10-23 DIAGNOSIS — Z87.81 HISTORY OF PELVIC FRACTURE: ICD-10-CM

## 2020-10-23 DIAGNOSIS — M25.561 ARTHRALGIA OF RIGHT KNEE: Primary | ICD-10-CM

## 2020-10-23 DIAGNOSIS — M17.11 PRIMARY OSTEOARTHRITIS OF RIGHT KNEE: ICD-10-CM

## 2020-10-23 PROCEDURE — 73560 X-RAY EXAM OF KNEE 1 OR 2: CPT | Performed by: ORTHOPAEDIC SURGERY

## 2020-10-23 PROCEDURE — 99213 OFFICE O/P EST LOW 20 MIN: CPT | Performed by: ORTHOPAEDIC SURGERY

## 2020-10-23 RX ORDER — OXYBUTYNIN CHLORIDE 5 MG/1
TABLET, EXTENDED RELEASE ORAL
COMMUNITY
Start: 2020-10-10 | End: 2020-11-06 | Stop reason: SDUPTHER

## 2020-10-27 ENCOUNTER — PROCEDURE VISIT (OUTPATIENT)
Dept: UROLOGY | Facility: CLINIC | Age: 80
End: 2020-10-27

## 2020-10-27 VITALS
TEMPERATURE: 98.3 F | HEART RATE: 87 BPM | SYSTOLIC BLOOD PRESSURE: 126 MMHG | DIASTOLIC BLOOD PRESSURE: 85 MMHG | RESPIRATION RATE: 18 BRPM | OXYGEN SATURATION: 96 %

## 2020-10-27 DIAGNOSIS — R97.20 ELEVATED PROSTATE SPECIFIC ANTIGEN (PSA): Primary | ICD-10-CM

## 2020-10-27 PROCEDURE — 76942 ECHO GUIDE FOR BIOPSY: CPT | Performed by: UROLOGY

## 2020-10-27 PROCEDURE — 55700 PR PROSTATE NEEDLE BIOPSY ANY APPROACH: CPT | Performed by: UROLOGY

## 2020-10-27 NOTE — PROGRESS NOTES
Chief Complaint  Prostate Biopsy and Ultrasound        HPI  Zoë is a 80 y.o. male who returns today for prostate needle biopsy in light of his elevated PSA.  An attempt at biopsy last month was unsuccessful because of equipment malfunction when he was noted to have a significant enlargement of his gland with extreme heterogeneity and calcifications.    Vitals:    10/27/20 1021   BP: 126/85   Pulse: 87   Resp: 18   Temp: 98.3 °F (36.8 °C)   SpO2: 96%       Past Medical History  Past Medical History:   Diagnosis Date   • Arthritis    • Diabetes mellitus (CMS/HCC)    • ED (erectile dysfunction)    • Enlarged prostate with lower urinary tract symptoms (LUTS)    • Full dentures    • History of fracture of pelvis 11/14/2019    after fall from ladder   • History of loss of consciousness 11/14/2019    after fall from ladder-flown [facility]   • History of rib fracture 11/14/2019    after fall from ladder   • History of right shoulder fracture 1960    walking up hill, fell on gravel   • History of stress test    • History of torn meniscus of right knee 2012   • [place]hard of hearing)     Bilateral hearing aids    • Hypertension    • Laceration of head 11/14/2019    after fall from ladder-treated at [facility]   • Left shoulder strain 2017    after fall from ladder   • Lung injury 11/14/2019    punctured right lung after falling off ladder   • Renal disorder    • Stone in kidney        Past Surgical History  Past Surgical History:   Procedure Laterality Date   • CHEST TUBE INSERTION  11/2019    rib fractures, punctured lung after fall from [employer]   • COLONOSCOPY     • COLONOSCOPY N/A 4/8/2019    Procedure: COLONOSCOPY;  Surgeon: Onesimo Arnold MD;  Location: [facility] ENDOSCOPY;  Service: Gastroenterology   • EYE SURGERY Right     cataract removal with IOL implant   • HERNIA REPAIR Right     multiple inguinal hernia repairs   • KNEE ARTHROSCOPY W/ MENISCECTOMY Right  05/10/2012    Partial medial and lateral menisectomy-Conrad Baird MD   • NEPHRECTOMY Right     partial right nephrectomy due to kidney stone   • PELVIC FRACTURE SURGERY Right 11/2019    multiple pelvic fractures after fall from ladder[facility]   • SHOULDER SURGERY Right 1960    fracture surgery to remove a piece of bone- [place], [place]       Medications  has a current medication list which includes the following prescription(s): ascorbic acid, aspirin, cholecalciferol, glucose blood, lisinopril, lovastatin, metformin, multiple vitamins-minerals, oxybutynin, oxybutynin xl, tamsulosin, and vitamin b-12.      Allergies  No Known Allergies    Social History  Social History     Socioeconomic History   • Marital status:      Spouse name: Not on file   • Number of children: Not on file   • Years of education: Not on file   • Highest education level: Not on file   Occupational History   • Occupation: retired maintenance     Employer: [employer]   Tobacco Use   • Smoking status: Current Every Day Smoker     Years: 20.00     Types: Pipe   • Smokeless tobacco: Never Used   • Tobacco comment: smokes his pipe daily   Substance and Sexual Activity   • Alcohol use: No   • Drug use: No   • Sexual activity: Defer   Social History Narrative    Right hand dominant       Family History  He has no family history of bladder or kidney cancer  He has no family history of kidney stones      AUA Symptom Score:      Review of Systems  Review of Systems    Physical Exam  Physical Exam    Labs Recent and today in the office:  Results for orders placed or performed in visit on 08/18/20   Fluoroquinolone Resist GNR Cul - ,    RE   Result Value Ref Range    Fluoroquinolone Resist GNR Cul Final report    Culture Result - ,   Result Value Ref Range    Culture Result 1 Comment      After the patient's ID was confirmed and proper consent was obtained the patient was placed in the left lateral position.  Digital  rectal exam was performed in Xylocaine jelly was instilled into the rectum.  The 7.5 MHz endocavitary probe was inserted and used to visualize the prostate.  A mixture of 2% Xylocaine and Marcaine was then used to perform a prostate nerve block injecting the junction of the seminal vesicle and bladder laterally and either side of the apex in the midline apically.  The 18-gauge prostate biopsy gun was then used to perform systematic biopsies.  A portion was removed from the median lob lateral lobe and apex from each side.  Typically 12-14 Cores are obtain depending on the size of the gland.  Any additional areas of interest also biopsied and submitted.  The patient had been covered appropriately with antibiotics as determined by stool culture.  No excessive bleeding was noted and the patient tolerated the procedure well.  He will return in 1 week for path report.    Assessment & Plan  Elevated PSA: Patient has a suspicious prostate gland and elevated PSA.  Tissue is submitted and he will return in 1 week for further recommendations.

## 2020-11-05 ENCOUNTER — OFFICE VISIT (OUTPATIENT)
Dept: UROLOGY | Facility: CLINIC | Age: 80
End: 2020-11-05

## 2020-11-05 DIAGNOSIS — R97.20 ELEVATED PROSTATE SPECIFIC ANTIGEN (PSA): Primary | ICD-10-CM

## 2020-11-05 DIAGNOSIS — C61 PROSTATE CANCER (HCC): ICD-10-CM

## 2020-11-05 LAB
BILIRUB BLD-MCNC: NEGATIVE MG/DL
CLARITY, POC: CLEAR
COLOR UR: YELLOW
GLUCOSE UR STRIP-MCNC: NEGATIVE MG/DL
KETONES UR QL: NEGATIVE
LEUKOCYTE EST, POC: NEGATIVE
NITRITE UR-MCNC: NEGATIVE MG/ML
PH UR: 6 [PH] (ref 5–8)
PROT UR STRIP-MCNC: NEGATIVE MG/DL
RBC # UR STRIP: ABNORMAL /UL
SP GR UR: 1.02 (ref 1–1.03)
UROBILINOGEN UR QL: NORMAL

## 2020-11-05 PROCEDURE — 99214 OFFICE O/P EST MOD 30 MIN: CPT | Performed by: UROLOGY

## 2020-11-05 PROCEDURE — 81003 URINALYSIS AUTO W/O SCOPE: CPT | Performed by: UROLOGY

## 2020-11-05 NOTE — PROGRESS NOTES
Chief Complaint  Prostate Biopsy Results        HPI  Zoë is a 80 y.o. male who returns today after prostate biopsy accompanied by his wife who states he had no significant problems with pain bleeding or fever.  Unfortunately his biopsy does show cancer in 3 out of 14 cores with to a Govind 6 and 1 of 3+4 equal 7.  With a PSA of 8.T and a normal gland to palpation the certainly seems to be early stage and intermediate grade.  He has limited longevity due to his age and the fact that he states he is already outlived all members of his family.  He is informed that radiation would be a good option to provide coverage if he thinks is destined to live beyond 10 years.  Otherwise I recommend a CT scan to check for metastatic disease and then active surveillance.  His knee surgery has been put on hold until we stage his prostate cancer.    There were no vitals filed for this visit.    Past Medical History  Past Medical History:   Diagnosis Date   • Arthritis    • Diabetes mellitus (CMS/HCC)    • ED (erectile dysfunction)    • Enlarged prostate with lower urinary tract symptoms (LUTS)    • Full dentures    • History of fracture of pelvis 11/14/2019    after fall from ladder   • History of loss of consciousness 11/14/2019    after fall from ladder-flown to [facility]   • History of rib fracture 11/14/2019    after fall from ladder   • History of right shoulder fracture 1960    walking up hill, fell on gravel   • History of stress test    • History of torn meniscus of right knee 2012   • [place] (hard of hearing)     Bilateral hearing aids    • Hypertension    • Laceration of head 11/14/2019    after fall from ladder-treated at [facility]   • Left shoulder strain 2017    after fall from ladder   • Lung injury 11/14/2019    punctured right lung after falling off ladder   • Renal disorder    • Stone in kidney        Past Surgical History  Past Surgical History:   Procedure Laterality Date   • CHEST TUBE  INSERTION  11/2019    rib fractures, punctured lung after fall from [facility]   • COLONOSCOPY     • COLONOSCOPY N/A 4/8/2019    Procedure: COLONOSCOPY;  Surgeon: Onesimo Arnold MD;  Location: [facility] ENDOSCOPY;  Service: Gastroenterology   • EYE SURGERY Right     cataract removal with IOL implant   • HERNIA REPAIR Right     multiple inguinal hernia repairs   • KNEE ARTHROSCOPY W/ MENISCECTOMY Right 05/10/2012    Partial medial and lateral menisectomy-Conrad Baird MD   • NEPHRECTOMY Right     partial right nephrectomy due to kidney stone   • PELVIC FRACTURE SURGERY Right 11/2019    multiple pelvic fractures after fall from [facility]   • SHOULDER SURGERY Right 1960    fracture surgery to remove a piece of bone- [place], [place]       Medications  has a current medication list which includes the following prescription(s): ascorbic acid, aspirin, cholecalciferol, glucose blood, lisinopril, lovastatin, metformin, multiple vitamins-minerals, oxybutynin, oxybutynin xl, tamsulosin, and vitamin b-12.      Allergies  No Known Allergies    Social History  Social History     Socioeconomic History   • Marital status:      Spouse name: Not on file   • Number of children: Not on file   • Years of education: Not on file   • Highest education level: Not on file   Occupational History   • Occupation: retired maintenance     Employer: [employer]   Tobacco Use   • Smoking status: Current Every Day Smoker     Years: 20.00     Types: Pipe   • Smokeless tobacco: Never Used   • Tobacco comment: smokes his pipe daily   Substance and Sexual Activity   • Alcohol use: No   • Drug use: No   • Sexual activity: Defer   Social History Narrative    Right hand dominant       Family History  He has no family history of bladder or kidney cancer  He has no family history of kidney stones      AUA Symptom Score:      Review of Systems  Review of Systems    Physical Exam  Physical  Exam    Labs Recent and today in the office:  Results for orders placed or performed in visit on 11/05/20   POC Urinalysis Dipstick, Automated    Specimen: Urine   Result Value Ref Range    Color Yellow Yellow, Straw, Dark Yellow, Vibha    Clarity, UA Clear Clear    Specific Gravity  1.025 1.005 - 1.030    pH, Urine 6.0 5.0 - 8.0    Leukocytes Negative Negative    Nitrite, UA Negative Negative    Protein, POC Negative Negative mg/dL    Glucose, UA Negative Negative, 1000 mg/dL (3+) mg/dL    Ketones, UA Negative Negative    Urobilinogen, UA Normal Normal    Bilirubin Negative Negative    Blood, UA 3+ (A) Negative         Assessment & Plan  Malignant neoplasm of the prostate: Stage T1c [place] 7 prostate cancer with a PSA of 8.2.  CT scan will be ordered for staging purposes and if negative for metastasis I would recommend active surveillance and androgen suppression therapy at any time it seems to progress or become symptomatic.  We mentioned the option of radiation but he says he does not count on being here for 10 years.

## 2020-11-06 ENCOUNTER — OFFICE VISIT (OUTPATIENT)
Dept: CARDIOLOGY | Facility: CLINIC | Age: 80
End: 2020-11-06

## 2020-11-06 ENCOUNTER — HOSPITAL ENCOUNTER (OUTPATIENT)
Facility: HOSPITAL | Age: 80
Discharge: HOME | End: 2020-11-06
Payer: MEDICARE

## 2020-11-06 VITALS
HEART RATE: 92 BPM | HEIGHT: 72 IN | BODY MASS INDEX: 23.49 KG/M2 | DIASTOLIC BLOOD PRESSURE: 78 MMHG | SYSTOLIC BLOOD PRESSURE: 132 MMHG | WEIGHT: 173.4 LBS | OXYGEN SATURATION: 98 %

## 2020-11-06 DIAGNOSIS — I71.20 THORACIC AORTIC ANEURYSM WITHOUT RUPTURE (HCC): ICD-10-CM

## 2020-11-06 DIAGNOSIS — I25.10 CORONARY ARTERY CALCIFICATION: ICD-10-CM

## 2020-11-06 DIAGNOSIS — E78.2 MIXED HYPERLIPIDEMIA: ICD-10-CM

## 2020-11-06 DIAGNOSIS — I25.84 CORONARY ARTERY CALCIFICATION: ICD-10-CM

## 2020-11-06 DIAGNOSIS — I10 ESSENTIAL HYPERTENSION: Primary | ICD-10-CM

## 2020-11-06 PROBLEM — I51.7 LEFT VENTRICULAR ENLARGEMENT: Status: RESOLVED | Noted: 2020-09-24 | Resolved: 2020-11-06

## 2020-11-06 LAB
BUN SERPL-MCNC: 16 MG/DL (ref 8–23)
BUN/CREAT SERPL: 19.3 (ref 7–25)
CALCIUM SERPL-MCNC: 9.2 MG/DL (ref 8.6–10.5)
CHLORIDE SERPL-SCNC: 102 MMOL/L (ref 98–107)
CO2 SERPL-SCNC: 32.1 MMOL/L (ref 22–29)
CREAT SERPL-MCNC: 0.83 MG/DL (ref 0.76–1.27)
GLUCOSE SERPL-MCNC: 108 MG/DL (ref 65–99)
POTASSIUM SERPL-SCNC: 3.7 MMOL/L (ref 3.5–5.2)
SODIUM SERPL-SCNC: 140 MMOL/L (ref 136–145)

## 2020-11-06 PROCEDURE — 99213 OFFICE O/P EST LOW 20 MIN: CPT | Performed by: INTERNAL MEDICINE

## 2020-11-06 PROCEDURE — 93005 ELECTROCARDIOGRAM TRACING: CPT

## 2020-11-06 PROCEDURE — 93000 ELECTROCARDIOGRAM COMPLETE: CPT | Performed by: INTERNAL MEDICINE

## 2020-11-06 NOTE — PROGRESS NOTES
[facility] at [facility]  Office visit  Ankur Camp  1940  [phone]  There is no work phone number on file.    VISIT DATE:  11/6/2020    PCP: Magaly Prabhakar APRN [address] [place] [address]    CC:  Chief Complaint   Patient presents with   • Coronary artery calcification   • Surgical Clearance     right knee surgery       Previous cardiac studies and procedures:  June 2020 CT chest: Marked coronary calcifications.  Ascending aorta measures 3.8 x 3.8 cm.  Left ventricular enlargement. left ventricular enlargement.    October 2020  [place] scan myocardial perfusion imaging   Diaphragmatic attenuation, otherwise normal myocardial perfusion.   Left ventricular ejection fraction of 63 %.   Normal LV size and systolic function.   Overall findings represent a low risk scan.    Echo   Ejection fraction is visually estimated to be 60-65 %.   Mild septal asymmetric hypertrophy.   Diastolic filling parameters suggests grade I diastolic dysfunction .    ASSESSMENT:   Diagnosis Plan   1. Essential hypertension     2. Mixed hyperlipidemia     3. Coronary artery calcification     4. Thoracic aortic aneurysm without rupture (CMS/HCC)         PLAN:  Preoperative cardiac evaluation: Potential right knee replacement.  Low risk for major perioperative cardiovascular complications.  No further cardiac testing or medication titration indicated prior to surgery.     Thoracic ascending aortic aneurysm: Small.  Afterload well controlled, goal less than 130/80 mmHg, ideally less than 120/80 mmHg.  Recommending yearly surveillance with CT imaging.     Coronary calcification: Continue aggressive risk factor modification.  Goal LDL less than 100.  Continue current medical therapy.  Negative ischemia evaluation.     Right bundle branch block: No significant functional or structural heart disease noted on transthoracic echo.    Subjective  Interval assessment: Stable functional capacity.  Denies chest  "pain, palpitations or dyspnea on exertion.  Blood pressures running less than 135/85 mmHg.  Discussed results of transthoracic echo and myocardial perfusion imaging.    Initial evaluation:79-year-old active smoker with a history of hypertension, dyslipidemia and prediabetes presents for evaluation prior to potential total knee replacement.  He appears to be fairly limited due to knee discomfort.  Does not typically complete at least 4 METS of exertion on a regular basis.  Denies chest discomfort.  Mild shortness of breath with exertion.  Denies sustained palpitations, presyncope or syncope.  Twelve-lead EKG today reveals normal sinus rhythm with right bundle branch block.  Currently smokes pipes, has smoked previously cigars and cigarettes intermittently over 40 years.  Underwent a screening CT chest earlier this year which revealed small ascending aortic aneurysm, marked coronary calcification LV enlargement.    PHYSICAL EXAMINATION:  Vitals:    11/06/20 1343   BP: 132/78   BP Location: Left arm   Patient Position: Sitting   Pulse: 92   SpO2: 98%   Weight: 78.7 kg (173 lb 6.4 oz)   Height: 182.9 cm (72\")     General Appearance:    Alert, cooperative, no distress, appears stated age   Head:    Normocephalic, without obvious abnormality, atraumatic   Eyes:    conjunctiva/corneas clear   Nose:   Nares normal, septum midline, mucosa normal, no drainage   Throat:   Lips, teeth and gums normal   Neck:   Supple, symmetrical, trachea midline, no carotid    bruit or JVD   Lungs:     Clear to auscultation bilaterally, respirations unlabored   Chest Wall:    No tenderness or deformity    Heart:    Regular rate and rhythm, S1 and S2 normal, no murmur, rub   or gallop, normal carotid impulse bilaterally without bruit.   Abdomen:     Soft, non-tender   Extremities:   Extremities normal, atraumatic, no cyanosis or edema   Pulses:   2+ and symmetric all extremities   Skin:   Skin color, texture, turgor normal, no rashes or lesions "       Diagnostic Data:    ECG 12 Lead    Date/Time: 11/6/2020 2:04 PM  Performed by: Cliff Lema III, MD  Authorized by: Cliff Lema III, MD   Comparison: compared with previous ECG from 9/24/2020  Similar to previous ECG  Rhythm: sinus rhythm  Conduction: right bundle branch block    Clinical impression: abnormal EKG          Lab Results   Component Value Date    CHLPL 135 08/20/2019    TRIG 107 08/20/2019    HDL 46 08/20/2019     Lab Results   Component Value Date    BUN 16 11/05/2020    CREATININE 0.83 11/05/2020     11/05/2020    K 3.7 11/05/2020     11/05/2020    CO2 32.1 (H) 11/05/2020     Lab Results   Component Value Date    HGBA1C 6.4 (H) 07/01/2020     Lab Results   Component Value Date    WBC 4.6 07/01/2020    HGB 14.4 07/01/2020    HCT 42.1 07/01/2020     (L) 07/01/2020       Allergies  No Known Allergies    Current Medications    Current Outpatient Medications:   •  ascorbic acid (VITAMIN C) 1000 MG tablet, Take 1,000 mg by mouth Daily., Disp: , Rfl:   •  aspirin 81 MG tablet, Take 81 mg by mouth Daily., Disp: , Rfl:   •  Cholecalciferol (D 1000) 1000 units capsule, Take 1,000 Units by mouth Daily., Disp: , Rfl:   •  glucose blood test strip, 1 each by In Vitro route daily Daily testing, DX:250.00, Disp: , Rfl:   •  lisinopril (PRINIVIL,ZESTRIL) 10 MG tablet, Take 10 mg by mouth Daily., Disp: , Rfl:   •  lovastatin (MEVACOR) 40 MG tablet, Take 40 mg by mouth Every Night., Disp: , Rfl:   •  metFORMIN (GLUCOPHAGE) 500 MG tablet, Take 500 mg by mouth Daily With Breakfast., Disp: , Rfl:   •  Multiple Vitamins-Minerals (PRESERVISION AREDS 2 PO), Take 1 tablet by mouth 2 (two) times a day., Disp: , Rfl:   •  oxybutynin (DITROPAN) 5 MG tablet, TAKE ONE TABLET BY MOUTH EVERY EVENING AT BEDTIME, Disp: 30 tablet, Rfl: 6  •  tamsulosin (FLOMAX) 0.4 MG capsule 24 hr capsule, Take 1 capsule by mouth Daily., Disp: , Rfl:   •  vitamin B-12 (CYANOCOBALAMIN) 1000 MCG tablet, Take 1,000 mcg by  mouth Daily., Disp: , Rfl:           ROS  Review of Systems   Cardiovascular: Negative for chest pain, dyspnea on exertion, irregular heartbeat, leg swelling and palpitations.   Respiratory: Negative for cough, shortness of breath and wheezing.          SOCIAL HX  Social History     Socioeconomic History   • Marital status:      Spouse name: Not on file   • Number of children: Not on file   • Years of education: Not on file   • Highest education level: Not on file   Occupational History   • Occupation: retired maintenance     Employer: [employer]   Tobacco Use   • Smoking status: Current Every Day Smoker     Years: 20.00     Types: Pipe   • Smokeless tobacco: Never Used   • Tobacco comment: smokes his pipe daily   Substance and Sexual Activity   • Alcohol use: No   • Drug use: No   • Sexual activity: Defer   Social History Narrative    Right hand dominant       FAMILY HX  Family History   Problem Relation Age of Onset   • Breast cancer Mother    • Cancer Sister    • Cancer Brother    • Cancer Sister    • Dementia Brother    • No Known Problems Brother    • No Known Problems Brother              Cliff Lema III, MD, FACC

## 2020-11-11 ENCOUNTER — HOSPITAL ENCOUNTER (OUTPATIENT)
Dept: CT IMAGING | Facility: HOSPITAL | Age: 80
Discharge: HOME | End: 2020-11-11
Admitting: UROLOGY

## 2020-11-11 PROCEDURE — 25010000002 IOPAMIDOL 61 % SOLUTION: Performed by: UROLOGY

## 2020-11-11 PROCEDURE — 74177 CT ABD & PELVIS W/CONTRAST: CPT

## 2020-11-11 RX ADMIN — IOPAMIDOL 100 ML: 612 INJECTION, SOLUTION INTRAVENOUS at 13:19

## 2020-11-13 ENCOUNTER — OFFICE VISIT (OUTPATIENT)
Dept: UROLOGY | Facility: CLINIC | Age: 80
End: 2020-11-13

## 2020-11-13 VITALS
DIASTOLIC BLOOD PRESSURE: 82 MMHG | RESPIRATION RATE: 16 BRPM | SYSTOLIC BLOOD PRESSURE: 125 MMHG | OXYGEN SATURATION: 96 % | TEMPERATURE: 98.2 F | HEART RATE: 89 BPM

## 2020-11-13 DIAGNOSIS — C61 PROSTATE CANCER (HCC): Primary | ICD-10-CM

## 2020-11-13 PROCEDURE — 99214 OFFICE O/P EST MOD 30 MIN: CPT | Performed by: UROLOGY

## 2020-11-13 NOTE — PROGRESS NOTES
Chief Complaint  Prostate Cancer (1 week follow up CT.)        KUSUM Camp is a 80 y.o. male who returns today for follow-up of his prostate cancer that is generally low-grade with a [place] 6 although he had 1 core of Govind 7 and recent CT scan was negative for metastatic disease but with enlarged lymph nodes is probably showing local extension.  I have recommended entering him on a program of active surveillance and defer treatment until he shows signs of progression or become symptomatic.  Only if he thought he was distantly of more than 10 years should reconsider radiation therapy.  At any time it progresses we will institute a program of androgen suppression and probably put in remission for 5 years.  Ankur states he is already outlived every member of his family at this age.    Vitals:    11/13/20 1142   BP: 125/82   Pulse: 89   Resp: 16   Temp: 98.2 °F (36.8 °C)   SpO2: 96%       Past Medical History  Past Medical History:   Diagnosis Date   • Arthritis    • Diabetes mellitus (CMS/HCC)    • ED (erectile dysfunction)    • Enlarged prostate with lower urinary tract symptoms (LUTS)    • Full dentures    • History of fracture of pelvis 11/14/2019    after fall from ladder   • History of loss of consciousness 11/14/2019    after fall from ladder-flown to [facility]   • History of rib fracture 11/14/2019    after fall from ladder   • History of right shoulder fracture 1960    walking up hill, fell on gravel   • History of stress test    • History of torn meniscus of right knee 2012   • [place] (hard of hearing)     Bilateral hearing aids    • Hypertension    • Laceration of head 11/14/2019    after fall from ladder-treated at [facility]   • Left shoulder strain 2017    after fall from ladder   • Lung injury 11/14/2019    punctured right lung after falling off ladder   • Renal disorder    • Stone in kidney        Past Surgical History  Past Surgical History:   Procedure Laterality Date   •  CHEST TUBE INSERTION  11/2019    rib fractures, punctured lung after fall from [facility]   • COLONOSCOPY     • COLONOSCOPY N/A 4/8/2019    Procedure: COLONOSCOPY;  Surgeon: Onesimo Arnold MD;  Location: [facility] ENDOSCOPY;  Service: Gastroenterology   • EYE SURGERY Right     cataract removal with IOL implant   • HERNIA REPAIR Right     multiple inguinal hernia repairs   • KNEE ARTHROSCOPY W/ MENISCECTOMY Right 05/10/2012    Partial medial and lateral menisectomy-Conrad Baird MD   • NEPHRECTOMY Right     partial right nephrectomy due to kidney stone   • PELVIC FRACTURE SURGERY Right 11/2019    multiple pelvic fractures after fall from [facility]   • SHOULDER SURGERY Right 1960    fracture surgery to remove a piece of bone- [place], [place]       Medications  has a current medication list which includes the following prescription(s): ascorbic acid, aspirin, cholecalciferol, glucose blood, lisinopril, lovastatin, metformin, multiple vitamins-minerals, oxybutynin, tamsulosin, and vitamin b-12.      Allergies  No Known Allergies    Social History  Social History     Socioeconomic History   • Marital status:      Spouse name: Not on file   • Number of children: Not on file   • Years of education: Not on file   • Highest education level: Not on file   Occupational History   • Occupation: retired maintenance     Employer: [employer]   Tobacco Use   • Smoking status: Current Every Day Smoker     Years: 20.00     Types: Pipe   • Smokeless tobacco: Never Used   • Tobacco comment: smokes his pipe daily   Substance and Sexual Activity   • Alcohol use: No   • Drug use: No   • Sexual activity: Defer   Social History Narrative    Right hand dominant       Family History  He has no family history of bladder or kidney cancer  He has no family history of kidney stones      AUA Symptom Score:      Review of Systems  Review of Systems    Physical Exam  Physical Exam    Labs  Recent and today in the office:  Results for orders placed or performed in visit on 11/05/20   Basic Metabolic Panel    Specimen: Blood   Result Value Ref Range    Glucose 108 (H) 65 - 99 mg/dL    BUN 16 8 - 23 mg/dL    Creatinine 0.83 0.76 - 1.27 mg/dL    eGFR Non African Am 89 >60 mL/min/1.73    eGFR African Am 108 >60 mL/min/1.73    BUN/Creatinine Ratio 19.3 7.0 - 25.0    Sodium 140 136 - 145 mmol/L    Potassium 3.7 3.5 - 5.2 mmol/L    Chloride 102 98 - 107 mmol/L    Total CO2 32.1 (H) 22.0 - 29.0 mmol/L    Calcium 9.2 8.6 - 10.5 mg/dL   POC Urinalysis Dipstick, Automated    Specimen: Urine   Result Value Ref Range    Color Yellow Yellow, Straw, Dark Yellow, Vibha    Clarity, UA Clear Clear    Specific Gravity  1.025 1.005 - 1.030    pH, Urine 6.0 5.0 - 8.0    Leukocytes Negative Negative    Nitrite, UA Negative Negative    Protein, POC Negative Negative mg/dL    Glucose, UA Negative Negative, 1000 mg/dL (3+) mg/dL    Ketones, UA Negative Negative    Urobilinogen, UA Normal Normal    Bilirubin Negative Negative    Blood, UA 3+ (A) Negative         Assessment & Plan  Prostate cancer: Stage D1.  Cancer seems to have likely spread to the seminal vesicle on the right side but there is no evidence of metastatic disease or bony metastasis.  At this point I recommended a program of active surveillance and he will return in 6 months for LINDSEY and PSA.  Currently his voiding symptoms are under good control on Flomax and Ditropan.  He is warned that he might need a catheter for a couple of days after his knee surgery.  Greater than 25 minutes is spent explaining the situation to the patient and his wife and he will return in 6 months for follow-up.

## 2020-11-21 ENCOUNTER — TELEPHONE (OUTPATIENT)
Dept: UROLOGY | Facility: CLINIC | Age: 80
End: 2020-11-21

## 2020-11-21 NOTE — TELEPHONE ENCOUNTER
In reviewing the record with this office note I noted I had dictated he had enlarged lymph nodes.  Actually this should have said he had slightly enlarged and asymmetrical seminal vesicles which could indicate local extension but not metastatic disease

## 2021-02-08 ENCOUNTER — HOSPITAL ENCOUNTER (OUTPATIENT)
Facility: HOSPITAL | Age: 81
Discharge: HOME | End: 2021-02-08
Payer: MEDICARE

## 2021-02-08 DIAGNOSIS — E11.9 TYPE 2 DIABETES MELLITUS WITHOUT COMPLICATION, WITHOUT LONG-TERM CURRENT USE OF INSULIN (HCC): ICD-10-CM

## 2021-02-08 DIAGNOSIS — E78.00 PURE HYPERCHOLESTEROLEMIA: ICD-10-CM

## 2021-02-08 DIAGNOSIS — E55.9 VITAMIN D DEFICIENCY: ICD-10-CM

## 2021-02-08 LAB
A/G RATIO: 2.2 (ref 0.8–2)
ALBUMIN SERPL-MCNC: 4.6 G/DL (ref 3.4–4.8)
ALP BLD-CCNC: 106 U/L (ref 25–100)
ALT SERPL-CCNC: 12 U/L (ref 4–36)
ANION GAP SERPL CALCULATED.3IONS-SCNC: 11 MMOL/L (ref 3–16)
AST SERPL-CCNC: 15 U/L (ref 8–33)
BILIRUB SERPL-MCNC: 0.6 MG/DL (ref 0.3–1.2)
BUN BLDV-MCNC: 19 MG/DL (ref 6–20)
CALCIUM SERPL-MCNC: 9.8 MG/DL (ref 8.5–10.5)
CHLORIDE BLD-SCNC: 107 MMOL/L (ref 98–107)
CHOLESTEROL, TOTAL: 162 MG/DL (ref 0–200)
CO2: 30 MMOL/L (ref 20–30)
CREAT SERPL-MCNC: 1 MG/DL (ref 0.4–1.2)
FOLATE: 11.93 NG/ML
GFR AFRICAN AMERICAN: >59
GFR NON-AFRICAN AMERICAN: >59
GLOBULIN: 2.1 G/DL
GLUCOSE BLD-MCNC: 167 MG/DL (ref 74–106)
HBA1C MFR BLD: 6.5 %
HCT VFR BLD CALC: 46.1 % (ref 40–54)
HDLC SERPL-MCNC: 56 MG/DL (ref 40–60)
HEMOGLOBIN: 15.3 G/DL (ref 13–18)
LDL CHOLESTEROL CALCULATED: 83 MG/DL
MCH RBC QN AUTO: 31.7 PG (ref 27–32)
MCHC RBC AUTO-ENTMCNC: 33.2 G/DL (ref 31–35)
MCV RBC AUTO: 95.6 FL (ref 80–100)
PDW BLD-RTO: 12.9 % (ref 11–16)
PLATELET # BLD: 144 K/UL (ref 150–400)
PMV BLD AUTO: 11.6 FL (ref 6–10)
POTASSIUM SERPL-SCNC: 4.2 MMOL/L (ref 3.4–5.1)
RBC # BLD: 4.82 M/UL (ref 4.5–6)
SODIUM BLD-SCNC: 148 MMOL/L (ref 136–145)
TOTAL PROTEIN: 6.7 G/DL (ref 6.4–8.3)
TRIGL SERPL-MCNC: 114 MG/DL (ref 0–249)
VITAMIN B-12: 1229 PG/ML (ref 211–911)
VITAMIN D 25-HYDROXY: 45.8 (ref 32–100)
VLDLC SERPL CALC-MCNC: 23 MG/DL
WBC # BLD: 5.4 K/UL (ref 4–11)

## 2021-02-08 PROCEDURE — 82607 VITAMIN B-12: CPT

## 2021-02-08 PROCEDURE — 83036 HEMOGLOBIN GLYCOSYLATED A1C: CPT

## 2021-02-08 PROCEDURE — 82306 VITAMIN D 25 HYDROXY: CPT

## 2021-02-08 PROCEDURE — 82746 ASSAY OF FOLIC ACID SERUM: CPT

## 2021-02-08 PROCEDURE — 85027 COMPLETE CBC AUTOMATED: CPT

## 2021-02-08 PROCEDURE — 80053 COMPREHEN METABOLIC PANEL: CPT

## 2021-02-08 PROCEDURE — 80061 LIPID PANEL: CPT

## 2021-03-15 DIAGNOSIS — I10 ESSENTIAL HYPERTENSION: ICD-10-CM

## 2021-03-15 RX ORDER — LOVASTATIN 40 MG/1
TABLET ORAL
Qty: 90 TABLET | Refills: 3 | Status: SHIPPED | OUTPATIENT
Start: 2021-03-15 | End: 2022-04-05

## 2021-04-05 NOTE — PATIENT INSTRUCTIONS
Addended by: SALOMÓN MILES on: 4/5/2021 11:22 AM     Modules accepted: Orders     · Keep a list of your medicines with you. List all of the prescription medicines, nonprescription medicines, supplements, natural remedies, and vitamins that you take. Tell your healthcare providers who treat you about all of the products you are taking. Your provider can provide you with a form to keep track of them. Just ask. · Follow the directions that come with your medicine, including information about food or alcohol. Make sure you know how and when to take your medicine. Do not take more or less than you are supposed to take. · Keep all medicines out of the reach of children. · Store medicines according to the directions on the label. · Monitor yourself. Learn to know how your body reacts to your new medicine and keep track of how it makes you feel before attempting (If your provider has allowed you to do so) to drive or go to work. · Seek emergency medical attention if you think you have used too much of this medicine. An overdose of any prescription medicine can be fatal. Overdose symptoms may include extreme drowsiness, muscle weakness, confusion, cold and clammy skin, pinpoint pupils, shallow breathing, slow heart rate, fainting, or coma. · Don't share prescription medicines with others, even when they seem to have the same symptoms. What may be good for you may be harmful to others. · If you are no longer taking a prescribed medication and you have pills left please take your pills out of their original containers. Mix crushed pills with an undesirable substance, such as cat litter or used coffee grounds. Put the mixture into a disposable container with a lid, such as an empty margarine tub, or into a sealable bag. Cover up or remove any of your personal information on the empty containers by covering it with black permanent marker or duct tape. Place the sealed container with the mixture, and the empty drug containers, in the trash.    · If you use a medication that is in the form of a patch, dispose of used patches by folding them in half so that the sticky sides meet, and then flushing them down a toilet. They should not be placed in the household trash where children or pets can find them. · If you have any questions, ask your provider or pharmacist for more information. · Be sure to keep all appointments for provider visits or tests. We are committed to providing you with the best care possible. In order to help us achieve these goals please remember to bring all medications, herbal products, and over the counter supplements with you to each visit. If your provider has ordered testing for you, please be sure to follow up with our office if you have not received results within 7 days after the testing took place. *If you receive a survey after visiting one of our offices, please take time to share your experience concerning your physician office visit. These surveys are confidential and no health information about you is shared. We are eager to improve for you and we are counting on your feedback to help make that happen. ips to Help You Stop Smoking       Cigarette smoking is a preventable cause of death in the [place]. If you have thought about quitting but haven't been able to, here are some reasons why you should and some ways to do it. Here's Why   Quitting smoking now can decrease your risk of getting smoking-related illnesses like:   Heart disease   Stroke   Several types of cancer, including:   Lung   Mouth   Esophagus   Larynx   Bladder   Pancreas   Kidney   Chronic lung diseases:   Bronchitis   Emphysema   Asthma   Cataracts   Macular degeneration   Thyroid conditions   Hearing loss   Erectile dysfunction   Dementia   Osteoporosis   Here's How   Once you've decided to quit smoking, set your target quit date a few weeks away.  In the time leading up to your quit day, try some of these ideas offered by the [address] [place] [address] [place] to help you successfully quit smoking. For the best results, work with your doctor. Together, you can test your lung function and compare the results to those of a nonsmoking person. The results can be given to you as your lung age. Finding out your lung age right after having the test done may help you to stop smoking. Your doctor can also discuss with you all of your options and refer you to smoking-cessation support groups. You may wish to use nicotine replacement (gum, patches, inhaler) or one of the prescription medications that have been shown to increase quit rates and prolong abstinence from smoking. But whatever you and your doctor decide on these matters, it will still be you who decides when an how to quit. Here are some techniques:   Switch Brands   Switch to a brand you find distasteful. Change to a brand that is low in tar and nicotine a couple of weeks before your target quit date. This will help change your smoking behavior. However, do not smoke more cigarettes, inhale them more often or more deeply, or place your fingertips over the holes in the filters. All of these actions will increase your nicotine intake, and the idea is to get your body used to functioning without nicotine. Cut Down the Number of Cigarettes You Smoke   Smoke only half of each cigarette. Each day, postpone the lighting of your first cigarette by one hour. Decide you'll only smoke during odd or even hours of the day. Decide beforehand how many cigarettes you'll smoke during the day. For each additional cigarette, give a dollar to your favorite aamir. Change your eating habits to help you cut down. For example, drink milk, which many people consider incompatible with smoking. End meals or snacks with something that won't lead to a cigarette. Reach for a glass of juice instead of a cigarette for a \"pick-me-up. \"   Remember: Cutting down can help you quit, but it's not a substitute for quitting.  If you're down to about seven cigarettes a day, it's time to set your target quit date, and get ready to stick to it. Don't Smoke \"Automatically\"   Smoke only those cigarettes you really want. Catch yourself before you light up a cigarette out of pure habit. Don't empty your ashtrays. This will remind you of how many cigarettes you've smoked each day, and the sight and the smell of stale cigarettes butts will be very unpleasant. Make yourself aware of each cigarette by using the opposite hand or putting cigarettes in an unfamiliar location or a different pocket to break the automatic reach. If you light up many times during the day without even thinking about it, try to look in a mirror each time you put a match to your cigarette. You may decide you don't need it. Make Smoking Inconvenient   Stop buying cigarettes by the carton. Wait until one pack is empty before you buy another. Stop carrying cigarettes with you at home or at work. Make them difficult to get to. Make Smoking Unpleasant   Smoke only under circumstances that aren't especially pleasurable for you. If you like to smoke with others, smoke alone. Turn your chair to an empty corner and focus only on the cigarette you are smoking and all its many negative effects. Collect all your cigarette butts in one large glass container as a visual reminder of the filth made by smoking. Just Before Quitting   Practice going without cigarettes. Don't think of never smoking again. Think of quitting in terms of one day at a time . Tell yourself you won't smoke today, and then don't. Clean your clothes to rid them of the cigarette smell, which can linger a long time. On the Day You Quit   Throw away all your cigarettes and matches. Hide your lighters and ashtrays. Visit the dentist and have your teeth cleaned to get rid of tobacco stains. Notice how nice they look and resolve to keep them that way.    Make a list of things you'd like to buy for yourself or someone else. Estimate the cost in terms of packs of cigarettes, and put the money aside to buy these presents. Keep very busy on the big day. Go to the movies, exercise, take long walks, or go bike riding. Remind your family and friends that this is your quit date, and ask them to help you over the rough spots of the first couple of days and weeks. Buy yourself a treat or do something special to celebrate. Telephone and Internet Support   Telephone, web-, and computer-based programs can offer you the support that you need to quit and to stay smoke-free. You can find many programs online, like the American [employer] Association's [place] from Smoking . Immediately After Quitting   Develop a clean, fresh, nonsmoking environment around yourselfat work and at home. Buy yourself flowersyou may be surprised how much you can enjoy their scent now. The first few days after you quit, spend as much free time as possible in places where smoking isn't allowed, such as [address] [place] [address], museums, theaters, department stores, and churches. Drink large quantities of water and fruit juice (but avoid sodas that contain caffeine). Try to avoid alcohol, coffee, and other beverages that you associate with cigarette smoking. Strike up conversation instead of a match for a cigarette. If you miss the sensation of having a cigarette in your hand, play with something elsea pencil, a paper clip, a marble. If you miss having something in your mouth, try toothpicks or a fake cigarette.

## 2021-04-21 ENCOUNTER — OFFICE VISIT (OUTPATIENT)
Dept: PRIMARY CARE CLINIC | Age: 81
End: 2021-04-21
Payer: MEDICARE

## 2021-04-21 VITALS
BODY MASS INDEX: 24.6 KG/M2 | OXYGEN SATURATION: 98 % | SYSTOLIC BLOOD PRESSURE: 126 MMHG | DIASTOLIC BLOOD PRESSURE: 72 MMHG | WEIGHT: 181.6 LBS | TEMPERATURE: 97.4 F | HEART RATE: 56 BPM | RESPIRATION RATE: 16 BRPM | HEIGHT: 72 IN

## 2021-04-21 DIAGNOSIS — I10 ESSENTIAL HYPERTENSION: Primary | ICD-10-CM

## 2021-04-21 DIAGNOSIS — C61 PROSTATE CANCER (HCC): ICD-10-CM

## 2021-04-21 DIAGNOSIS — Z13.29 THYROID DISORDER SCREEN: ICD-10-CM

## 2021-04-21 DIAGNOSIS — E11.9 TYPE 2 DIABETES MELLITUS WITHOUT COMPLICATION, WITHOUT LONG-TERM CURRENT USE OF INSULIN (HCC): ICD-10-CM

## 2021-04-21 DIAGNOSIS — G89.29 CHRONIC PAIN OF RIGHT KNEE: ICD-10-CM

## 2021-04-21 DIAGNOSIS — M25.561 CHRONIC PAIN OF RIGHT KNEE: ICD-10-CM

## 2021-04-21 DIAGNOSIS — E55.9 VITAMIN D DEFICIENCY: ICD-10-CM

## 2021-04-21 PROCEDURE — 99214 OFFICE O/P EST MOD 30 MIN: CPT | Performed by: NURSE PRACTITIONER

## 2021-04-21 ASSESSMENT — PATIENT HEALTH QUESTIONNAIRE - PHQ9
2. FEELING DOWN, DEPRESSED OR HOPELESS: 0
SUM OF ALL RESPONSES TO PHQ QUESTIONS 1-9: 0
SUM OF ALL RESPONSES TO PHQ9 QUESTIONS 1 & 2: 0
1. LITTLE INTEREST OR PLEASURE IN DOING THINGS: 0

## 2021-04-21 ASSESSMENT — ENCOUNTER SYMPTOMS
RESPIRATORY NEGATIVE: 1
GASTROINTESTINAL NEGATIVE: 1

## 2021-04-21 NOTE — PROGRESS NOTES
SUBJECTIVE:    Patient ID: Sanjuanita Lara is a 79 y.o. male. Chief Complaint   Patient presents with    Follow-up    Hypertension    Diabetes    Discuss Labs         HPI:  He has been going to Dr. Simin Marmolejo at the [facility]. He was suppose to have stem cell in his knee but they can not afford the stem cell because it is 5000 dollars. He is going back to Dr. Juliana Ledbetter about possible knee surgery. The therapist that has been seeing him thinks that he is developing Parkinson's. His wife thinks he has not changed. He does shuffle but he has knee pain and had a pelvic fracture. He really hasn't changed in several years. Dr. Rosas Crespo told him that he has prostate cancer but it is might not treat it right now. Might just watch it since it appears so slow growing. He has been taking his blood pressure and blood sugar medications. He is not having any problems. With it. Patient's medications,allergies, past medical, surgical, social and family histories were reviewed and updated as appropriate. .  Current Outpatient Medications on File Prior to Visit   Medication Sig Dispense Refill    metFORMIN (GLUCOPHAGE) 500 MG tablet TAKE ONE TABLET BY MOUTH EVERY DAY WITH BREAKFAST 90 tablet 3    lisinopril (PRINIVIL;ZESTRIL) 10 MG tablet TAKE ONE TABLET BY MOUTH EVERY DAY 90 tablet 3    tamsulosin (FLOMAX) 0.4 MG capsule TAKE ONE CAPSULE BY MOUTH EVERY DAY 90 capsule 3    Lancets MISC 1 each by Does not apply route 2 times daily Dx E11.9  each 5    Glucose Blood DISK 1 each by In Vitro route 3 times daily  each 3    blood glucose monitor strips Test 3 times a day & as needed for symptoms of irregular blood glucose. Dx E11. 9. Breeze 2 100 strip 3    Multiple Vitamins-Minerals (RA VISION-STONEY PRESERVE PO) Take by mouth 2 times daily Drops bid      aspirin 81 MG tablet Take 81 mg by mouth daily.  vitamin B-12 (CYANOCOBALAMIN) 1000 MCG tablet Take 1,000 mcg by mouth daily.       Vitamin D (CHOLECALCIFEROL) 1000 UNITS CAPS capsule Take 1,000 Units by mouth daily.  Ascorbic Acid (VITAMIN C) 500 MG tablet Take 1,000 mg by mouth daily.  lovastatin (MEVACOR) 40 MG tablet TAKE ONE TABLET BY MOUTH NIGHTLY 90 tablet 3     No current facility-administered medications on file prior to visit. Review of Systems   Constitutional: Negative. HENT: Negative. Eyes: Positive for discharge. Respiratory: Negative. Cardiovascular: Negative. Gastrointestinal: Negative. Genitourinary: Negative. Musculoskeletal: Positive for arthralgias (right knee pain) and gait problem. Skin: Negative. Psychiatric/Behavioral: Negative. OBJECTIVE:  /72 (Site: Left Upper Arm, Position: Sitting, Cuff Size: Medium Adult)   Pulse 56   Temp 97.4 °F (36.3 °C) (Temporal)   Resp 16   Ht 6' (1.829 m)   Wt 181 lb 9.6 oz (82.4 kg)   SpO2 98% Comment: room air  BMI 24.63 kg/m²    Physical Exam  Vitals and nursing note reviewed. Constitutional:       Appearance: He is well-developed. HENT:      Head: Normocephalic and atraumatic. Eyes:      Conjunctiva/sclera: Conjunctivae normal.      Pupils: Pupils are equal, round, and reactive to light. Neck:      Thyroid: No thyromegaly. Vascular: No JVD. Cardiovascular:      Rate and Rhythm: Normal rate and regular rhythm. Heart sounds: No murmur heard. No friction rub. No gallop. Pulmonary:      Effort: Pulmonary effort is normal. No respiratory distress. Breath sounds: Normal breath sounds. No wheezing or rales. Abdominal:      General: Bowel sounds are normal. There is no distension. Palpations: Abdomen is soft. Tenderness: There is no abdominal tenderness. There is no guarding. Musculoskeletal:      Cervical back: Normal range of motion and neck supple. Right knee: Swelling present. Decreased range of motion. Tenderness present over the medial joint line. Skin:     General: Skin is warm and dry. Findings: No rash. Neurological:      Mental Status: He is alert and oriented to person, place, and time. Gait: Gait abnormal.   Psychiatric:         Judgment: Judgment normal.         No results found for requested labs within last 30 days. Hemoglobin A1C (%)   Date Value   02/08/2021 6.5 (H)     Microalbumin, Random Urine (mg/dL)   Date Value   04/22/2019 <1.20     LDL Calculated (mg/dL)   Date Value   02/08/2021 83           Lab Results   Component Value Date    TSH 1.17 10/02/2017         ASSESSMENT/PLAN:     Delia Ornelas was seen today for follow-up, hypertension, diabetes and discuss labs. Diagnoses and all orders for this visit:    Essential hypertension  -     CBC; Future  BP is stable. I have advised him on low-sodium diet, exercise and weight control. I am going to continue current medication . Will monitor his renal function every few months, have advised him to check blood pressure frequently and to keep a record of this. Type 2 diabetes mellitus without complication, without long-term current use of insulin (HCC)  -     CBC; Future  -     Comprehensive Metabolic Panel; Future  -     Hemoglobin A1C; Future  Stable. I advised him regarding diabetic diet, exercise and weight control. Also, I advised him to stay on the current medication, monitor his fingerstick closely. I am going to check the A1c every few months. I will check microalbumin on a yearly basis. I have also advised him to have a yearly eye exam and to monitor his feet closely. Along with instruction of possible complications related to diabetes, even with good control. A1C will be checked  in few months. Lab Results   Component Value Date    LABA1C 6.5 (H) 02/08/2021       Prostate cancer [facility])  Follow with URology. Chronic pain of right knee  Follow with Orthopedist, fall precautions and continue brace. Thyroid disorder screen  -     TSH without Reflex;  Future    Vitamin D deficiency  -     Vitamin B12 & Folate;

## 2021-05-18 ENCOUNTER — PREP FOR SURGERY (OUTPATIENT)
Dept: OTHER | Facility: HOSPITAL | Age: 81
End: 2021-05-18

## 2021-05-18 ENCOUNTER — OFFICE VISIT (OUTPATIENT)
Dept: ORTHOPEDIC SURGERY | Facility: CLINIC | Age: 81
End: 2021-05-18

## 2021-05-18 VITALS — BODY MASS INDEX: 24.24 KG/M2 | HEIGHT: 72 IN | RESPIRATION RATE: 18 BRPM | WEIGHT: 179 LBS

## 2021-05-18 DIAGNOSIS — M17.11 PRIMARY OSTEOARTHRITIS OF RIGHT KNEE: Primary | ICD-10-CM

## 2021-05-18 PROCEDURE — S0260 H&P FOR SURGERY: HCPCS | Performed by: PHYSICIAN ASSISTANT

## 2021-05-18 RX ORDER — CEFAZOLIN SODIUM 2 G/50ML
2 SOLUTION INTRAVENOUS
Status: CANCELLED | OUTPATIENT
Start: 2021-05-19 | End: 2021-05-20

## 2021-05-18 RX ORDER — TRANEXAMIC ACID 10 MG/ML
1000 INJECTION, SOLUTION INTRAVENOUS
Status: CANCELLED | OUTPATIENT
Start: 2021-05-19 | End: 2021-05-20

## 2021-05-18 RX ORDER — MELATONIN
1000 DAILY
COMMUNITY

## 2021-05-18 RX ORDER — OXYBUTYNIN CHLORIDE 5 MG/1
5 TABLET, EXTENDED RELEASE ORAL NIGHTLY
COMMUNITY
Start: 2021-04-26 | End: 2021-05-26 | Stop reason: HOSPADM

## 2021-05-18 NOTE — PROGRESS NOTES
"Ankur Camp is a 80 y.o. right hand dominant male   Pre-op Exam of the Right Knee (TKA scheduled 5/25/21, recent Hyalgan injections at arthritis center)          CHIEF COMPLAINT:    Pre op for right TKA    History of Present Illness      Patient presents for a preop right knee arthroplasty appointment.  He has been dealing with right knee pain for many years.  He states at times the right knee does \"give out\".  He currently takes Aleve and uses a knee brace to help with pain.  He has tried cortisone and Visco injections without improvement.  Patient informs me he did have a cardiac evaluation and received clearance at the [facility]-cardiologist.      PAST MEDICAL HISTORY:    Past Medical History:   Diagnosis Date   • Arthritis    • Diabetes mellitus (CMS/[facility])    • ED (erectile dysfunction)    • Enlarged prostate with lower urinary tract symptoms (LUTS)    • Full dentures    • History of fracture of pelvis 11/14/2019    after fall from ladder   • History of loss of consciousness 11/14/2019    after fall from ladder-flown to [facility]   • History of rib fracture 11/14/2019    after fall from ladder   • History of right shoulder fracture 1960    walking up hill, fell on gravel   • History of stress test    • History of torn meniscus of right knee 2012   • [place] (hard of hearing)     Bilateral hearing aids    • Hypertension    • Laceration of head 11/14/2019    after fall from ladder-treated at [facility]   • Left shoulder strain 2017    after fall from ladder   • Lung injury 11/14/2019    punctured right lung after falling off ladder   • Renal disorder    • Stone in kidney          Current Outpatient Medications:   •  cholecalciferol (VITAMIN D3) 25 MCG (1000 UT) tablet, Take 1,000 Units by mouth Daily., Disp: , Rfl:   •  ascorbic acid (VITAMIN C) 1000 MG tablet, Take 1,000 mg by mouth Daily., Disp: , Rfl:   •  aspirin 81 MG tablet, Take 81 mg by mouth Daily., Disp: , Rfl: "   •  glucose blood test strip, 1 each by In Vitro route daily Daily testing, DX:250.00, Disp: , Rfl:   •  lisinopril (PRINIVIL,ZESTRIL) 10 MG tablet, Take 10 mg by mouth Daily., Disp: , Rfl:   •  lovastatin (MEVACOR) 40 MG tablet, Take 40 mg by mouth Every Night., Disp: , Rfl:   •  metFORMIN (GLUCOPHAGE) 500 MG tablet, Take 500 mg by mouth Daily With Breakfast., Disp: , Rfl:   •  oxybutynin XL (DITROPAN-XL) 5 MG 24 hr tablet, Take 5 mg by mouth Every Night., Disp: , Rfl:   •  tamsulosin (FLOMAX) 0.4 MG capsule 24 hr capsule, Take 1 capsule by mouth Daily., Disp: , Rfl:   •  vitamin B-12 (CYANOCOBALAMIN) 1000 MCG tablet, Take 1,000 mcg by mouth Daily., Disp: , Rfl:     Past Surgical History:   Procedure Laterality Date   • CHEST TUBE INSERTION  11/2019    rib fractures, punctured lung after fall from [facility]   • COLONOSCOPY     • COLONOSCOPY N/A 4/8/2019    Procedure: COLONOSCOPY;  Surgeon: Onesimo Arnold MD;  Location: [facility] ENDOSCOPY;  Service: Gastroenterology   • EYE SURGERY Right     cataract removal with IOL implant   • HERNIA REPAIR Right     multiple inguinal hernia repairs   • KNEE ARTHROSCOPY W/ MENISCECTOMY Right 05/10/2012    Partial medial and lateral menisectomy-Conrad Baird MD   • NEPHRECTOMY Right     partial right nephrectomy due to kidney stone   • PELVIC FRACTURE SURGERY Right 11/2019    multiple pelvic fractures after fall from [facility]   • SHOULDER SURGERY Right 1960    fracture surgery to remove a piece of bone- [place], [place]       Family History   Problem Relation Age of Onset   • Breast cancer Mother    • Cancer Sister    • Cancer Brother    • Cancer Sister    • Dementia Brother    • No Known Problems Brother    • No Known Problems Brother        Social History     Socioeconomic History   • Marital status:      Spouse name: Not on file   • Number of children: Not on file   • Years of education: Not on file   • Highest education level:  "Not on file   Tobacco Use   • Smoking status: Current Every Day Smoker     Years: 20.00     Types: Pipe   • Smokeless tobacco: Never Used   • Tobacco comment: smokes his pipe daily   Substance and Sexual Activity   • Alcohol use: No   • Drug use: No   • Sexual activity: Defer        No Known Allergies    Review of Systems   Constitutional: Negative for diaphoresis, fever and unexpected weight change.   HENT: Negative for dental problem and sore throat.    Eyes: Negative for visual disturbance.   Respiratory: Negative for shortness of breath.    Cardiovascular: Negative for chest pain.   Gastrointestinal: Negative for abdominal pain, constipation, diarrhea, nausea and vomiting.   Genitourinary: Negative for difficulty urinating and frequency.   Musculoskeletal: Positive for arthralgias (right knee) and gait problem.   Neurological: Negative for headaches.   Hematological: Does not bruise/bleed easily.       I have reviewed the medical and surgical history, family history, social history, medications, and/or allergies, and the review of systems of this report.    PHYSICAL EXAMINATION:       Resp 18   Ht 182.9 cm (72\")   Wt 81.2 kg (179 lb)   BMI 24.28 kg/m²     GENERAL [x] Well developed  []Ill appearing [x] No acute distress    HEENT [x]No acute changes [x] Normocephalic, atraumatic    NECK [x]Supple  [] No midline tenderness    LUNGS [x]Clear bilaterally [x]No wheezes []Rhonchi [] Rales    HEART [x] Regular rate  [x] Regular rhythm [] Irregular    ABDOMEN [x] Soft [x] Not tender [x] Not distended [x] Normal sounds    VAS/EXT [x] Normal Pulses []Edema []Cyanosis             SKIN [x] Warm [x]Dry []Pink []Ecchymosis []Cool    NEURO [x] Sensation Intact [x] Motor Intact [x] Pulse intact  [] Dysesthesias:_________  []Weakness:__________    MUSCULOSKELETAL []          Physical Exam  Right Knee Exam     Range of Motion   Extension: 10   Flexion: 80     Other   Erythema: absent  Sensation: normal  Pulse: " present          Extremity DVT signs are negative on physical exam with negative Sadia sign, no calf pain, no palpable cords and no skin tone change   Neurologic Exam        DVT Screening: No Yes   Smoker [x] []   Personal/Family History of DVT or PE [x] []   Sedentary Lifestyle [x] []   BMI >30 [x] []      Tranexamic acid TXA criteria for usage during arthroplasty surgery.    Previous or Active DVT/PE: NO  Cardiac Stent within 1 year: NO  Embolic/Ischemic stroke within 1 year: NO  Creatinine less than 30ml/min: NO  Congenital Thrombophilia: NO  Hypersensitivity to TXA: NO  Color Blindness: NO  NOTE:  TXA  tranexemic acid summary: THIS PATIENT IS A CANDIDATE FOR IV TXA DURING SURGERY.     Independent Review of Radiographic Studies:    No new imaging done today.   Prior right knee weightbearing films reveal severe medial bone-on-bone interaction with patellofemoral degenerative spurring.  Laboratory and Other Studies:  No new results reviewed today.   Medical Decision Making:    Stable neurovascular exam.          Diagnoses and all orders for this visit:    1. Primary osteoarthritis of right knee (Primary)         *SPECIAL INSTRUCTIONS:  Multi-modal analgesia.  Tranexamic acid IV protocol (see screening parameters this report).  Multi-modal DVT prophylaxis.  Rehabilitation PT/OT protocol with same day walker ambulation with therapist.      Risks, benefits, and alternative treatments discussed with the patient: [x] Yes [] No    Risk benefits and merits of the proposed surgery were discussed and the patient's questions were answered risks discussed including and not limited to:  Anesthesia reactions  Blood loss and possible transfusion  Infection  Deep venous thrombosis and pulmonary embolus  Nerve, vascular or tendon injury  Fracture  Deformity  Stiffness  Weakness  Prosthesis and implant issues such as loosening, material wear or dislocation  Skin necrosis  Revision surgery or further treatment  Recurrence of problem  and condition     Informed consent: [] Signed  [x] To be obtained at hospital  [] Both    Recommendations/Plan:  Discussion of orthopaedic goals and activities and patient and/or guardian expressed appreciation.  Risk, benefits, and merits of treatment alternatives reviewed with the patient and/or guardian and questions answered  Regular exercise as tolerated  Guided on proper techniques for mobility, strength, agility and/or conditioning exercises  The nature of the proposed surgery reviewed with the patient including risks, benefits, rehabilitation, recovery timeframe, and outcome expectations        PLANNED SURGICAL PROCEDURE: Right tka    Patient is encouraged and agreeable to call or return sooner for any issues or concerns.

## 2021-05-19 PROBLEM — M17.11 PRIMARY OSTEOARTHRITIS OF RIGHT KNEE: Status: ACTIVE | Noted: 2021-05-19

## 2021-05-21 ENCOUNTER — HOSPITAL ENCOUNTER (OUTPATIENT)
Dept: GENERAL RADIOLOGY | Facility: HOSPITAL | Age: 81
Discharge: HOME | End: 2021-05-21

## 2021-05-21 ENCOUNTER — PRE-ADMISSION TESTING (OUTPATIENT)
Dept: PREADMISSION TESTING | Facility: HOSPITAL | Age: 81
End: 2021-05-21

## 2021-05-21 VITALS — WEIGHT: 179 LBS | BODY MASS INDEX: 24.24 KG/M2 | HEIGHT: 72 IN

## 2021-05-21 DIAGNOSIS — M17.11 PRIMARY OSTEOARTHRITIS OF RIGHT KNEE: ICD-10-CM

## 2021-05-21 LAB
ALBUMIN SERPL-MCNC: 3.9 G/DL (ref 3.5–5.2)
ALBUMIN/GLOB SERPL: 1.7 G/DL
ALP SERPL-CCNC: 91 U/L (ref 39–117)
ALT SERPL W P-5'-P-CCNC: 11 U/L (ref 1–41)
ANION GAP SERPL CALCULATED.3IONS-SCNC: 9 MMOL/L (ref 5–15)
APTT PPP: 30.9 SECONDS (ref 24.5–37.2)
AST SERPL-CCNC: 16 U/L (ref 1–40)
BACTERIA UR QL AUTO: ABNORMAL /HPF
BASOPHILS # BLD AUTO: 0.03 10*3/MM3 (ref 0–0.2)
BASOPHILS NFR BLD AUTO: 0.6 % (ref 0–1.5)
BILIRUB SERPL-MCNC: 0.5 MG/DL (ref 0–1.2)
BILIRUB UR QL STRIP: NEGATIVE
BUN SERPL-MCNC: 19 MG/DL (ref 8–23)
BUN/CREAT SERPL: 19.4 (ref 7–25)
CALCIUM SPEC-SCNC: 9.3 MG/DL (ref 8.6–10.5)
CHLORIDE SERPL-SCNC: 108 MMOL/L (ref 98–107)
CLARITY UR: CLEAR
CO2 SERPL-SCNC: 27 MMOL/L (ref 22–29)
COD CRY URNS QL: ABNORMAL /HPF
COLOR UR: ABNORMAL
CREAT SERPL-MCNC: 0.98 MG/DL (ref 0.76–1.27)
DEPRECATED RDW RBC AUTO: 45.1 FL (ref 37–54)
EOSINOPHIL # BLD AUTO: 0.1 10*3/MM3 (ref 0–0.4)
EOSINOPHIL NFR BLD AUTO: 1.8 % (ref 0.3–6.2)
ERYTHROCYTE [DISTWIDTH] IN BLOOD BY AUTOMATED COUNT: 13.1 % (ref 12.3–15.4)
GFR SERPL CREATININE-BSD FRML MDRD: 74 ML/MIN/1.73
GLOBULIN UR ELPH-MCNC: 2.3 GM/DL
GLUCOSE SERPL-MCNC: 147 MG/DL (ref 65–99)
GLUCOSE UR STRIP-MCNC: NEGATIVE MG/DL
HBA1C MFR BLD: 6.8 % (ref 4.8–5.6)
HCT VFR BLD AUTO: 43.5 % (ref 37.5–51)
HGB BLD-MCNC: 14.7 G/DL (ref 13–17.7)
HGB UR QL STRIP.AUTO: NEGATIVE
HYALINE CASTS UR QL AUTO: ABNORMAL /LPF
IMM GRANULOCYTES # BLD AUTO: 0.01 10*3/MM3 (ref 0–0.05)
IMM GRANULOCYTES NFR BLD AUTO: 0.2 % (ref 0–0.5)
INR PPP: 1.1 (ref 0.9–1.1)
KETONES UR QL STRIP: ABNORMAL
LEUKOCYTE ESTERASE UR QL STRIP.AUTO: ABNORMAL
LYMPHOCYTES # BLD AUTO: 1.2 10*3/MM3 (ref 0.7–3.1)
LYMPHOCYTES NFR BLD AUTO: 22.2 % (ref 19.6–45.3)
MCH RBC QN AUTO: 31.8 PG (ref 26.6–33)
MCHC RBC AUTO-ENTMCNC: 33.8 G/DL (ref 31.5–35.7)
MCV RBC AUTO: 94.2 FL (ref 79–97)
MONOCYTES # BLD AUTO: 0.51 10*3/MM3 (ref 0.1–0.9)
MONOCYTES NFR BLD AUTO: 9.4 % (ref 5–12)
NEUTROPHILS NFR BLD AUTO: 3.56 10*3/MM3 (ref 1.7–7)
NEUTROPHILS NFR BLD AUTO: 65.8 % (ref 42.7–76)
NITRITE UR QL STRIP: NEGATIVE
NRBC BLD AUTO-RTO: 0 /100 WBC (ref 0–0.2)
PH UR STRIP.AUTO: 5.5 [PH] (ref 5–8)
PLATELET # BLD AUTO: 142 10*3/MM3 (ref 140–450)
PMV BLD AUTO: 10.9 FL (ref 6–12)
POTASSIUM SERPL-SCNC: 4 MMOL/L (ref 3.5–5.2)
PROT SERPL-MCNC: 6.2 G/DL (ref 6–8.5)
PROT UR QL STRIP: ABNORMAL
PROTHROMBIN TIME: 14.7 SECONDS (ref 12–15.1)
RBC # BLD AUTO: 4.62 10*6/MM3 (ref 4.14–5.8)
RBC # UR: ABNORMAL /HPF
REF LAB TEST METHOD: ABNORMAL
SODIUM SERPL-SCNC: 144 MMOL/L (ref 136–145)
SP GR UR STRIP: >=1.03 (ref 1–1.03)
SQUAMOUS #/AREA URNS HPF: ABNORMAL /HPF
UROBILINOGEN UR QL STRIP: ABNORMAL
WBC # BLD AUTO: 5.41 10*3/MM3 (ref 3.4–10.8)
WBC UR QL AUTO: ABNORMAL /HPF

## 2021-05-21 PROCEDURE — 93010 ELECTROCARDIOGRAM REPORT: CPT | Performed by: INTERNAL MEDICINE

## 2021-05-21 PROCEDURE — U0004 COV-19 TEST NON-CDC HGH THRU: HCPCS

## 2021-05-21 PROCEDURE — C9803 HOPD COVID-19 SPEC COLLECT: HCPCS

## 2021-05-21 PROCEDURE — 80053 COMPREHEN METABOLIC PANEL: CPT

## 2021-05-21 PROCEDURE — U0005 INFEC AGEN DETEC AMPLI PROBE: HCPCS

## 2021-05-21 PROCEDURE — 85610 PROTHROMBIN TIME: CPT

## 2021-05-21 PROCEDURE — 87081 CULTURE SCREEN ONLY: CPT

## 2021-05-21 PROCEDURE — 86900 BLOOD TYPING SEROLOGIC ABO: CPT

## 2021-05-21 PROCEDURE — 81001 URINALYSIS AUTO W/SCOPE: CPT

## 2021-05-21 PROCEDURE — 71046 X-RAY EXAM CHEST 2 VIEWS: CPT

## 2021-05-21 PROCEDURE — 85730 THROMBOPLASTIN TIME PARTIAL: CPT

## 2021-05-21 PROCEDURE — 83036 HEMOGLOBIN GLYCOSYLATED A1C: CPT

## 2021-05-21 PROCEDURE — 85025 COMPLETE CBC W/AUTO DIFF WBC: CPT

## 2021-05-21 PROCEDURE — 86901 BLOOD TYPING SEROLOGIC RH(D): CPT

## 2021-05-21 PROCEDURE — 36415 COLL VENOUS BLD VENIPUNCTURE: CPT

## 2021-05-21 PROCEDURE — 93005 ELECTROCARDIOGRAM TRACING: CPT

## 2021-05-21 RX ORDER — NAPROXEN 250 MG/1
250 TABLET ORAL 2 TIMES DAILY PRN
COMMUNITY
End: 2021-05-26 | Stop reason: HOSPADM

## 2021-05-21 ASSESSMENT — KOOS JR
KOOS JR SCORE: 14
KOOS JR SCORE: 52.465

## 2021-05-22 LAB
MRSA SPEC QL CULT: NORMAL
SARS-COV-2 RNA NOSE QL NAA+PROBE: NOT DETECTED

## 2021-05-24 ENCOUNTER — TELEPHONE (OUTPATIENT)
Dept: ORTHOPEDIC SURGERY | Facility: CLINIC | Age: 81
End: 2021-05-24

## 2021-05-24 LAB
QT INTERVAL: 440 MS
QTC INTERVAL: 428 MS

## 2021-05-24 RX ORDER — CEFDINIR 300 MG/1
300 CAPSULE ORAL 2 TIMES DAILY
Qty: 14 CAPSULE | Refills: 0 | Status: SHIPPED | OUTPATIENT
Start: 2021-05-24 | End: 2021-06-02 | Stop reason: SDUPTHER

## 2021-05-24 NOTE — PROGRESS NOTES
Can you let him know he has a mild UTI.  We would need to call him in Omnicef 300 mg twice daily? I will send it in

## 2021-05-24 NOTE — TELEPHONE ENCOUNTER
Caller: HARIKA OLSON    Relationship: WIFE    Best call back number: [phone]    What is the best time to reach you: ANYTIME TODAY    Who are you requesting to speak with (clinical staff, provider,  specific staff member): CLINICAL / SX SCHEDULE    Do you know the name of the person who called: N/A    What was the call regarding: CHANGING SURGERY TIME ON 5-25-21    Do you require a callback: YES

## 2021-05-25 ENCOUNTER — APPOINTMENT (OUTPATIENT)
Dept: ULTRASOUND IMAGING | Facility: HOSPITAL | Age: 81
End: 2021-05-25

## 2021-05-25 ENCOUNTER — ANESTHESIA (OUTPATIENT)
Dept: PERIOP | Facility: HOSPITAL | Age: 81
End: 2021-05-25

## 2021-05-25 ENCOUNTER — HOSPITAL ENCOUNTER (OUTPATIENT)
Facility: HOSPITAL | Age: 81
Discharge: HOME HEALTH | End: 2021-05-26
Attending: ORTHOPAEDIC SURGERY | Admitting: ORTHOPAEDIC SURGERY

## 2021-05-25 ENCOUNTER — APPOINTMENT (OUTPATIENT)
Dept: GENERAL RADIOLOGY | Facility: HOSPITAL | Age: 81
End: 2021-05-25

## 2021-05-25 ENCOUNTER — ANESTHESIA EVENT (OUTPATIENT)
Dept: PERIOP | Facility: HOSPITAL | Age: 81
End: 2021-05-25

## 2021-05-25 DIAGNOSIS — N40.0 BENIGN PROSTATIC HYPERPLASIA WITHOUT URINARY OBSTRUCTION: ICD-10-CM

## 2021-05-25 DIAGNOSIS — M17.11 PRIMARY OSTEOARTHRITIS OF RIGHT KNEE: ICD-10-CM

## 2021-05-25 DIAGNOSIS — Z96.651 STATUS POST TOTAL KNEE REPLACEMENT USING CEMENT, RIGHT: Primary | ICD-10-CM

## 2021-05-25 LAB
ABO GROUP BLD: NORMAL
ABO GROUP BLD: NORMAL
BILIRUB UR QL STRIP: NEGATIVE
BLD GP AB SCN SERPL QL: NEGATIVE
CLARITY UR: CLEAR
COLOR UR: YELLOW
GLUCOSE BLDC GLUCOMTR-MCNC: 150 MG/DL (ref 70–130)
GLUCOSE UR STRIP-MCNC: NEGATIVE MG/DL
HGB UR QL STRIP.AUTO: NEGATIVE
KETONES UR QL STRIP: NEGATIVE
LEUKOCYTE ESTERASE UR QL STRIP.AUTO: NEGATIVE
NITRITE UR QL STRIP: NEGATIVE
PH UR STRIP.AUTO: 6.5 [PH] (ref 5–8)
PROT UR QL STRIP: NEGATIVE
RH BLD: POSITIVE
RH BLD: POSITIVE
SP GR UR STRIP: 1.02 (ref 1–1.03)
T&S EXPIRATION DATE: NORMAL
UROBILINOGEN UR QL STRIP: NORMAL

## 2021-05-25 PROCEDURE — C1776 JOINT DEVICE (IMPLANTABLE): HCPCS | Performed by: ORTHOPAEDIC SURGERY

## 2021-05-25 PROCEDURE — A9270 NON-COVERED ITEM OR SERVICE: HCPCS | Performed by: ORTHOPAEDIC SURGERY

## 2021-05-25 PROCEDURE — G0378 HOSPITAL OBSERVATION PER HR: HCPCS

## 2021-05-25 PROCEDURE — 63710000001 OXYBUTYNIN XL 5 MG TABLET SUSTAINED-RELEASE 24 HOUR: Performed by: ORTHOPAEDIC SURGERY

## 2021-05-25 PROCEDURE — 25010000002 MORPHINE PER 10 MG: Performed by: ORTHOPAEDIC SURGERY

## 2021-05-25 PROCEDURE — 25010000003 CEFAZOLIN SODIUM-DEXTROSE 2-3 GM-%(50ML) RECONSTITUTED SOLUTION: Performed by: PHYSICIAN ASSISTANT

## 2021-05-25 PROCEDURE — 63710000001 ASCORBIC ACID 500 MG TABLET: Performed by: ORTHOPAEDIC SURGERY

## 2021-05-25 PROCEDURE — 63710000001 METFORMIN 500 MG TABLET: Performed by: ORTHOPAEDIC SURGERY

## 2021-05-25 PROCEDURE — 73560 X-RAY EXAM OF KNEE 1 OR 2: CPT

## 2021-05-25 PROCEDURE — 97165 OT EVAL LOW COMPLEX 30 MIN: CPT

## 2021-05-25 PROCEDURE — 63710000001 HYDROCODONE-ACETAMINOPHEN 7.5-325 MG TABLET: Performed by: ORTHOPAEDIC SURGERY

## 2021-05-25 PROCEDURE — 25010000002 ONDANSETRON PER 1 MG: Performed by: NURSE ANESTHETIST, CERTIFIED REGISTERED

## 2021-05-25 PROCEDURE — 25010000002 ROPIVACAINE PER 1 MG: Performed by: ORTHOPAEDIC SURGERY

## 2021-05-25 PROCEDURE — 63710000001 TAMSULOSIN 0.4 MG CAPSULE: Performed by: ORTHOPAEDIC SURGERY

## 2021-05-25 PROCEDURE — P9612 CATHETERIZE FOR URINE SPEC: HCPCS

## 2021-05-25 PROCEDURE — 86850 RBC ANTIBODY SCREEN: CPT | Performed by: PHYSICIAN ASSISTANT

## 2021-05-25 PROCEDURE — 63710000001 DOCUSATE SODIUM 100 MG CAPSULE: Performed by: ORTHOPAEDIC SURGERY

## 2021-05-25 PROCEDURE — C1713 ANCHOR/SCREW BN/BN,TIS/BN: HCPCS | Performed by: ORTHOPAEDIC SURGERY

## 2021-05-25 PROCEDURE — 25010000003 CEFAZOLIN PER 500 MG: Performed by: ORTHOPAEDIC SURGERY

## 2021-05-25 PROCEDURE — 86901 BLOOD TYPING SEROLOGIC RH(D): CPT | Performed by: PHYSICIAN ASSISTANT

## 2021-05-25 PROCEDURE — 97161 PT EVAL LOW COMPLEX 20 MIN: CPT

## 2021-05-25 PROCEDURE — 82962 GLUCOSE BLOOD TEST: CPT

## 2021-05-25 PROCEDURE — 81003 URINALYSIS AUTO W/O SCOPE: CPT | Performed by: ORTHOPAEDIC SURGERY

## 2021-05-25 PROCEDURE — 63710000001 ATORVASTATIN 10 MG TABLET: Performed by: ORTHOPAEDIC SURGERY

## 2021-05-25 PROCEDURE — 25010000002 KETOROLAC TROMETHAMINE PER 15 MG: Performed by: ORTHOPAEDIC SURGERY

## 2021-05-25 PROCEDURE — 27447 TOTAL KNEE ARTHROPLASTY: CPT | Performed by: ORTHOPAEDIC SURGERY

## 2021-05-25 PROCEDURE — 63710000001 CHOLECALCIFEROL 25 MCG (1000 UT) TABLET: Performed by: ORTHOPAEDIC SURGERY

## 2021-05-25 PROCEDURE — 25010000002 PROPOFOL 200 MG/20ML EMULSION: Performed by: NURSE ANESTHETIST, CERTIFIED REGISTERED

## 2021-05-25 PROCEDURE — 63710000001 LISINOPRIL 20 MG TABLET: Performed by: ORTHOPAEDIC SURGERY

## 2021-05-25 PROCEDURE — 25010000003 CEFAZOLIN SODIUM-DEXTROSE 1-4 GM-%(50ML) RECONSTITUTED SOLUTION: Performed by: ORTHOPAEDIC SURGERY

## 2021-05-25 PROCEDURE — 25010000002 ONDANSETRON PER 1 MG: Performed by: ORTHOPAEDIC SURGERY

## 2021-05-25 PROCEDURE — 86900 BLOOD TYPING SEROLOGIC ABO: CPT | Performed by: PHYSICIAN ASSISTANT

## 2021-05-25 PROCEDURE — 63710000001 ASPIRIN 81 MG TABLET DELAYED-RELEASE: Performed by: ORTHOPAEDIC SURGERY

## 2021-05-25 PROCEDURE — 25010000002 DEXAMETHASONE PER 1 MG: Performed by: NURSE ANESTHETIST, CERTIFIED REGISTERED

## 2021-05-25 DEVICE — TRY TIB CRUC 75MM: Type: IMPLANTABLE DEVICE | Site: KNEE | Status: FUNCTIONAL

## 2021-05-25 DEVICE — CMT BONE SIMPLEX/P FULL DOSE 10/PK: Type: IMPLANTABLE DEVICE | Site: KNEE | Status: FUNCTIONAL

## 2021-05-25 DEVICE — CAP TOTL KN CMT PRIMARY: Type: IMPLANTABLE DEVICE | Site: KNEE | Status: FUNCTIONAL

## 2021-05-25 DEVICE — PAT 3PEG STD 8X31MM: Type: IMPLANTABLE DEVICE | Site: KNEE | Status: FUNCTIONAL

## 2021-05-25 DEVICE — BEAR TIB/KN VANGUARD AS 14X75MM NS: Type: IMPLANTABLE DEVICE | Site: KNEE | Status: FUNCTIONAL

## 2021-05-25 DEVICE — COMP FEM/KN VANGUARD INTLK CR 67.5MM NS RT: Type: IMPLANTABLE DEVICE | Site: KNEE | Status: FUNCTIONAL

## 2021-05-25 RX ORDER — NALOXONE HCL 0.4 MG/ML
0.1 VIAL (ML) INJECTION
Status: DISCONTINUED | OUTPATIENT
Start: 2021-05-25 | End: 2021-05-26 | Stop reason: HOSPADM

## 2021-05-25 RX ORDER — BUPIVACAINE HYDROCHLORIDE 5 MG/ML
INJECTION, SOLUTION EPIDURAL; INTRACAUDAL
Status: COMPLETED | OUTPATIENT
Start: 2021-05-25 | End: 2021-05-25

## 2021-05-25 RX ORDER — CEFAZOLIN SODIUM 2 G/50ML
2 SOLUTION INTRAVENOUS
Status: COMPLETED | OUTPATIENT
Start: 2021-05-25 | End: 2021-05-25

## 2021-05-25 RX ORDER — ROCURONIUM BROMIDE 10 MG/ML
INJECTION, SOLUTION INTRAVENOUS AS NEEDED
Status: DISCONTINUED | OUTPATIENT
Start: 2021-05-25 | End: 2021-05-25 | Stop reason: SURG

## 2021-05-25 RX ORDER — LIDOCAINE HYDROCHLORIDE 20 MG/ML
INJECTION, SOLUTION INTRAVENOUS AS NEEDED
Status: DISCONTINUED | OUTPATIENT
Start: 2021-05-25 | End: 2021-05-25 | Stop reason: SURG

## 2021-05-25 RX ORDER — PROPOFOL 10 MG/ML
INJECTION, EMULSION INTRAVENOUS AS NEEDED
Status: DISCONTINUED | OUTPATIENT
Start: 2021-05-25 | End: 2021-05-25 | Stop reason: SURG

## 2021-05-25 RX ORDER — SODIUM CHLORIDE, SODIUM LACTATE, POTASSIUM CHLORIDE, CALCIUM CHLORIDE 600; 310; 30; 20 MG/100ML; MG/100ML; MG/100ML; MG/100ML
1000 INJECTION, SOLUTION INTRAVENOUS CONTINUOUS
Status: DISCONTINUED | OUTPATIENT
Start: 2021-05-25 | End: 2021-05-25

## 2021-05-25 RX ORDER — OXYBUTYNIN CHLORIDE 5 MG/1
5 TABLET, EXTENDED RELEASE ORAL NIGHTLY
Status: DISCONTINUED | OUTPATIENT
Start: 2021-05-25 | End: 2021-05-26 | Stop reason: HOSPADM

## 2021-05-25 RX ORDER — ONDANSETRON 2 MG/ML
INJECTION INTRAMUSCULAR; INTRAVENOUS AS NEEDED
Status: DISCONTINUED | OUTPATIENT
Start: 2021-05-25 | End: 2021-05-25 | Stop reason: SURG

## 2021-05-25 RX ORDER — HYDROCODONE BITARTRATE AND ACETAMINOPHEN 7.5; 325 MG/1; MG/1
1 TABLET ORAL EVERY 6 HOURS PRN
Status: DISCONTINUED | OUTPATIENT
Start: 2021-05-25 | End: 2021-05-26 | Stop reason: HOSPADM

## 2021-05-25 RX ORDER — MAGNESIUM HYDROXIDE 1200 MG/15ML
LIQUID ORAL AS NEEDED
Status: DISCONTINUED | OUTPATIENT
Start: 2021-05-25 | End: 2021-05-25 | Stop reason: HOSPADM

## 2021-05-25 RX ORDER — SODIUM CHLORIDE 0.9 % (FLUSH) 0.9 %
1-10 SYRINGE (ML) INJECTION AS NEEDED
Status: DISCONTINUED | OUTPATIENT
Start: 2021-05-25 | End: 2021-05-26 | Stop reason: HOSPADM

## 2021-05-25 RX ORDER — ONDANSETRON 2 MG/ML
4 INJECTION INTRAMUSCULAR; INTRAVENOUS EVERY 6 HOURS PRN
Status: DISCONTINUED | OUTPATIENT
Start: 2021-05-25 | End: 2021-05-26 | Stop reason: HOSPADM

## 2021-05-25 RX ORDER — SODIUM CHLORIDE 0.9 % (FLUSH) 0.9 %
10 SYRINGE (ML) INJECTION AS NEEDED
Status: DISCONTINUED | OUTPATIENT
Start: 2021-05-25 | End: 2021-05-25 | Stop reason: HOSPADM

## 2021-05-25 RX ORDER — BUPIVACAINE HCL/0.9 % NACL/PF 0.125 %
4-14 PREFILLED PUMP RESERVOIR EPIDURAL CONTINUOUS
Status: DISCONTINUED | OUTPATIENT
Start: 2021-05-25 | End: 2021-05-26 | Stop reason: HOSPADM

## 2021-05-25 RX ORDER — DOCUSATE SODIUM 100 MG/1
100 CAPSULE, LIQUID FILLED ORAL 2 TIMES DAILY
Status: DISCONTINUED | OUTPATIENT
Start: 2021-05-25 | End: 2021-05-26 | Stop reason: HOSPADM

## 2021-05-25 RX ORDER — TAMSULOSIN HYDROCHLORIDE 0.4 MG/1
0.4 CAPSULE ORAL DAILY
Status: DISCONTINUED | OUTPATIENT
Start: 2021-05-25 | End: 2021-05-26 | Stop reason: HOSPADM

## 2021-05-25 RX ORDER — SODIUM CHLORIDE, SODIUM LACTATE, POTASSIUM CHLORIDE, CALCIUM CHLORIDE 600; 310; 30; 20 MG/100ML; MG/100ML; MG/100ML; MG/100ML
100 INJECTION, SOLUTION INTRAVENOUS CONTINUOUS
Status: DISCONTINUED | OUTPATIENT
Start: 2021-05-25 | End: 2021-05-26 | Stop reason: HOSPADM

## 2021-05-25 RX ORDER — LISINOPRIL 10 MG/1
10 TABLET ORAL DAILY
Status: DISCONTINUED | OUTPATIENT
Start: 2021-05-25 | End: 2021-05-26 | Stop reason: HOSPADM

## 2021-05-25 RX ORDER — TRANEXAMIC ACID 10 MG/ML
1000 INJECTION, SOLUTION INTRAVENOUS
Status: COMPLETED | OUTPATIENT
Start: 2021-05-25 | End: 2021-05-25

## 2021-05-25 RX ORDER — MELATONIN
1000 DAILY
Status: DISCONTINUED | OUTPATIENT
Start: 2021-05-25 | End: 2021-05-26 | Stop reason: HOSPADM

## 2021-05-25 RX ORDER — CEFAZOLIN SODIUM 1 G/50ML
1 SOLUTION INTRAVENOUS EVERY 8 HOURS
Status: COMPLETED | OUTPATIENT
Start: 2021-05-25 | End: 2021-05-26

## 2021-05-25 RX ORDER — ONDANSETRON 4 MG/1
4 TABLET, FILM COATED ORAL EVERY 6 HOURS PRN
Status: DISCONTINUED | OUTPATIENT
Start: 2021-05-25 | End: 2021-05-26 | Stop reason: HOSPADM

## 2021-05-25 RX ORDER — CEFDINIR 300 MG/1
300 CAPSULE ORAL EVERY 12 HOURS SCHEDULED
Status: DISCONTINUED | OUTPATIENT
Start: 2021-05-25 | End: 2021-05-26 | Stop reason: HOSPADM

## 2021-05-25 RX ORDER — ASPIRIN 81 MG/1
81 TABLET ORAL DAILY
Status: DISCONTINUED | OUTPATIENT
Start: 2021-05-25 | End: 2021-05-26 | Stop reason: HOSPADM

## 2021-05-25 RX ORDER — SODIUM CHLORIDE 0.9 % (FLUSH) 0.9 %
3 SYRINGE (ML) INJECTION EVERY 12 HOURS SCHEDULED
Status: DISCONTINUED | OUTPATIENT
Start: 2021-05-25 | End: 2021-05-26 | Stop reason: HOSPADM

## 2021-05-25 RX ORDER — DEXAMETHASONE SODIUM PHOSPHATE 4 MG/ML
INJECTION, SOLUTION INTRA-ARTICULAR; INTRALESIONAL; INTRAMUSCULAR; INTRAVENOUS; SOFT TISSUE AS NEEDED
Status: DISCONTINUED | OUTPATIENT
Start: 2021-05-25 | End: 2021-05-25 | Stop reason: SURG

## 2021-05-25 RX ORDER — ASCORBIC ACID 500 MG
1000 TABLET ORAL DAILY
Status: DISCONTINUED | OUTPATIENT
Start: 2021-05-25 | End: 2021-05-26 | Stop reason: HOSPADM

## 2021-05-25 RX ORDER — ATORVASTATIN CALCIUM 10 MG/1
10 TABLET, FILM COATED ORAL DAILY
Status: DISCONTINUED | OUTPATIENT
Start: 2021-05-25 | End: 2021-05-26 | Stop reason: HOSPADM

## 2021-05-25 RX ORDER — ASCORBIC ACID 500 MG
1000 TABLET ORAL DAILY
Status: DISCONTINUED | OUTPATIENT
Start: 2021-05-25 | End: 2021-05-25

## 2021-05-25 RX ADMIN — ROCURONIUM BROMIDE 50 MG: 10 INJECTION INTRAVENOUS at 08:21

## 2021-05-25 RX ADMIN — BUPIVACAINE HYDROCHLORIDE 28 ML: 5 INJECTION, SOLUTION EPIDURAL; INTRACAUDAL; PERINEURAL at 08:05

## 2021-05-25 RX ADMIN — DOCUSATE SODIUM 100 MG: 100 CAPSULE ORAL at 21:21

## 2021-05-25 RX ADMIN — ASPIRIN 81 MG: 81 TABLET, COATED ORAL at 18:59

## 2021-05-25 RX ADMIN — HYDROCODONE BITARTRATE AND ACETAMINOPHEN 1 TABLET: 7.5; 325 TABLET ORAL at 21:21

## 2021-05-25 RX ADMIN — ATORVASTATIN CALCIUM 10 MG: 10 TABLET, FILM COATED ORAL at 18:59

## 2021-05-25 RX ADMIN — OXYCODONE HYDROCHLORIDE AND ACETAMINOPHEN 1000 MG: 500 TABLET ORAL at 18:59

## 2021-05-25 RX ADMIN — ONDANSETRON 4 MG: 2 INJECTION INTRAMUSCULAR; INTRAVENOUS at 16:03

## 2021-05-25 RX ADMIN — SODIUM CHLORIDE, POTASSIUM CHLORIDE, SODIUM LACTATE AND CALCIUM CHLORIDE 1000 ML: 600; 310; 30; 20 INJECTION, SOLUTION INTRAVENOUS at 07:22

## 2021-05-25 RX ADMIN — PROPOFOL 150 MG: 10 INJECTION, EMULSION INTRAVENOUS at 08:21

## 2021-05-25 RX ADMIN — LISINOPRIL 10 MG: 20 TABLET ORAL at 18:59

## 2021-05-25 RX ADMIN — SODIUM CHLORIDE, POTASSIUM CHLORIDE, SODIUM LACTATE AND CALCIUM CHLORIDE: 600; 310; 30; 20 INJECTION, SOLUTION INTRAVENOUS at 08:41

## 2021-05-25 RX ADMIN — OXYBUTYNIN CHLORIDE 5 MG: 5 TABLET, EXTENDED RELEASE ORAL at 21:21

## 2021-05-25 RX ADMIN — SODIUM CHLORIDE, POTASSIUM CHLORIDE, SODIUM LACTATE AND CALCIUM CHLORIDE 100 ML/HR: 600; 310; 30; 20 INJECTION, SOLUTION INTRAVENOUS at 11:28

## 2021-05-25 RX ADMIN — TRANEXAMIC ACID 1000 MG: 10 INJECTION, SOLUTION INTRAVENOUS at 09:37

## 2021-05-25 RX ADMIN — DEXAMETHASONE SODIUM PHOSPHATE 8 MG: 4 INJECTION, SOLUTION INTRAMUSCULAR; INTRAVENOUS at 08:52

## 2021-05-25 RX ADMIN — TRANEXAMIC ACID 1000 MG: 10 INJECTION, SOLUTION INTRAVENOUS at 08:37

## 2021-05-25 RX ADMIN — LIDOCAINE HYDROCHLORIDE 60 MG: 20 INJECTION, SOLUTION INTRAVENOUS at 08:19

## 2021-05-25 RX ADMIN — TAMSULOSIN HYDROCHLORIDE 0.4 MG: 0.4 CAPSULE ORAL at 21:21

## 2021-05-25 RX ADMIN — METFORMIN HYDROCHLORIDE 500 MG: 500 TABLET, FILM COATED ORAL at 18:59

## 2021-05-25 RX ADMIN — FAMOTIDINE 20 MG: 10 INJECTION INTRAVENOUS at 07:22

## 2021-05-25 RX ADMIN — Medication 1000 UNITS: at 18:59

## 2021-05-25 RX ADMIN — CEFAZOLIN SODIUM 1 G: 1 SOLUTION INTRAVENOUS at 19:00

## 2021-05-25 RX ADMIN — Medication 6 ML/HR: at 11:27

## 2021-05-25 RX ADMIN — CEFAZOLIN SODIUM 2 G: 2 SOLUTION INTRAVENOUS at 08:24

## 2021-05-25 RX ADMIN — ONDANSETRON 4 MG: 2 INJECTION INTRAMUSCULAR; INTRAVENOUS at 08:52

## 2021-05-25 RX ADMIN — SODIUM CHLORIDE, PRESERVATIVE FREE 3 ML: 5 INJECTION INTRAVENOUS at 21:00

## 2021-05-25 NOTE — ANESTHESIA POSTPROCEDURE EVALUATION
Patient: Ankur Camp    Procedure Summary     Date: 05/25/21 Room / Location: [facility] OR  / [facility] OR    Anesthesia Start: 0819 Anesthesia Stop: 1054    Procedure: total knee arthroplasty (Right Knee) Diagnosis:       Primary osteoarthritis of right knee      (Primary osteoarthritis of right knee [M17.11])    Surgeons: Conrad Baird MD Provider: Jaquan Bearden CRNA    Anesthesia Type: general, general with block, spinal ASA Status: 3          Anesthesia Type: general, general with block, spinal    Vitals  Vitals Value Taken Time   /71 05/25/21 1054   Temp 97.4 °F (36.3 °C) 05/25/21 1036   Pulse 66 05/25/21 1054   Resp 24 05/25/21 1050   SpO2 100 % 05/25/21 1054   Vitals shown include unvalidated device data.        Post Anesthesia Care and Evaluation    Patient location during evaluation: PACU  Patient participation: complete - patient participated  Level of consciousness: awake and alert and sleepy but conscious  Pain score: 2  Pain management: adequate  Airway patency: patent  Anesthetic complications: No anesthetic complications  PONV Status: none  Cardiovascular status: acceptable  Respiratory status: acceptable and face mask  Hydration status: acceptable

## 2021-05-25 NOTE — ANESTHESIA PREPROCEDURE EVALUATION
Anesthesia Evaluation     Patient summary reviewed and Nursing notes reviewed   NPO Solid Status: > 8 hours  NPO Liquid Status: > 8 hours           Airway   Mallampati: II  TM distance: >3 FB  Neck ROM: full  No difficulty expected  Dental      Pulmonary - normal exam   (+) a smoker Current,   Cardiovascular   Exercise tolerance: good (4-7 METS)    Rhythm: regular    (+) hypertension, CAD, hyperlipidemia,     ROS comment: stress test on chart    Neuro/Psych  GI/Hepatic/Renal/Endo    (+)   renal disease, diabetes mellitus,     Musculoskeletal     Abdominal    Substance History      OB/GYN          Other   arthritis,    history of cancer                  Anesthesia Plan    ASA 3     general, general with block and spinal   (Pt refuses spinal risk benefits alternatives explained )  intravenous induction     Anesthetic plan, all risks, benefits, and alternatives have been provided, discussed and informed consent has been obtained with: patient.    Plan discussed with CRNA.

## 2021-05-25 NOTE — ANESTHESIA PROCEDURE NOTES
Peripheral Block      Patient reassessed immediately prior to procedure    Start time: 5/25/2021 7:53 AM  Stop time: 5/25/2021 8:07 AM  Reason for block: at surgeon's request and post-op pain management  Preanesthetic Checklist  Completed: patient identified, IV checked, site marked, risks and benefits discussed, surgical consent, monitors and equipment checked, pre-op evaluation and timeout performed  Prep:  Pt Position: supine  Sterile barriers:cap, gloves and mask  Prep: ChloraPrep  Patient monitoring: blood pressure monitoring, continuous pulse oximetry and EKG  Procedure  Performed under: MAC  Guidance:ultrasound guided  ULTRASOUND INTERPRETATION. Using ultrasound guidance a 21 G gauge needle was placed in close proximity to the nerve, at which point, under ultrasound guidance anesthetic was injected in the area of the nerve and spread of the anesthesia was seen on ultrasound in close proximity thereto.  There were no abnormalities seen on ultrasound; a digital image was taken; and the patient tolerated the procedure with no complications. Images:still images not obtained    Laterality:right  Block Type:adductor canal block and popliteal  Injection Technique:catheter (Adductor canal catheter,  Single shot-ipack or popliteal and anterior femur)  Needle Type:echogenic  Needle Gauge:21 G  Resistance on Injection: none  Cath Depth at skin: 10 cm    Medications Used: bupivacaine PF (MARCAINE) injection 0.5%, 28 mL      Medications  Comment:          20 ml local anes to Adductor Canal            8 ml local anes to Popliteal            0 ml local anes to iPACK            0 ml local anes to local infiltration anterior femur             Adjuncts per total volume of LA:              Decadron 10 mg PSF      If required, intravenous sedation was given -- see meds on anesthesia record.    Post Assessment  Injection Assessment: negative aspiration for heme, no paresthesia on injection and incremental injection  Patient  Tolerance:comfortable throughout block  Complications:no  Additional Notes  Procedure:          CATHETER ADDUCTOR CANAL BLOCK                                                             CATHETER at skin: 8                                Patient analgesia was achieved with IV Sedation( see meds)       The pt was placed in the Supine position.  The Insertion site was  prepped and Draped in sterile fashion.  A  I-Flow 18g echogenic needle was then  inserted approximately midline, mid-thigh and advanced In-plane with Ultrasound guidance.  Normal Saline PSF was utilized for hydrodissection of tissue.  The Vastus medialis and Sartorius muscle where visualized and the needle tip was placed in the adductor canal,  lateral to the femoral artery.  LA injection spread was visualized, injection was incremental 1-5 ml, injection pressure was normal or little; no intraneural injection; no vascular injection.  LA dose was injected thru the needle (see dose above).  I-flow 20g catheter was placed via the needle with ultrasound visualization and confirmation with NS fluid bolus or air injection. The needle was then removed and the skin was sealed with Skin Affiix at catheter insertion site.  Skin was prepped with benzoin or mastisol and the labeled curled catheter was secured with steristrips and a transparent dressing.    +++++++++++++++++++++++++++++++++++++++++    POPLITEAL BLOCK     The pt was placed in a Supine with Leg Elevated position.  The Insertion site was  prepped and draped in sterile fashion. Skin and subcutaneous tissue was infiltrated and anesthetized with 1% Lidocaine via a 25g needle.  A BBraun echogenic needle was then  inserted approximately 3 cm proximal to the popliteal fossa at the lateral mid biceps femoris and advanced In-plane with Ultrasound guidance. The popliteal artery was visualized and the common peroneal and tibial bifurcation was located.  LA injection spread was visualized, injection was  incremental 1-5 ml; injection pressure was normal or little, no intraneural injection, no vascular injection. Selective tibial

## 2021-05-25 NOTE — ANESTHESIA PROCEDURE NOTES
Airway  Urgency: elective    Date/Time: 5/25/2021 8:20 AM  Airway not difficult    General Information and Staff    Patient location during procedure: OR  CRNA: Jaquan Bearden CRNA    Indications and Patient Condition  Indications for airway management: airway protection    Preoxygenated: yes  MILS maintained throughout  Mask difficulty assessment: 1 - vent by mask    Final Airway Details  Final airway type: endotracheal airway      Successful airway: ETT  Cuffed: yes   Successful intubation technique: direct laryngoscopy  Endotracheal tube insertion site: oral  Blade: Moura  Blade size: 2  ETT size (mm): 7.5  Cormack-Lehane Classification: grade I - full view of glottis  Placement verified by: chest auscultation and capnometry   Number of attempts at approach: 1

## 2021-05-26 VITALS
HEIGHT: 72 IN | WEIGHT: 179.68 LBS | OXYGEN SATURATION: 98 % | DIASTOLIC BLOOD PRESSURE: 82 MMHG | BODY MASS INDEX: 24.34 KG/M2 | HEART RATE: 84 BPM | RESPIRATION RATE: 17 BRPM | TEMPERATURE: 98.2 F | SYSTOLIC BLOOD PRESSURE: 140 MMHG

## 2021-05-26 DIAGNOSIS — Z96.651 S/P TKR (TOTAL KNEE REPLACEMENT), RIGHT: Primary | ICD-10-CM

## 2021-05-26 LAB
ANION GAP SERPL CALCULATED.3IONS-SCNC: 8.1 MMOL/L (ref 5–15)
BASOPHILS # BLD AUTO: 0.01 10*3/MM3 (ref 0–0.2)
BASOPHILS NFR BLD AUTO: 0.1 % (ref 0–1.5)
BUN SERPL-MCNC: 16 MG/DL (ref 8–23)
BUN/CREAT SERPL: 15.8 (ref 7–25)
CALCIUM SPEC-SCNC: 8.5 MG/DL (ref 8.6–10.5)
CHLORIDE SERPL-SCNC: 106 MMOL/L (ref 98–107)
CO2 SERPL-SCNC: 25.9 MMOL/L (ref 22–29)
CREAT SERPL-MCNC: 1.01 MG/DL (ref 0.76–1.27)
DEPRECATED RDW RBC AUTO: 43.3 FL (ref 37–54)
EOSINOPHIL # BLD AUTO: 0 10*3/MM3 (ref 0–0.4)
EOSINOPHIL NFR BLD AUTO: 0 % (ref 0.3–6.2)
ERYTHROCYTE [DISTWIDTH] IN BLOOD BY AUTOMATED COUNT: 12.8 % (ref 12.3–15.4)
GFR SERPL CREATININE-BSD FRML MDRD: 71 ML/MIN/1.73
GLUCOSE SERPL-MCNC: 190 MG/DL (ref 65–99)
HCT VFR BLD AUTO: 38.5 % (ref 37.5–51)
HGB BLD-MCNC: 13.4 G/DL (ref 13–17.7)
IMM GRANULOCYTES # BLD AUTO: 0.04 10*3/MM3 (ref 0–0.05)
IMM GRANULOCYTES NFR BLD AUTO: 0.4 % (ref 0–0.5)
LYMPHOCYTES # BLD AUTO: 1.25 10*3/MM3 (ref 0.7–3.1)
LYMPHOCYTES NFR BLD AUTO: 12.6 % (ref 19.6–45.3)
MCH RBC QN AUTO: 32.2 PG (ref 26.6–33)
MCHC RBC AUTO-ENTMCNC: 34.8 G/DL (ref 31.5–35.7)
MCV RBC AUTO: 92.5 FL (ref 79–97)
MONOCYTES # BLD AUTO: 1.06 10*3/MM3 (ref 0.1–0.9)
MONOCYTES NFR BLD AUTO: 10.6 % (ref 5–12)
NEUTROPHILS NFR BLD AUTO: 7.6 10*3/MM3 (ref 1.7–7)
NEUTROPHILS NFR BLD AUTO: 76.3 % (ref 42.7–76)
NRBC BLD AUTO-RTO: 0 /100 WBC (ref 0–0.2)
PLATELET # BLD AUTO: 140 10*3/MM3 (ref 140–450)
PMV BLD AUTO: 11.2 FL (ref 6–12)
POTASSIUM SERPL-SCNC: 3.6 MMOL/L (ref 3.5–5.2)
RBC # BLD AUTO: 4.16 10*6/MM3 (ref 4.14–5.8)
SODIUM SERPL-SCNC: 140 MMOL/L (ref 136–145)
WBC # BLD AUTO: 9.96 10*3/MM3 (ref 3.4–10.8)

## 2021-05-26 PROCEDURE — A9270 NON-COVERED ITEM OR SERVICE: HCPCS | Performed by: ORTHOPAEDIC SURGERY

## 2021-05-26 PROCEDURE — 99218 PR INITIAL OBSERVATION CARE/DAY 30 MINUTES: CPT | Performed by: UROLOGY

## 2021-05-26 PROCEDURE — 63710000001 CEFDINIR 300 MG CAPSULE: Performed by: ORTHOPAEDIC SURGERY

## 2021-05-26 PROCEDURE — 63710000001 DOCUSATE SODIUM 100 MG CAPSULE: Performed by: ORTHOPAEDIC SURGERY

## 2021-05-26 PROCEDURE — 85025 COMPLETE CBC W/AUTO DIFF WBC: CPT | Performed by: ORTHOPAEDIC SURGERY

## 2021-05-26 PROCEDURE — 97535 SELF CARE MNGMENT TRAINING: CPT

## 2021-05-26 PROCEDURE — G0378 HOSPITAL OBSERVATION PER HR: HCPCS

## 2021-05-26 PROCEDURE — 97116 GAIT TRAINING THERAPY: CPT

## 2021-05-26 PROCEDURE — 80048 BASIC METABOLIC PNL TOTAL CA: CPT | Performed by: ORTHOPAEDIC SURGERY

## 2021-05-26 PROCEDURE — 63710000001 ATORVASTATIN 10 MG TABLET: Performed by: ORTHOPAEDIC SURGERY

## 2021-05-26 PROCEDURE — P9612 CATHETERIZE FOR URINE SPEC: HCPCS

## 2021-05-26 PROCEDURE — 25010000002 ENOXAPARIN PER 10 MG: Performed by: ORTHOPAEDIC SURGERY

## 2021-05-26 PROCEDURE — 63710000001 ASPIRIN 81 MG TABLET DELAYED-RELEASE: Performed by: ORTHOPAEDIC SURGERY

## 2021-05-26 PROCEDURE — 63710000001 CHOLECALCIFEROL 25 MCG (1000 UT) TABLET: Performed by: ORTHOPAEDIC SURGERY

## 2021-05-26 PROCEDURE — 99202 OFFICE O/P NEW SF 15 MIN: CPT | Performed by: NURSE PRACTITIONER

## 2021-05-26 PROCEDURE — 25010000003 CEFAZOLIN SODIUM-DEXTROSE 1-4 GM-%(50ML) RECONSTITUTED SOLUTION: Performed by: ORTHOPAEDIC SURGERY

## 2021-05-26 PROCEDURE — 63710000001 HYDROCODONE-ACETAMINOPHEN 7.5-325 MG TABLET: Performed by: ORTHOPAEDIC SURGERY

## 2021-05-26 PROCEDURE — 63710000001 METFORMIN 500 MG TABLET: Performed by: ORTHOPAEDIC SURGERY

## 2021-05-26 PROCEDURE — 63710000001 ASCORBIC ACID 500 MG TABLET: Performed by: ORTHOPAEDIC SURGERY

## 2021-05-26 PROCEDURE — 63710000001 LISINOPRIL 20 MG TABLET: Performed by: ORTHOPAEDIC SURGERY

## 2021-05-26 PROCEDURE — 97110 THERAPEUTIC EXERCISES: CPT

## 2021-05-26 PROCEDURE — 99024 POSTOP FOLLOW-UP VISIT: CPT | Performed by: PHYSICIAN ASSISTANT

## 2021-05-26 RX ORDER — PSEUDOEPHEDRINE HCL 30 MG
100 TABLET ORAL 2 TIMES DAILY
Qty: 20 CAPSULE | Refills: 0 | Status: SHIPPED | OUTPATIENT
Start: 2021-05-26 | End: 2021-11-02

## 2021-05-26 RX ORDER — MULTIPLE VITAMINS W/ MINERALS TAB 9MG-400MCG
1 TAB ORAL 2 TIMES DAILY
COMMUNITY
End: 2022-06-21

## 2021-05-26 RX ORDER — HYDROCODONE BITARTRATE AND ACETAMINOPHEN 7.5; 325 MG/1; MG/1
1 TABLET ORAL EVERY 6 HOURS PRN
Qty: 28 TABLET | Refills: 0 | Status: SHIPPED | OUTPATIENT
Start: 2021-05-26 | End: 2021-08-02

## 2021-05-26 RX ORDER — AMINO ACIDS/PROTEIN HYDROLYS 15G-100/30
30 LIQUID (ML) ORAL 2 TIMES DAILY
Status: DISCONTINUED | OUTPATIENT
Start: 2021-05-26 | End: 2021-05-26 | Stop reason: HOSPADM

## 2021-05-26 RX ORDER — NITROFURANTOIN 25; 75 MG/1; MG/1
100 CAPSULE ORAL DAILY
Qty: 30 CAPSULE | Refills: 0 | Status: SHIPPED | OUTPATIENT
Start: 2021-05-26 | End: 2021-06-08

## 2021-05-26 RX ADMIN — ATORVASTATIN CALCIUM 10 MG: 10 TABLET, FILM COATED ORAL at 08:11

## 2021-05-26 RX ADMIN — CEFDINIR 300 MG: 300 CAPSULE ORAL at 08:11

## 2021-05-26 RX ADMIN — LISINOPRIL 10 MG: 20 TABLET ORAL at 08:10

## 2021-05-26 RX ADMIN — ASPIRIN 81 MG: 81 TABLET, COATED ORAL at 08:11

## 2021-05-26 RX ADMIN — CEFAZOLIN SODIUM 1 G: 1 SOLUTION INTRAVENOUS at 01:44

## 2021-05-26 RX ADMIN — SODIUM CHLORIDE, PRESERVATIVE FREE 3 ML: 5 INJECTION INTRAVENOUS at 08:12

## 2021-05-26 RX ADMIN — DOCUSATE SODIUM 100 MG: 100 CAPSULE ORAL at 08:11

## 2021-05-26 RX ADMIN — OXYCODONE HYDROCHLORIDE AND ACETAMINOPHEN 1000 MG: 500 TABLET ORAL at 08:11

## 2021-05-26 RX ADMIN — METFORMIN HYDROCHLORIDE 500 MG: 500 TABLET, FILM COATED ORAL at 08:11

## 2021-05-26 RX ADMIN — ENOXAPARIN SODIUM 30 MG: 30 INJECTION SUBCUTANEOUS at 08:11

## 2021-05-26 RX ADMIN — Medication 1000 UNITS: at 08:11

## 2021-05-26 RX ADMIN — HYDROCODONE BITARTRATE AND ACETAMINOPHEN 1 TABLET: 7.5; 325 TABLET ORAL at 14:43

## 2021-05-27 ENCOUNTER — TELEPHONE (OUTPATIENT)
Dept: UROLOGY | Facility: CLINIC | Age: 81
End: 2021-05-27

## 2021-05-28 ENCOUNTER — CARE COORDINATION (OUTPATIENT)
Dept: CARE COORDINATION | Age: 81
End: 2021-05-28

## 2021-05-28 LAB
LAB AP CASE REPORT: NORMAL
PATH REPORT.FINAL DX SPEC: NORMAL

## 2021-05-28 NOTE — CARE COORDINATION
[facility] Transitions Initial Follow Up Call    Call within 2 business days of discharge: Yes     Patient: Prabha Deal Patient : 1940 MRN: <[id number]>    Last Discharge [address] [id number]       Complaint Diagnosis Description Type Department Provider    19 Fall Closed traumatic fracture of ribs of right side with pneumothorax, initial encounter . .. ED (TRANSFER) [address] ED Jessica Braun, [address]  Right knee replacement       RARS: No data recorded     Spoke with: Wife states that Kelly Nieves is doing well. He hasn't needed much pain medication. He is up moving around. Home health was in yesterday and will be calling schedule for PT. They gave her some exercises for him to work on. We discussed his HFU appointment. We discussed signs and symptoms that could be of concern and would need possible emergent help. She verbalized understanding of theses.       Discharge department/facility: [address] [place] [address] services provided:  Scheduled appointment with PCPAngela  Obtained and reviewed discharge summary and/or continuity of care documents    Follow Up  Future Appointments   Date Time Provider Brandon Long   2021  9:00 AM SINDY Pringle  MARCO MHAHSAN   2021 11:00 AM Yenny Bolton [address] [place] [address] MARCO P-GAGAN Kelly RN

## 2021-06-02 ENCOUNTER — OFFICE VISIT (OUTPATIENT)
Dept: PRIMARY CARE CLINIC | Age: 81
End: 2021-06-02
Payer: MEDICARE

## 2021-06-02 ENCOUNTER — OFFICE VISIT (OUTPATIENT)
Dept: ORTHOPEDIC SURGERY | Facility: CLINIC | Age: 81
End: 2021-06-02

## 2021-06-02 VITALS
HEART RATE: 94 BPM | HEIGHT: 72 IN | DIASTOLIC BLOOD PRESSURE: 82 MMHG | SYSTOLIC BLOOD PRESSURE: 128 MMHG | OXYGEN SATURATION: 95 % | TEMPERATURE: 97.7 F | WEIGHT: 177.4 LBS | BODY MASS INDEX: 24.03 KG/M2 | RESPIRATION RATE: 16 BRPM

## 2021-06-02 VITALS — BODY MASS INDEX: 24.24 KG/M2 | WEIGHT: 179 LBS | HEIGHT: 72 IN

## 2021-06-02 DIAGNOSIS — Z96.651 STATUS POST RIGHT KNEE REPLACEMENT: Primary | ICD-10-CM

## 2021-06-02 DIAGNOSIS — Z96.651 S/P TKR (TOTAL KNEE REPLACEMENT), RIGHT: Primary | ICD-10-CM

## 2021-06-02 DIAGNOSIS — R33.9 URINARY RETENTION: ICD-10-CM

## 2021-06-02 DIAGNOSIS — N48.1 BALANITIS: ICD-10-CM

## 2021-06-02 DIAGNOSIS — M79.89 LEG SWELLING: ICD-10-CM

## 2021-06-02 DIAGNOSIS — B37.9 YEAST INFECTION: ICD-10-CM

## 2021-06-02 PROCEDURE — 99024 POSTOP FOLLOW-UP VISIT: CPT | Performed by: ORTHOPAEDIC SURGERY

## 2021-06-02 PROCEDURE — 99214 OFFICE O/P EST MOD 30 MIN: CPT | Performed by: NURSE PRACTITIONER

## 2021-06-02 RX ORDER — NITROFURANTOIN 25; 75 MG/1; MG/1
CAPSULE ORAL
COMMUNITY
Start: 2021-05-27 | End: 2021-08-11 | Stop reason: ALTCHOICE

## 2021-06-02 RX ORDER — CEFDINIR 300 MG/1
300 CAPSULE ORAL 2 TIMES DAILY
Qty: 14 CAPSULE | Refills: 0 | Status: SHIPPED | OUTPATIENT
Start: 2021-06-02 | End: 2021-07-06

## 2021-06-02 RX ORDER — PSEUDOEPHEDRINE HCL 30 MG
100 TABLET ORAL 2 TIMES DAILY
COMMUNITY
Start: 2021-05-26 | End: 2021-08-11 | Stop reason: ALTCHOICE

## 2021-06-02 RX ORDER — CEFDINIR 300 MG/1
CAPSULE ORAL
COMMUNITY
Start: 2021-05-24 | End: 2021-08-11 | Stop reason: ALTCHOICE

## 2021-06-02 RX ORDER — HYDROCODONE BITARTRATE AND ACETAMINOPHEN 7.5; 325 MG/1; MG/1
TABLET ORAL
COMMUNITY
Start: 2021-05-26 | End: 2021-08-11 | Stop reason: ALTCHOICE

## 2021-06-02 RX ORDER — NYSTATIN 100000 [USP'U]/G
POWDER TOPICAL
Qty: 60 G | Refills: 1 | Status: SHIPPED | OUTPATIENT
Start: 2021-06-02 | End: 2021-08-11 | Stop reason: ALTCHOICE

## 2021-06-02 RX ORDER — NYSTATIN 100000 U/G
CREAM TOPICAL
Qty: 30 G | Refills: 2 | Status: SHIPPED | OUTPATIENT
Start: 2021-06-02 | End: 2021-08-11 | Stop reason: ALTCHOICE

## 2021-06-02 ASSESSMENT — ENCOUNTER SYMPTOMS
GASTROINTESTINAL NEGATIVE: 1
COLOR CHANGE: 1
EYES NEGATIVE: 1
RESPIRATORY NEGATIVE: 1

## 2021-06-02 NOTE — PROGRESS NOTES
Post-Discharge Transitional Care Management Services or Hospital Follow Up      Jaz Arreola   YOB: 1940    Date of Office Visit:  6/2/2021  Date of Hospital Admission: 11/14/19  Date of Hospital Discharge: 11/14/19  Readmission Risk Score(high >=14%. Medium >=10%):No data recorded    Care management risk score Rising risk (score 2-5) and Complex Care (Scores >=6): 2     Non face to face  following discharge, date last encounter closed (first attempt may have been earlier): 5/28/2021 10:46 AM 5/28/2021 10:46 AM    Call initiated 2 business days of discharge: Yes     Patient Active Problem List   Diagnosis    Hyperlipidemia    HTN (hypertension)    Type 2 diabetes mellitus without complication [facility]    Vitamin D deficiency    Benign prostatic hyperplasia without lower urinary tract symptoms    Influenza B    Thoracic aortic aneurysm without rupture ([facility]       No Known Allergies    Medications listed as ordered at the time of discharge from hospital   Jenni RUEDA   Home Medication Instructions SAMUEL:    Printed on:06/02/21 0584   Medication Information                      Ascorbic Acid (VITAMIN C) 500 MG tablet  Take 1,000 mg by mouth daily. aspirin 81 MG tablet  Take 81 mg by mouth daily. blood glucose monitor strips  Test 3 times a day & as needed for symptoms of irregular blood glucose. Dx E11. 9. Breeze 2             cefdinir (OMNICEF) 300 MG capsule  TAKE 1 CAPSULE BY MOUTH 2 (TWO) TIMES A DAY.              docusate (COLACE, DULCOLAX) 100 MG CAPS  Take 100 mg by mouth 2 times daily             enoxaparin (LOVENOX) 30 MG/0.3ML injection               Glucose Blood DISK  1 each by In Vitro route 3 times daily KX             HYDROcodone-acetaminophen (NORCO) 7.5-325 MG per tablet               Lancets MISC  1 each by Does not apply route 2 times daily Dx E11.9 KS             lisinopril (PRINIVIL;ZESTRIL) 10 MG tablet  TAKE ONE TABLET BY MOUTH EVERY DAY lovastatin (MEVACOR) 40 MG tablet  TAKE ONE TABLET BY MOUTH NIGHTLY             metFORMIN (GLUCOPHAGE) 500 MG tablet  TAKE ONE TABLET BY MOUTH EVERY DAY WITH BREAKFAST             Multiple Vitamins-Minerals (RA VISION-STONEY PRESERVE PO)  Take by mouth 2 times daily Drops bid             nitrofurantoin, macrocrystal-monohydrate, (MACROBID) 100 MG capsule  TAKE 1 CAPSULE BY MOUTH DAILY. WHILE CATHETER IN PLACE             nystatin (MYCOSTATIN) 766822 UNIT/GM cream  Apply topically 2 times daily. nystatin (MYCOSTATIN) 685944 UNIT/GM powder  Apply 3 times daily. tamsulosin (FLOMAX) 0.4 MG capsule  TAKE ONE CAPSULE BY MOUTH EVERY DAY             vitamin B-12 (CYANOCOBALAMIN) 1000 MCG tablet  Take 1,000 mcg by mouth daily. Vitamin D (CHOLECALCIFEROL) 1000 UNITS CAPS capsule  Take 1,000 Units by mouth daily. Medications marked \"taking\" at this time  Outpatient Medications Marked as Taking for the 6/2/21 encounter (Office Visit) with SINDY Henry   Medication Sig Dispense Refill    cefdinir (OMNICEF) 300 MG capsule TAKE 1 CAPSULE BY MOUTH 2 (TWO) TIMES A DAY.  enoxaparin (LOVENOX) 30 MG/0.3ML injection       docusate (COLACE, DULCOLAX) 100 MG CAPS Take 100 mg by mouth 2 times daily      HYDROcodone-acetaminophen (NORCO) 7.5-325 MG per tablet       nitrofurantoin, macrocrystal-monohydrate, (MACROBID) 100 MG capsule TAKE 1 CAPSULE BY MOUTH DAILY. WHILE CATHETER IN PLACE      nystatin (MYCOSTATIN) 160987 UNIT/GM powder Apply 3 times daily. 60 g 1    nystatin (MYCOSTATIN) 183018 UNIT/GM cream Apply topically 2 times daily.  30 g 2    lovastatin (MEVACOR) 40 MG tablet TAKE ONE TABLET BY MOUTH NIGHTLY 90 tablet 3    metFORMIN (GLUCOPHAGE) 500 MG tablet TAKE ONE TABLET BY MOUTH EVERY DAY WITH BREAKFAST 90 tablet 3    lisinopril (PRINIVIL;ZESTRIL) 10 MG tablet TAKE ONE TABLET BY MOUTH EVERY DAY 90 tablet 3    tamsulosin (FLOMAX) 0.4 MG capsule TAKE ONE with fluid. ). Skin: Positive for color change and rash (excoriated rash in groin). Psychiatric/Behavioral: Negative. Vitals:    06/02/21 0900 06/02/21 0911   BP: (!) 150/84 128/82   Site: Left Upper Arm Right Upper Arm   Position: Sitting Sitting   Cuff Size: Medium Adult Medium Adult   Pulse: 94    Resp: 16    Temp: 97.7 °F (36.5 °C)    TempSrc: Temporal    SpO2: 95%    Weight: 177 lb 6.4 oz (80.5 kg)    Height: 6' (1.829 m)      Body mass index is 24.06 kg/m². Wt Readings from Last 3 Encounters:   06/02/21 177 lb 6.4 oz (80.5 kg)   04/21/21 181 lb 9.6 oz (82.4 kg)   10/15/20 169 lb (76.7 kg)     BP Readings from Last 3 Encounters:   06/02/21 128/82   04/21/21 126/72   10/15/20 (!) 144/70       Physical Exam  Vitals and nursing note reviewed. Constitutional:       Appearance: He is well-developed. HENT:      Head: Normocephalic and atraumatic. Eyes:      Conjunctiva/sclera: Conjunctivae normal.      Pupils: Pupils are equal, round, and reactive to light. Neck:      Thyroid: No thyromegaly. Vascular: No JVD. Cardiovascular:      Rate and Rhythm: Normal rate and regular rhythm. Heart sounds: No murmur heard. No friction rub. No gallop. Pulmonary:      Effort: Pulmonary effort is normal. No respiratory distress. Breath sounds: Normal breath sounds. No wheezing or rales. Abdominal:      General: Bowel sounds are normal. There is no distension. Palpations: Abdomen is soft. Tenderness: There is no abdominal tenderness. There is no guarding. Genitourinary:     Penis: Swelling present. Comments: Indwelling catheter in place draining clear yellow urine into a leg bag. Musculoskeletal:         General: No tenderness. Cervical back: Normal range of motion and neck supple. Right knee: Effusion and erythema present. Skin:     General: Skin is warm and dry. Findings: No rash.    Neurological:      Mental Status: He is alert and oriented to person, place, and time. Psychiatric:         Judgment: Judgment normal.         This picture was taken at home on 06/01/2021        Very tight with swelling. Assessment/Plan:  1. Status post right knee replacement  Due to large amount of swelling his orthopedist office was consulted, pictures sent for evaluation. My need fluid aspirated. Continue therapy and ice. Prn pain medication. 2. Leg swelling  To be evaluated by ortho. 3. Urinary retention  Catheter in place, follow with urology     4. Yeast infection  Nystatin cream and powder    5. Balanitis  Advised to place ice, elevate penis and apply nystatin prn.          Medical Decision Making: high complexity

## 2021-06-02 NOTE — PROGRESS NOTES
Chief Complaint   Patient presents with    Follow-Up from [facility] Knee Problem     right knee replacement     Groin Swelling       Have you seen any other physician or provider since your last visit yes - [facility]     Have you had any other diagnostic tests since your last visit? yes - labs and x ray for surgery    Have you changed or stopped any medications since your last visit? yes - stopped Oxybutynin and started Cefdinir and Macrobid     Patient is here for a hospital follow up from right knee replacement. He thinks it may have infection in it. He is also having groin swelling. He has a catheter today.

## 2021-06-02 NOTE — PROGRESS NOTES
Subjective   Patient ID: Ankur Camp is a 80 y.o. male  Post-op of the Right Knee (Status post total knee replacement 5/25/21.  Here to evaluate swelling and redness on medial knee.  )             History of Present Illness  80-year-old presents status post right total knee arthroplasty with Dr. Baird with some concern of redness by his therapist and PCP near the incisional area especially along the medial aspect of the incision.  No significant drainage fever or significant fall or injury since his surgery.  His progression with therapy has been very excellent he is gone up and down a flight of stairs twice today with minimal pain in the knee.  Has only taken 2 or 3 postoperative narcotic pills at home.  Currently denies significant calf pain has moderate to minimal swelling in the ankle consistent with his prior surgery.  He is being followed by urology for urological related issues has an indwelling catheter and apparently has a fungal infection as well in the perineum.      Review of Systems   Constitutional: Negative for diaphoresis, fever and unexpected weight change.   HENT: Negative for dental problem and sore throat.    Eyes: Negative for visual disturbance.   Respiratory: Negative for shortness of breath.    Cardiovascular: Negative for chest pain.   Gastrointestinal: Negative for abdominal pain, constipation, diarrhea, nausea and vomiting.   Genitourinary: Negative for difficulty urinating and frequency.   Musculoskeletal: Positive for arthralgias.   Neurological: Negative for headaches.   Hematological: Does not bruise/bleed easily.   All other systems reviewed and are negative.      Past Medical History:   Diagnosis Date   • Arthritis    • Cancer (CMS/HCC)     kidney/ prostate   • Diabetes mellitus (CMS/HCC)    • ED (erectile dysfunction)    • Enlarged prostate with lower urinary tract symptoms (LUTS)    • Full dentures    • History of fracture of pelvis 11/14/2019    after fall from ladder   •  History of loss of consciousness 11/14/2019    after fall from ladder-flown to [facility]   • History of rib fracture 11/14/2019    after fall from ladder   • History of right shoulder fracture 1960    walking up hill, fell on gravel   • History of stress test    • History of torn meniscus of right knee 2012   • [place] (hard of hearing)     Bilateral hearing aids    • Hypertension    • Laceration of head 11/14/2019    after fall from ladder-treated at [facility]   • Left shoulder strain 2017    after fall from ladder   • Lung injury 11/14/2019    punctured right lung after falling off ladder   • Renal disorder    • Stone in kidney         Past Surgical History:   Procedure Laterality Date   • CHEST TUBE INSERTION  11/2019    rib fractures, punctured lung after fall from [facility]   • COLONOSCOPY     • COLONOSCOPY N/A 4/8/2019    Procedure: COLONOSCOPY;  Surgeon: Onesimo Arnold MD;  Location: [facility] ENDOSCOPY;  Service: Gastroenterology   • EYE SURGERY Right     cataract removal with IOL implant   • HERNIA REPAIR Right     multiple inguinal hernia repairs   • KNEE ARTHROSCOPY W/ MENISCECTOMY Right 05/10/2012    Partial medial and lateral menisectomy-Conrad Baird MD   • NEPHRECTOMY Right     partial right nephrectomy due to kidney stone   • PELVIC FRACTURE SURGERY Right 11/2019    multiple pelvic fractures after fall from [facility]   • SHOULDER SURGERY Right 1960    fracture surgery to remove a piece of bone- [place]   • TOTAL KNEE ARTHROPLASTY Right 5/25/2021    Procedure: knee arthroplasty total;  Surgeon: Conrad Baird MD;  Location: [facility] OR;  Service: Orthopedics;  Laterality: Right;       Family History   Problem Relation Age of Onset   • Breast cancer Mother    • Cancer Sister    • Cancer Brother    • Cancer Sister    • Dementia Brother    • No Known Problems Brother    • No Known Problems Brother        Social History      Socioeconomic History   • Marital status:      Spouse name: Not on file   • Number of children: Not on file   • Years of education: Not on file   • Highest education level: Not on file   Tobacco Use   • Smoking status: Current Every Day Smoker     Years: 20.00     Types: Pipe   • Smokeless tobacco: Never Used   • Tobacco comment: smokes his pipe daily   Substance and Sexual Activity   • Alcohol use: Not Currently   • Drug use: No   • Sexual activity: Defer       I have reviewed the medical and surgical history, family history, social history, medications, and/or allergies, and the review of systems of this report.    No Known Allergies      Current Outpatient Medications:   •  ascorbic acid (VITAMIN C) 1000 MG tablet, Take 1,000 mg by mouth Daily., Disp: , Rfl:   •  cefdinir (OMNICEF) 300 MG capsule, Take 1 capsule by mouth 2 (Two) Times a Day., Disp: 14 capsule, Rfl: 0  •  cholecalciferol (VITAMIN D3) 25 MCG (1000 UT) tablet, Take 1,000 Units by mouth Daily., Disp: , Rfl:   •  docusate sodium 100 MG capsule, Take 1 capsule by mouth 2 (Two) Times a Day., Disp: 20 capsule, Rfl: 0  •  enoxaparin (LOVENOX) 30 MG/0.3ML solution syringe, Inject 0.3 mL (contents of 1 syringe) under the skin into the appropriate area as directed Every 12 (Twelve) Hours for 9 days., Disp: 5.4 mL, Rfl: 0  •  glucose blood test strip, 1 each by In Vitro route daily Daily testing, DX:250.00, Disp: , Rfl:   •  HYDROcodone-acetaminophen (Norco) 7.5-325 MG per tablet, Take 1 tablet by mouth Every 6 (Six) Hours As Needed for pain, Disp: 28 tablet, Rfl: 0  •  lisinopril (PRINIVIL,ZESTRIL) 10 MG tablet, Take 10 mg by mouth Daily., Disp: , Rfl:   •  lovastatin (MEVACOR) 40 MG tablet, Take 40 mg by mouth Every Night., Disp: , Rfl:   •  metFORMIN (GLUCOPHAGE) 500 MG tablet, Take 500 mg by mouth Daily With Breakfast., Disp: , Rfl:   •  multivitamin with minerals (PRESERVISION AREDS PO), Take 1 tablet by mouth 2 (Two) Times a Day., Disp: ,  "Rfl:   •  nitrofurantoin, macrocrystal-monohydrate, (Macrobid) 100 MG capsule, Take 1 capsule by mouth Daily. While catheter in place, Disp: 30 capsule, Rfl: 0  •  NON FORMULARY, AREDS-2 vitamin , Disp: , Rfl:   •  tamsulosin (FLOMAX) 0.4 MG capsule 24 hr capsule, Take 1 capsule by mouth Daily., Disp: , Rfl:   •  vitamin B-12 (CYANOCOBALAMIN) 1000 MCG tablet, Take 1,000 mcg by mouth Daily., Disp: , Rfl:     Objective   Ht 182.9 cm (72\")   Wt 81.2 kg (179 lb)   BMI 24.28 kg/m²    Physical Exam  Constitutional: Patient is oriented to person, place, and time. Patient appears well-developed and well-nourished.   HENT:Head: Normocephalic and atraumatic.   Eyes: EOM are normal. Pupils are equal, round, and reactive to light.   Neck: Normal range of motion. Neck supple.   Cardiovascular: Normal rate.    Pulmonary/Chest: Effort normal and breath sounds normal.   Abdominal: Soft.   Neurological: Patient is alert and oriented to person, place, and time.   Skin: Skin is warm and dry.   Psychiatric: Patient has a normal mood and affect.   Nursing note and vitals reviewed.       [unfilled]   Right knee: His incisional area is well approximated there is no significant drainage there is slight erythema around the superior 3 to 4 cm of the incision with well coapted staples, there is some very superficial erythema on the medial aspect of the midportion of the incision consistent with resolving hematoma.  He has active  extension at the knee to -5 and flexion to 80 with some minimal pain but no restriction in his extensor mechanism.  His calf is supple Homans' sign negative.    Assessment/Plan   Review of Radiographic Studies:    No new imaging done today.      Procedures     Diagnoses and all orders for this visit:    1. S/P TKR (total knee replacement), right (Primary)    Other orders  -     cefdinir (OMNICEF) 300 MG capsule; Take 1 capsule by mouth 2 (Two) Times a Day.  Dispense: 14 capsule; Refill: 0       Orthopedic " activities reviewed and patient expressed appreciation      Recommendations/Plan:   Work/Activity Status: May perform usual activities as tolerated    Patient agreeable to call or return sooner for any concerns.             Impression:  Status post right total knee arthroplasty with resolving hematoma  Plan:  Observation progression in therapy continued use of his antibiotics,  recheck next week as scheduled

## 2021-06-06 ASSESSMENT — ENCOUNTER SYMPTOMS: EYE DISCHARGE: 1

## 2021-06-08 ENCOUNTER — OFFICE VISIT (OUTPATIENT)
Dept: ORTHOPEDIC SURGERY | Facility: CLINIC | Age: 81
End: 2021-06-08

## 2021-06-08 ENCOUNTER — HOSPITAL ENCOUNTER (OUTPATIENT)
Dept: ULTRASOUND IMAGING | Facility: HOSPITAL | Age: 81
Discharge: HOME | End: 2021-06-08
Admitting: PHYSICIAN ASSISTANT

## 2021-06-08 VITALS — WEIGHT: 179 LBS | RESPIRATION RATE: 18 BRPM | BODY MASS INDEX: 24.24 KG/M2 | HEIGHT: 72 IN

## 2021-06-08 DIAGNOSIS — Z96.651 S/P TKR (TOTAL KNEE REPLACEMENT), RIGHT: ICD-10-CM

## 2021-06-08 DIAGNOSIS — Z96.651 S/P TKR (TOTAL KNEE REPLACEMENT), RIGHT: Primary | ICD-10-CM

## 2021-06-08 DIAGNOSIS — M79.661 RIGHT CALF PAIN: ICD-10-CM

## 2021-06-08 PROCEDURE — 99024 POSTOP FOLLOW-UP VISIT: CPT | Performed by: PHYSICIAN ASSISTANT

## 2021-06-08 PROCEDURE — 93971 EXTREMITY STUDY: CPT

## 2021-06-08 RX ORDER — NYSTATIN 100000 [USP'U]/G
1 POWDER TOPICAL AS NEEDED
COMMUNITY
Start: 2021-06-02 | End: 2022-09-28

## 2021-06-08 NOTE — PROGRESS NOTES
Subjective   Patient ID: Ankur Camp is a 80 y.o. right hand dominant male is here today for a post-operative visit.  Post-op of the Right Knee (TKA 5/25/21, reports significant pain, in to see Dr Sim 6/2/21 c/o swelling and redness, started Omnicef, reports it seems to look better)        History of Present Illness      Pain controlled: [] no   [x] yes   Medication refill requested: [x] no   [] yes    Patient compliant with instructions: [] no   [x] yes   Other: Reports good progress since surgery.  Patient reports the redness that was noticed to his right knee during his last office visit has improved.  He denies fever or chills.  Denies cough, chest pain or shortness of breath.  He is still using a leg bag catheter that was placed by urology.  He does receive home physical therapy     Past Medical History:   Diagnosis Date   • Arthritis    • Cancer (CMS/[facility])     kidney/ prostate   • Diabetes mellitus (CMS/[facility])    • ED (erectile dysfunction)    • Enlarged prostate with lower urinary tract symptoms (LUTS)    • Full dentures    • History of fracture of pelvis 11/14/2019    after fall from ladder   • History of loss of consciousness 11/14/2019    after fall from ladder-flown to [facility]   • History of rib fracture 11/14/2019    after fall from ladder   • History of right shoulder fracture 1960    walking up hill, fell on gravel   • History of stress test    • History of torn meniscus of right knee 2012   • [place] (hard of hearing)     Bilateral hearing aids    • Hypertension    • Laceration of head 11/14/2019    after fall from ladder-treated at [facility]   • Left shoulder strain 2017    after fall from ladder   • Lung injury 11/14/2019    punctured right lung after falling off ladder   • Renal disorder    • Stone in kidney         Past Surgical History:   Procedure Laterality Date   • CHEST TUBE INSERTION  11/2019    rib fractures, punctured lung after fall from [facility]  "[place]   • COLONOSCOPY     • COLONOSCOPY N/A 4/8/2019    Procedure: COLONOSCOPY;  Surgeon: Onesimo Arnold MD;  Location: [facility] ENDOSCOPY;  Service: Gastroenterology   • EYE SURGERY Right     cataract removal with IOL implant   • HERNIA REPAIR Right     multiple inguinal hernia repairs   • KNEE ARTHROSCOPY W/ MENISCECTOMY Right 05/10/2012    Partial medial and lateral menisectomy-Conrad Baird MD   • NEPHRECTOMY Right     partial right nephrectomy due to kidney stone   • PELVIC FRACTURE SURGERY Right 11/2019    multiple pelvic fractures after fall from ladder-[facility]   • SHOULDER SURGERY Right 1960    fracture surgery to remove a piece of bone- [place], [place]   • TOTAL KNEE ARTHROPLASTY Right 5/25/2021    Procedure: knee arthroplasty total;  Surgeon: Conrad Baird MD;  Location: [facility] OR;  Service: Orthopedics;  Laterality: Right;       No Known Allergies    Review of Systems   Constitutional: Negative for diaphoresis, fever and unexpected weight change.   HENT: Negative for dental problem and sore throat.    Eyes: Negative for visual disturbance.   Respiratory: Negative for shortness of breath.    Cardiovascular: Negative for chest pain.   Gastrointestinal: Negative for abdominal pain, constipation, diarrhea, nausea and vomiting.   Genitourinary: Negative for difficulty urinating and frequency.   Musculoskeletal: Positive for arthralgias (right knee) and gait problem (walks with walker).   Neurological: Negative for headaches.   Hematological: Does not bruise/bleed easily.     I have reviewed the medical and surgical history, family history, social history, medications, and/or allergies, and the review of systems of this report.    Objective   Resp 18   Ht 182.9 cm (72\")   Wt 81.2 kg (179 lb)   BMI 24.28 kg/m²       Signs of infection: [x] no                    [] yes   Drainage: [x] no                    [] yes   Incision: [x] healing well     []healed well   Motor exam intact: [] " no                    [x] yes   Neurovascular exam intact: [] no                    [x] yes   Signs of compartment syndrome: [x] no                    [] yes   Signs of DVT: [x] no                    [] yes   Other:      Physical Exam  Ortho Exam     Extremity DVT signs are positive on physical exam with calf pain  Neurologic Exam    Assessment/Plan     Independent Review of Radiographic Studies:    No new imaging done today.    Laboratory and Other Studies:  No new results reviewed today.     Medical Decision Making:    Stable neurovascular exam.     Procedures     Diagnoses and all orders for this visit:    1. S/P TKR (total knee replacement), right (Primary)  -     US Venous Doppler Lower Extremity Right (duplex); Future    2. Right calf pain  -     US Venous Doppler Lower Extremity Right (duplex); Future         Recommendations/Plan:     Sutures Staples or Pins [x] Removed today  [] At prior visit  [] Plan removal later   Physical therapy: []rehab facility  []outpatient referral  [x] therapy ongoing   Ultrasound: [x]not ordered         []order given to patient   Labs: [x]not ordered         []order given to patient   Weight Bearing status: []Full [x]WBAT []PWB []NWB []Other     Physical therapy program ongoing  Take prescribed medications as instructed only as tolerated      Make sure to keep your follow-up appointment with urology.  Continue taking the oral Omnicef.  We will send patient for a stat venous Doppler to rule out DVT  Patient is encouraged and agreeable to call or return sooner for any issues or concerns.

## 2021-06-17 ENCOUNTER — PROCEDURE VISIT (OUTPATIENT)
Dept: UROLOGY | Facility: CLINIC | Age: 81
End: 2021-06-17

## 2021-06-17 DIAGNOSIS — N40.0 BENIGN PROSTATIC HYPERPLASIA, UNSPECIFIED WHETHER LOWER URINARY TRACT SYMPTOMS PRESENT: Primary | ICD-10-CM

## 2021-06-17 DIAGNOSIS — R39.9 LOWER URINARY TRACT SYMPTOMS (LUTS): ICD-10-CM

## 2021-06-17 PROCEDURE — 51741 ELECTRO-UROFLOWMETRY FIRST: CPT | Performed by: UROLOGY

## 2021-06-17 PROCEDURE — 52000 CYSTOURETHROSCOPY: CPT | Performed by: UROLOGY

## 2021-06-17 PROCEDURE — 76872 US TRANSRECTAL: CPT | Performed by: UROLOGY

## 2021-06-17 PROCEDURE — 99214 OFFICE O/P EST MOD 30 MIN: CPT | Performed by: UROLOGY

## 2021-06-17 RX ORDER — PHENAZOPYRIDINE HYDROCHLORIDE 100 MG/1
100 TABLET, FILM COATED ORAL 3 TIMES DAILY PRN
Qty: 30 TABLET | Refills: 0 | Status: SHIPPED | OUTPATIENT
Start: 2021-06-17 | End: 2021-06-20

## 2021-06-17 RX ORDER — ACETAMINOPHEN 325 MG/1
650 TABLET ORAL EVERY 6 HOURS
Qty: 32 TABLET | Refills: 0 | Status: SHIPPED | OUTPATIENT
Start: 2021-06-17 | End: 2021-06-21

## 2021-06-17 RX ORDER — SULFAMETHOXAZOLE AND TRIMETHOPRIM 800; 160 MG/1; MG/1
1 TABLET ORAL 2 TIMES DAILY
Qty: 6 TABLET | Refills: 0 | Status: SHIPPED | OUTPATIENT
Start: 2021-06-17 | End: 2021-08-02

## 2021-06-17 NOTE — PROGRESS NOTES
Chief Complaint  Urinary retention  Lower urinary tract symptoms    HPI  Mr. Camp is a 80 y.o. male with recent history of total knee arthroplasty, who was found to be in urinary retention and have significant lower urinary tract symptoms postoperatively.  He has a longstanding history of lower urinary tract symptoms and has been on alpha blockers and anticholinergics in the past.  He was unable to urinate after his recent orthopedic surgery, had to have a [place] catheter placed.  Oxybutynin was discontinued prior to his discharge.  He has remained on his alpha-blocker.      I have reviewed the patient's medical history in detail and updated the computerized patient record.      Review of Systems  Constitutional: No fevers or chills  Skin: Negative for rash  Endocrine: No heat/cold intolerance   Gastrointestinal: Negative for constipation, nausea or vomiting  Genitourinary: Negative for new lower urinary tract symptoms, gross hematuria or dysuria.  Musculoskeletal: Negative for flank pain  Neurological:  Negative for frequent headaches or dizziness  Lymph/Heme: Negative for leg swelling or calf pain    Physical Exam  There were no vitals taken for this visit.  Constitutional: NAD, WDWN.  Cardiovascular: Regular rate.  Pulmonary/Chest: Respirations are even and non-labored bilaterally.  Abdominal: Soft. No distension, tenderness, masses or guarding. No CVA tenderness.  Extremities: TRISTIN x 4, Warm. No clubbing.  No cyanosis.    Skin: Pink, warm and dry.  No rashes noted.    Genitourinary  Penis: circumcised penis, glans normal, no penile discharge.  No rashes/lesions.    Testes: descended bilaterally, no masses, nontender to palpation. Remainder of scrotal contents normal. No hernia appreciated.  Perineum:  No perineal pain with palpation.  Rectal:  Normal tone, no masses.  Prostate:  35 grams.  Symmetric, non-tender, anodular and no induration.      Labs  Brief Urine Lab Results  (Last result in the past 365 days)       Color   Clarity   Blood   Leuk Est   Nitrite   Protein   CREAT   Urine HCG        05/25/21 0705 Yellow Clear Negative Negative Negative Negative                 Radiographic Studies  XR Chest 2 View    Result Date: 5/21/2021  No acute cardiopulmonary process.        Images were reviewed, interpreted, and dictated by Dr. Reed Newton M.D. Transcribed by Lindsay Thomas PA-C.  This report was finalized on 5/21/2021 12:43 PM by Reed Newton M.D..    XR Knee 1 or 2 View Right    Result Date: 5/25/2021  Postoperative changes of right knee arthroplasty.         Images were reviewed, interpreted, and dictated by Dr. Reed Newton M.D. Transcribed by Lindsay Thomas PA-C.  This report was finalized on 5/25/2021 11:58 AM by Reed Newton M.D..    US Venous Doppler Lower Extremity Right (duplex)    Result Date: 6/8/2021  No evidence of deep venous thrombosis.      Images were reviewed, interpreted, and dictated by Dr. Reed Newton M.D. Transcribed by Lindsay Thomas PA-C.  This report was finalized on 6/8/2021 1:04 PM by Reed Newton M.D..    Preprocedure diagnosis  Lower urinary tract symptoms    Postprocedure diagnosis  Same.  Occlusive prostate    Procedure  Flexible Cystourethroscopy    Attending surgeon  Jose Fernandez MD    Anesthesia  2% lidocaine jelly intraurethrally    Complications  None    Indications  80 y.o. male undergoing a flexible cystoscopy for the above mentioned indications.  Informed consent was obtained.      Findings  Cystoscopic findings included one right and left ureteral orifice in the normal anatomic position with normal bladder mucosa and no tumors, masses or stones. The urethral urothelium was within normal limits with no strictures.  There was not a prominent median lobe.  The lateral lobes were obstructive in appearance.      Procedure  The patient was placed in supine position and prepped and draped in sterile fashion with lidocaine jelly per  urethra for anesthesia.  A timeout was performed.  The 14F flexible cystoscope was lubricated and gently placed through the penile urethra and into the bladder.  The bladder was completely visualized.  The cystoscope was retroflexed and the bladder neck and prostate visualized.  The cystoscope was slowly withdrawn while visualizing the urethra and the procedure terminated.  The patient tolerated the procedure well.      TRUS OF PROSTATE    Preoperative diagnosis  LUTS    Postoperative diagnosis  Same    Procedure  1.  Transrectal ultrasound of the prostate    Attending Surgeon  Jose Fernandez MD    Anesthesia  2% lidocaine jelly, intrarectal instillation, 10mL    Complications  None    Specimen  None    Indications  Mr. Camp is a 80 y.o. male with LUTS.  He presents for prostate ultrasound to evaluate prostate size.    Procedure  The patient was positioned and prepped in a left lateral position with lower extremities flexed.  Lidocaine jelly, 2%, was injected per rectum. A digital rectal exam was performed which demonstrated a smooth prostate without nodules or induration. The [employer] E8CS rectal ultrasound probe was slowly introduced into the rectum without difficulty.  The prostate and seminal vesicles were inspected systematically using cross and sagittal views with the ultrasound. A median lobe was not seen.  The dimensions of the prostate were measured, for a calculated volume of 31 mL.  The seminal vesicles appeared normal.  The rectal ultrasound probe was removed.  The patient tolerated the procedure well.    Uroflow:  Peak flow rate - 1.7 mL/sec  Average flow rate - 1.1 mL/sec  Flow curve - nonexistant  Voided volume - 20 mL    I personally reviewed  and interpreted this study.       Assessment  80 y.o. male with urinary retention after knee surgery, acute worsening of chronic LUTS, weak stream, UUI.     After his cystoscopy in a separate room in separate encounter we had a long conversation about his  longstanding lower urinary tract symptoms.  He is quite bothered by these.  He is still recovering from his orthopedic surgery.    Plan  1. Urolift at [facility] in August after he is recovered.  Risk factors are aortic aneurysm and recent joint replacement.  2. Rx sent in      Jose Fernandez MD

## 2021-06-17 NOTE — PATIENT INSTRUCTIONS
Dr. Fernandez's Preoperative Instructions Before and After UroLift Procedure at the surgery center  The following instructions will help you care for yourself, or be cared for before and after your procedure.      Diet  Drink plenty of liquids and eat light meals after the procedure.  It is very important to treat plenty of fluids before and especially after your procedure.   Do not eat or drink anything after midnight the night before your procedure.    Anesthesia Precautions & Expectations  You will receive a twilight sedation anesthetic.  After anesthesia, rest for 24 hours.    Do not drive, drink alcoholic beverages or make any important decisions during this time.  You will need someone to drive you the day of your procedure.  Please take the medications 1 hour prior to your procedure.      What to take before the procedure  We will give you an antibiotic to be taken one hour ahead of time that day and for the next 3 days.   We will also give you an anti-inflammatory medication and a medication for urinary burning to be taken starting that day and for the next few days.   We will also give you medicine for urinary urgency and frequency to be taken for the next 4 weeks.    Take all the medicines 1 hour before your procedure.    Activity  Start normal activities in twenty-four (24) hours.    Wound Care and Hygiene  No restrictions, start normal routine.    What to Expect after Surgery  Mild pain with voiding.  Frequency or urgency - expect these to occur for 2-4 weeks after surgery. Call if these are severe and we will give a medication to help with them.  Bladder cramps.  Minimal bleeding with voiding.    Call your Doctor  Passing clots in urine preventing bladder emptying  Severe pain not controlled by oral medication  Temperature above 101.5 degrees  Inability to urinate within eight (8) hours after surgery    Other Contacts  Urology Office:  [id number]   [place] [address] [phone]  office  [phone] fax

## 2021-07-06 ENCOUNTER — OFFICE VISIT (OUTPATIENT)
Dept: ORTHOPEDIC SURGERY | Facility: CLINIC | Age: 81
End: 2021-07-06

## 2021-07-06 VITALS — BODY MASS INDEX: 24.24 KG/M2 | TEMPERATURE: 97.1 F | WEIGHT: 179 LBS | RESPIRATION RATE: 18 BRPM | HEIGHT: 72 IN

## 2021-07-06 DIAGNOSIS — Z96.651 S/P TKR (TOTAL KNEE REPLACEMENT), RIGHT: Primary | ICD-10-CM

## 2021-07-06 PROCEDURE — 99024 POSTOP FOLLOW-UP VISIT: CPT | Performed by: PHYSICIAN ASSISTANT

## 2021-07-06 NOTE — PROGRESS NOTES
Subjective   Patient ID: Ankur Camp is a 80 y.o. right hand dominant male  Post-op of the Right Knee (TKA 5/25/2021, currently in PT has two appointments left. Has some soreness.)         History of Present Illness    Patient is following up for scheduled visit regarding right total knee arthroplasty.  Date of surgery 5/25 2021.  He reports he is doing great.  He still has occasional soreness after his PT.  Denies redness or warmth to knee or LE.  Patient did have a right venous Doppler ordered during his last office visit which was negative for DVT.  He states the swelling comes and goes.      Past Medical History:   Diagnosis Date   • Arthritis    • Cancer (CMS/HCC)     kidney/ prostate   • Diabetes mellitus (CMS/HCC)    • ED (erectile dysfunction)    • Enlarged prostate with lower urinary tract symptoms (LUTS)    • Full dentures    • History of fracture of pelvis 11/14/2019    after fall from ladder   • History of loss of consciousness 11/14/2019    after fall from ladder-flown to [facility]   • History of rib fracture 11/14/2019    after fall from ladder   • History of right shoulder fracture 1960    walking up hill, fell on gravel   • History of stress test    • History of torn meniscus of right knee 2012   • [place] (hard of hearing)     Bilateral hearing aids    • Hypertension    • Laceration of head 11/14/2019    after fall from ladder-treated at [facility]   • Left shoulder strain 2017    after fall from ladder   • Lung injury 11/14/2019    punctured right lung after falling off ladder   • Renal disorder    • Stone in kidney         Past Surgical History:   Procedure Laterality Date   • CHEST TUBE INSERTION  11/2019    rib fractures, punctured lung after fall from ladder-[facility]   • COLONOSCOPY     • COLONOSCOPY N/A 4/8/2019    Procedure: COLONOSCOPY;  Surgeon: Onesimo Arnold MD;  Location: [facility] ENDOSCOPY;  Service: Gastroenterology   • EYE SURGERY Right      cataract removal with IOL implant   • HERNIA REPAIR Right     multiple inguinal hernia repairs   • KNEE ARTHROSCOPY W/ MENISCECTOMY Right 05/10/2012    Partial medial and lateral menisectomy-Conrad Baird MD   • NEPHRECTOMY Right     partial right nephrectomy due to kidney stone   • PELVIC FRACTURE SURGERY Right 11/2019    multiple pelvic fractures after fall from ladder-[facility]   • SHOULDER SURGERY Right 1960    fracture surgery to remove a piece of bone- [place]   • TOTAL KNEE ARTHROPLASTY Right 5/25/2021    Procedure: knee arthroplasty total;  Surgeon: Conrad Baird MD;  Location: [facility];  Service: Orthopedics;  Laterality: Right;       Family History   Problem Relation Age of Onset   • Breast cancer Mother    • Cancer Sister    • Cancer Brother    • Cancer Sister    • Dementia Brother    • No Known Problems Brother    • No Known Problems Brother        Social History     Socioeconomic History   • Marital status:      Spouse name: Not on file   • Number of children: Not on file   • Years of education: Not on file   • Highest education level: Not on file   Tobacco Use   • Smoking status: Current Every Day Smoker     Years: 20.00     Types: Pipe   • Smokeless tobacco: Never Used   • Tobacco comment: smokes his pipe daily   Substance and Sexual Activity   • Alcohol use: Not Currently   • Drug use: No   • Sexual activity: Defer         Current Outpatient Medications:   •  ascorbic acid (VITAMIN C) 1000 MG tablet, Take 1,000 mg by mouth Daily., Disp: , Rfl:   •  cholecalciferol (VITAMIN D3) 25 MCG (1000 UT) tablet, Take 1,000 Units by mouth Daily., Disp: , Rfl:   •  docusate sodium 100 MG capsule, Take 1 capsule by mouth 2 (Two) Times a Day., Disp: 20 capsule, Rfl: 0  •  glucose blood test strip, 1 each by In Vitro route daily Daily testing, DX:250.00, Disp: , Rfl:   •  HYDROcodone-acetaminophen (Norco) 7.5-325 MG per tablet, Take 1 tablet by mouth Every 6 (Six) Hours As  "Needed for pain, Disp: 28 tablet, Rfl: 0  •  lisinopril (PRINIVIL,ZESTRIL) 10 MG tablet, Take 10 mg by mouth Daily., Disp: , Rfl:   •  lovastatin (MEVACOR) 40 MG tablet, Take 40 mg by mouth Every Night., Disp: , Rfl:   •  metFORMIN (GLUCOPHAGE) 500 MG tablet, Take 500 mg by mouth Daily With Breakfast., Disp: , Rfl:   •  multivitamin with minerals (PRESERVISION AREDS PO), Take 1 tablet by mouth 2 (Two) Times a Day., Disp: , Rfl:   •  NON FORMULARY, AREDS-2 vitamin , Disp: , Rfl:   •  nystatin (MYCOSTATIN) 422214 UNIT/GM powder, APPLY 3 TIMES DAILY., Disp: , Rfl:   •  sulfamethoxazole-trimethoprim (Bactrim DS) 800-160 MG per tablet, Take 1 tablet by mouth 2 (Two) Times a Day. Start taking the morning of the procedure, Disp: 6 tablet, Rfl: 0  •  tamsulosin (FLOMAX) 0.4 MG capsule 24 hr capsule, Take 1 capsule by mouth Daily., Disp: , Rfl:   •  vitamin B-12 (CYANOCOBALAMIN) 1000 MCG tablet, Take 1,000 mcg by mouth Daily., Disp: , Rfl:     No Known Allergies    Review of Systems   Constitutional: Negative for fever.   HENT: Negative for dental problem and voice change.    Eyes: Negative for visual disturbance.   Respiratory: Negative for shortness of breath.    Cardiovascular: Negative for chest pain.   Gastrointestinal: Negative for abdominal pain.   Genitourinary: Negative for dysuria.   Musculoskeletal: Positive for arthralgias (right knee soreness\) and joint swelling (right ankle ). Negative for gait problem.   Skin: Negative for rash.   Neurological: Negative for speech difficulty.   Hematological: Does not bruise/bleed easily.   Psychiatric/Behavioral: Negative for confusion.       I have reviewed the medical and surgical history, family history, social history, medications, and/or allergies, and the review of systems of this report.    Objective   Temp 97.1 °F (36.2 °C) (Skin)   Resp 18   Ht 182.9 cm (72\")   Wt 81.2 kg (179 lb)   BMI 24.28 kg/m²    Physical Exam  Vitals and nursing note reviewed. "   Constitutional:       Appearance: Normal appearance.   Pulmonary:      Effort: Pulmonary effort is normal.   Neurological:      Mental Status: He is alert and oriented to person, place, and time.   Psychiatric:         Behavior: Behavior normal.       Right Knee Exam     Range of Motion   Right knee extension: 7.   Right knee flexion: 110.     Other   Erythema: absent  Scars: present  Sensation: normal  Pulse: present  Swelling: mild           Extremity DVT signs are negative on physical exam with negative Sadia sign, no calf pain, no palpable cords and no skin tone change   Neurologic Exam     Mental Status   Oriented to person, place, and time.              Assessment/Plan   Independent Review of Radiographic Studies:    No new imaging done today.      Procedures       Diagnoses and all orders for this visit:    1. S/P TKR (total knee replacement), right (Primary)       Orthopedic activities reviewed and patient expressed appreciation  Discussion of orthopedic goals  Risk, benefits, and merits of treatment alternatives reviewed with the patient and questions answered  Physical therapy ongoing  Ice, heat, and/or modalities as beneficial    Recommendations/Plan:  Patient is encouraged to call or return for any issues or concerns.    Follow-up in 8 to 10 weeks.  Continue physical therapy  Patient agreeable to call or return sooner for any concerns.             EMR Dragon-transcription disclaimer:  This encounter note is an electronic transcription of spoken language to printed text.  Electronic transcription of spoken language may permit erroneous or at times nonsensical words or phrases to be inadvertently transcribed.  Although I have reviewed the note for such errors, some may still exist

## 2021-07-07 DIAGNOSIS — N40.0 BENIGN PROSTATIC HYPERPLASIA WITHOUT LOWER URINARY TRACT SYMPTOMS: ICD-10-CM

## 2021-07-07 RX ORDER — TAMSULOSIN HYDROCHLORIDE 0.4 MG/1
CAPSULE ORAL
Qty: 90 CAPSULE | Refills: 3 | Status: SHIPPED | OUTPATIENT
Start: 2021-07-07 | End: 2022-04-13 | Stop reason: ALTCHOICE

## 2021-08-02 ENCOUNTER — OFFICE VISIT (OUTPATIENT)
Dept: ORTHOPEDIC SURGERY | Facility: CLINIC | Age: 81
End: 2021-08-02

## 2021-08-02 VITALS — BODY MASS INDEX: 23.57 KG/M2 | HEIGHT: 72 IN | WEIGHT: 174 LBS | TEMPERATURE: 97 F
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
## 2021-08-02 DIAGNOSIS — M25.561 ARTHRALGIA OF RIGHT KNEE: ICD-10-CM

## 2021-08-02 DIAGNOSIS — Z96.651 STATUS POST TOTAL KNEE REPLACEMENT USING CEMENT, RIGHT: Primary | ICD-10-CM

## 2021-08-02 PROCEDURE — 73560 X-RAY EXAM OF KNEE 1 OR 2: CPT | Performed by: ORTHOPAEDIC SURGERY

## 2021-08-02 PROCEDURE — 99024 POSTOP FOLLOW-UP VISIT: CPT | Performed by: ORTHOPAEDIC SURGERY

## 2021-08-03 ENCOUNTER — HOSPITAL ENCOUNTER (OUTPATIENT)
Facility: HOSPITAL | Age: 81
Discharge: HOME | End: 2021-08-03
Payer: MEDICARE

## 2021-08-03 DIAGNOSIS — I10 ESSENTIAL HYPERTENSION: ICD-10-CM

## 2021-08-03 DIAGNOSIS — E11.9 TYPE 2 DIABETES MELLITUS WITHOUT COMPLICATION, WITHOUT LONG-TERM CURRENT USE OF INSULIN (HCC): ICD-10-CM

## 2021-08-03 DIAGNOSIS — E55.9 VITAMIN D DEFICIENCY: ICD-10-CM

## 2021-08-03 DIAGNOSIS — Z13.29 THYROID DISORDER SCREEN: ICD-10-CM

## 2021-08-03 LAB
A/G RATIO: 1.9 (ref 0.8–2)
ALBUMIN SERPL-MCNC: 4.4 G/DL (ref 3.4–4.8)
ALP BLD-CCNC: 132 U/L (ref 25–100)
ALT SERPL-CCNC: 15 U/L (ref 4–36)
ANION GAP SERPL CALCULATED.3IONS-SCNC: 9 MMOL/L (ref 3–16)
AST SERPL-CCNC: 18 U/L (ref 8–33)
BILIRUB SERPL-MCNC: 0.5 MG/DL (ref 0.3–1.2)
BUN BLDV-MCNC: 15 MG/DL (ref 6–20)
CALCIUM SERPL-MCNC: 9.6 MG/DL (ref 8.5–10.5)
CHLORIDE BLD-SCNC: 105 MMOL/L (ref 98–107)
CO2: 30 MMOL/L (ref 20–30)
CREAT SERPL-MCNC: 0.9 MG/DL (ref 0.4–1.2)
FOLATE: 14.52 NG/ML
GFR AFRICAN AMERICAN: >59
GFR NON-AFRICAN AMERICAN: >59
GLOBULIN: 2.3 G/DL
GLUCOSE BLD-MCNC: 150 MG/DL (ref 74–106)
HBA1C MFR BLD: 7.1 %
HCT VFR BLD CALC: 44.7 % (ref 40–54)
HEMOGLOBIN: 14.5 G/DL (ref 13–18)
MCH RBC QN AUTO: 31.2 PG (ref 27–32)
MCHC RBC AUTO-ENTMCNC: 32.4 G/DL (ref 31–35)
MCV RBC AUTO: 96.1 FL (ref 80–100)
PDW BLD-RTO: 13.4 % (ref 11–16)
PLATELET # BLD: 175 K/UL (ref 150–400)
PMV BLD AUTO: 11 FL (ref 6–10)
POTASSIUM SERPL-SCNC: 4 MMOL/L (ref 3.4–5.1)
RBC # BLD: 4.65 M/UL (ref 4.5–6)
SODIUM BLD-SCNC: 144 MMOL/L (ref 136–145)
TOTAL PROTEIN: 6.7 G/DL (ref 6.4–8.3)
TSH SERPL DL<=0.05 MIU/L-ACNC: 1.44 UIU/ML (ref 0.27–4.2)
VITAMIN B-12: >2000 PG/ML (ref 211–911)
VITAMIN D 25-HYDROXY: 54.5 (ref 32–100)
WBC # BLD: 6.5 K/UL (ref 4–11)

## 2021-08-03 PROCEDURE — 84443 ASSAY THYROID STIM HORMONE: CPT

## 2021-08-03 PROCEDURE — 82607 VITAMIN B-12: CPT

## 2021-08-03 PROCEDURE — 80053 COMPREHEN METABOLIC PANEL: CPT

## 2021-08-03 PROCEDURE — 83036 HEMOGLOBIN GLYCOSYLATED A1C: CPT

## 2021-08-03 PROCEDURE — 82306 VITAMIN D 25 HYDROXY: CPT

## 2021-08-03 PROCEDURE — 85027 COMPLETE CBC AUTOMATED: CPT

## 2021-08-03 PROCEDURE — 82746 ASSAY OF FOLIC ACID SERUM: CPT

## 2021-08-11 ENCOUNTER — OFFICE VISIT (OUTPATIENT)
Dept: PRIMARY CARE CLINIC | Age: 81
End: 2021-08-11
Payer: MEDICARE

## 2021-08-11 VITALS
DIASTOLIC BLOOD PRESSURE: 72 MMHG | RESPIRATION RATE: 16 BRPM | WEIGHT: 171.6 LBS | TEMPERATURE: 97.8 F | BODY MASS INDEX: 23.24 KG/M2 | HEART RATE: 67 BPM | OXYGEN SATURATION: 98 % | SYSTOLIC BLOOD PRESSURE: 130 MMHG | HEIGHT: 72 IN

## 2021-08-11 DIAGNOSIS — Z00.00 MEDICARE ANNUAL WELLNESS VISIT, SUBSEQUENT: Primary | ICD-10-CM

## 2021-08-11 DIAGNOSIS — Z00.00 ROUTINE GENERAL MEDICAL EXAMINATION AT A HEALTH CARE FACILITY: ICD-10-CM

## 2021-08-11 DIAGNOSIS — R26.89 SHUFFLING GAIT: ICD-10-CM

## 2021-08-11 DIAGNOSIS — K59.09 CHRONIC CONSTIPATION: ICD-10-CM

## 2021-08-11 DIAGNOSIS — R26.89 POOR BALANCE: ICD-10-CM

## 2021-08-11 PROCEDURE — G0439 PPPS, SUBSEQ VISIT: HCPCS | Performed by: NURSE PRACTITIONER

## 2021-08-11 RX ORDER — POLYETHYLENE GLYCOL 3350 17 G/17G
17 POWDER, FOR SOLUTION ORAL DAILY
Qty: 510 G | Refills: 5 | Status: SHIPPED | OUTPATIENT
Start: 2021-08-11 | End: 2021-09-10

## 2021-08-11 ASSESSMENT — PATIENT HEALTH QUESTIONNAIRE - PHQ9
SUM OF ALL RESPONSES TO PHQ9 QUESTIONS 1 & 2: 0
SUM OF ALL RESPONSES TO PHQ QUESTIONS 1-9: 0
SUM OF ALL RESPONSES TO PHQ QUESTIONS 1-9: 0
1. LITTLE INTEREST OR PLEASURE IN DOING THINGS: 0
2. FEELING DOWN, DEPRESSED OR HOPELESS: 0
SUM OF ALL RESPONSES TO PHQ QUESTIONS 1-9: 0

## 2021-08-11 ASSESSMENT — LIFESTYLE VARIABLES: HOW OFTEN DO YOU HAVE A DRINK CONTAINING ALCOHOL: 0

## 2021-08-11 NOTE — PROGRESS NOTES
Medicare Annual Wellness Visit  Name: Awilda Sibley Date: 2021   MRN: [id number] Sex: Male   Age: 79 y.o. Ethnicity: Non- / Non    : 1940 Race: White (non-)      Leonora Page is here for Medicare AWV    Screenings for behavioral, psychosocial and functional/safety risks, and cognitive dysfunction are all negative except as indicated below. These results, as well as other patient data from the [address] form, are documented in Flowsheets linked to this Encounter. He is going to have a urolift done next week. He can not hold his urine at all. He is having a lot of problems with balance and shuffling gait. No Known Allergies      Prior to Visit Medications    Medication Sig Taking? Authorizing Provider   polyethylene glycol (GLYCOLAX) 17 GM/SCOOP powder Take 17 g by mouth daily Yes SINDY Rosenthal   tamsulosin (FLOMAX) 0.4 MG capsule TAKE ONE CAPSULE BY MOUTH EVERY DAY Yes SINDY Rosenthal   lovastatin (MEVACOR) 40 MG tablet TAKE ONE TABLET BY MOUTH NIGHTLY Yes SINDY Rosenthal   metFORMIN (GLUCOPHAGE) 500 MG tablet TAKE ONE TABLET BY MOUTH EVERY DAY WITH BREAKFAST Yes SINDY Rosenthal   lisinopril (PRINIVIL;ZESTRIL) 10 MG tablet TAKE ONE TABLET BY MOUTH EVERY DAY Yes SINDY Rosenthal   Lancets MISC 1 each by Does not apply route 2 times daily Dx E11.9 KS Yes SINDY Rosenthal   Glucose Blood DISK 1 each by In Vitro route 3 times daily KX Yes SINDY Rosenthal   blood glucose monitor strips Test 3 times a day & as needed for symptoms of irregular blood glucose. Dx E11. 9. Breeze 2 Yes SINDY Schmidt   Multiple Vitamins-Minerals (RA VISION-STONEY PRESERVE PO) Take by mouth 2 times daily Drops bid Yes Historical Provider, MD   aspirin 81 MG tablet Take 81 mg by mouth daily. Yes Historical Provider, MD   vitamin B-12 (CYANOCOBALAMIN) 1000 MCG tablet Take 1,000 mcg by mouth daily.  Yes Historical Provider, MD   Vitamin D (CHOLECALCIFEROL) 1000 UNITS CAPS capsule Take 1,000 Units by mouth daily. Yes Historical Provider, MD   Ascorbic Acid (VITAMIN C) 500 MG tablet Take 1,000 mg by mouth daily. Yes Historical Provider, MD         Past Medical History:   Diagnosis Date    Chronic kidney disease     Hyperlipidemia     Hypertension     Type 2 diabetes mellitus without complication ([address]        Past Surgical History:   Procedure Laterality Date    EYE SURGERY      laser    FRACTURE SURGERY      pelvic    HERNIA REPAIR      times 7    KIDNEY REMOVAL      1/2 kidney removal right    KNEE ARTHROPLASTY Right 05/2021    Dr Zulema Moore Right          Family History   Problem Relation Age of Onset    Cancer Mother         breast    Diabetes Father     High Blood Pressure Father     Diabetes Brother        CareTeam (Including outside providers/suppliers regularly involved in providing care):   Patient Care Team:  SINDY Jimenez as PCP - General (Family Nurse Practitioner)  SIND[facility] Jim[facility]    Wt Readings from Last 3 Encounters:   08/11/21 171 lb 9.6 oz (77.8 kg)   06/02/21 177 lb 6.4 oz (80.5 kg)   04/21/21 181 lb 9.6 oz (82.4 kg)     Vitals:    08/11/21 0944   BP: 130/72   Site: Left Upper Arm   Position: Sitting   Cuff Size: Medium Adult   Pulse: 67   Resp: 16   Temp: 97.8 °F (36.6 °C)   TempSrc: Temporal   SpO2: 98%   Weight: 171 lb 9.6 oz (77.8 kg)   Height: 6' (1.829 m)     Body mass index is 23.27 kg/m². Based upon direct observation of the patient, evaluation of cognition reveals recent and remote memory intact.     General Appearance: alert and oriented to person, place and time, well developed and well- nourished, in no acute distress  Skin: warm and dry, no rash or erythema  Head: normocephalic and atraumatic  Eyes: pupils equal, round, and reactive to light, extraocular eye movements intact, conjunctivae normal  ENT: tympanic membrane, external ear and ear canal normal bilaterally, nose without deformity, nasal mucosa and turbinates normal without polyps  Neck: supple and non-tender without mass, no thyromegaly or thyroid nodules, no cervical lymphadenopathy  Pulmonary/Chest: clear to auscultation bilaterally- no wheezes, rales or rhonchi, normal air movement, no respiratory distress  Cardiovascular: normal rate, regular rhythm, normal S1 and S2, no murmurs, rubs, clicks, or gallops, distal pulses intact, no carotid bruits  Abdomen: soft, non-tender, non-distended, normal bowel sounds, no masses or organomegaly  Extremities: no cyanosis, clubbing or edema  Musculoskeletal: shuffling gait with poor balance  Neurologic: reflexes normal and symmetric, no cranial nerve deficit, gait, coordination and speech normal slow movement. Patient's complete Health Risk Assessment and screening values have been reviewed and are found in Flowsheets. The following problems were reviewed today and where indicated follow up appointments were made and/or referrals ordered. Positive Risk Factor Screenings with Interventions:     Fall Risk:  2 or more falls in past year?: (!) yes  Fall with injury in past year?: (!) yes  Fall Risk Interventions:    · Home safety tips provided  · Home exercises provided to promote strength and balance  · advised to walk with a cane       Substance History:  Social History     Tobacco History     Smoking Status  Current Every Day Smoker Smoking Frequency  1 pack/day for 40 years (40 pk yrs) Smoking Tobacco Type  Pipe, Cigars    Smokeless Tobacco Use  Never Used          Alcohol History     Alcohol Use Status  No          Drug Use     Drug Use Status  No          Sexual Activity     Sexually Active  Not Asked               Alcohol Screening:       A score of 8 or more is associated with harmful or hazardous drinking. A score of 13 or more in women, and 15 or more in men, is likely to indicate alcohol dependence.   Substance Abuse Interventions:  · Tobacco abuse:  patient is not ready to work toward tobacco cessation at this time    General Health and ACP:  General  In general, how would you say your health is?: Very Good  In the past 7 days, have you experienced any of the following?  New or Increased Pain, New or Increased Fatigue, Loneliness, Social Isolation, Stress or Anger?: None of These  Do you get the social and emotional support that you need?: Yes  Do you have a Living Will?: Yes  Advance Directives     [facility] Will ACP-Advance Directive ACP-Power of     Not on File Not on File Not on File Not on File      General Health Risk Interventions:  · multiple medical conditions with specialist    Health Habits/Nutrition:  Health Habits/Nutrition  Do you exercise for at least 20 minutes 2-3 times per week?: (!) No  Have you lost any weight without trying in the past 3 months?: (!) Yes  Do you eat only one meal per day?: No  Have you seen the dentist within the past year?: Yes  Body mass index: 23.27  Health Habits/Nutrition Interventions:  · Inadequate physical activity:  patient is not ready to increase his/her physical activity level at this time    Hearing/Vision:  No exam data present  Hearing/Vision  Do you or your family notice any trouble with your hearing that hasn't been managed with hearing aids?: (!) Yes  Do you have difficulty driving, watching TV, or doing any of your daily activities because of your eyesight?: No  Have you had an eye exam within the past year?: Yes  Hearing/Vision Interventions:  · up to date on exams    Safety:  Safety  Do you have working smoke detectors?: Yes  Have all throw rugs been removed or fastened?: (!) No  Do you have non-slip mats or surfaces in all bathtubs/showers?: (!) No  Do all of your stairways have a railing or banister?: Yes  Are your doorways, halls and stairs free of clutter?: Yes  Do you always fasten your seatbelt when you are in a car?: Yes  Safety Interventions:  · Home safety tips provided  · advised to use cane and caution for fall. ADL:  ADLs  In the past 7 days, did you need help from others to perform any of the following everyday activities? Eating, dressing, grooming, bathing, toileting, or walking/balance?: (!) Dressing  In the past 7 days, did you need help from others to take care of any of the following? Laundry, housekeeping, banking/finances, shopping, telephone use, food preparation, transportation, or taking medications?: [employer], Taking Medications  ADL Interventions:  · Patient declines any further evaluation/treatment for this issue  He no longer receiving physical therapy. Personalized Preventive Plan   Current Health Maintenance Status  Immunization History   Administered Date(s) Administered    Hepatitis A Adult (Havrix, Vaqta) 11/21/2018    Influenza, Quadv, IM, (6 mo and older Fluzone, Flulaval, Fluarix and 3 yrs and older Afluria) 10/15/2020    Influenza, Triv, inactivated, subunit, adjuvanted, IM (Fluad 65 yrs and older) 02/21/2020    Pneumococcal Conjugate 13-valent ([id number]) 04/10/2017    Pneumococcal Polysaccharide ([id number]) 12/05/2018    Tdap (Boostrix, Adacel) 11/25/2017        Health Maintenance   Topic Date Due    Hepatitis C screen  Never done    COVID-19 Vaccine (1) Never done    Shingles Vaccine (1 of 2) Never done    PSA counseling  Never done   [employer] Visit (AWV)  Never done    Low dose CT lung screening  06/05/2021    Flu vaccine (1) 09/01/2021    Lipid screen  02/08/2022    Potassium monitoring  08/03/2022    Creatinine monitoring  08/03/2022    DTaP/Tdap/Td vaccine (2 - Td or Tdap) 11/25/2027    Pneumococcal 65+ years Vaccine  Completed    Hepatitis A vaccine  Aged Out    Hib vaccine  Aged Out    Meningococcal (ACWY) vaccine  Aged Out     Recommendations for [employer] Due: see orders and patient instructions/AVS.  .   Recommended screening schedule for the next 5-10 years is provided to the patient in written form: see Patient Bo Chappell was seen today for medicare awv. Diagnoses and all orders for this visit:    Medicare annual wellness visit, subsequent    Shuffling gait  -     External Referral To Neurology    Poor balance  -     External Referral To Neurology    Chronic constipation  -     polyethylene glycol (GLYCOLAX) 17 GM/SCOOP powder;  Take 17 g by mouth daily    Routine general medical examination at a health care facility

## 2021-08-11 NOTE — PATIENT INSTRUCTIONS
· Keep a list of your medicines with you. List all of the prescription medicines, nonprescription medicines, supplements, natural remedies, and vitamins that you take. Tell your healthcare providers who treat you about all of the products you are taking. Your provider can provide you with a form to keep track of them. Just ask. · Follow the directions that come with your medicine, including information about food or alcohol. Make sure you know how and when to take your medicine. Do not take more or less than you are supposed to take. · Keep all medicines out of the reach of children. · Store medicines according to the directions on the label. · Monitor yourself. Learn to know how your body reacts to your new medicine and keep track of how it makes you feel before attempting (If your provider has allowed you to do so) to drive or go to work. · Seek emergency medical attention if you think you have used too much of this medicine. An overdose of any prescription medicine can be fatal. Overdose symptoms may include extreme drowsiness, muscle weakness, confusion, cold and clammy skin, pinpoint pupils, shallow breathing, slow heart rate, fainting, or coma. · Don't share prescription medicines with others, even when they seem to have the same symptoms. What may be good for you may be harmful to others. · If you are no longer taking a prescribed medication and you have pills left please take your pills out of their original containers. Mix crushed pills with an undesirable substance, such as cat litter or used coffee grounds. Put the mixture into a disposable container with a lid, such as an empty margarine tub, or into a sealable bag. Cover up or remove any of your personal information on the empty containers by covering it with black permanent marker or duct tape. Place the sealed container with the mixture, and the empty drug containers, in the trash.    · If you use a medication that is in the form of a patch, dispose of used patches by folding them in half so that the sticky sides meet, and then flushing them down a toilet. They should not be placed in the household trash where children or pets can find them. · If you have any questions, ask your provider or pharmacist for more information. · Be sure to keep all appointments for provider visits or tests. We are committed to providing you with the best care possible. In order to help us achieve these goals please remember to bring all medications, herbal products, and over the counter supplements with you to each visit. If your provider has ordered testing for you, please be sure to follow up with our office if you have not received results within 7 days after the testing took place. *If you receive a survey after visiting one of our offices, please take time to share your experience concerning your physician office visit. These surveys are confidential and no health information about you is shared. We are eager to improve for you and we are counting on your feedback to help make that happen. Personalized Preventive Plan for Nikolas Montanez - 8/11/2021  Medicare offers a range of preventive health benefits. Some of the tests and screenings are paid in full while other may be subject to a deductible, co-insurance, and/or copay. Some of these benefits include a comprehensive review of your medical history including lifestyle, illnesses that may run in your family, and various assessments and screenings as appropriate. After reviewing your medical record and screening and assessments performed today your provider may have ordered immunizations, labs, imaging, and/or referrals for you. A list of these orders (if applicable) as well as your Preventive Care list are included within your After Visit Summary for your review.     Other Preventive Recommendations:    A preventive eye exam performed by an eye specialist is recommended every 1-2 years to screen for glaucoma; cataracts, macular degeneration, and other eye disorders. A preventive dental visit is recommended every 6 months. Try to get at least 150 minutes of exercise per week or 10,000 steps per day on a pedometer . Order or download the FREE \"Exercise & Physical Activity: Your Everyday Guide\" from The "[employer]. Call 4-466.782.7187 or search The "[employer] online. You need 5223-1772 mg of calcium and 6602-4871 IU of vitamin D per day. It is possible to meet your calcium requirement with diet alone, but a vitamin D supplement is usually necessary to meet this goal.  When exposed to the sun, use a sunscreen that protects against both UVA and UVB radiation with an SPF of 30 or greater. Reapply every 2 to 3 hours or after sweating, drying off with a towel, or swimming. Always wear a seat belt when traveling in a car. Always wear a helmet when riding a bicycle or motorcycle.

## 2021-08-11 NOTE — PROGRESS NOTES
Chief Complaint   Patient presents with    Medicare AWV       Have you seen any other physician or provider since your last visit yes - Eye doctor for bleeding vessel     Have you had any other diagnostic tests since your last visit? yes - labs    Have you changed or stopped any medications since your last visit? no     Patient is here for his AWV. He recently was seen by the eye doctor to follow up on a vessel bleed. Patient has lost 6 pounds since his last visit. He is having problems with constipation. He does not have any stool softeners.

## 2021-08-15 NOTE — PROGRESS NOTES
Subjective   Patient ID: Ankur Camp is a 80 y.o. right hand dominant male is here today for a post-operative visit.  Injury of the Right Knee (S/P fall onto right knee this weekend, fall on Saturday and Sunday. Sunday he states he fell onto his right knee. He has occasional pain, some soreness medial right knee. )       CHIEF COMPLAINT:    Progress and recovery 2 1/2 months after right total knee replacement surgery.  Accidental fall this past weekend.    History of Present Illness      Pain controlled: [] no   [x] yes   Medication refill requested: [x] no   [] yes    Patient compliant with instructions: [] no   [x] yes   Other: Reports good progress since surgery and until he sustained and accidental fall this past weekend where he hit onto the front of his right knee.  Since then he has occasional anteromedial knee soreness, no prominent swelling and no change or limitation in his walking or routine activity.     Past Medical History:   Diagnosis Date   • Arthritis    • Cancer (CMS/[facility])     kidney/ prostate   • Diabetes mellitus (CMS/[facility])    • ED (erectile dysfunction)    • Enlarged prostate with lower urinary tract symptoms (LUTS)    • Full dentures    • History of fracture of pelvis 11/14/2019    after fall from ladder   • History of loss of consciousness 11/14/2019    after fall from ladder-flown to [facility]   • History of rib fracture 11/14/2019    after fall from ladder   • History of right shoulder fracture 1960    walking up hill, fell on gravel   • History of stress test    • History of torn meniscus of right knee 2012   • [place] (hard of hearing)     Bilateral hearing aids    • Hypertension    • Laceration of head 11/14/2019    after fall from ladder-treated at [facility]   • Left shoulder strain 2017    after fall from ladder   • Lung injury 11/14/2019    punctured right lung after falling off ladder   • Renal disorder    • Stone in kidney         Past Surgical History:  "  Procedure Laterality Date   • CHEST TUBE INSERTION  11/2019    rib fractures, punctured lung after fall from [facility]   • COLONOSCOPY     • COLONOSCOPY N/A 4/8/2019    Procedure: COLONOSCOPY;  Surgeon: Onesimo Arnold MD;  Location: [facility] ENDOSCOPY;  Service: Gastroenterology   • EYE SURGERY Right     cataract removal with IOL implant   • HERNIA REPAIR Right     multiple inguinal hernia repairs   • KNEE ARTHROSCOPY W/ MENISCECTOMY Right 05/10/2012    Partial medial and lateral menisectomy-Conrad Baird MD   • NEPHRECTOMY Right     partial right nephrectomy due to kidney stone   • PELVIC FRACTURE SURGERY Right 11/2019    multiple pelvic fractures after fall from [facility]   • SHOULDER SURGERY Right 1960    fracture surgery to remove a piece of bone- [place], [place]   • TOTAL KNEE ARTHROPLASTY Right 5/25/2021    Procedure: knee arthroplasty total;  Surgeon: Conrad Baird MD;  Location: [facility] OR;  Service: Orthopedics;  Laterality: Right;       No Known Allergies    Review of Systems   Constitutional: Negative for fever.   Musculoskeletal: Positive for arthralgias. Negative for gait problem and joint swelling.   Skin: Negative for color change and rash.   Neurological: Negative for weakness.     I have reviewed the medical and surgical history, family history, social history, medications, and/or allergies, and the review of systems of this report.    Objective   Temp 97 °F (36.1 °C)   Ht 182.9 cm (72\")   Wt 78.9 kg (174 lb)   BMI 23.60 kg/m²       Signs of infection: [x] no                    [] yes   Drainage: [x] no                    [] yes   Incision: [] healing well     [x]healed well   Motor exam intact: [] no                    [x] yes   Neurovascular exam intact: [] no                    [x] yes   Signs of compartment syndrome: [x] no                    [] yes   Signs of DVT: [x] no                    [] yes   Other: Right knee AROM 5 - 105 degrees.  No " knee effusion.  No skin breakdown.  Strong intact extensor mechanism.  Stable ligament exam, no patella apprehension.     Physical Exam  Vitals reviewed.   Constitutional:       General: He is not in acute distress.     Appearance: He is well-developed.   Skin:     General: Skin is warm and dry.      Findings: No erythema or rash.   Neurological:      Mental Status: He is alert.       Ortho Exam    Extremity DVT signs are negative on physical exam with negative Sadia sign and no calf pain  Neurologic Exam    Assessment/Plan     Independent Review of Radiographic Studies:    AP standing of both knees and lateral of the right knee, indication to evaluate status of prosthesis and joint, and compared with prior imaging, shows no acute fracture or dislocation. Good position and alignment of prosthesis with no radiographic signs of loosening.     Laboratory and Other Studies:  No new results reviewed today.     Medical Decision Making:    No complications of procedure and treatments.  Stable post-operative exam and expected early progress.  Good progress, significantly improved.  Stable knee exam and radiographs after recent fall.  Continue current treatment plan.     Procedures     Diagnoses and all orders for this visit:    1. Status post total knee replacement using cement, right (Primary)  -     XR Knee 1 or 2 View Right    2. Arthralgia of right knee  -     XR Knee 1 or 2 View Right    3. Accidental fall, initial encounter         Recommendations/Plan:     Physical therapy: []rehab facility  []outpatient referral  [x] therapy ongoing   Ultrasound: [x]not ordered         []order given to patient   Labs: [x]not ordered         []order given to patient   Weight Bearing status: []Full [x]WBAT []PWB []NWB []Other     Discussion of orthopaedic goals and activities and patient and/or guardian expressed appreciation.  Regular exercise as tolerated  Guided on proper techniques for mobility, strength, agility and/or  conditioning exercises  Physical therapy program ongoing  Take prescribed medications as instructed only as tolerated  Aftercare and dental prophylaxis for joint replacement surgery.     Exercise, medications, injections, other patient advice, and return appointment as noted.  Brace: No brace was given at today's visit.  Referral: No referrals made at today's visit.  Test/Studies: No additional studies ordered at this time.  Work/Activity Status: Usual activities, routine exercise as tolerated, no strenuous activity.    Return in 5 weeks (on 9/7/2021).  Patient is encouraged and agreeable to call or return sooner for any issues or concerns.

## 2021-08-17 ENCOUNTER — OUTSIDE FACILITY SERVICE (OUTPATIENT)
Dept: UROLOGY | Facility: CLINIC | Age: 81
End: 2021-08-17

## 2021-08-17 ENCOUNTER — TELEPHONE (OUTPATIENT)
Dept: UROLOGY | Facility: CLINIC | Age: 81
End: 2021-08-17

## 2021-08-17 DIAGNOSIS — R39.9 LOWER URINARY TRACT SYMPTOMS (LUTS): Primary | ICD-10-CM

## 2021-08-17 PROCEDURE — 52442 CYSTO INS TRNSPRSTC IMPLT EA: CPT | Performed by: UROLOGY

## 2021-08-17 PROCEDURE — 52317 REMOVE BLADDER STONE: CPT | Performed by: UROLOGY

## 2021-08-17 PROCEDURE — 52441 CYSTO INSJ TRNSPRSTC 1 IMPLT: CPT | Performed by: UROLOGY

## 2021-08-17 RX ORDER — SULFAMETHOXAZOLE AND TRIMETHOPRIM 800; 160 MG/1; MG/1
1 TABLET ORAL 2 TIMES DAILY
Qty: 6 TABLET | Refills: 0 | Status: SHIPPED | OUTPATIENT
Start: 2021-08-17 | End: 2021-09-20

## 2021-08-17 RX ORDER — ACETAMINOPHEN 325 MG/1
650 TABLET ORAL EVERY 6 HOURS
Qty: 32 TABLET | Refills: 0 | Status: SHIPPED | OUTPATIENT
Start: 2021-08-17 | End: 2021-08-21

## 2021-08-17 RX ORDER — PHENAZOPYRIDINE HYDROCHLORIDE 100 MG/1
100 TABLET, FILM COATED ORAL 3 TIMES DAILY PRN
Qty: 30 TABLET | Refills: 0 | Status: SHIPPED | OUTPATIENT
Start: 2021-08-17 | End: 2021-08-20

## 2021-08-17 NOTE — TELEPHONE ENCOUNTER
Patient had urolift this morning and expecting medicines to [facility] pharmacy.   RUBEN Grace

## 2021-08-18 ENCOUNTER — TELEPHONE (OUTPATIENT)
Dept: UROLOGY | Facility: CLINIC | Age: 81
End: 2021-08-18

## 2021-08-18 NOTE — TELEPHONE ENCOUNTER
Urolift meds were sent yesterday but myrbetriq was not. Patient says he's supposed to take x 4 weeks. If so needs rx to [facility] pharmacy.  RUBEN Grace

## 2021-08-18 NOTE — TELEPHONE ENCOUNTER
Tell him that he does not need it unless he is having lots of urgency and frequency.  It is not covered by his insurance.  If he wants it have him come by the office and will just give him a 4-week sample.

## 2021-09-07 ENCOUNTER — OFFICE VISIT (OUTPATIENT)
Dept: ORTHOPEDIC SURGERY | Facility: CLINIC | Age: 81
End: 2021-09-07

## 2021-09-07 VITALS — WEIGHT: 173 LBS | TEMPERATURE: 96.2 F | HEIGHT: 72 IN | BODY MASS INDEX: 23.43 KG/M2 | RESPIRATION RATE: 18 BRPM

## 2021-09-07 DIAGNOSIS — Z96.651 STATUS POST TOTAL KNEE REPLACEMENT USING CEMENT, RIGHT: Primary | ICD-10-CM

## 2021-09-07 PROCEDURE — 99212 OFFICE O/P EST SF 10 MIN: CPT | Performed by: PHYSICIAN ASSISTANT

## 2021-09-07 NOTE — PROGRESS NOTES
"Subjective   Patient ID: Ankur Camp is a 80 y.o. right hand dominant male  Follow-up of the Right Knee (TKA 5/25/21, completed PT with good progress, denies pain)         History of Present Illness  Patient is following up for scheduled follow-up visit in regards to a right total knee arthroplasty.  Date of surgery 5/25/2021.  He states he completed formal therapy.  His wife states he does his home exercises \"sometimes\"  Patient denies having pain.  Denies redness or warmth.  He did have a recent fall but states the right knee is doing well since.                                                 Past Medical History:   Diagnosis Date   • Arthritis    • Cancer (CMS/[facility])     kidney/ prostate   • Diabetes mellitus (CMS/[facility])    • ED (erectile dysfunction)    • Enlarged prostate with lower urinary tract symptoms (LUTS)    • Full dentures    • History of fracture of pelvis 11/14/2019    after fall from ladder   • History of loss of consciousness 11/14/2019    after fall from ladder-flown to [facility]   • History of rib fracture 11/14/2019    after fall from ladder   • History of right shoulder fracture 1960    walking up hill, fell on gravel   • History of stress test    • History of torn meniscus of right knee 2012   • [place] (hard of hearing)     Bilateral hearing aids    • Hypertension    • Laceration of head 11/14/2019    after fall from ladder-treated at [facility]   • Left shoulder strain 2017    after fall from ladder   • Lung injury 11/14/2019    punctured right lung after falling off ladder   • Renal disorder    • Stone in kidney         Past Surgical History:   Procedure Laterality Date   • CHEST TUBE INSERTION  11/2019    rib fractures, punctured lung after fall from ladder-[facility]   • COLONOSCOPY     • COLONOSCOPY N/A 4/8/2019    Procedure: COLONOSCOPY;  Surgeon: Onesimo Arnold MD;  Location: [facility] ENDOSCOPY;  Service: Gastroenterology   • EYE SURGERY Right     " cataract removal with IOL implant   • HERNIA REPAIR Right     multiple inguinal hernia repairs   • KNEE ARTHROSCOPY W/ MENISCECTOMY Right 05/10/2012    Partial medial and lateral menisectomy-Conrad Baird MD   • NEPHRECTOMY Right     partial right nephrectomy due to kidney stone   • PELVIC FRACTURE SURGERY Right 11/2019    multiple pelvic fractures after fall from ladder-[facility]   • SHOULDER SURGERY Right 1960    fracture surgery to remove a piece of bone- [place], [place]   • TOTAL KNEE ARTHROPLASTY Right 5/25/2021    Procedure: knee arthroplasty total;  Surgeon: Conrad Baird MD;  Location: [facility];  Service: Orthopedics;  Laterality: Right;       Family History   Problem Relation Age of Onset   • Breast cancer Mother    • Cancer Sister    • Cancer Brother    • Cancer Sister    • Dementia Brother    • No Known Problems Brother    • No Known Problems Brother        Social History     Socioeconomic History   • Marital status:      Spouse name: Not on file   • Number of children: Not on file   • Years of education: Not on file   • Highest education level: Not on file   Tobacco Use   • Smoking status: Current Every Day Smoker     Years: 20.00     Types: Pipe   • Smokeless tobacco: Never Used   • Tobacco comment: smokes his pipe daily   Substance and Sexual Activity   • Alcohol use: Not Currently   • Drug use: No   • Sexual activity: Defer         Current Outpatient Medications:   •  ascorbic acid (VITAMIN C) 1000 MG tablet, Take 1,000 mg by mouth Daily., Disp: , Rfl:   •  cholecalciferol (VITAMIN D3) 25 MCG (1000 UT) tablet, Take 1,000 Units by mouth Daily., Disp: , Rfl:   •  docusate sodium 100 MG capsule, Take 1 capsule by mouth 2 (Two) Times a Day., Disp: 20 capsule, Rfl: 0  •  glucose blood test strip, 1 each by In Vitro route daily Daily testing, DX:250.00, Disp: , Rfl:   •  lisinopril (PRINIVIL,ZESTRIL) 10 MG tablet, Take 10 mg by mouth Daily., Disp: , Rfl:   •  lovastatin  "(MEVACOR) 40 MG tablet, Take 40 mg by mouth Every Night., Disp: , Rfl:   •  metFORMIN (GLUCOPHAGE) 500 MG tablet, Take 500 mg by mouth Daily With Breakfast., Disp: , Rfl:   •  multivitamin with minerals (PRESERVISION AREDS PO), Take 1 tablet by mouth 2 (Two) Times a Day., Disp: , Rfl:   •  NON FORMULARY, AREDS-2 vitamin , Disp: , Rfl:   •  nystatin (MYCOSTATIN) 023739 UNIT/GM powder, APPLY 3 TIMES DAILY., Disp: , Rfl:   •  sulfamethoxazole-trimethoprim (Bactrim DS) 800-160 MG per tablet, Take 1 tablet by mouth 2 (Two) Times a Day. Start taking the morning of the procedure, Disp: 6 tablet, Rfl: 0  •  tamsulosin (FLOMAX) 0.4 MG capsule 24 hr capsule, Take 1 capsule by mouth Daily., Disp: , Rfl:   •  vitamin B-12 (CYANOCOBALAMIN) 1000 MCG tablet, Take 1,000 mcg by mouth Daily., Disp: , Rfl:     No Known Allergies    Review of Systems   Musculoskeletal: Negative for arthralgias.   Skin: Negative.    Neurological: Negative for numbness.   All other systems reviewed and are negative.      I have reviewed the medical and surgical history, family history, social history, medications, and/or allergies, and the review of systems of this report.    Objective   Temp 96.2 °F (35.7 °C) (Skin)   Resp 18   Ht 182.9 cm (72\")   Wt 78.5 kg (173 lb)   BMI 23.46 kg/m²    Physical Exam  Vitals and nursing note reviewed.   Constitutional:       Appearance: Normal appearance.   Pulmonary:      Effort: Pulmonary effort is normal.   Musculoskeletal:      Right knee: No effusion, erythema or ecchymosis. Normal range of motion. No tenderness.   Neurological:      Mental Status: He is alert and oriented to person, place, and time.   Psychiatric:         Behavior: Behavior normal.       Right Knee Exam     Range of Motion   Right knee extension: 8.   Right knee flexion: 110.     Other   Erythema: absent  Scars: present  Sensation: normal  Pulse: present  Effusion: no effusion present           Extremity DVT signs are negative on physical " exam with negative Sadia sign, no calf pain, no palpable cords and no skin tone change   Neurologic Exam     Mental Status   Oriented to person, place, and time.       Right knee extensor mechanism is intact    Gait is WNL     Assessment/Plan   Independent Review of Radiographic Studies:    No new imaging done today.  Reviewed imaging with patient at a prior visit.      Procedures       Diagnoses and all orders for this visit:    1. Status post total knee replacement using cement, right (Primary)       Orthopedic activities reviewed and patient expressed appreciation  Discussion of orthopedic goals  Risk, benefits, and merits of treatment alternatives reviewed with the patient and questions answered    Recommendations/Plan:  Exercise, medications, injections, other patient advice, and return appointment as noted.  Patient is encouraged to call or return for any issues or concerns.    Follow up in 4 months    Patient agreeable to call or return sooner for any concerns.               EMR Dragon-transcription disclaimer:  This encounter note is an electronic transcription of spoken language to printed text.  Electronic transcription of spoken language may permit erroneous or at times nonsensical words or phrases to be inadvertently transcribed.  Although I have reviewed the note for such errors, some may still exist

## 2021-09-20 ENCOUNTER — OFFICE VISIT (OUTPATIENT)
Dept: UROLOGY | Facility: CLINIC | Age: 81
End: 2021-09-20

## 2021-09-20 VITALS
DIASTOLIC BLOOD PRESSURE: 90 MMHG | WEIGHT: 173 LBS | HEIGHT: 72 IN | HEART RATE: 77 BPM | SYSTOLIC BLOOD PRESSURE: 150 MMHG | BODY MASS INDEX: 23.43 KG/M2 | TEMPERATURE: 97.5 F

## 2021-09-20 DIAGNOSIS — C61 PROSTATE CANCER (HCC): Primary | ICD-10-CM

## 2021-09-20 DIAGNOSIS — N40.0 BENIGN PROSTATIC HYPERPLASIA WITHOUT URINARY OBSTRUCTION: ICD-10-CM

## 2021-09-20 PROCEDURE — 51798 US URINE CAPACITY MEASURE: CPT | Performed by: UROLOGY

## 2021-09-20 PROCEDURE — 99214 OFFICE O/P EST MOD 30 MIN: CPT | Performed by: UROLOGY

## 2021-09-20 NOTE — PROGRESS NOTES
Chief complaint  Lower urinary tract symptoms    HPI  Ms. Camp is a 80 y.o. male with history below in assessment, who presents for follow up.     At this visit his IPSS is 10.  We do not have a score prior to surgery. He is pleased.    For historical review,  History of urinary retention after knee surgery, acute worsening of chronic LUTS, weak stream, UUI.     Past Medical History:   Diagnosis Date   • Arthritis    • Cancer (CMS/[facility]CC)     kidney/ prostate   • Diabetes mellitus (CMS/[facility]CC)    • ED (erectile dysfunction)    • Enlarged prostate with lower urinary tract symptoms (LUTS)    • Full dentures    • History of fracture of pelvis 11/14/2019    after fall from ladder   • History of loss of consciousness 11/14/2019    after fall from ladder-flown to [facility]   • History of rib fracture 11/14/2019    after fall from ladder   • History of right shoulder fracture 1960    walking up hill, fell on gravel   • History of stress test    • History of torn meniscus of right knee 2012   • [place] (hard of hearing)     Bilateral hearing aids    • Hypertension    • Laceration of head 11/14/2019    after fall from ladder-treated at [facility]   • Left shoulder strain 2017    after fall from ladder   • Lung injury 11/14/2019    punctured right lung after falling off ladder   • Renal disorder    • Stone in kidney        Past Surgical History:   Procedure Laterality Date   • CHEST TUBE INSERTION  11/2019    rib fractures, punctured lung after fall from [place]   • COLONOSCOPY     • COLONOSCOPY N/A 4/8/2019    Procedure: COLONOSCOPY;  Surgeon: Onesimo Arnold MD;  Location: [facility] ENDOSCOPY;  Service: Gastroenterology   • EYE SURGERY Right     cataract removal with IOL implant   • HERNIA REPAIR Right     multiple inguinal hernia repairs   • KNEE ARTHROSCOPY W/ MENISCECTOMY Right 05/10/2012    Partial medial and lateral menisectomy-Conrad Baird MD   • NEPHRECTOMY Right     partial  "right nephrectomy due to kidney stone   • PELVIC FRACTURE SURGERY Right 11/2019    multiple pelvic fractures after fall from ladder-[facility]   • SHOULDER SURGERY Right 1960    fracture surgery to remove a piece of bone- [place], [place]   • TOTAL KNEE ARTHROPLASTY Right 5/25/2021    Procedure: knee arthroplasty total;  Surgeon: Conrad Baird MD;  Location: [facility];  Service: Orthopedics;  Laterality: Right;         Current Outpatient Medications:   •  ascorbic acid (VITAMIN C) 1000 MG tablet, Take 1,000 mg by mouth Daily., Disp: , Rfl:   •  cholecalciferol (VITAMIN D3) 25 MCG (1000 UT) tablet, Take 1,000 Units by mouth Daily., Disp: , Rfl:   •  docusate sodium 100 MG capsule, Take 1 capsule by mouth 2 (Two) Times a Day., Disp: 20 capsule, Rfl: 0  •  glucose blood test strip, 1 each by In Vitro route daily Daily testing, DX:250.00, Disp: , Rfl:   •  lisinopril (PRINIVIL,ZESTRIL) 10 MG tablet, Take 10 mg by mouth Daily., Disp: , Rfl:   •  lovastatin (MEVACOR) 40 MG tablet, Take 40 mg by mouth Every Night., Disp: , Rfl:   •  metFORMIN (GLUCOPHAGE) 500 MG tablet, Take 500 mg by mouth Daily With Breakfast., Disp: , Rfl:   •  multivitamin with minerals (PRESERVISION AREDS PO), Take 1 tablet by mouth 2 (Two) Times a Day., Disp: , Rfl:   •  NON FORMULARY, AREDS-2 vitamin , Disp: , Rfl:   •  nystatin (MYCOSTATIN) 920539 UNIT/GM powder, APPLY 3 TIMES DAILY., Disp: , Rfl:   •  sulfamethoxazole-trimethoprim (Bactrim DS) 800-160 MG per tablet, Take 1 tablet by mouth 2 (Two) Times a Day. Start taking the morning of the procedure, Disp: 6 tablet, Rfl: 0  •  tamsulosin (FLOMAX) 0.4 MG capsule 24 hr capsule, Take 1 capsule by mouth Daily., Disp: , Rfl:   •  vitamin B-12 (CYANOCOBALAMIN) 1000 MCG tablet, Take 1,000 mcg by mouth Daily., Disp: , Rfl:      Physical Exam  Visit Vitals  /90   Pulse 77   Temp 97.5 °F (36.4 °C)   Ht 182.9 cm (72\")   Wt 78.5 kg (173 lb)   BMI 23.46 kg/m²       Labs  Brief Urine " Lab Results  (Last result in the past 365 days)      Color   Clarity   Blood   Leuk Est   Nitrite   Protein   CREAT   Urine HCG        05/25/21 0705 Yellow Clear Negative Negative Negative Negative               Lab Results   Component Value Date    GLUCOSE 190 (H) 05/26/2021    CALCIUM 8.5 (L) 05/26/2021     05/26/2021    K 3.6 05/26/2021    CO2 25.9 05/26/2021     05/26/2021    BUN 16 05/26/2021    CREATININE 1.01 05/26/2021    EGFRIFAFRI 108 11/05/2020    EGFRIFNONA 71 05/26/2021    BCR 15.8 05/26/2021    ANIONGAP 8.1 05/26/2021       Lab Results   Component Value Date    WBC 6.5 08/03/2021    HGB 14.5 08/03/2021    HCT 44.7 08/03/2021    MCV 96.1 08/03/2021     08/03/2021            Lab Results   Component Value Date    PSA 8.2 (H) 08/18/2020    PSA 5.910 (H) 02/12/2020    PSA 7.510 (H) 08/20/2019       No results found for: TESTOSTERONE     Radiographic Studies  XR Knee 1 or 2 View Right  Result Date: 5/25/2021  Postoperative changes of right knee arthroplasty.         Images were reviewed, interpreted, and dictated by Dr. Reed Newton M.D. Transcribed by Lindsay Thomas PA-C.  This report was finalized on 5/25/2021 11:58 AM by Reed Newton M.D..    US Venous Doppler Lower Extremity Right (duplex)  Result Date: 6/8/2021  No evidence of deep venous thrombosis.      Images were reviewed, interpreted, and dictated by Dr. Reed Newton M.D. Transcribed by Lindsay Thomas PA-C.  This report was finalized on 6/8/2021 1:04 PM by Reed Newton M.D..    PVR  Post-void residual performed with ultrasound scanner by staff and interpreted by santiago Boyle mL      I have reviewed above labs and imaging.     Assessment  80 y.o. male with History of urinary retention after knee surgery, acute worsening of chronic LUTS, weak stream, UUI. He is s/p Urolift.   He has a Hx of Prostate cancer and is on AS. 1 core makeda 3+4=7. Both LUTS and PCa are now stable.     Plan  1. FU in 6 mo w/ PSA,  IPSS, PVR  2. Stop flomax - he should now be off all urinary RX

## 2021-10-22 DIAGNOSIS — I10 ESSENTIAL HYPERTENSION: ICD-10-CM

## 2021-10-22 RX ORDER — LISINOPRIL 10 MG/1
TABLET ORAL
Qty: 90 TABLET | Refills: 3 | Status: SHIPPED | OUTPATIENT
Start: 2021-10-22 | End: 2021-12-13 | Stop reason: DRUGHIGH

## 2021-11-02 ENCOUNTER — OFFICE VISIT (OUTPATIENT)
Dept: NEUROLOGY | Facility: CLINIC | Age: 81
End: 2021-11-02

## 2021-11-02 ENCOUNTER — HOSPITAL ENCOUNTER (OUTPATIENT)
Dept: GENERAL RADIOLOGY | Facility: HOSPITAL | Age: 81
Discharge: HOME | End: 2021-11-02
Admitting: NURSE PRACTITIONER

## 2021-11-02 VITALS
DIASTOLIC BLOOD PRESSURE: 84 MMHG | OXYGEN SATURATION: 98 % | WEIGHT: 177 LBS | HEART RATE: 73 BPM | BODY MASS INDEX: 23.98 KG/M2 | TEMPERATURE: 97.8 F | SYSTOLIC BLOOD PRESSURE: 180 MMHG | HEIGHT: 72 IN

## 2021-11-02 DIAGNOSIS — R25.3 FASCICULATION OF LOWER EXTREMITY: ICD-10-CM

## 2021-11-02 DIAGNOSIS — R25.3 FASCICULATION OF LOWER EXTREMITY: Primary | ICD-10-CM

## 2021-11-02 DIAGNOSIS — Z87.81 HISTORY OF PELVIC FRACTURE: ICD-10-CM

## 2021-11-02 PROCEDURE — 99213 OFFICE O/P EST LOW 20 MIN: CPT | Performed by: NURSE PRACTITIONER

## 2021-11-02 PROCEDURE — 72070 X-RAY EXAM THORAC SPINE 2VWS: CPT

## 2021-11-02 RX ORDER — ASPIRIN 81 MG/1
81 TABLET, CHEWABLE ORAL DAILY
COMMUNITY

## 2021-11-02 NOTE — PROGRESS NOTES
New Neurology Patient Office Visit      Patient Name: Ankur Camp    Referring Physician: Magaly Prabhakar AP*    Chief Complaint:    Chief Complaint   Patient presents with   • Gait Problem     Pt in office for leg trembling in left leg, having some balance problems.         History of Present Illness: Ankur Camp is a pleasant 81 y.o. male who is here today to establish care with Neurology.  He was referred for evaluation of LLE tremor.  Present with wife today.   'this leg (left) has been jumping'  Has had had previous right TKR in 2015, since then has been having 'jumping' in leg. This can worsen after sitting for awhile. When he stand, it will quiver. When he stands and tries to turn, he requires several short steps with left leg to complete turn.   He did fall from ladder November 2019, he had several fractures in pelvis requiring surgical repair. Wife is unaware of any spinal fractures, never wore TLSO brace.    He does feel that once he begins walking, he is able to walk as fast as he wants. Has not noticed any tremor in either upper extremity. No changes in sleep behavior. No drooling, no vocal changes (has always had soft voice), but does have difficulty swallowing large pills. Uses docusate prn for constipation, but estimates only once per month.     He does report having a brother with Parkinson's    The following portions of the patient's history were reviewed and updated as appropriate: allergies, current medications, past family history, past medical history, past social history, past surgical history and problem list.    Subjective      Review of Systems:   Review of Systems   HENT: Positive for hearing loss. Negative for voice change.    Eyes: Positive for visual disturbance.   Musculoskeletal: Positive for gait problem.   Neurological: Positive for weakness.   All other systems reviewed and are negative.    Past Medical History:   Past Medical History:   Diagnosis Date   •  Arthritis    • Cancer (HCC)     kidney/ prostate   • Diabetes mellitus (HCC)    • ED (erectile dysfunction)    • Enlarged prostate with lower urinary tract symptoms (LUTS)    • Full dentures    • History of fracture of pelvis 11/14/2019    after fall from ladder   • History of loss of consciousness 11/14/2019    after fall from ladder-flown to [facility]   • History of rib fracture 11/14/2019    after fall from ladder   • History of right shoulder fracture 1960    walking up hill, fell on gravel   • History of stress test    • History of torn meniscus of right knee 2012   • [place] IRA (hard of hearing)     Bilateral hearing aids    • Hypertension    • Laceration of head 11/14/2019    after fall from ladder-treated at [facility]   • Left shoulder strain 2017    after fall from ladder   • Lung injury 11/14/2019    punctured right lung after falling off ladder   • Renal disorder    • Stone in kidney        Past Surgical History:   Past Surgical History:   Procedure Laterality Date   • CHEST TUBE INSERTION  11/2019    rib fractures, punctured lung after fall from [facility]   • COLONOSCOPY     • COLONOSCOPY N/A 4/8/2019    Procedure: COLONOSCOPY;  Surgeon: Onesimo Arnold MD;  Location: [facility] ENDOSCOPY;  Service: Gastroenterology   • EYE SURGERY Right     cataract removal with IOL implant   • HERNIA REPAIR Right     multiple inguinal hernia repairs   • KNEE ARTHROSCOPY W/ MENISCECTOMY Right 05/10/2012    Partial medial and lateral menisectomy-Conrad Baird MD   • NEPHRECTOMY Right     partial right nephrectomy due to kidney stone   • PELVIC FRACTURE SURGERY Right 11/2019    multiple pelvic fractures after fall from [facility]   • SHOULDER SURGERY Right 1960    fracture surgery to remove a piece of bone- [place], [place]   • TOTAL KNEE ARTHROPLASTY Right 5/25/2021    Procedure: knee arthroplasty total;  Surgeon: Conrad Baird MD;  Location: [facility] OR;   "Service: Orthopedics;  Laterality: Right;       Family History:   Family History   Problem Relation Age of Onset   • Breast cancer Mother    • Cancer Sister    • Cancer Brother    • Cancer Sister    • Dementia Brother    • No Known Problems Brother    • No Known Problems Brother        Social History:   Social History     Socioeconomic History   • Marital status:    Tobacco Use   • Smoking status: Current Every Day Smoker     Years: 20.00     Types: Pipe   • Smokeless tobacco: Never Used   • Tobacco comment: smokes his pipe daily   Substance and Sexual Activity   • Alcohol use: Not Currently   • Drug use: No   • Sexual activity: Defer       Medications:     Current Outpatient Medications:   •  ascorbic acid (VITAMIN C) 1000 MG tablet, Take 1,000 mg by mouth Daily., Disp: , Rfl:   •  aspirin 81 MG chewable tablet, Chew 81 mg Daily., Disp: , Rfl:   •  cholecalciferol (VITAMIN D3) 25 MCG (1000 UT) tablet, Take 1,000 Units by mouth Daily., Disp: , Rfl:   •  glucose blood test strip, 1 each by In Vitro route daily Daily testing, DX:250.00, Disp: , Rfl:   •  lisinopril (PRINIVIL,ZESTRIL) 10 MG tablet, Take 10 mg by mouth Daily., Disp: , Rfl:   •  lovastatin (MEVACOR) 40 MG tablet, Take 40 mg by mouth Every Night., Disp: , Rfl:   •  metFORMIN (GLUCOPHAGE) 500 MG tablet, Take 500 mg by mouth Daily With Breakfast., Disp: , Rfl:   •  multivitamin with minerals (PRESERVISION AREDS PO), Take 1 tablet by mouth 2 (Two) Times a Day., Disp: , Rfl:   •  NON FORMULARY, AREDS-2 vitamin , Disp: , Rfl:   •  nystatin (MYCOSTATIN) 840530 UNIT/GM powder, APPLY 3 TIMES DAILY., Disp: , Rfl:   •  vitamin B-12 (CYANOCOBALAMIN) 1000 MCG tablet, Take 1,000 mcg by mouth Daily., Disp: , Rfl:     Allergies:   No Known Allergies    Objective     Physical Exam:  Vital Signs:   Vitals:    11/02/21 1100   BP: 180/84   Pulse: 73   Temp: 97.8 °F (36.6 °C)   SpO2: 98%   Weight: 80.3 kg (177 lb)   Height: 182.9 cm (72\")   PainSc: 0-No pain "       Physical Exam  Vitals and nursing note reviewed. Exam conducted with a chaperone present ( wife present).   Neck:      Vascular: No carotid bruit.   Cardiovascular:      Rate and Rhythm: Regular rhythm.      Heart sounds: Normal heart sounds.   Pulmonary:      Effort: Pulmonary effort is normal.      Breath sounds: Normal breath sounds.   Musculoskeletal:      Left shoulder: Decreased range of motion.   Neurological:      Mental Status: He is oriented to person, place, and time.      Coordination: Romberg Test normal.      Deep Tendon Reflexes: Strength normal.      Reflex Scores:       Bicep reflexes are 2+ on the right side and 2+ on the left side.       Patellar reflexes are 1+ on the right side and 0 on the left side.  Psychiatric:         Speech: Speech normal.       Neurologic Exam     Mental Status   Oriented to person, place, and time.   Attention: normal. Concentration: normal.   Speech: speech is normal   Level of consciousness: alert  Normal comprehension.     Cranial Nerves   Cranial nerves II through XII intact.     CN II   Visual acuity: normal with correction    Motor Exam   Muscle bulk: normal  Overall muscle tone: normal  Right arm pronator drift: absent  Left arm pronator drift: absent    Strength   Strength 5/5 throughout.     Sensory Exam   Light touch normal.     Gait, Coordination, and Reflexes     Gait  Gait: non-neurologic    Coordination   Romberg: negative    Tremor   Resting tremor: absent  Action tremor: left leg    Reflexes   Right biceps: 2+  Left biceps: 2+  Right patellar: 1+  Left patellar: 0  Able to stand on 1st attempt with UE assist  Gait antalgic but fluid  Shuffle steps with turn only      Results Review:   I reviewed the patient's new clinical results.  I have reviewed the patient's other medical records to include, labs, radiology and referrals.     Assessment / Plan      Assessment/Plan:   Diagnoses and all orders for this visit:    1. Fasciculation of lower extremity  (Primary)  -     EMG & Nerve Conduction Test; Future  -     XR spine thoracic 2 vw; Future    2. History of pelvic fracture  -     EMG & Nerve Conduction Test; Future    Patient does not appear Parkinsonian on exam today, however we discussed that it could be mild and will consider levodopa trial if EMG and spinal imaging unremarkable. Possibly UMN lesion from previous thoracic spine fracture, obtain Xray      Follow Up:   Return in about 4 weeks (around 11/30/2021) for Next scheduled follow up.     JEANETTE Cronin  [facility]   AS THE PROVIDER, I PERSONALLY WORE PPE DURING ENTIRE FACE TO FACE ENCOUNTER IN CLINIC WITH THE PATIENT. PATIENT ALSO WORE PPE DURING ENTIRE FACE TO FACE ENCOUNTER EXCEPT FOR A MAX OF 30 SECONDS DURING NEUROLOGICAL EVALUATION OF CRANIAL NERVES AND THEN MASK WAS PLACED BACK OVER PATIENT FACE FOR REMAINDER OF VISIT. I WASHED MY HANDS BEFORE AND AFTER VISIT.      Please note that portions of this note may have been completed with a voice recognition program. Efforts were made to edit the dictations, but occasionally words are mistranscribed.

## 2021-11-11 ENCOUNTER — OFFICE VISIT (OUTPATIENT)
Dept: CARDIOLOGY | Facility: CLINIC | Age: 81
End: 2021-11-11

## 2021-11-11 VITALS
HEIGHT: 72 IN | DIASTOLIC BLOOD PRESSURE: 98 MMHG | OXYGEN SATURATION: 94 % | WEIGHT: 178.6 LBS | SYSTOLIC BLOOD PRESSURE: 178 MMHG | HEART RATE: 92 BPM | BODY MASS INDEX: 24.19 KG/M2

## 2021-11-11 DIAGNOSIS — I25.84 CORONARY ARTERY CALCIFICATION: Primary | ICD-10-CM

## 2021-11-11 DIAGNOSIS — I25.10 CORONARY ARTERY CALCIFICATION: Primary | ICD-10-CM

## 2021-11-11 DIAGNOSIS — E78.2 MIXED HYPERLIPIDEMIA: ICD-10-CM

## 2021-11-11 DIAGNOSIS — I10 PRIMARY HYPERTENSION: ICD-10-CM

## 2021-11-11 DIAGNOSIS — I71.20 THORACIC AORTIC ANEURYSM WITHOUT RUPTURE (HCC): ICD-10-CM

## 2021-11-11 PROCEDURE — 99214 OFFICE O/P EST MOD 30 MIN: CPT | Performed by: INTERNAL MEDICINE

## 2021-11-11 RX ORDER — BISOPROLOL FUMARATE 5 MG/1
5 TABLET, FILM COATED ORAL DAILY
Qty: 90 TABLET | Refills: 4 | Status: SHIPPED | OUTPATIENT
Start: 2021-11-11 | End: 2022-11-17

## 2021-11-11 NOTE — PROGRESS NOTES
[facility]  Office visit  Ankur Camp  1940  [phone]  There is no work phone number on file.    VISIT DATE:  11/11/2021    PCP: Magaly Prabhakar APRN [address] [place] [address]    CC:  Chief Complaint   Patient presents with   • Coronary artery calcification       Previous cardiac studies and procedures:  June 2020 CT chest: Marked coronary calcifications.  Ascending aorta measures 3.8 x 3.8 cm.  Left ventricular enlargement. left ventricular enlargement.    October 2020  [place] scan myocardial perfusion imaging   Diaphragmatic attenuation, otherwise normal myocardial perfusion.   Left ventricular ejection fraction of 63 %.   Normal LV size and systolic function.   Overall findings represent a low risk scan.    Echo   Ejection fraction is visually estimated to be 60-65 %.   Mild septal asymmetric hypertrophy.   Diastolic filling parameters suggests grade I diastolic dysfunction .    ASSESSMENT:   Diagnosis Plan   1. Coronary artery calcification     2. Mixed hyperlipidemia     3. Primary hypertension     4. Thoracic aortic aneurysm without rupture (HCC)         PLAN:  Thoracic ascending aortic aneurysm: Small.  Afterload well controlled, goal less than 130/80 mmHg, ideally less than 120/80 mmHg.   Will arrange for surveillance CT imaging in 1 year.    Coronary calcification: Continue aggressive risk factor modification.  Goal LDL less than 100.  Continue current medical therapy.  Negative ischemia evaluation.     Right bundle branch block: No significant functional or structural heart disease noted on transthoracic echo.    Hypertension: Goal less than 130/80 mmHg. Continue lisinopril 10 mg p.o. daily, adding bisoprolol 5 mg p.o. daily. We will continue to trend clinical response.    Subjective  Interval assessment: Gradually improving functional capacity after recent total knee replacement, using cane for ambulation. Denies chest pain, palpitations or dyspnea  "on exertion. Has not been checking home blood pressures recently.     Initial evaluation:79-year-old active smoker with a history of hypertension, dyslipidemia and prediabetes presents for evaluation prior to potential total knee replacement.  He appears to be fairly limited due to knee discomfort.  Does not typically complete at least 4 METS of exertion on a regular basis.  Denies chest discomfort.  Mild shortness of breath with exertion.  Denies sustained palpitations, presyncope or syncope.  Twelve-lead EKG today reveals normal sinus rhythm with right bundle branch block.  Currently smokes pipes, has smoked previously cigars and cigarettes intermittently over 40 years.  Underwent a screening CT chest earlier this year which revealed small ascending aortic aneurysm, marked coronary calcification LV enlargement.    PHYSICAL EXAMINATION:  Vitals:    11/11/21 1132   BP: 178/98   BP Location: Right arm   Patient Position: Sitting   Weight: 81 kg (178 lb 9.6 oz)   Height: 182.9 cm (72\")     General Appearance:    Alert, cooperative, no distress, appears stated age   Head:    Normocephalic, without obvious abnormality, atraumatic   Eyes:    conjunctiva/corneas clear   Nose:   Nares normal, septum midline, mucosa normal, no drainage   Throat:   Lips, teeth and gums normal   Neck:   Supple, symmetrical, trachea midline, no carotid    bruit or JVD   Lungs:     Clear to auscultation bilaterally, respirations unlabored   Chest Wall:    No tenderness or deformity    Heart:    Regular rate and rhythm, S1 and S2 normal, no murmur, rub   or gallop, normal carotid impulse bilaterally without bruit.   Abdomen:     Soft, non-tender   Extremities:   Extremities normal, atraumatic, no cyanosis or edema   Pulses:   2+ and symmetric all extremities   Skin:   Skin color, texture, turgor normal, no rashes or lesions       Diagnostic Data:  Procedures  Lab Results   Component Value Date    CHLPL 162 02/08/2021    TRIG 114 02/08/2021    " HDL 56 02/08/2021     Lab Results   Component Value Date    GLUCOSE 190 (H) 05/26/2021    BUN 16 05/26/2021    CREATININE 1.01 05/26/2021     05/26/2021    K 3.6 05/26/2021     05/26/2021    CO2 25.9 05/26/2021     Lab Results   Component Value Date    HGBA1C 7.1 (H) 08/03/2021     Lab Results   Component Value Date    WBC 6.5 08/03/2021    HGB 14.5 08/03/2021    HCT 44.7 08/03/2021     08/03/2021       Allergies  No Known Allergies    Current Medications    Current Outpatient Medications:   •  ascorbic acid (VITAMIN C) 1000 MG tablet, Take 1,000 mg by mouth Daily., Disp: , Rfl:   •  aspirin 81 MG chewable tablet, Chew 81 mg Daily., Disp: , Rfl:   •  cholecalciferol (VITAMIN D3) 25 MCG (1000 UT) tablet, Take 1,000 Units by mouth Daily., Disp: , Rfl:   •  glucose blood test strip, 1 each by In Vitro route daily Daily testing, DX:250.00, Disp: , Rfl:   •  lisinopril (PRINIVIL,ZESTRIL) 10 MG tablet, Take 10 mg by mouth Daily., Disp: , Rfl:   •  lovastatin (MEVACOR) 40 MG tablet, Take 40 mg by mouth Every Night., Disp: , Rfl:   •  metFORMIN (GLUCOPHAGE) 500 MG tablet, Take 500 mg by mouth Daily With Breakfast., Disp: , Rfl:   •  Multiple Vitamins-Minerals (ICAPS AREDS 2 PO), Take  by mouth., Disp: , Rfl:   •  multivitamin with minerals (PRESERVISION AREDS PO), Take 1 tablet by mouth 2 (Two) Times a Day., Disp: , Rfl:   •  nystatin (MYCOSTATIN) 916757 UNIT/GM powder, APPLY 3 TIMES DAILY., Disp: , Rfl:   •  vitamin B-12 (CYANOCOBALAMIN) 1000 MCG tablet, Take 1,000 mcg by mouth Daily., Disp: , Rfl:           ROS  Review of Systems   Cardiovascular: Negative for chest pain, dyspnea on exertion, irregular heartbeat, leg swelling and palpitations.   Respiratory: Negative for cough, shortness of breath and wheezing.          SOCIAL HX  Social History     Socioeconomic History   • Marital status:    Tobacco Use   • Smoking status: Current Every Day Smoker     Years: 20.00     Types: Pipe   • Smokeless  tobacco: Never Used   • Tobacco comment: smokes his pipe daily   Vaping Use   • Vaping Use: Never used   Substance and Sexual Activity   • Alcohol use: Not Currently   • Drug use: No   • Sexual activity: Defer       FAMILY HX  Family History   Problem Relation Age of Onset   • Breast cancer Mother    • Cancer Sister    • Cancer Brother    • Cancer Sister    • Dementia Brother    • No Known Problems Brother    • No Known Problems Brother              Cliff Lema III, MD, FACC

## 2021-12-09 ENCOUNTER — OFFICE VISIT (OUTPATIENT)
Dept: NEUROLOGY | Facility: CLINIC | Age: 81
End: 2021-12-09

## 2021-12-09 VITALS
TEMPERATURE: 97 F | BODY MASS INDEX: 24.38 KG/M2 | HEIGHT: 72 IN | SYSTOLIC BLOOD PRESSURE: 142 MMHG | HEART RATE: 74 BPM | WEIGHT: 180 LBS | OXYGEN SATURATION: 99 % | DIASTOLIC BLOOD PRESSURE: 72 MMHG

## 2021-12-09 DIAGNOSIS — R25.3 FASCICULATION OF LOWER EXTREMITY: Primary | ICD-10-CM

## 2021-12-09 PROCEDURE — 99213 OFFICE O/P EST LOW 20 MIN: CPT | Performed by: NURSE PRACTITIONER

## 2021-12-09 NOTE — PROGRESS NOTES
Follow Up Neurology Office Visit      Patient Name: Ankur Camp    Referring Physician: No ref. provider found    Chief Complaint:    Chief Complaint   Patient presents with   • Gait Problem     Pt in office to follow up on gait        History of Present Illness: Ankur Camp is a 81 y.o. male who is here to follow up with Neurology for lower extremity tremors.  He is accompanied by his wife today.  No difference from previous visit. Has not been able to have EMG, scheduled for 1/5/2022.  No falls or increased difficulty with walking.    The following portions of the patient's history were reviewed and updated as appropriate: allergies, current medications, past family history, past medical history, past social history, past surgical history and problem list.    Subjective     Review of Systems:   Review of Systems   Musculoskeletal: Positive for gait problem.   Neurological: Positive for tremors.   All other systems reviewed and are negative.    Medications:     Current Outpatient Medications:   •  ascorbic acid (VITAMIN C) 1000 MG tablet, Take 1,000 mg by mouth Daily., Disp: , Rfl:   •  aspirin 81 MG chewable tablet, Chew 81 mg Daily., Disp: , Rfl:   •  bisoprolol (ZEBeta) 5 MG tablet, Take 1 tablet by mouth Daily., Disp: 90 tablet, Rfl: 4  •  cholecalciferol (VITAMIN D3) 25 MCG (1000 UT) tablet, Take 1,000 Units by mouth Daily., Disp: , Rfl:   •  glucose blood test strip, 1 each by In Vitro route daily Daily testing, DX:250.00, Disp: , Rfl:   •  lovastatin (MEVACOR) 40 MG tablet, Take 40 mg by mouth Every Night., Disp: , Rfl:   •  metFORMIN (GLUCOPHAGE) 500 MG tablet, Take 500 mg by mouth Daily With Breakfast., Disp: , Rfl:   •  Multiple Vitamins-Minerals (ICAPS AREDS 2 PO), Take  by mouth., Disp: , Rfl:   •  multivitamin with minerals (PRESERVISION AREDS PO), Take 1 tablet by mouth 2 (Two) Times a Day., Disp: , Rfl:   •  nystatin (MYCOSTATIN) 374145 UNIT/GM powder, APPLY 3 TIMES DAILY., Disp: ,  "Rfl:   •  vitamin B-12 (CYANOCOBALAMIN) 1000 MCG tablet, Take 1,000 mcg by mouth Daily., Disp: , Rfl:     Allergies:   No Known Allergies    Objective     Physical Exam:  Vital Signs:   Vitals:    12/09/21 0919   BP: 142/72   Pulse: 74   Temp: 97 °F (36.1 °C)   SpO2: 99%   Weight: 81.6 kg (180 lb)   Height: 182.9 cm (72\")   PainSc: 0-No pain       Physical Exam  Vitals and nursing note reviewed. Exam conducted with a chaperone present ( wife).   Pulmonary:      Effort: Pulmonary effort is normal.   Neurological:      Mental Status: He is oriented to person, place, and time. Mental status is at baseline.      Deep Tendon Reflexes: Strength normal.   Psychiatric:         Mood and Affect: Mood normal.         Speech: Speech normal.         Behavior: Behavior normal.         Thought Content: Thought content normal.         Judgment: Judgment normal.         Neurologic Exam     Mental Status   Oriented to person, place, and time.   Attention: normal. Concentration: normal.   Speech: speech is normal   Level of consciousness: alert  Normal comprehension.     Cranial Nerves   Cranial nerves II through XII intact.     Motor Exam   Muscle bulk: normal    Strength   Strength 5/5 throughout.     Gait, Coordination, and Reflexes     Gait  Gait: shuffling (intermittent, only with turn)    Tremor   Resting tremor: absent    Results Review:   I reviewed the patient's new clinical results.  I have reviewed the patient's other medical records to include, labs, radiology and referrals.     Assessment / Plan      Assessment/Plan:   Diagnoses and all orders for this visit:    1. Fasciculation of lower extremity (Primary)    Patient continues to have intermittent fasciculation in the lower extremity, imaging results reviewed with patient and his wife.  I discussed levodopa trial, patient is wife are not interested in pursuing this at this time and I think this is reasonable as he is able to ambulate with a cane and denies any recent " falls or injury.  Awaiting for EMG for further evaluation.    Follow Up:   Return in about 7 weeks (around 1/27/2022) for Next scheduled follow up.     JEANETTE Cronin  [facility]   AS THE PROVIDER, I PERSONALLY WORE PPE DURING ENTIRE FACE TO FACE ENCOUNTER IN CLINIC WITH THE PATIENT. PATIENT ALSO WORE PPE DURING ENTIRE FACE TO FACE ENCOUNTER EXCEPT FOR A MAX OF 30 SECONDS DURING NEUROLOGICAL EVALUATION OF CRANIAL NERVES AND THEN MASK WAS PLACED BACK OVER PATIENT FACE FOR REMAINDER OF VISIT. I WASHED MY HANDS BEFORE AND AFTER VISIT.    Please note that portions of this note may have been completed with a voice recognition program. Efforts were made to edit the dictations, but occasionally words are mistranscribed.

## 2021-12-13 ENCOUNTER — HOSPITAL ENCOUNTER (OUTPATIENT)
Facility: HOSPITAL | Age: 81
Discharge: HOME | End: 2021-12-13
Payer: MEDICARE

## 2021-12-13 ENCOUNTER — OFFICE VISIT (OUTPATIENT)
Dept: PRIMARY CARE CLINIC | Age: 81
End: 2021-12-13
Payer: MEDICARE

## 2021-12-13 VITALS
WEIGHT: 180.2 LBS | BODY MASS INDEX: 24.41 KG/M2 | DIASTOLIC BLOOD PRESSURE: 64 MMHG | OXYGEN SATURATION: 98 % | HEART RATE: 61 BPM | TEMPERATURE: 97.8 F | HEIGHT: 72 IN | SYSTOLIC BLOOD PRESSURE: 150 MMHG | RESPIRATION RATE: 16 BRPM

## 2021-12-13 DIAGNOSIS — I10 ESSENTIAL HYPERTENSION: Primary | ICD-10-CM

## 2021-12-13 DIAGNOSIS — E78.00 PURE HYPERCHOLESTEROLEMIA: ICD-10-CM

## 2021-12-13 DIAGNOSIS — E11.9 TYPE 2 DIABETES MELLITUS WITHOUT COMPLICATION, WITHOUT LONG-TERM CURRENT USE OF INSULIN (HCC): ICD-10-CM

## 2021-12-13 DIAGNOSIS — Z91.81 AT HIGH RISK FOR FALLS: ICD-10-CM

## 2021-12-13 DIAGNOSIS — N39.45 CONTINUOUS LEAKAGE OF URINE: ICD-10-CM

## 2021-12-13 LAB
A/G RATIO: 2.1 (ref 0.8–2)
ALBUMIN SERPL-MCNC: 4.4 G/DL (ref 3.4–4.8)
ALP BLD-CCNC: 110 U/L (ref 25–100)
ALT SERPL-CCNC: 10 U/L (ref 4–36)
ANION GAP SERPL CALCULATED.3IONS-SCNC: 13 MMOL/L (ref 3–16)
AST SERPL-CCNC: 15 U/L (ref 8–33)
BILIRUB SERPL-MCNC: 0.4 MG/DL (ref 0.3–1.2)
BUN BLDV-MCNC: 16 MG/DL (ref 6–20)
CALCIUM SERPL-MCNC: 9.4 MG/DL (ref 8.5–10.5)
CHLORIDE BLD-SCNC: 105 MMOL/L (ref 98–107)
CO2: 28 MMOL/L (ref 20–30)
CREAT SERPL-MCNC: 0.9 MG/DL (ref 0.4–1.2)
GFR AFRICAN AMERICAN: >59
GFR NON-AFRICAN AMERICAN: >59
GLOBULIN: 2.1 G/DL
GLUCOSE BLD-MCNC: 177 MG/DL (ref 74–106)
HBA1C MFR BLD: 6.9 %
HCT VFR BLD CALC: 46.6 % (ref 40–54)
HEMOGLOBIN: 15.5 G/DL (ref 13–18)
MCH RBC QN AUTO: 31.9 PG (ref 27–32)
MCHC RBC AUTO-ENTMCNC: 33.3 G/DL (ref 31–35)
MCV RBC AUTO: 95.9 FL (ref 80–100)
PDW BLD-RTO: 13.5 % (ref 11–16)
PLATELET # BLD: 160 K/UL (ref 150–400)
PMV BLD AUTO: 11.5 FL (ref 6–10)
POTASSIUM SERPL-SCNC: 3.7 MMOL/L (ref 3.4–5.1)
RBC # BLD: 4.86 M/UL (ref 4.5–6)
SODIUM BLD-SCNC: 146 MMOL/L (ref 136–145)
TOTAL PROTEIN: 6.5 G/DL (ref 6.4–8.3)
WBC # BLD: 5.7 K/UL (ref 4–11)

## 2021-12-13 PROCEDURE — 83036 HEMOGLOBIN GLYCOSYLATED A1C: CPT

## 2021-12-13 PROCEDURE — 85027 COMPLETE CBC AUTOMATED: CPT

## 2021-12-13 PROCEDURE — 99214 OFFICE O/P EST MOD 30 MIN: CPT | Performed by: NURSE PRACTITIONER

## 2021-12-13 PROCEDURE — 80053 COMPREHEN METABOLIC PANEL: CPT

## 2021-12-13 RX ORDER — LISINOPRIL 20 MG/1
20 TABLET ORAL DAILY
Qty: 90 TABLET | Refills: 5 | Status: SHIPPED | OUTPATIENT
Start: 2021-12-13 | End: 2022-04-13 | Stop reason: ALTCHOICE

## 2021-12-13 SDOH — ECONOMIC STABILITY: FOOD INSECURITY: WITHIN THE PAST 12 MONTHS, YOU WORRIED THAT YOUR FOOD WOULD RUN OUT BEFORE YOU GOT MONEY TO BUY MORE.: NEVER TRUE

## 2021-12-13 SDOH — ECONOMIC STABILITY: FOOD INSECURITY: WITHIN THE PAST 12 MONTHS, THE FOOD YOU BOUGHT JUST DIDN'T LAST AND YOU DIDN'T HAVE MONEY TO GET MORE.: NEVER TRUE

## 2021-12-13 ASSESSMENT — ENCOUNTER SYMPTOMS
RESPIRATORY NEGATIVE: 1
ALLERGIC/IMMUNOLOGIC NEGATIVE: 1
GASTROINTESTINAL NEGATIVE: 1
EYES NEGATIVE: 1

## 2021-12-13 ASSESSMENT — SOCIAL DETERMINANTS OF HEALTH (SDOH): HOW HARD IS IT FOR YOU TO PAY FOR THE VERY BASICS LIKE FOOD, HOUSING, MEDICAL CARE, AND HEATING?: NOT HARD AT ALL

## 2021-12-13 NOTE — PROGRESS NOTES
Chief Complaint   Patient presents with    Hypertension    Hyperlipidemia    Diabetes       Have you seen any other physician or provider since your last visit yes - eye doctor    Have you had any other diagnostic tests since your last visit? no    Have you changed or stopped any medications since your last visit? no     Diabetic retinal exam completed this year? Yes                       * If yes please have patient sign a records release to obtain record to update Health Maintenance                       * If no, please order referral for patient to be scheduled         SUBJECTIVE:    Patient ID:Donell Mclain is a [de-identified] y.o. male. Chief Complaint   Patient presents with    Hypertension    Hyperlipidemia    Diabetes     HPI:  Patient has had diabetes for the past few years. He has been compliant with the medications and denies any side effects from it. He has NOT been monitoring fingersticks on a daily basis. His fingerstick range is unknown. He denies any hypoglycemic symptoms. He has  been following a diabetic diet and has been active. His last eye exam was less than a year ago. He is using oral meds only. He is still struggling with falling. He recently fell in his kitchen. He says he doesn't really know what happened   He is using a cane most of the time. He feels like he should be walking better. He has been to physical therapy and finished all his treatments. He denies dizziness. He underwent a bladder lift. He is having trouble holding his urine. He has not been back to see the Urologist recently. Next appointment is March. He says when he stands up he is urinating. He is taking Flomax but has had the Detrol La stopped. Patient has had hyperlipidemia for few years. He has been compliant with low fat diet. He has been compliant with taking the medications, without side effects. His weight is up 9 lbs compared to last visit. He is  active, and rarely exercises.     Patient has had hypertension for few years. He has been compliant with taking medications, without side effects from it. He has been following a low-sodium, is active and rarely exercises. Weight is increasing steadily, compared to last visit. His blood pressure is  elevated 148/80 at this time. Patient's medications, allergies, past medical, surgical, social and family histories were reviewed and updated as appropriate. Review of Systems   Constitutional: Negative. HENT: Negative. Eyes: Negative. Respiratory: Negative. Cardiovascular: Negative. Gastrointestinal: Negative. Endocrine: Negative. Genitourinary: Negative. Incontinence    Musculoskeletal: Negative. Skin: Negative. Allergic/Immunologic: Negative. Neurological: Positive for weakness (frequent falls). Hematological: Negative. Psychiatric/Behavioral: Negative. OBJECTIVE:  BP (!) 150/64 (Site: Right Upper Arm, Position: Sitting, Cuff Size: Medium Adult)   Pulse 61   Temp 97.8 °F (36.6 °C) (Temporal)   Resp 16   Ht 6' (1.829 m)   Wt 180 lb 3.2 oz (81.7 kg)   SpO2 98% Comment: room air  BMI 24.44 kg/m²    Physical Exam  Vitals and nursing note reviewed. Constitutional:       Appearance: He is well-developed. HENT:      Head: Normocephalic and atraumatic. Eyes:      Conjunctiva/sclera: Conjunctivae normal.      Pupils: Pupils are equal, round, and reactive to light. Neck:      Thyroid: No thyromegaly. Vascular: No JVD. Cardiovascular:      Rate and Rhythm: Normal rate and regular rhythm. Heart sounds: No murmur heard. No friction rub. No gallop. Pulmonary:      Effort: Pulmonary effort is normal. No respiratory distress. Breath sounds: Normal breath sounds. No wheezing or rales. Abdominal:      General: Bowel sounds are normal. There is no distension. Palpations: Abdomen is soft. Tenderness: There is no abdominal tenderness. There is no guarding. Musculoskeletal:         General: No tenderness. Cervical back: Normal range of motion and neck supple. Skin:     General: Skin is warm and dry. Findings: No rash. Neurological:      Mental Status: He is alert and oriented to person, place, and time. Coordination: Coordination abnormal (poor balance). Gait: Gait abnormal (shuffling).    Psychiatric:         Judgment: Judgment normal.         Results in Past 30 Days  Result Component Current Result Ref Range Previous Result Ref Range   Albumin/Globulin Ratio 2.1 (H) (12/13/2021) 0.8 - 2.0 Not in Time Range    Albumin 4.4 (12/13/2021) 3.4 - 4.8 g/dL Not in Time Range    Alkaline Phosphatase 110 (H) (12/13/2021) 25 - 100 U/L Not in Time Range    ALT 10 (12/13/2021) 4 - 36 U/L Not in Time Range    AST 15 (12/13/2021) 8 - 33 U/L Not in Time Range    BUN 16 (12/13/2021) 6 - 20 mg/dL Not in Time Range    Calcium 9.4 (12/13/2021) 8.5 - 10.5 mg/dL Not in Time Range    Chloride 105 (12/13/2021) 98 - 107 mmol/L Not in Time Range    CO2 28 (12/13/2021) 20 - 30 mmol/L Not in Time Range    CREATININE 0.9 (12/13/2021) 0.4 - 1.2 mg/dL Not in Time Range    GFR  >59 (12/13/2021) >59 Not in Time Range    GFR Non- >59 (12/13/2021) >59 Not in Time Range    Globulin 2.1 (12/13/2021) Not Established g/dL Not in Time Range    Glucose 177 (H) (12/13/2021) 74 - 106 mg/dL Not in Time Range    Potassium 3.7 (12/13/2021) 3.4 - 5.1 mmol/L Not in Time Range    Sodium 146 (H) (12/13/2021) 136 - 145 mmol/L Not in Time Range    Total Bilirubin 0.4 (12/13/2021) 0.3 - 1.2 mg/dL Not in Time Range    Total Protein 6.5 (12/13/2021) 6.4 - 8.3 g/dL Not in Time Range        Hemoglobin A1C (%)   Date Value   12/13/2021 6.9 (H)     Microalbumin, Random Urine (mg/dL)   Date Value   04/22/2019 <1.20     LDL Calculated (mg/dL)   Date Value   02/08/2021 83         Lab Results   Component Value Date    WBC 5.7 12/13/2021    NEUTROABS 15.3 11/14/2019    HGB 15.5 12/13/2021    HCT 46.6 12/13/2021    MCV 95.9 12/13/2021     12/13/2021       Lab Results   Component Value Date    TSH 1.44 08/03/2021         ASSESSMENT/PLAN:     1. Essential hypertension  Increased Lisinopril.   - lisinopril (PRINIVIL;ZESTRIL) 20 MG tablet; Take 1 tablet by mouth daily  Dispense: 90 tablet; Refill: 5    2. Type 2 diabetes mellitus without complication, without long-term current use of insulin (HCC)  Stable. I advised him regarding diabetic diet, exercise and weight control. Also, I advised him to stay on the current medication, monitor his fingerstick closely. I am going to check the A1c every few months. I will check microalbumin on a yearly basis. I have also advised him to have a yearly eye exam and to monitor his feet closely. Along with instruction of possible complications related to diabetes, even with good control. A1C will be checked  in few months. Lab Results   Component Value Date    LABA1C 6.9 (H) 12/13/2021       - Comprehensive Metabolic Panel; Future  - CBC; Future  - Hemoglobin A1C; Future    3. Continuous leakage of urine  Advised to follow with urology. 4. Pure hypercholesterolemia  Continue Mevacor 40 mg.     5. At high risk for falls  Discussed the use of Cam walker discussed fall prevention and balance training. Written by Arian DAI, acting as a scribe for Kristyn Xie on 12/13/2021 at 4:30 PM.      On the basis of positive falls risk screening, assessment and plan is as follows: encourage use of cane. Karen Dubon

## 2021-12-17 DIAGNOSIS — E11.9 TYPE 2 DIABETES MELLITUS WITHOUT COMPLICATION, WITHOUT LONG-TERM CURRENT USE OF INSULIN (HCC): ICD-10-CM

## 2021-12-30 RX ORDER — LANCETS 30 GAUGE
1 EACH MISCELLANEOUS 3 TIMES DAILY
Qty: 100 EACH | Refills: 3 | Status: SHIPPED | OUTPATIENT
Start: 2021-12-30 | End: 2022-04-13 | Stop reason: SDUPTHER

## 2021-12-30 RX ORDER — GLUCOSAMINE HCL/CHONDROITIN SU 500-400 MG
CAPSULE ORAL
Qty: 100 STRIP | Refills: 3 | Status: SHIPPED | OUTPATIENT
Start: 2021-12-30 | End: 2022-04-13 | Stop reason: SDUPTHER

## 2022-01-01 ENCOUNTER — TELEPHONE (OUTPATIENT)
Dept: PRIMARY CARE CLINIC | Age: 82
End: 2022-01-01

## 2022-01-05 ENCOUNTER — HOSPITAL ENCOUNTER (OUTPATIENT)
Dept: NEUROLOGY | Facility: HOSPITAL | Age: 82
Discharge: HOME | End: 2022-01-05
Admitting: NURSE PRACTITIONER

## 2022-01-05 DIAGNOSIS — R25.3 FASCICULATION OF LOWER EXTREMITY: ICD-10-CM

## 2022-01-05 DIAGNOSIS — Z87.81 HISTORY OF PELVIC FRACTURE: ICD-10-CM

## 2022-01-05 PROCEDURE — 95911 NRV CNDJ TEST 9-10 STUDIES: CPT | Performed by: PSYCHIATRY & NEUROLOGY

## 2022-01-05 PROCEDURE — 95886 MUSC TEST DONE W/N TEST COMP: CPT | Performed by: PSYCHIATRY & NEUROLOGY

## 2022-01-05 PROCEDURE — 95911 NRV CNDJ TEST 9-10 STUDIES: CPT

## 2022-01-05 PROCEDURE — 95886 MUSC TEST DONE W/N TEST COMP: CPT

## 2022-01-10 ENCOUNTER — OFFICE VISIT (OUTPATIENT)
Dept: ORTHOPEDIC SURGERY | Facility: CLINIC | Age: 82
End: 2022-01-10

## 2022-01-10 VITALS — WEIGHT: 181.2 LBS | BODY MASS INDEX: 24.54 KG/M2 | TEMPERATURE: 97.6 F | HEIGHT: 72 IN

## 2022-01-10 DIAGNOSIS — M62.838 MUSCLE SPASM OF BOTH LOWER LEGS: ICD-10-CM

## 2022-01-10 DIAGNOSIS — Z96.651 STATUS POST TOTAL KNEE REPLACEMENT USING CEMENT, RIGHT: Primary | ICD-10-CM

## 2022-01-10 PROCEDURE — 99213 OFFICE O/P EST LOW 20 MIN: CPT | Performed by: PHYSICIAN ASSISTANT

## 2022-01-10 NOTE — PROGRESS NOTES
"Subjective   Patient ID: Ankur Camp is a 81 y.o. right hand dominant male  Follow-up of the Right Knee (TKA 5/25/21. States it is doing okay, still hurts every now and then.)         History of Present Illness  Patient reports his right knee is doing well. No pain to right knee.   He and his wife states he has been experiencing LLE \"tremors\" since 2015. The right LE began \"shaking just like the Left leg\" since may 2021. The BLE symptoms are most noticeable when walking or going from sitting to standing  He currently uses a cane.  Endorses \"occasional LBP without numbness    Past Medical History:   Diagnosis Date   • Arthritis    • Cancer (HCC)     kidney/ prostate   • Diabetes mellitus (HCC)    • ED (erectile dysfunction)    • Enlarged prostate with lower urinary tract symptoms (LUTS)    • Full dentures    • History of fracture of pelvis 11/14/2019    after fall from ladder   • History of loss of consciousness 11/14/2019    after fall from ladder-flown to [facility]   • History of rib fracture 11/14/2019    after fall from ladder   • History of right shoulder fracture 1960    walking up hill, fell on gravel   • History of stress test    • History of torn meniscus of right knee 2012   • [place] (hard of hearing)     Bilateral hearing aids    • Hypertension    • Laceration of head 11/14/2019    after fall from ladder-treated at [facility]   • Left shoulder strain 2017    after fall from ladder   • Lung injury 11/14/2019    punctured right lung after falling off ladder   • Renal disorder    • Stone in kidney         Past Surgical History:   Procedure Laterality Date   • CHEST TUBE INSERTION  11/2019    rib fractures, punctured lung after fall from ladder-[facility]   • COLONOSCOPY     • COLONOSCOPY N/A 4/8/2019    Procedure: COLONOSCOPY;  Surgeon: Onesimo Arnold MD;  Location: [facility] ENDOSCOPY;  Service: Gastroenterology   • EYE SURGERY Right     cataract removal with IOL " implant   • HERNIA REPAIR Right     multiple inguinal hernia repairs   • KNEE ARTHROSCOPY W/ MENISCECTOMY Right 05/10/2012    Partial medial and lateral menisectomy-Conrad Baird MD   • NEPHRECTOMY Right     partial right nephrectomy due to kidney stone   • PELVIC FRACTURE SURGERY Right 11/2019    multiple pelvic fractures after fall from ladder-[facility]   • SHOULDER SURGERY Right 1960    fracture surgery to remove a piece of bone- [place], [place]   • TOTAL KNEE ARTHROPLASTY Right 5/25/2021    Procedure: knee arthroplasty total;  Surgeon: Conrad Baird MD;  Location: [facility];  Service: Orthopedics;  Laterality: Right;       Family History   Problem Relation Age of Onset   • Breast cancer Mother    • Cancer Sister    • Cancer Brother    • Cancer Sister    • Dementia Brother    • No Known Problems Brother    • No Known Problems Brother        Social History     Socioeconomic History   • Marital status:    Tobacco Use   • Smoking status: Current Every Day Smoker     Years: 20.00     Types: Pipe   • Smokeless tobacco: Never Used   • Tobacco comment: smokes his pipe daily   Vaping Use   • Vaping Use: Never used   Substance and Sexual Activity   • Alcohol use: Not Currently   • Drug use: No   • Sexual activity: Defer         Current Outpatient Medications:   •  ascorbic acid (VITAMIN C) 1000 MG tablet, Take 1,000 mg by mouth Daily., Disp: , Rfl:   •  aspirin 81 MG chewable tablet, Chew 81 mg Daily., Disp: , Rfl:   •  bisoprolol (ZEBeta) 5 MG tablet, Take 1 tablet by mouth Daily., Disp: 90 tablet, Rfl: 4  •  cholecalciferol (VITAMIN D3) 25 MCG (1000 UT) tablet, Take 1,000 Units by mouth Daily., Disp: , Rfl:   •  glucose blood test strip, 1 each by In Vitro route daily Daily testing, DX:250.00, Disp: , Rfl:   •  lovastatin (MEVACOR) 40 MG tablet, Take 40 mg by mouth Every Night., Disp: , Rfl:   •  metFORMIN (GLUCOPHAGE) 500 MG tablet, Take 500 mg by mouth Daily With Breakfast., Disp: , Rfl:  "  •  Multiple Vitamins-Minerals (ICAPS AREDS 2 PO), Take  by mouth., Disp: , Rfl:   •  multivitamin with minerals (PRESERVISION AREDS PO), Take 1 tablet by mouth 2 (Two) Times a Day., Disp: , Rfl:   •  nystatin (MYCOSTATIN) 955470 UNIT/GM powder, APPLY 3 TIMES DAILY., Disp: , Rfl:   •  vitamin B-12 (CYANOCOBALAMIN) 1000 MCG tablet, Take 1,000 mcg by mouth Daily., Disp: , Rfl:     No Known Allergies    Review of Systems   Constitutional: Negative for fever.   HENT: Negative for dental problem and voice change.    Eyes: Negative for visual disturbance.   Respiratory: Negative for shortness of breath.    Cardiovascular: Negative for chest pain.   Gastrointestinal: Negative for abdominal pain.   Genitourinary: Negative for dysuria.   Musculoskeletal: Positive for arthralgias (right knee) and gait problem (presents with cane). Negative for joint swelling.   Skin: Negative for rash.   Neurological: Negative for speech difficulty.   Hematological: Does not bruise/bleed easily.   Psychiatric/Behavioral: Negative for confusion.       I have reviewed the medical and surgical history, family history, social history, medications, and/or allergies, and the review of systems of this report.    Objective   Temp 97.6 °F (36.4 °C)   Ht 182.9 cm (72.01\")   Wt 82.2 kg (181 lb 3.2 oz)   BMI 24.57 kg/m²    Physical Exam  Vitals and nursing note reviewed.   Constitutional:       Appearance: Normal appearance.   Pulmonary:      Effort: Pulmonary effort is normal.   Neurological:      Mental Status: He is alert and oriented to person, place, and time.   Psychiatric:         Behavior: Behavior normal.       Right Knee Exam     Tenderness   The patient is experiencing no tenderness.     Range of Motion   Extension: 10   Flexion: 100     Other   Erythema: absent  Scars: present  Sensation: normal  Pulse: present           Extremity DVT signs are negative on physical exam with negative Sadia sign, no calf pain, no palpable cords and no skin " tone change   Neurologic Exam     Mental Status   Oriented to person, place, and time.     Motor Exam     Strength   Right anterior tibial: 4/5  Left anterior tibial: 4/5  Right gastroc: 4/5  Left gastroc: 4/5              Assessment/Plan   Independent Review of Radiographic Studies:    No new imaging done today.      Procedures       Diagnoses and all orders for this visit:    1. Status post total knee replacement using cement, right (Primary)    2. Muscle spasm of both lower legs       Orthopedic activities reviewed and patient expressed appreciation  Discussion of orthopedic goals  Risk, benefits, and merits of treatment alternatives reviewed with the patient and questions answered  Ice, heat, and/or modalities as beneficial    Recommendations/Plan:  Exercise, medications, injections, other patient advice, and return appointment as noted.  Patient is encouraged to call or return for any issues or concerns.  Follow up in May 2022 for right knee  Keep your scheduled appt with Neuro 1-25-22-I will defer treatment for the EMG sensory neuropathy to neurology    Patient agreeable to call or return sooner for any concerns.           EMR Dragon-transcription disclaimer:  This encounter note is an electronic transcription of spoken language to printed text.  Electronic transcription of spoken language may permit erroneous or at times nonsensical words or phrases to be inadvertently transcribed.  Although I have reviewed the note for such errors, some may still exist

## 2022-01-11 ENCOUNTER — TELEPHONE (OUTPATIENT)
Dept: NEUROLOGY | Facility: CLINIC | Age: 82
End: 2022-01-11

## 2022-01-11 NOTE — TELEPHONE ENCOUNTER
Caller: Kianna Camp    Relationship: Emergency Contact    Best call back number: [phone]    What was the call regarding: PT'S WIFE CALLED STATING THAT AT PT'S VISIT WITH DR. IAN ARAGON, IT WAS ADVISED THAT PT TRY TO GET IN TO SEE NEURO SOONER THAN HIS SCHEDULED APPT WITH [facility] ON 1/25/22 DUE TO TREMOR IN LEGS.    I SAW NO SOONER AVAILABLE APPTS, SO I HAVE ADDED PT TO WAITLIST BE THERE ANY CANCELLATIONS.    PLEASE BE AWARE.

## 2022-02-23 ENCOUNTER — OFFICE VISIT (OUTPATIENT)
Dept: NEUROLOGY | Facility: CLINIC | Age: 82
End: 2022-02-23

## 2022-02-23 VITALS
TEMPERATURE: 97.7 F | BODY MASS INDEX: 24.79 KG/M2 | WEIGHT: 183 LBS | SYSTOLIC BLOOD PRESSURE: 162 MMHG | HEART RATE: 72 BPM | HEIGHT: 72 IN | DIASTOLIC BLOOD PRESSURE: 70 MMHG | OXYGEN SATURATION: 98 %

## 2022-02-23 DIAGNOSIS — R25.9 PARKINSONIAN FEATURES: Primary | ICD-10-CM

## 2022-02-23 PROCEDURE — 99213 OFFICE O/P EST LOW 20 MIN: CPT | Performed by: NURSE PRACTITIONER

## 2022-02-23 NOTE — PROGRESS NOTES
Follow Up Neurology Office Visit      Patient Name: Ankur Camp    Referring Physician: No ref. provider found    Chief Complaint:    Chief Complaint   Patient presents with   • Follow-up     Pt in office to follow up on fasciculation of lower extremity       History of Present Illness: Ankur Camp is a 81 y.o. male who is here to follow up with Neurology for difficulty walking. Previously denied falls or difficulty walking, however, reported falls noted in PCP note. Today he admits that he has fallen a few times, wife reports significant difficulty especially with doorstep. He will often have difficulty raising leg over entryway.   Difficulty is consistent throughout the day, but most bothersome when arising from seated position. He will often have trouble getting started, then feels that he can walk reasonably well until he has to turn. He continues to have shuffling gait with turn.      The following portions of the patient's history were reviewed and updated as appropriate: allergies, current medications, past family history, past medical history, past social history, past surgical history and problem list.    Subjective     Review of Systems:   Review of Systems   Musculoskeletal: Positive for gait problem.   Neurological: Positive for tremors.   All other systems reviewed and are negative.    Medications:     Current Outpatient Medications:   •  ascorbic acid (VITAMIN C) 1000 MG tablet, Take 1,000 mg by mouth Daily., Disp: , Rfl:   •  aspirin 81 MG chewable tablet, Chew 81 mg Daily., Disp: , Rfl:   •  bisoprolol (ZEBeta) 5 MG tablet, Take 1 tablet by mouth Daily., Disp: 90 tablet, Rfl: 4  •  cholecalciferol (VITAMIN D3) 25 MCG (1000 UT) tablet, Take 1,000 Units by mouth Daily., Disp: , Rfl:   •  glucose blood test strip, 1 each by In Vitro route daily Daily testing, DX:250.00, Disp: , Rfl:   •  lovastatin (MEVACOR) 40 MG tablet, Take 40 mg by mouth Every Night., Disp: , Rfl:   •  metFORMIN  "(GLUCOPHAGE) 500 MG tablet, Take 500 mg by mouth Daily With Breakfast., Disp: , Rfl:   •  Multiple Vitamins-Minerals (ICAPS AREDS 2 PO), Take  by mouth., Disp: , Rfl:   •  multivitamin with minerals (PRESERVISION AREDS PO), Take 1 tablet by mouth 2 (Two) Times a Day., Disp: , Rfl:   •  nystatin (MYCOSTATIN) 015309 UNIT/GM powder, APPLY 3 TIMES DAILY., Disp: , Rfl:   •  vitamin B-12 (CYANOCOBALAMIN) 1000 MCG tablet, Take 1,000 mcg by mouth Daily., Disp: , Rfl:   •  carbidopa-levodopa (SINEMET)  MG per tablet, Take 1 tablet by mouth Daily for 3 days, THEN 1 tablet 2 (Two) Times a Day for 3 days, THEN 1 tablet 3 (Three) Times a Day for 30 days., Disp: 99 tablet, Rfl: 2    Allergies:   No Known Allergies    Objective     Physical Exam:  Vital Signs:   Vitals:    02/23/22 0845   BP: 162/70   Pulse: 72   Temp: 97.7 °F (36.5 °C)   SpO2: 98%   Weight: 83 kg (183 lb)   Height: 182.9 cm (72\")   PainSc: 0-No pain     Physical Exam  Vitals and nursing note reviewed. Exam conducted with a chaperone present ( wife).   Pulmonary:      Effort: Pulmonary effort is normal.   Neurological:      Mental Status: He is oriented to person, place, and time.      Deep Tendon Reflexes: Strength normal.   Psychiatric:         Mood and Affect: Mood normal.         Speech: Speech normal.         Behavior: Behavior normal.       Neurologic Exam     Mental Status   Oriented to person, place, and time.   Attention: normal. Concentration: normal.   Speech: speech is normal   Level of consciousness: alert  Normal comprehension.     Cranial Nerves   Cranial nerves II through XII intact.     Motor Exam   Muscle bulk: normal  Overall muscle tone: normal    Strength   Strength 5/5 throughout.     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 "      Results Review:   I reviewed the patient's new clinical results.  I have reviewed the patient's other medical records to include, labs, radiology and referrals.     Assessment / Plan      Assessment/Plan:   Diagnoses and all orders for this visit:    1. Parkinsonian features (Primary)  -     carbidopa-levodopa (SINEMET)  MG per tablet; Take 1 tablet by mouth Daily for 3 days, THEN 1 tablet 2 (Two) Times a Day for 3 days, THEN 1 tablet 3 (Three) Times a Day for 30 days.  Dispense: 99 tablet; Refill: 2  -     Ambulatory Referral to Neurology       Follow Up:   Return in about 4 weeks (around 3/23/2022) for Next scheduled follow up.     Cuca Woodard [facility]   AS THE PROVIDER, I PERSONALLY WORE PPE DURING ENTIRE FACE TO FACE ENCOUNTER IN CLINIC WITH THE PATIENT. PATIENT ALSO WORE PPE DURING ENTIRE FACE TO FACE ENCOUNTER EXCEPT FOR A MAX OF 30 SECONDS DURING NEUROLOGICAL EVALUATION OF CRANIAL NERVES AND THEN MASK WAS PLACED BACK OVER PATIENT FACE FOR REMAINDER OF VISIT. I WASHED MY HANDS BEFORE AND AFTER VISIT.    Please note that portions of this note may have been completed with a voice recognition program. Efforts were made to edit the dictations, but occasionally words are mistranscribed.

## 2022-02-23 NOTE — PATIENT INSTRUCTIONS
You will begin taking a medicine called carbidopa-levodopa to see if this helps improve your movement. Take 1 pill in the morning for 3 days, then 1 pill morning and mid day for 3 days, then 1 pills 3 times per day after. It is best to take this on an empty stomach, but if this bothers your stomach it is ok to take with food.     Parkinson's Disease  Parkinson's disease causes problems with movements. It is a long-term condition. It gets worse over time (is progressive). It affects each person in different ways. It makes it harder for you to:  · Control how your body moves.  · Move your body normally.  The condition can range from mild to very bad (advanced).  What are the causes?  This condition results from a loss of brain cells called neurons. These brain cells make a chemical called dopamine, which is needed to control body movement. As the condition gets worse, the brain cells make less dopamine. This makes it hard to move or control your movements.  The exact cause of this condition is not known.  What increases the risk?  · Being male.  · Being age 60 or older.  · Having family members who had Parkinson's disease.  · Having had an injury to the brain.  · Being very sad (depressed).  · Being around things that are harmful or poisonous.  What are the signs or symptoms?  Symptoms of this condition can vary. The main symptoms have to do with movement. These include:  · A tremor or shaking while you are resting that you cannot control.  · Stiffness in your neck, arms, and legs.  · Slowing of movement. This may include:  ? Losing expressions of the face.  ? Having trouble making small movements that are needed to button your clothing or brush your teeth.  · Walking in a way that is not normal. You may walk with short, shuffling steps.  · Loss of balance when standing. You may sway, fall backward, or have trouble making turns.  Other symptoms include:  · Being very sad, worried, or confused.  · Seeing or hearing  things that are not real.  · Losing thinking abilities (dementia).  · Trouble speaking or swallowing.  · Having a hard time pooping (constipation).  · Needing to pee right away, peeing often, or not being able to control when you pee or poop.  · Sleep problems.  How is this treated?  There is no cure. The goal of treatment is to manage your symptoms. Treatment may include:  · Medicines.  · Therapy to help with talking or movement.  · Surgery to reduce shaking and other movements that you cannot control.  Follow these instructions at home:  Medicines  · Take over-the-counter and prescription medicines only as told by your doctor.  · Avoid taking pain or sleeping medicines.  Eating and drinking  · Follow instructions from your doctor about what you cannot eat or drink.  · Do not drink alcohol.  Activity  · Talk with your doctor about if it is safe for you to drive.  · Do exercises as told by your doctor.  Lifestyle         · Put in grab bars and railings in your home. These help to prevent falls.  · Do not use any products that contain nicotine or tobacco, such as cigarettes, e-cigarettes, and chewing tobacco. If you need help quitting, ask your doctor.  · Join a support group.  General instructions  · Talk with your doctor about what you need help with and what your safety needs are.  · Keep all follow-up visits as told by your doctor, including any therapy visits to help with talking or moving. This is important.  Contact a doctor if:  · Medicines do not help your symptoms.  · You feel off-balance.  · You fall at home.  · You need more help at home.  · You have trouble swallowing.  · You have a very hard time pooping.  · You have a lot of side effects from your medicines.  · You feel very sad, worried, or confused.  Get help right away if:  · You were hurt in a fall.  · You see or hear things that are not real.  · You cannot swallow without choking.  · You have chest pain or trouble breathing.  · You do not feel  safe at home.  · You have thoughts about hurting yourself or others.  If you ever feel like you may hurt yourself or others, or have thoughts about taking your own life, get help right away. You can go to your nearest emergency department or call:  · Your local emergency services (911 in the [place]  · A suicide crisis helpline, such as the National Suicide Prevention Lifeline at 1-585.244.6513. This is open 24 hours a day.  Summary  · This condition causes problems with movements.  · It is a long-term condition. It gets worse over time.  · There is no cure. Treatment focuses on managing your symptoms.  · Talk with your doctor about what you need help with and what your safety needs are.  · Keep all follow-up visits as told by your doctor. This is important.  This information is not intended to replace advice given to you by your health care provider. Make sure you discuss any questions you have with your health care provider.  Document Revised: 03/05/2020 Document Reviewed: 03/05/2020  Elsevier Patient Education © 2021 [employer]

## 2022-03-21 ENCOUNTER — OFFICE VISIT (OUTPATIENT)
Dept: UROLOGY | Facility: CLINIC | Age: 82
End: 2022-03-21

## 2022-03-21 VITALS
OXYGEN SATURATION: 97 % | DIASTOLIC BLOOD PRESSURE: 88 MMHG | HEART RATE: 69 BPM | WEIGHT: 183 LBS | TEMPERATURE: 97.8 F | HEIGHT: 72 IN | BODY MASS INDEX: 24.79 KG/M2 | SYSTOLIC BLOOD PRESSURE: 144 MMHG

## 2022-03-21 DIAGNOSIS — R39.9 LOWER URINARY TRACT SYMPTOMS (LUTS): Primary | ICD-10-CM

## 2022-03-21 DIAGNOSIS — C61 PROSTATE CANCER: ICD-10-CM

## 2022-03-21 PROCEDURE — 99214 OFFICE O/P EST MOD 30 MIN: CPT | Performed by: PHYSICIAN ASSISTANT

## 2022-03-21 RX ORDER — OXYBUTYNIN CHLORIDE 10 MG/1
10 TABLET, EXTENDED RELEASE ORAL DAILY
Qty: 30 TABLET | Refills: 11 | Status: SHIPPED | OUTPATIENT
Start: 2022-03-21 | End: 2022-07-18

## 2022-03-21 NOTE — PROGRESS NOTES
Chief Complaint   Patient presents with   • Follow-up     6 month   • Urinary Incontinence        HPI  Mr. Camp is a 81 y.o. male with history of BPH s/p Urolift on 08/17/21, Prostate CA on AS who presents for follow up.     At this visit, has had worsening UUI and leakage since December. His urgency has become so severe, he is unable to sit through [employer] services, is having incontinence multiple times per day due to immediate loss of bladder control as soon as urinary urge starts. He is not currently taking any meds for his bladder.    IPSS Questionnaire (AUA-7):  Over the past month…    1)  Incomplete Emptying  How often have you had a sensation of not emptying your bladder?  4 - More than half the time   2)  Frequency  How often have you had to urinate less than every two hours? 3 - About half the time   3)  Intermittency  How often have you found you stopped and started again several times when you urinated?  5 - Almost always   4) Urgency  How often have you found it difficult to postpone urination?  3 - About half the time   5) Weak Stream  How often have you had a weak urinary stream?  3 - About half the time   6) Straining  How often have you had to push or strain to begin urination?  2 - Less than half the time   7) Nocturia  How many times did you typically get up at night to urinate?  2 - 2 times   Total Score:  22       Quality of life due to urinary symptoms:  If you were to spend the rest of your life with your urinary condition the way it is now, how would you feel about that? 5-Unhappy   Urine Leakage (Incontinence) 3-Moderate (3 or more pads/day)         Past Medical History:   Diagnosis Date   • Arthritis    • Cancer (HCC)     kidney/ prostate   • Diabetes mellitus (HCC)    • ED (erectile dysfunction)    • Enlarged prostate with lower urinary tract symptoms (LUTS)    • Full dentures    • History of fracture of pelvis 11/14/2019    after fall from ladder   • History of loss of consciousness  11/14/2019    after fall from ladder-flown to [facility]   • History of rib fracture 11/14/2019    after fall from ladder   • History of right shoulder fracture 1960    walking up hill, fell on gravel   • History of stress test    • History of torn meniscus of right knee 2012   • [place] (hard of hearing)     Bilateral hearing aids    • Hypertension    • Laceration of head 11/14/2019    after fall from ladder-treated at [facility]   • Left shoulder strain 2017    after fall from ladder   • Lung injury 11/14/2019    punctured right lung after falling off ladder   • Renal disorder    • Stone in kidney        Past Surgical History:   Procedure Laterality Date   • CHEST TUBE INSERTION  11/2019    rib fractures, punctured lung after fall from [facility]   • COLONOSCOPY     • COLONOSCOPY N/A 4/8/2019    Procedure: COLONOSCOPY;  Surgeon: Onesimo Arnold MD;  Location: [facility];  Service: Gastroenterology   • EYE SURGERY Right     cataract removal with IOL implant   • HERNIA REPAIR Right     multiple inguinal hernia repairs   • KNEE ARTHROSCOPY W/ MENISCECTOMY Right 05/10/2012    Partial medial and lateral menisectomy-Conrad Baird MD   • NEPHRECTOMY Right     partial right nephrectomy due to kidney stone   • PELVIC FRACTURE SURGERY Right 11/2019    multiple pelvic fractures after fall from [facility]   • SHOULDER SURGERY Right 1960    fracture surgery to remove a piece of bone- [place]   • TOTAL KNEE ARTHROPLASTY Right 5/25/2021    Procedure: knee arthroplasty total;  Surgeon: Conrad Baird MD;  Location: [facility] OR;  Service: Orthopedics;  Laterality: Right;         Current Outpatient Medications:   •  ascorbic acid (VITAMIN C) 1000 MG tablet, Take 1,000 mg by mouth Daily., Disp: , Rfl:   •  aspirin 81 MG chewable tablet, Chew 81 mg Daily., Disp: , Rfl:   •  bisoprolol (ZEBeta) 5 MG tablet, Take 1 tablet by mouth Daily., Disp: 90 tablet,  Rfl: 4  •  carbidopa-levodopa (SINEMET)  MG per tablet, Take 1 tablet by mouth Daily for 3 days, THEN 1 tablet 2 (Two) Times a Day for 3 days, THEN 1 tablet 3 (Three) Times a Day for 30 days., Disp: 99 tablet, Rfl: 2  •  cholecalciferol (VITAMIN D3) 25 MCG (1000 UT) tablet, Take 1,000 Units by mouth Daily., Disp: , Rfl:   •  glucose blood test strip, 1 each by In Vitro route daily Daily testing, DX:250.00, Disp: , Rfl:   •  lovastatin (MEVACOR) 40 MG tablet, Take 40 mg by mouth Every Night., Disp: , Rfl:   •  metFORMIN (GLUCOPHAGE) 500 MG tablet, Take 500 mg by mouth Daily With Breakfast., Disp: , Rfl:   •  Multiple Vitamins-Minerals (ICAPS AREDS 2 PO), Take  by mouth., Disp: , Rfl:   •  multivitamin with minerals (PRESERVISION AREDS PO), Take 1 tablet by mouth 2 (Two) Times a Day., Disp: , Rfl:   •  nystatin (MYCOSTATIN) 443753 UNIT/GM powder, APPLY 3 TIMES DAILY., Disp: , Rfl:   •  vitamin B-12 (CYANOCOBALAMIN) 1000 MCG tablet, Take 1,000 mcg by mouth Daily., Disp: , Rfl:      Physical Exam  There were no vitals taken for this visit.    Labs  Brief Urine Lab Results  (Last result in the past 365 days)      Color   Clarity   Blood   Leuk Est   Nitrite   Protein   CREAT   Urine HCG        05/25/21 0705 Yellow   Clear   Negative   Negative   Negative   Negative                 Lab Results   Component Value Date    GLUCOSE 190 (H) 05/26/2021    CALCIUM 8.5 (L) 05/26/2021     05/26/2021    K 3.6 05/26/2021    CO2 25.9 05/26/2021     05/26/2021    BUN 16 05/26/2021    CREATININE 1.01 05/26/2021    EGFRIFAFRI 108 11/05/2020    EGFRIFNONA 71 05/26/2021    BCR 15.8 05/26/2021    ANIONGAP 8.1 05/26/2021       Lab Results   Component Value Date    WBC 5.7 12/13/2021    HGB 15.5 12/13/2021    HCT 46.6 12/13/2021    MCV 95.9 12/13/2021     12/13/2021            Lab Results   Component Value Date    PSA 8.2 (H) 08/18/2020    PSA 5.910 (H) 02/12/2020    PSA 7.510 (H) 08/20/2019         PVR  Post-void  residual performed with ultrasound scanner by staff and interpreted by me - [facility]    I have reviewed above labs and imaging.     Assessment  81 y.o. male with history of BPH s/p UroLift August 17, 2021, prostate cancer under active surveillance, presenting for 6-month follow-up of UroLift.  He has developed overactive bladder symptoms with severe urinary urgency incontinence.  His PVR is 0 cc.  Regarding prostate cancer, he has not had a PSA since August 2020 when it was 8.2.  A prostate biopsy was obtained by Dr. Leyva in October of 2020, revealing Sheldahl 3+4 in 1 core, Govind 3+3 in 2 other cores.               Plan  1.  History of BPH s/p UroLift (Aug 2021)   -Unfortunately his IPSS increased from 10 to 22, largely driven by UUI and frequency   -PVR 0 cc, off of Flomax   -Start oxybutynin XL 10 mg daily    2.  History of prostate cancer as above   -has not had a repeat PSA since August 2020, since prior to his biopsy in October 2020   -PSA today    FU in 3 months to assess OAB improvement; depending on PSA results, may require sooner follow up for further diagnostics

## 2022-03-29 ENCOUNTER — OFFICE VISIT (OUTPATIENT)
Dept: NEUROLOGY | Facility: CLINIC | Age: 82
End: 2022-03-29

## 2022-03-29 VITALS
WEIGHT: 186 LBS | SYSTOLIC BLOOD PRESSURE: 130 MMHG | DIASTOLIC BLOOD PRESSURE: 80 MMHG | TEMPERATURE: 96.8 F | HEIGHT: 72 IN | BODY MASS INDEX: 25.19 KG/M2 | OXYGEN SATURATION: 95 % | HEART RATE: 66 BPM

## 2022-03-29 DIAGNOSIS — R25.9 PARKINSONIAN FEATURES: ICD-10-CM

## 2022-03-29 DIAGNOSIS — R25.3 FASCICULATION OF LOWER EXTREMITY: Primary | ICD-10-CM

## 2022-03-29 PROCEDURE — 99213 OFFICE O/P EST LOW 20 MIN: CPT | Performed by: NURSE PRACTITIONER

## 2022-03-29 NOTE — PROGRESS NOTES
Follow Up Neurology Office Visit      Patient Name: Ankur Camp    Referring Physician: No ref. provider found    Chief Complaint:    Chief Complaint   Patient presents with   • Follow-up     Pt in office to follow up on parkinsons       History of Present Illness: Ankur Camp is a 81 y.o. male who is here to follow up with Neurology for  Gait difficulty.   No improvement with levodopa trial. Patient states no different, but also denies falls.    Does not shake at rest, but often starts when trying to rise and walk. Wife is concerned that he will trip on rug.     He does feel that walking and movement improve throughout the day, most difficulty with getting up in the morning.   Denies mid back pain.   Last course of PT last summer after TKR    Brother with PD    The following portions of the patient's history were reviewed and updated as appropriate: allergies, current medications, past family history, past medical history, past social history, past surgical history and problem list.    Subjective     Review of Systems:   Review of Systems   Musculoskeletal: Positive for arthralgias and gait problem. Negative for neck stiffness.   Neurological: Negative for tremors.     Medications:     Current Outpatient Medications:   •  ascorbic acid (VITAMIN C) 1000 MG tablet, Take 1,000 mg by mouth Daily., Disp: , Rfl:   •  aspirin 81 MG chewable tablet, Chew 81 mg Daily., Disp: , Rfl:   •  bisoprolol (ZEBeta) 5 MG tablet, Take 1 tablet by mouth Daily., Disp: 90 tablet, Rfl: 4  •  cholecalciferol (VITAMIN D3) 25 MCG (1000 UT) tablet, Take 1,000 Units by mouth Daily., Disp: , Rfl:   •  glucose blood test strip, 1 each by In Vitro route daily Daily testing, DX:250.00, Disp: , Rfl:   •  lovastatin (MEVACOR) 40 MG tablet, Take 40 mg by mouth Every Night., Disp: , Rfl:   •  metFORMIN (GLUCOPHAGE) 500 MG tablet, Take 500 mg by mouth Daily With Breakfast., Disp: , Rfl:   •  Multiple Vitamins-Minerals (ICAPS AREDS 2  "PO), Take  by mouth., Disp: , Rfl:   •  multivitamin with minerals tablet tablet, Take 1 tablet by mouth 2 (Two) Times a Day., Disp: , Rfl:   •  nystatin (MYCOSTATIN) 267698 UNIT/GM powder, APPLY 3 TIMES DAILY., Disp: , Rfl:   •  oxybutynin XL (Ditropan XL) 10 MG 24 hr tablet, Take 1 tablet by mouth Daily., Disp: 30 tablet, Rfl: 11  •  vitamin B-12 (CYANOCOBALAMIN) 1000 MCG tablet, Take 1,000 mcg by mouth Daily., Disp: , Rfl:     Allergies:   No Known Allergies    Objective     Physical Exam:  Vital Signs:   Vitals:    03/29/22 1041   BP: 130/80   Pulse: 66   Temp: 96.8 °F (36 °C)   SpO2: 95%   Weight: 84.4 kg (186 lb)   Height: 182.9 cm (72\")   PainSc: 0-No pain       Physical Exam  Vitals and nursing note reviewed. Exam conducted with a chaperone present ( wife).   Pulmonary:      Effort: Pulmonary effort is normal.   Neurological:      Mental Status: He is oriented to person, place, and time. Mental status is at baseline.      Coordination: Romberg Test normal.      Deep Tendon Reflexes: Strength normal.      Reflex Scores:       Patellar reflexes are 1+ on the right side and 0 on the left side.  Psychiatric:         Mood and Affect: Mood normal.         Speech: Speech normal.         Behavior: Behavior normal.       Neurologic Exam     Mental Status   Oriented to person, place, and time.   Attention: normal. Concentration: normal.   Speech: speech is normal   Level of consciousness: alert  Normal comprehension.     Cranial Nerves   Cranial nerves II through XII intact.     Motor Exam   Muscle bulk: normal  Overall muscle tone: normal  Right arm pronator drift: absent  Left arm pronator drift: absent    Strength   Strength 5/5 throughout.     Gait, Coordination, and Reflexes     Coordination   Romberg: negative    Tremor   Resting tremor: absent    Reflexes   Right patellar: 1+  Left patellar: 0  No fasciculation at rest  Able to stand with 2 attempts, ambulates quickly with walker with mildly antalgic gait "     Results Review:   I reviewed the patient's new clinical results.  I have reviewed the patient's other medical records to include, labs, radiology and referrals.     Assessment / Plan      Assessment/Plan:   Diagnoses and all orders for this visit:    1. Fasciculation of lower extremity (Primary)  -     MRI Thoracic Spine Without Contrast; Future  -     Ambulatory Referral to Physical Therapy Evaluate and treat    2. Parkinsonian features  -     Ambulatory Referral to Physical Therapy Evaluate and treat    She has history of trauma, fall from ladder and diffuse degenerative changes noted on x-ray. MRI ordered upon consideration of upper motor neuron lesion which may be contributing to symptoms.  He has not improved with trial of levodopa, he is able to ambulate quickly and does not have other features consistent with parkinsonism other than shuffling gait with turns.  We discussed that this could be related to sensory neuropathy noted on recent EMG.     Follow Up:   Return in about 3 months (around 6/29/2022) for Next scheduled follow up.     Cuca Woodard, [facility]   AS THE PROVIDER, I PERSONALLY WORE PPE DURING ENTIRE FACE TO FACE ENCOUNTER IN CLINIC WITH THE PATIENT. PATIENT ALSO WORE PPE DURING ENTIRE FACE TO FACE ENCOUNTER EXCEPT FOR A MAX OF 30 SECONDS DURING NEUROLOGICAL EVALUATION OF CRANIAL NERVES AND THEN MASK WAS PLACED BACK OVER PATIENT FACE FOR REMAINDER OF VISIT. I WASHED MY HANDS BEFORE AND AFTER VISIT.    Please note that portions of this note may have been completed with a voice recognition program. Efforts were made to edit the dictations, but occasionally words are mistranscribed.

## 2022-03-29 NOTE — PATIENT INSTRUCTIONS
7/13/2022 11:00 AM 7/13/2022 12:00 PM         Providers    Julien Reeves MD  Neurology  NPI: [id number]  [address]  [facility] [address]     Phone: [phone]  Fax: [phone]

## 2022-04-05 DIAGNOSIS — I10 ESSENTIAL HYPERTENSION: ICD-10-CM

## 2022-04-05 RX ORDER — LOVASTATIN 40 MG/1
TABLET ORAL
Qty: 90 TABLET | Refills: 3 | Status: SHIPPED | OUTPATIENT
Start: 2022-04-05 | End: 2022-06-27 | Stop reason: ALTCHOICE

## 2022-04-07 ENCOUNTER — TELEPHONE (OUTPATIENT)
Dept: PRIMARY CARE CLINIC | Age: 82
End: 2022-04-07

## 2022-04-07 NOTE — TELEPHONE ENCOUNTER
----- Message from Maria De Jesus Jimenes sent at 4/7/2022  2:47 PM EDT -----  Subject: Message to Provider    QUESTIONS  Information for Provider? Pt has an appointment 4/13 with Dr. Hakeem Corea and   would like to know if he needs to have his labs done prior to the   appointment. Would like a call back. ---------------------------------------------------------------------------  --------------  Gene Civicon TEREZA  What is the best way for the office to contact you? OK to leave message on   voicemail  Preferred Call Back Phone Number? [phone]  ---------------------------------------------------------------------------  --------------  SCRIPT ANSWERS  Relationship to Patient?  Self

## 2022-04-11 ENCOUNTER — TREATMENT (OUTPATIENT)
Dept: PHYSICAL THERAPY | Facility: CLINIC | Age: 82
End: 2022-04-11

## 2022-04-11 DIAGNOSIS — R26.2 DIFFICULTY WALKING: Primary | ICD-10-CM

## 2022-04-11 DIAGNOSIS — R25.9 PARKINSONIAN FEATURES: ICD-10-CM

## 2022-04-11 PROCEDURE — 97161 PT EVAL LOW COMPLEX 20 MIN: CPT | Performed by: PHYSICAL THERAPIST

## 2022-04-11 NOTE — PROGRESS NOTES
"Physical Therapy Initial Evaluation and Plan of Care    TOTAL TIME: 60 MINUTES    Subjective Evaluation    History of Present Illness  Mechanism of injury: Patient presents today with c/o difficulty walking     Has falls 'every now and then' ; recalls last one being 'about one month ago'     Does some driving, mowed his yard last week on zero turn mower    Had a TKA on right knee last year; did therapy and was d/c'd    Ambulated into clinic on a walker, brought cane as well; wife states he uses a cane in the house     C/o tremors in left leg; states he has a brother with Parkinsons that has the same problem     2019 fell off of a high ladder, was in hospital for 30+ days, on ventilator; had multiple fractures, some screws placed in pelvic fx; also had a severe concussion    Had recent x rays of back; does not complain of back pain    Has MRI scheduled for      Has 6 stairs he climbs to get in / out of his trailer    Has long concrete ramp at [employer]     Quality of life: good    Pain  Current pain ratin    Patient Goals  Patient goals for therapy: improved balance and increased strength             Objective          Active Range of Motion     Right Knee   Flexion: 115 degrees   Extensor la degrees     Strength/Myotome Testing     Right Knee   Flexion: 4  Extension: 4  Quadriceps contraction: poor    Tests     Additional Tests Details  MCTSIB= 2/4    TUG= 45\"    30\" STS= 2     Ambulation   Weight-Bearing Status   Assistive device used: four-wheeled walker and single point cane    Additional Weight-Bearing Status Details  Uses walker into the clinic  Uses cane in his home        Observational Gait   Walking speed within functional limits. Decreased stride length.     Additional Observational Gait Details  Some tremorring upon initially rising, once patient begins to ambulate this decreases           Assessment & Plan     Assessment  Impairments: abnormal gait, impaired balance, impaired physical strength " "and lacks appropriate home exercise program  Functional Limitations: walking and standing  Assessment details: 80 yo presents with difficulty walking and Parkinsonian features; he has occasional falls but none recently; he uses a RW or cane for ambulation ; hx of TKA and lacks functional knee extension; decreased balance ; patient may benefit from PT to improve functional gait and balance  Prognosis: fair    Goals  Plan Goals: 4 weeks:  1. Patient to be IND with HEP  2. Patient to display < 35\" on TUG  3. Patient to display > 5 reps on 30\" STS  4. Patient to display 3/4 on MCTSIB     Plan  Therapy options: will be seen for skilled therapy services  Other planned modality interventions: modalities prn   Planned therapy interventions: manual therapy, neuromuscular re-education, postural training, soft tissue mobilization, strengthening, stretching, therapeutic activities, joint mobilization, home exercise program, gait training, functional ROM exercises, flexibility, body mechanics training and balance/weight-bearing training  Frequency: 2x week  Duration in weeks: 8  Treatment plan discussed with: patient        Manual Therapy:         mins  08220;  Therapeutic Exercise:         mins  62546;     Neuromuscular Wilbert:        mins  89873;    Therapeutic Activity:          mins  49792;     Gait Training:           mins  72697;     Ultrasound:          mins  40480;    Electrical Stimulation:         mins  35357 (MC );  Dry Needling          mins self-pay    Timed Treatment:      mins   Total Treatment:     60   mins    PT SIGNATURE: KEVIN Gilliam, PT   DATE TREATMENT INITIATED: 4/11/2022    Medicare Initial Certification  Certification Period: 7/10/2022  I certify that the therapy services are furnished while this patient is under my care.  The services outlined above are required by this patient, and will be reviewed every 90 days.     PHYSICIAN: Cuca Woodard, APRN      DATE:     Please sign and return via " fax to  .. Thank you, [facility].

## 2022-04-13 ENCOUNTER — OFFICE VISIT (OUTPATIENT)
Dept: PRIMARY CARE CLINIC | Age: 82
End: 2022-04-13
Payer: MEDICARE

## 2022-04-13 ENCOUNTER — HOSPITAL ENCOUNTER (OUTPATIENT)
Facility: HOSPITAL | Age: 82
Discharge: HOME | End: 2022-04-13
Payer: MEDICARE

## 2022-04-13 VITALS
HEART RATE: 63 BPM | OXYGEN SATURATION: 96 % | BODY MASS INDEX: 25 KG/M2 | WEIGHT: 184.6 LBS | HEIGHT: 72 IN | RESPIRATION RATE: 16 BRPM | DIASTOLIC BLOOD PRESSURE: 70 MMHG | TEMPERATURE: 97.7 F | SYSTOLIC BLOOD PRESSURE: 120 MMHG

## 2022-04-13 DIAGNOSIS — I10 PRIMARY HYPERTENSION: ICD-10-CM

## 2022-04-13 DIAGNOSIS — E11.9 TYPE 2 DIABETES MELLITUS WITHOUT COMPLICATION, WITHOUT LONG-TERM CURRENT USE OF INSULIN (HCC): ICD-10-CM

## 2022-04-13 DIAGNOSIS — E55.9 VITAMIN D DEFICIENCY: ICD-10-CM

## 2022-04-13 DIAGNOSIS — R25.3 FASCICULATION OF LOWER EXTREMITY: Primary | ICD-10-CM

## 2022-04-13 DIAGNOSIS — Z13.29 THYROID DISORDER SCREEN: ICD-10-CM

## 2022-04-13 LAB
A/G RATIO: 1.8 (ref 0.8–2)
ALBUMIN SERPL-MCNC: 3.9 G/DL (ref 3.4–4.8)
ALP BLD-CCNC: 93 U/L (ref 25–100)
ALT SERPL-CCNC: 11 U/L (ref 4–36)
ANION GAP SERPL CALCULATED.3IONS-SCNC: 11 MMOL/L (ref 3–16)
AST SERPL-CCNC: 16 U/L (ref 8–33)
BILIRUB SERPL-MCNC: 0.6 MG/DL (ref 0.3–1.2)
BUN BLDV-MCNC: 19 MG/DL (ref 6–20)
CALCIUM SERPL-MCNC: 9.4 MG/DL (ref 8.5–10.5)
CHLORIDE BLD-SCNC: 107 MMOL/L (ref 98–107)
CO2: 27 MMOL/L (ref 20–30)
CREAT SERPL-MCNC: 1 MG/DL (ref 0.4–1.2)
FOLATE: >20 NG/ML
GFR AFRICAN AMERICAN: >59
GFR NON-AFRICAN AMERICAN: >59
GLOBULIN: 2.2 G/DL
GLUCOSE BLD-MCNC: 233 MG/DL (ref 74–106)
HBA1C MFR BLD: 6.6 %
HCT VFR BLD CALC: 45.6 % (ref 40–54)
HEMOGLOBIN: 15.1 G/DL (ref 13–18)
MCH RBC QN AUTO: 31.6 PG (ref 27–32)
MCHC RBC AUTO-ENTMCNC: 33.1 G/DL (ref 31–35)
MCV RBC AUTO: 95.4 FL (ref 80–100)
PDW BLD-RTO: 13.7 % (ref 11–16)
PLATELET # BLD: 141 K/UL (ref 150–400)
PMV BLD AUTO: 11.7 FL (ref 6–10)
POTASSIUM SERPL-SCNC: 3.9 MMOL/L (ref 3.4–5.1)
RBC # BLD: 4.78 M/UL (ref 4.5–6)
SODIUM BLD-SCNC: 145 MMOL/L (ref 136–145)
TOTAL PROTEIN: 6.1 G/DL (ref 6.4–8.3)
TSH SERPL DL<=0.05 MIU/L-ACNC: 1.33 UIU/ML (ref 0.27–4.2)
VITAMIN B-12: 1858 PG/ML (ref 211–911)
VITAMIN D 25-HYDROXY: 60 (ref 32–100)
WBC # BLD: 5.5 K/UL (ref 4–11)

## 2022-04-13 PROCEDURE — 82607 VITAMIN B-12: CPT

## 2022-04-13 PROCEDURE — 80053 COMPREHEN METABOLIC PANEL: CPT

## 2022-04-13 PROCEDURE — 83036 HEMOGLOBIN GLYCOSYLATED A1C: CPT

## 2022-04-13 PROCEDURE — 85027 COMPLETE CBC AUTOMATED: CPT

## 2022-04-13 PROCEDURE — 3044F HG A1C LEVEL LT 7.0%: CPT | Performed by: NURSE PRACTITIONER

## 2022-04-13 PROCEDURE — 82746 ASSAY OF FOLIC ACID SERUM: CPT

## 2022-04-13 PROCEDURE — 82306 VITAMIN D 25 HYDROXY: CPT

## 2022-04-13 PROCEDURE — 99214 OFFICE O/P EST MOD 30 MIN: CPT | Performed by: NURSE PRACTITIONER

## 2022-04-13 PROCEDURE — 84443 ASSAY THYROID STIM HORMONE: CPT

## 2022-04-13 RX ORDER — BISOPROLOL FUMARATE 5 MG/1
TABLET ORAL
COMMUNITY
Start: 2022-02-10

## 2022-04-13 RX ORDER — OXYBUTYNIN CHLORIDE 10 MG/1
TABLET, EXTENDED RELEASE ORAL
COMMUNITY
Start: 2022-03-21 | End: 2022-08-15 | Stop reason: ALTCHOICE

## 2022-04-13 ASSESSMENT — ENCOUNTER SYMPTOMS
RESPIRATORY NEGATIVE: 1
GASTROINTESTINAL NEGATIVE: 1
EYES NEGATIVE: 1
ALLERGIC/IMMUNOLOGIC NEGATIVE: 1

## 2022-04-13 NOTE — PROGRESS NOTES
Chief Complaint   Patient presents with    Follow-up    Hypertension    Hyperlipidemia    Diabetes       Have you seen any other physician or provider since your last visit yes - Dr Deandre Reyes, orthopedic and neurology, physical therapy at [facility]    Have you had any other diagnostic tests since your last visit? X ray lumbar    Have you changed or stopped any medications since your last visit? yes - stopped Lisinopril and Flomax and started Bisoprolol and Oxybutynin           SUBJECTIVE:    Patient ID:Donell Contreras is a [de-identified] y.o. male. Chief Complaint   Patient presents with    Follow-up    Hypertension    Hyperlipidemia    Diabetes     HPI:  Patient was seen by Dr Deandre Reyes for urinary frequency and had medication changes. Patient was seen by Neurology and was treated for Parkinson's for 1 month on Carbidopa Levodopa but did not help. He is unsteady on his feet due to the tremors and is using a walker. He is scheduled for an MRI next week and is taking physical therapy. Patient has had diabetes for the past few years. He has been compliant with the medications and denies any side effects from it. He has not been monitoring fingersticks on a daily basis. His fingerstick range is unknown. He denies any hypoglycemic symptoms. He has been following a diabetic diet and has not been active. His last eye exam was less than a year ago. He is using oral meds only. Patient has had hypertension for few years. He has been compliant with taking the new medications, without side effects from it. He has been following a low-sodium, is not active and never exercises. Weight is fluctuating a bit, compared to last visit. His blood pressure is stable 120/70 at this time. He has been going to Neurology and she gave him medications Levodopa for Parkinson's but it didn't help. Patient's medications, allergies, past medical, surgical, social and family histories were reviewed and updated as appropriate. Review of Systems   Constitutional: Negative. HENT: Negative. Eyes: Negative. Respiratory: Negative. Cardiovascular: Negative. Gastrointestinal: Negative. Endocrine: Negative. Genitourinary: Positive for frequency. Musculoskeletal: Positive for gait problem. Skin: Negative. Allergic/Immunologic: Negative. Neurological: Positive for tremors and weakness. Hematological: Negative. Psychiatric/Behavioral: Negative. OBJECTIVE:  /70 (Site: Right Upper Arm, Position: Sitting, Cuff Size: Medium Adult)   Pulse 63   Temp 97.7 °F (36.5 °C) (Temporal)   Resp 16   Ht 6' (1.829 m)   Wt 184 lb 9.6 oz (83.7 kg)   SpO2 96% Comment: room air  BMI 25.04 kg/m²    Physical Exam  Vitals and nursing note reviewed. Constitutional:       Appearance: He is well-developed. HENT:      Head: Normocephalic and atraumatic. Eyes:      Conjunctiva/sclera: Conjunctivae normal.      Pupils: Pupils are equal, round, and reactive to light. Neck:      Thyroid: No thyromegaly. Vascular: No JVD. Cardiovascular:      Rate and Rhythm: Normal rate and regular rhythm. Heart sounds: No murmur heard. No friction rub. No gallop. Pulmonary:      Effort: Pulmonary effort is normal. No respiratory distress. Breath sounds: Normal breath sounds. No wheezing or rales. Abdominal:      General: Bowel sounds are normal. There is no distension. Palpations: Abdomen is soft. Tenderness: There is no abdominal tenderness. There is no guarding. Musculoskeletal:         General: No tenderness. Cervical back: Normal range of motion and neck supple. Skin:     General: Skin is warm and dry. Findings: No rash. Neurological:      Mental Status: He is alert and oriented to person, place, and time. Coordination: Coordination abnormal.      Gait: Gait abnormal.      Comments: Shuffling gait, walking with a walker.      Psychiatric:         Judgment: Judgment normal. Results in Past 30 Days  Result Component Current Result Ref Range Previous Result Ref Range   Albumin/Globulin Ratio 1.8 (4/13/2022) 0.8 - 2.0 Not in Time Range    Albumin 3.9 (4/13/2022) 3.4 - 4.8 g/dL Not in Time Range    Alkaline Phosphatase 93 (4/13/2022) 25 - 100 U/L Not in Time Range    ALT 11 (4/13/2022) 4 - 36 U/L Not in Time Range    AST 16 (4/13/2022) 8 - 33 U/L Not in Time Range    BUN 19 (4/13/2022) 6 - 20 mg/dL Not in Time Range    Calcium 9.4 (4/13/2022) 8.5 - 10.5 mg/dL Not in Time Range    Chloride 107 (4/13/2022) 98 - 107 mmol/L Not in Time Range    CO2 27 (4/13/2022) 20 - 30 mmol/L Not in Time Range    CREATININE 1.0 (4/13/2022) 0.4 - 1.2 mg/dL Not in Time Range    GFR  >59 (4/13/2022) >59 Not in Time Range    GFR Non- >59 (4/13/2022) >59 Not in Time Range    Globulin 2.2 (4/13/2022) Not Established g/dL Not in Time Range    Glucose 233 (H) (4/13/2022) 74 - 106 mg/dL Not in Time Range    Potassium 3.9 (4/13/2022) 3.4 - 5.1 mmol/L Not in Time Range    Sodium 145 (4/13/2022) 136 - 145 mmol/L Not in Time Range    Total Bilirubin 0.6 (4/13/2022) 0.3 - 1.2 mg/dL Not in Time Range    Total Protein 6.1 (L) (4/13/2022) 6.4 - 8.3 g/dL Not in Time Range        Hemoglobin A1C (%)   Date Value   04/13/2022 6.6 (H)     Microalbumin, Random Urine (mg/dL)   Date Value   04/22/2019 <1.20     LDL Calculated (mg/dL)   Date Value   02/08/2021 83         Lab Results   Component Value Date    WBC 5.5 04/13/2022    NEUTROABS 15.3 11/14/2019    HGB 15.1 04/13/2022    HCT 45.6 04/13/2022    MCV 95.4 04/13/2022     04/13/2022       Lab Results   Component Value Date    TSH 1.33 04/13/2022         ASSESSMENT/PLAN:     1. Type 2 diabetes mellitus without complication, without long-term current use of insulin (HCC)  Stable. I advised him regarding diabetic diet, exercise and weight control. Also, I advised him to stay on the current medication, monitor his fingerstick closely. I am going to check the A1c every few months. I will check microalbumin on a yearly basis. I have also advised him to have a yearly eye exam and to monitor his feet closely. Along with instruction of possible complications related to diabetes, even with good control. A1C will be checked  in few months. Lab Results   Component Value Date    LABA1C 6.6 (H) 04/13/2022       - Hemoglobin A1C; Future  - Comprehensive Metabolic Panel; Future  - CBC; Future    2. Primary hypertension  BP is stable. I have advised him on low-sodium diet, exercise and weight control. I am going to continue current medication . Will monitor his renal function every few months, have advised him to check blood pressure frequently and to keep a record of this. 3. Fasciculation of lower extremity  He is going to Neurology. Continue evalaution.    - External Referral To Neurology    4. Vitamin D deficiency    - Vitamin B12 & Folate; Future  - Vitamin D 25 Hydroxy; Future    5. Thyroid disorder screen    - TSH;  Future       Written by Gonzalo DAI, acting as a scribe for Luan Foote on 4/13/2022 at 11:22 PM.

## 2022-04-21 ENCOUNTER — TREATMENT (OUTPATIENT)
Dept: PHYSICAL THERAPY | Facility: CLINIC | Age: 82
End: 2022-04-21

## 2022-04-21 DIAGNOSIS — R25.9 PARKINSONIAN FEATURES: ICD-10-CM

## 2022-04-21 DIAGNOSIS — R26.2 DIFFICULTY WALKING: Primary | ICD-10-CM

## 2022-04-21 PROCEDURE — 97112 NEUROMUSCULAR REEDUCATION: CPT | Performed by: PHYSICAL THERAPIST

## 2022-04-21 PROCEDURE — 97530 THERAPEUTIC ACTIVITIES: CPT | Performed by: PHYSICAL THERAPIST

## 2022-04-21 PROCEDURE — 97110 THERAPEUTIC EXERCISES: CPT | Performed by: PHYSICAL THERAPIST

## 2022-04-21 NOTE — PROGRESS NOTES
Physical Therapy Daily Progress Note    TOTAL TIME: 65 MINUTES    Ankur Camp reports: patient reports no changes, no recent falls       Objective   See Exercise, Manual, and Modality Logs for complete treatment.     THERAPEUTIC EXERCISES/ACTIVITIES ADDED TODAY: see flow sheets    Gait training with RW    Assessment/Plan  Patient performed well with exercises today; patient motivated to improve his functional mobility and strength     Progress per Plan of Care and Progress strengthening /stabilization /functional activity           Manual Therapy:         mins  63366;  Therapeutic Exercise:    15     mins  70377;     Neuromuscular Wilbert:    15    mins  57228;    Therapeutic Activity:     15     mins  57000;     Gait Training:           mins  92894;     Ultrasound:          mins  72451;    Electrical Stimulation:         mins  50001 ( );  Dry Needling          mins self-pay    Timed Treatment:   45   mins   Total Treatment:     65   mins    KEVIN Gilliam PT  Physical Therapist

## 2022-04-27 ENCOUNTER — TREATMENT (OUTPATIENT)
Dept: PHYSICAL THERAPY | Facility: CLINIC | Age: 82
End: 2022-04-27

## 2022-04-27 DIAGNOSIS — R26.2 DIFFICULTY WALKING: Primary | ICD-10-CM

## 2022-04-27 DIAGNOSIS — R25.9 PARKINSONIAN FEATURES: ICD-10-CM

## 2022-04-27 PROCEDURE — 97530 THERAPEUTIC ACTIVITIES: CPT | Performed by: PHYSICAL THERAPIST

## 2022-04-27 PROCEDURE — 97116 GAIT TRAINING THERAPY: CPT | Performed by: PHYSICAL THERAPIST

## 2022-04-27 PROCEDURE — 97110 THERAPEUTIC EXERCISES: CPT | Performed by: PHYSICAL THERAPIST

## 2022-04-27 PROCEDURE — 97112 NEUROMUSCULAR REEDUCATION: CPT | Performed by: PHYSICAL THERAPIST

## 2022-04-27 NOTE — PROGRESS NOTES
Physical Therapy Daily Progress Note    TOTAL TIME: 60 MINUTES    Ankur Camp reports: no new complaints, felt good after last session- just was tired       Objective   See Exercise, Manual, and Modality Logs for complete treatment.     THERAPEUTIC EXERCISES/ACTIVITIES ADDED TODAY: see flow sheets     Assessment/Plan  Patient performed well with PT today; needs VC's for safe turns and to attempt to take longer steps; proper usage of cane education rather than carrying it; education for shifting weight forward over toes for sit to stand transfers     Progress per Plan of Care and Progress strengthening /stabilization /functional activity           Manual Therapy:         mins  27933;  Therapeutic Exercise:    15     mins  94989;     Neuromuscular Wilbert:    15    mins  47034;    Therapeutic Activity:     15     mins  50039;     Gait Training:      15     mins  93451;     Ultrasound:          mins  44434;    Electrical Stimulation:         mins  98053 ( );  Dry Needling          mins self-pay    Timed Treatment:   60   mins   Total Treatment:     60   mins    KEVIN Gilliam PT  Physical Therapist

## 2022-04-28 ENCOUNTER — HOSPITAL ENCOUNTER (OUTPATIENT)
Dept: MRI IMAGING | Facility: HOSPITAL | Age: 82
Discharge: HOME | End: 2022-04-28
Admitting: NURSE PRACTITIONER

## 2022-04-28 DIAGNOSIS — R25.3 FASCICULATION OF LOWER EXTREMITY: ICD-10-CM

## 2022-04-28 PROCEDURE — 72146 MRI CHEST SPINE W/O DYE: CPT

## 2022-05-04 ENCOUNTER — TREATMENT (OUTPATIENT)
Dept: PHYSICAL THERAPY | Facility: CLINIC | Age: 82
End: 2022-05-04

## 2022-05-04 DIAGNOSIS — R25.9 PARKINSONIAN FEATURES: ICD-10-CM

## 2022-05-04 DIAGNOSIS — R26.2 DIFFICULTY WALKING: Primary | ICD-10-CM

## 2022-05-04 PROCEDURE — 97116 GAIT TRAINING THERAPY: CPT | Performed by: PHYSICAL THERAPIST

## 2022-05-04 PROCEDURE — 97110 THERAPEUTIC EXERCISES: CPT | Performed by: PHYSICAL THERAPIST

## 2022-05-04 PROCEDURE — 97112 NEUROMUSCULAR REEDUCATION: CPT | Performed by: PHYSICAL THERAPIST

## 2022-05-04 PROCEDURE — 97530 THERAPEUTIC ACTIVITIES: CPT | Performed by: PHYSICAL THERAPIST

## 2022-05-04 NOTE — PROGRESS NOTES
Physical Therapy Daily Progress Note    TOTAL TIME: 60 MINUTES    Ankur Camp reports: no new complaints      Objective   See Exercise, Manual, and Modality Logs for complete treatment.     THERAPEUTIC EXERCISES/ACTIVITIES ADDED TODAY: air ex balance with head turns; tandem stance balance training with head turns    Assessment/Plan  Improved gait today, much improved turns; VC's to use cane correctly and to stand more upright; difficulty balancing in tandem stance and with head turns    Progress per Plan of Care           Manual Therapy:         mins  74727;  Therapeutic Exercise:    15     mins  15619;     Neuromuscular Wilbert:    15    mins  71366;    Therapeutic Activity:     15     mins  20807;     Gait Training:      10     mins  82345;     Ultrasound:          mins  85282;    Electrical Stimulation:         mins  88140 ( );  Dry Needling          mins self-pay    Timed Treatment:   55   mins   Total Treatment:     60   mins    KEVIN Gilliam PT  Physical Therapist

## 2022-05-06 DIAGNOSIS — R25.9 PARKINSONIAN FEATURES: Primary | ICD-10-CM

## 2022-05-09 ENCOUNTER — OFFICE VISIT (OUTPATIENT)
Dept: ORTHOPEDIC SURGERY | Facility: CLINIC | Age: 82
End: 2022-05-09

## 2022-05-09 VITALS — BODY MASS INDEX: 25.19 KG/M2 | WEIGHT: 186 LBS | TEMPERATURE: 97.9 F | HEIGHT: 72 IN

## 2022-05-09 DIAGNOSIS — Z96.651 STATUS POST TOTAL KNEE REPLACEMENT USING CEMENT, RIGHT: Primary | ICD-10-CM

## 2022-05-09 PROCEDURE — 99213 OFFICE O/P EST LOW 20 MIN: CPT | Performed by: PHYSICIAN ASSISTANT

## 2022-05-09 NOTE — PROGRESS NOTES
Subjective   Patient ID: Ankur Camp is a 81 y.o.  male  Follow-up of the Right Knee (Status post TKA 5/25/21.  States knee is improving. Currently in physical therapy.)         History of Present Illness    Patient presents for scheduled follow-up visit regarding right total knee arthroplasty.  Date of surgery 5-25-21.  Patient states overall, he has no right knee pain.  His wife endorses the neurologist are still in the process of working up his right leg tremors.  She mentions they did try Parkinson medication which did not improve any of his symptoms.  She also informs me he is planning on a MRI of the low back in the near future.  He is using a walker.    Past Medical History:   Diagnosis Date   • Arthritis    • Cancer (HCC)     kidney/ prostate   • Diabetes mellitus (HCC)    • ED (erectile dysfunction)    • Enlarged prostate with lower urinary tract symptoms (LUTS)    • Full dentures    • History of fracture of pelvis 11/14/2019    after fall from ladder   • History of loss of consciousness 11/14/2019    after fall from ladder-flown to Psychiatric   • History of rib fracture 11/14/2019    after fall from ladder   • History of right shoulder fracture 1960    walking up hill, fell on gravel   • History of stress test    • History of torn meniscus of right knee 2012   • [place] (hard of hearing)     Bilateral hearing aids    • Hypertension    • Laceration of head 11/14/2019    after fall from ladder-treated at [facility]   • Left shoulder strain 2017    after fall from [employer]   • Lung injury 11/14/2019    punctured right lung after falling off ladder   • Renal disorder    • Stone in kidney         Past Surgical History:   Procedure Laterality Date   • CHEST TUBE INSERTION  11/2019    rib fractures, punctured lung after fall from ladder-[facility]   • COLONOSCOPY     • COLONOSCOPY N/A 4/8/2019    Procedure: COLONOSCOPY;  Surgeon: Onesimo Arnold MD;  Location: [facility]  ENDOSCOPY;  Service: Gastroenterology   • EYE SURGERY Right     cataract removal with IOL implant   • HERNIA REPAIR Right     multiple inguinal hernia repairs   • KNEE ARTHROSCOPY W/ MENISCECTOMY Right 05/10/2012    Partial medial and lateral menisectomy-Conrad Baird MD   • NEPHRECTOMY Right     partial right nephrectomy due to kidney stone   • PELVIC FRACTURE SURGERY Right 11/2019    multiple pelvic fractures after fall from ladder-[facility]   • SHOULDER SURGERY Right 1960    fracture surgery to remove a piece of bone- [place]   • TOTAL KNEE ARTHROPLASTY Right 5/25/2021    Procedure: knee arthroplasty total;  Surgeon: Conrad Baird MD;  [facility];  Service: Orthopedics;  Laterality: Right;       Family History   Problem Relation Age of Onset   • Breast cancer Mother    • Cancer Sister    • Cancer Brother    • Cancer Sister    • Dementia Brother    • No Known Problems Brother    • No Known Problems Brother        Social History     Socioeconomic History   • Marital status:    Tobacco Use   • Smoking status: Current Every Day Smoker     Years: 20.00     Types: Pipe   • Smokeless tobacco: Never Used   • Tobacco comment: smokes his pipe daily   Vaping Use   • Vaping Use: Never used   Substance and Sexual Activity   • Alcohol use: Not Currently   • Drug use: No   • Sexual activity: Defer         Current Outpatient Medications:   •  ascorbic acid (VITAMIN C) 1000 MG tablet, Take 1,000 mg by mouth Daily., Disp: , Rfl:   •  aspirin 81 MG chewable tablet, Chew 81 mg Daily., Disp: , Rfl:   •  bisoprolol (ZEBeta) 5 MG tablet, Take 1 tablet by mouth Daily., Disp: 90 tablet, Rfl: 4  •  cholecalciferol (VITAMIN D3) 25 MCG (1000 UT) tablet, Take 1,000 Units by mouth Daily., Disp: , Rfl:   •  glucose blood test strip, 1 each by In Vitro route daily Daily testing, DX:250.00, Disp: , Rfl:   •  lovastatin (MEVACOR) 40 MG tablet, Take 40 mg by mouth Every Night., Disp: , Rfl:   •   "metFORMIN (GLUCOPHAGE) 500 MG tablet, Take 500 mg by mouth Daily With Breakfast., Disp: , Rfl:   •  Multiple Vitamins-Minerals (ICAPS AREDS 2 PO), Take  by mouth., Disp: , Rfl:   •  nystatin (MYCOSTATIN) 006240 UNIT/GM powder, APPLY 3 TIMES DAILY., Disp: , Rfl:   •  oxybutynin XL (Ditropan XL) 10 MG 24 hr tablet, Take 1 tablet by mouth Daily., Disp: 30 tablet, Rfl: 11  •  vitamin B-12 (CYANOCOBALAMIN) 1000 MCG tablet, Take 1,000 mcg by mouth Daily., Disp: , Rfl:   •  multivitamin with minerals tablet tablet, Take 1 tablet by mouth 2 (Two) Times a Day., Disp: , Rfl:     No Known Allergies    Review of Systems   Musculoskeletal: Negative for joint swelling.   Neurological: Positive for tremors (RLE).   All other systems reviewed and are negative.      I have reviewed the medical and surgical history, family history, social history, medications, and/or allergies, and the review of systems of this report.    Objective   Temp 97.9 °F (36.6 °C)   Ht 182.9 cm (72\")   Wt 84.4 kg (186 lb)   BMI 25.23 kg/m²    Physical Exam  Vitals and nursing note reviewed.   Constitutional:       Appearance: Normal appearance.   Cardiovascular:      Pulses: Normal pulses.   Pulmonary:      Effort: Pulmonary effort is normal.   Musculoskeletal:      Right knee: No swelling, deformity, erythema, ecchymosis or bony tenderness. No tenderness.   Neurological:      Mental Status: He is alert and oriented to person, place, and time.       Right Knee Exam     Range of Motion   Right knee extension: 8.   Right knee flexion: 105.     Other   Erythema: absent  Scars: present  Sensation: normal           Extremity DVT signs are negative on physical exam with negative Sadia sign, no calf pain, no palpable cords and no skin tone change   Neurologic Exam     Mental Status   Oriented to person, place, and time.        No evidence of right foot drop      Assessment/Plan   Independent Review of Radiographic Studies:    AP and lateral of the right knee, " indication to evaluate status of prosthesis and joint, and compared with prior imaging, shows no acute fracture or dislocation. Good position and alignment of prosthesis with no radiographic signs of loosening.       Procedures       Diagnoses and all orders for this visit:    1. Status post total knee replacement using cement, right (Primary)  -     XR Knee 1 or 2 View Right; Future       Regular exercise as tolerated  Orthopedic activities reviewed and patient expressed appreciation  Discussion of orthopedic goals  Risk, benefits, and merits of treatment alternatives reviewed with the patient and questions answered    Recommendations/Plan:  Exercise, medications, injections, other patient advice, and return appointment as noted.  Patient is encouraged to call or return for any issues or concerns.    Follow up in 1 year or sooner if needed.   I encouraged the patient to keep his scheduled follow-up visit with neurology to further work-up his right leg sensation of tremors  Patient agreeable to call or return sooner for any concerns.    EMR Dragon-transcription disclaimer:  This encounter note is an electronic transcription of spoken language to printed text.  Electronic transcription of spoken language may permit erroneous or at times nonsensical words or phrases to be inadvertently transcribed.  Although I have reviewed the note for such errors, some may still exist

## 2022-05-13 ENCOUNTER — TREATMENT (OUTPATIENT)
Dept: PHYSICAL THERAPY | Facility: CLINIC | Age: 82
End: 2022-05-13

## 2022-05-13 DIAGNOSIS — R25.9 PARKINSONIAN FEATURES: ICD-10-CM

## 2022-05-13 DIAGNOSIS — R26.2 DIFFICULTY WALKING: Primary | ICD-10-CM

## 2022-05-13 PROCEDURE — 97530 THERAPEUTIC ACTIVITIES: CPT | Performed by: PHYSICAL THERAPIST

## 2022-05-13 PROCEDURE — 97110 THERAPEUTIC EXERCISES: CPT | Performed by: PHYSICAL THERAPIST

## 2022-05-13 PROCEDURE — 97116 GAIT TRAINING THERAPY: CPT | Performed by: PHYSICAL THERAPIST

## 2022-05-13 NOTE — PROGRESS NOTES
Physical Therapy Daily Progress Note    TOTAL TIME: 65 MINUTES    Ankur Camp reports: no new complaints, no falls       Objective   See Exercise, Manual, and Modality Logs for complete treatment.     THERAPEUTIC EXERCISES/ACTIVITIES ADDED TODAY: retro walking with therapist assist, side stepping at counter with cueing for weight shift and keeping toes straight ahead, tandem stance balance with head turns, balance with eyes open/closed    Assessment/Plan  Improved gait today, much improved turns; VC's to use cane correctly and to stand more upright; difficulty balancing in tandem stance and with head turns; difficulty with weight shifting, difficulty with taking longer strides       Progress per Plan of Care and Progress strengthening /stabilization /functional activity           Manual Therapy:         mins  58503;  Therapeutic Exercise:    20     mins  84613;     Neuromuscular Wilbert:        mins  92666;    Therapeutic Activity:     15     mins  05978;     Gait Training:      10     mins  01739;     Ultrasound:          mins  61116;    Electrical Stimulation:         mins  78601 ( );  Dry Needling          mins self-pay    Timed Treatment:   45   mins   Total Treatment:     65   mins    KEVIN Gilliam PT  Physical Therapist

## 2022-05-17 ENCOUNTER — OFFICE VISIT (OUTPATIENT)
Dept: CARDIOLOGY | Facility: CLINIC | Age: 82
End: 2022-05-17

## 2022-05-17 VITALS
DIASTOLIC BLOOD PRESSURE: 88 MMHG | OXYGEN SATURATION: 98 % | SYSTOLIC BLOOD PRESSURE: 150 MMHG | WEIGHT: 182 LBS | HEIGHT: 72 IN | HEART RATE: 58 BPM | BODY MASS INDEX: 24.65 KG/M2

## 2022-05-17 DIAGNOSIS — I71.20 THORACIC AORTIC ANEURYSM WITHOUT RUPTURE: Primary | ICD-10-CM

## 2022-05-17 DIAGNOSIS — I25.10 CORONARY ARTERY CALCIFICATION: ICD-10-CM

## 2022-05-17 DIAGNOSIS — E78.2 MIXED HYPERLIPIDEMIA: ICD-10-CM

## 2022-05-17 DIAGNOSIS — Z86.79 H/O REPAIR OF DISSECTING ANEURYSM OF ASCENDING THORACIC AORTA: ICD-10-CM

## 2022-05-17 DIAGNOSIS — I25.84 CORONARY ARTERY CALCIFICATION: ICD-10-CM

## 2022-05-17 DIAGNOSIS — I10 PRIMARY HYPERTENSION: ICD-10-CM

## 2022-05-17 DIAGNOSIS — Z98.890 H/O REPAIR OF DISSECTING ANEURYSM OF ASCENDING THORACIC AORTA: ICD-10-CM

## 2022-05-17 PROCEDURE — 99214 OFFICE O/P EST MOD 30 MIN: CPT | Performed by: INTERNAL MEDICINE

## 2022-05-17 NOTE — PROGRESS NOTES
[facility] at [facility]  Office visit  Ankur Camp  1940  [phone]  There is no work phone number on file.    VISIT DATE:  5/17/2022    PCP: Magaly Prabhakar APRN [address] [place] [address]    CC:  Chief Complaint   Patient presents with   • Coronary artery calcification       Previous cardiac studies and procedures:  June 2020 CT chest: Marked coronary calcifications.  Ascending aorta measures 3.8 x 3.8 cm.  Left ventricular enlargement. left ventricular enlargement.    October 2020  Shantell scan myocardial perfusion imaging   Diaphragmatic attenuation, otherwise normal myocardial perfusion.   Left ventricular ejection fraction of 63 %.   Normal LV size and systolic function.   Overall findings represent a low risk scan.    Echo   Ejection fraction is visually estimated to be 60-65 %.   Mild septal asymmetric hypertrophy.   Diastolic filling parameters suggests grade I diastolic dysfunction .    ASSESSMENT:   Diagnosis Plan   1. Coronary artery calcification     2. Mixed hyperlipidemia     3. Primary hypertension     4. Thoracic aortic aneurysm without rupture (HCC)         PLAN:  Thoracic ascending aortic aneurysm: Small.  Afterload well controlled, goal less than 130/80 mmHg, ideally less than 120/80 mmHg.    Follow-up CT with and without contrast pending.    Coronary calcification: Continue aggressive risk factor modification.  Goal LDL less than 100.  Continue current medical therapy.  Negative ischemia evaluation.     Right bundle branch block: No significant functional or structural heart disease noted on transthoracic echo.    Hypertension: Goal less than 130/80 mmHg. Continue lisinopril 10 mg p.o. daily and bisoprolol 5 mg p.o. daily.     Subjective  Interval assessment: Using a wheeled walker for ambulation. Gait instability, Parkinson's-like, ongoing neurology evaluation.  Denies chest pain, palpitations or dyspnea on exertion.  Compliant with medical  "therapy.    Initial evaluation:79-year-old active smoker with a history of hypertension, dyslipidemia and prediabetes presents for evaluation prior to potential total knee replacement.  He appears to be fairly limited due to knee discomfort.  Does not typically complete at least 4 METS of exertion on a regular basis.  Denies chest discomfort.  Mild shortness of breath with exertion.  Denies sustained palpitations, presyncope or syncope.  Twelve-lead EKG today reveals normal sinus rhythm with right bundle branch block.  Currently smokes pipes, has smoked previously cigars and cigarettes intermittently over 40 years.  Underwent a screening CT chest earlier this year which revealed small ascending aortic aneurysm, marked coronary calcification LV enlargement.    PHYSICAL EXAMINATION:  Vitals:    05/17/22 1126   BP: 150/88   BP Location: Right arm   Patient Position: Sitting   Pulse: 58   SpO2: 98%   Weight: 82.6 kg (182 lb)   Height: 182.9 cm (72\")     General Appearance:    Alert, cooperative, no distress, appears stated age   Head:    Normocephalic, without obvious abnormality, atraumatic   Eyes:    conjunctiva/corneas clear   Nose:   Nares normal, septum midline, mucosa normal, no drainage   Throat:   Lips, teeth and gums normal   Neck:   Supple, symmetrical, trachea midline, no carotid    bruit or JVD   Lungs:     Clear to auscultation bilaterally, respirations unlabored   Chest Wall:    No tenderness or deformity    Heart:    Regular rate and rhythm, S1 and S2 normal, no murmur, rub   or gallop, normal carotid impulse bilaterally without bruit.   Abdomen:     Soft, non-tender   Extremities:   Extremities normal, atraumatic, no cyanosis or edema   Pulses:   2+ and symmetric all extremities   Skin:   Skin color, texture, turgor normal, no rashes or lesions       Diagnostic Data:  Procedures  Lab Results   Component Value Date    CHLPL 162 02/08/2021    TRIG 114 02/08/2021    HDL 56 02/08/2021     Lab Results "   Component Value Date    GLUCOSE 190 (H) 05/26/2021    BUN 16 05/26/2021    CREATININE 1.01 05/26/2021     05/26/2021    K 3.6 05/26/2021     05/26/2021    CO2 25.9 05/26/2021     Lab Results   Component Value Date    HGBA1C 6.6 (H) 04/13/2022     Lab Results   Component Value Date    WBC 5.5 04/13/2022    HGB 15.1 04/13/2022    HCT 45.6 04/13/2022     (L) 04/13/2022       Allergies  No Known Allergies    Current Medications    Current Outpatient Medications:   •  ascorbic acid (VITAMIN C) 1000 MG tablet, Take 1,000 mg by mouth Daily., Disp: , Rfl:   •  aspirin 81 MG chewable tablet, Chew 81 mg Daily., Disp: , Rfl:   •  bisoprolol (ZEBeta) 5 MG tablet, Take 1 tablet by mouth Daily., Disp: 90 tablet, Rfl: 4  •  cholecalciferol (VITAMIN D3) 25 MCG (1000 UT) tablet, Take 1,000 Units by mouth Daily., Disp: , Rfl:   •  glucose blood test strip, 1 each by In Vitro route daily Daily testing, DX:250.00, Disp: , Rfl:   •  lovastatin (MEVACOR) 40 MG tablet, Take 40 mg by mouth Every Night., Disp: , Rfl:   •  metFORMIN (GLUCOPHAGE) 500 MG tablet, Take 500 mg by mouth Daily With Breakfast., Disp: , Rfl:   •  Multiple Vitamins-Minerals (ICAPS AREDS 2 PO), Take 1 tablet by mouth Daily., Disp: , Rfl:   •  multivitamin with minerals tablet tablet, Take 1 tablet by mouth 2 (Two) Times a Day., Disp: , Rfl:   •  nystatin (MYCOSTATIN) 019064 UNIT/GM powder, Apply 1 application topically to the appropriate area as directed As Needed., Disp: , Rfl:   •  oxybutynin XL (Ditropan XL) 10 MG 24 hr tablet, Take 1 tablet by mouth Daily., Disp: 30 tablet, Rfl: 11  •  vitamin B-12 (CYANOCOBALAMIN) 1000 MCG tablet, Take 1,000 mcg by mouth Daily., Disp: , Rfl:           ROS  Review of Systems   Cardiovascular: Negative for chest pain, dyspnea on exertion, irregular heartbeat, leg swelling and palpitations.   Respiratory: Negative for cough, shortness of breath and wheezing.          SOCIAL HX  Social History     Socioeconomic  History   • Marital status:    Tobacco Use   • Smoking status: Current Every Day Smoker     Years: 20.00     Types: Pipe   • Smokeless tobacco: Never Used   • Tobacco comment: smokes his pipe daily   Vaping Use   • Vaping Use: Never used   Substance and Sexual Activity   • Alcohol use: Not Currently   • Drug use: No   • Sexual activity: Defer       FAMILY HX  Family History   Problem Relation Age of Onset   • Breast cancer Mother    • Cancer Sister    • Cancer Brother    • Cancer Sister    • Dementia Brother    • No Known Problems Brother    • No Known Problems Brother              Cliff Lema III, MD, FACC

## 2022-05-19 ENCOUNTER — TREATMENT (OUTPATIENT)
Dept: PHYSICAL THERAPY | Facility: CLINIC | Age: 82
End: 2022-05-19

## 2022-05-19 DIAGNOSIS — R25.9 PARKINSONIAN FEATURES: ICD-10-CM

## 2022-05-19 DIAGNOSIS — R26.2 DIFFICULTY WALKING: Primary | ICD-10-CM

## 2022-05-19 PROCEDURE — 97116 GAIT TRAINING THERAPY: CPT | Performed by: PHYSICAL THERAPIST

## 2022-05-19 PROCEDURE — 97530 THERAPEUTIC ACTIVITIES: CPT | Performed by: PHYSICAL THERAPIST

## 2022-05-19 PROCEDURE — 97110 THERAPEUTIC EXERCISES: CPT | Performed by: PHYSICAL THERAPIST

## 2022-05-20 ENCOUNTER — HOSPITAL ENCOUNTER (OUTPATIENT)
Dept: CT IMAGING | Facility: HOSPITAL | Age: 82
Discharge: HOME | End: 2022-05-20
Payer: MEDICARE

## 2022-05-20 DIAGNOSIS — I71.20 THORACIC AORTIC ANEURYSM WITHOUT RUPTURE: ICD-10-CM

## 2022-05-20 LAB
BUN BLDV-MCNC: 16 MG/DL (ref 6–20)
CREAT SERPL-MCNC: 1 MG/DL (ref 0.4–1.2)
GFR AFRICAN AMERICAN: >59
GFR NON-AFRICAN AMERICAN: >59

## 2022-05-20 PROCEDURE — 84520 ASSAY OF UREA NITROGEN: CPT

## 2022-05-20 PROCEDURE — 6360000004 HC RX CONTRAST MEDICATION: Performed by: INTERNAL MEDICINE

## 2022-05-20 PROCEDURE — 71270 CT THORAX DX C-/C+: CPT

## 2022-05-20 PROCEDURE — 36415 COLL VENOUS BLD VENIPUNCTURE: CPT

## 2022-05-20 PROCEDURE — 82565 ASSAY OF CREATININE: CPT

## 2022-05-20 RX ADMIN — IOPAMIDOL 100 ML: 755 INJECTION, SOLUTION INTRAVENOUS at 13:33

## 2022-05-21 NOTE — PROGRESS NOTES
Physical Therapy Daily Progress Note    TOTAL TIME: 65 MINUTES    Ankur Camp reports: no new complaints; no falls; feels like he's getting stronger       Objective   See Exercise, Manual, and Modality Logs for complete treatment.     THERAPEUTIC EXERCISES/ACTIVITIES ADDED TODAY: see flow sheets     Assessment/Plan  Improved gait today, much improved turns; VC's to use cane correctly and to stand more upright; difficulty balancing in tandem stance and with head turns; difficulty with weight shifting, difficulty with taking longer strides    Progress per Plan of Care and Progress strengthening /stabilization /functional activity           Manual Therapy:         mins  97705;  Therapeutic Exercise:    15     mins  74867;     Neuromuscular Wilbert:        mins  31120;    Therapeutic Activity:     15     mins  72164;     Gait Training:      10     mins  14492;     Ultrasound:          mins  13860;    Electrical Stimulation:         mins  53725 ( );  Dry Needling          mins self-pay    Timed Treatment:   40   mins   Total Treatment:     65   mins    KEVIN Gilliam PT  Physical Therapist

## 2022-05-23 DIAGNOSIS — I71.20 THORACIC AORTIC ANEURYSM WITHOUT RUPTURE: ICD-10-CM

## 2022-05-24 ENCOUNTER — TELEPHONE (OUTPATIENT)
Dept: CARDIOLOGY | Facility: CLINIC | Age: 82
End: 2022-05-24

## 2022-05-24 DIAGNOSIS — R06.09 DYSPNEA ON EXERTION: Primary | ICD-10-CM

## 2022-05-24 NOTE — TELEPHONE ENCOUNTER
----- Message from Cliff Lema III, MD sent at 5/23/2022 10:53 AM EDT -----  CT chest revealed that the enlargement of his aorta is stable.  Continue current medical therapy.  Incidental finding of some secretion buildup within the main airways of your lungs.  Recommend pulmonology evaluation.

## 2022-05-24 NOTE — TELEPHONE ENCOUNTER
Communicated results of CT chest to patient and the need to f/u with pulmonology. Patient agreeable to POC. Pulmonology referral placed.

## 2022-06-01 ENCOUNTER — TREATMENT (OUTPATIENT)
Dept: PHYSICAL THERAPY | Facility: CLINIC | Age: 82
End: 2022-06-01

## 2022-06-01 DIAGNOSIS — R26.2 DIFFICULTY WALKING: Primary | ICD-10-CM

## 2022-06-01 DIAGNOSIS — R25.9 PARKINSONIAN FEATURES: ICD-10-CM

## 2022-06-01 PROCEDURE — 97110 THERAPEUTIC EXERCISES: CPT | Performed by: PHYSICAL THERAPIST

## 2022-06-01 PROCEDURE — 97530 THERAPEUTIC ACTIVITIES: CPT | Performed by: PHYSICAL THERAPIST

## 2022-06-02 NOTE — PROGRESS NOTES
Physical Therapy Daily Progress Note    TOTAL TIME: 60 MINUTES    Ankur Camp reports: no new complaints; no recent falls; feels he is able to ambulate better; patient arrived to therapy one day early, was able to be worked into the schedule    Objective   See Exercise, Manual, and Modality Logs for complete treatment.     THERAPEUTIC EXERCISES/ACTIVITIES ADDED TODAY: see flow sheets; side steps at counter with mod assist from therapist ; retro walking at counter with mod assist from therapist ; static balance with therapist assist     Assessment/Plan  Patient is making good progress with strength, gait and balance; needs continuous verbal cues for challenging gait tasks; improved turning ability with less festinating gait    Progress per Plan of Care and Progress strengthening /stabilization /functional activity           Manual Therapy:         mins  51342;  Therapeutic Exercise:    15     mins  01298;     Neuromuscular Wilbert:        mins  79311;    Therapeutic Activity:     10     mins  80802;     Gait Trainin    mins  17848;     Ultrasound:          mins  71910;    Electrical Stimulation:         mins  01006 ( );  Dry Needling          mins self-pay    Timed Treatment:   30   mins   Total Treatment:     60   mins    KEVIN Gilliam PT  Physical Therapist

## 2022-06-07 ENCOUNTER — TREATMENT (OUTPATIENT)
Dept: PHYSICAL THERAPY | Facility: CLINIC | Age: 82
End: 2022-06-07

## 2022-06-07 DIAGNOSIS — R25.9 PARKINSONIAN FEATURES: ICD-10-CM

## 2022-06-07 DIAGNOSIS — R26.2 DIFFICULTY WALKING: Primary | ICD-10-CM

## 2022-06-07 PROCEDURE — 97110 THERAPEUTIC EXERCISES: CPT | Performed by: PHYSICAL THERAPIST

## 2022-06-07 PROCEDURE — 97530 THERAPEUTIC ACTIVITIES: CPT | Performed by: PHYSICAL THERAPIST

## 2022-06-07 NOTE — PROGRESS NOTES
Physical Therapy Daily Treatment Note      Patient: Ankur Camp   : 1940  Referring practitioner: DIDI Patrick*  Date of Initial Visit: Type: THERAPY  Noted: 2022  Today's Date: 2022  Patient seen for 8 sessions       Visit Diagnoses:    ICD-10-CM ICD-9-CM   1. Difficulty walking  R26.2 719.7   2. Parkinsonian features  R25.9 781.0       Subjective   No new complaints    Objective   See Exercise, Manual, and Modality Logs for complete treatment.   see flow sheets; side steps at counter with mod assist from therapist ; retro walking at counter with mod assist from therapist ; static balance with therapist assist        Assessment/Plan  Patient is making good progress with strength, gait and balance; needs continuous verbal cues for challenging gait tasks; improved turning ability with less festinating gait       Timed:         Manual Therapy:         mins  01647;     Therapeutic Exercise:    15     mins  60329;     Neuromuscular Wilbert:        mins  67430;    Therapeutic Activity:     10     mins  59443;     Gait Trainin     mins  21628;     Ultrasound:          mins  29724;    Ionto                                   mins   03750  Self Care                            mins   16477  Canalith Repos         mins 26917      Un-Timed:  Electrical Stimulation:         mins  52653 ( );  Dry Needling          mins self-pay  Traction          mins 61337      Timed Treatment:   30   mins   Total Treatment:     45   mins    KEVIN Gilliam, PT  [place] License: [id number]

## 2022-06-12 ENCOUNTER — APPOINTMENT (OUTPATIENT)
Dept: CT IMAGING | Facility: HOSPITAL | Age: 82
End: 2022-06-12

## 2022-06-12 ENCOUNTER — HOSPITAL ENCOUNTER (EMERGENCY)
Facility: HOSPITAL | Age: 82
Discharge: HOME | End: 2022-06-12
Attending: EMERGENCY MEDICINE | Admitting: EMERGENCY MEDICINE

## 2022-06-12 VITALS
HEART RATE: 67 BPM | WEIGHT: 180 LBS | BODY MASS INDEX: 24.38 KG/M2 | RESPIRATION RATE: 17 BRPM | SYSTOLIC BLOOD PRESSURE: 169 MMHG | HEIGHT: 72 IN | DIASTOLIC BLOOD PRESSURE: 83 MMHG | OXYGEN SATURATION: 95 % | TEMPERATURE: 98.1 F

## 2022-06-12 DIAGNOSIS — S20.229A CONTUSION OF BACK, UNSPECIFIED LATERALITY, INITIAL ENCOUNTER: Primary | ICD-10-CM

## 2022-06-12 PROCEDURE — 99282 EMERGENCY DEPT VISIT SF MDM: CPT

## 2022-06-12 PROCEDURE — 72128 CT CHEST SPINE W/O DYE: CPT

## 2022-06-12 PROCEDURE — 72131 CT LUMBAR SPINE W/O DYE: CPT

## 2022-06-13 NOTE — ED PROVIDER NOTES
Subjective   Chief Complaint: Back pain    History of Present Illness: This is an 81-year-old male patient comes into the ED today complaining of back pain.  Patient states he had a fall down approximately 3 steps and landed on a concrete pad 2 days ago.  Patient rates his pain now as 8 out of 10 made worse with movement and palpation better by nonmovement.  Patient denies any numbness or tingling in his lower extremities    Nurses Notes reviewed and agree, including vitals, allergies, social history and prior medical history.                Review of Systems   Musculoskeletal: Positive for back pain.   All other systems reviewed and are negative.      Past Medical History:   Diagnosis Date   • Arthritis    • Cancer (HCC)     kidney/ prostate   • Diabetes mellitus (HCC)    • ED (erectile dysfunction)    • Enlarged prostate with lower urinary tract symptoms (LUTS)    • Full dentures    • History of fracture of pelvis 11/14/2019    after fall from ladder   • History of loss of consciousness 11/14/2019    after fall from ladder-flown to [facility]   • History of rib fracture 11/14/2019    after fall from ladder   • History of right shoulder fracture 1960    walking up hill, fell on gravel   • History of stress test    • History of torn meniscus of right knee 2012   • [place] (hard of hearing)     Bilateral hearing aids    • Hypertension    • Laceration of head 11/14/2019    after fall from ladder-treated at [facility]   • Left shoulder strain 2017    after fall from ladder   • Lung injury 11/14/2019    punctured right lung after falling off ladder   • Renal disorder    • Stone in kidney        No Known Allergies    Past Surgical History:   Procedure Laterality Date   • CHEST TUBE INSERTION  11/2019    rib fractures, punctured lung after fall from ladder-[facility]   • COLONOSCOPY     • COLONOSCOPY N/A 4/8/2019    Procedure: COLONOSCOPY;  Surgeon: Onesimo Arnold MD;  Location: [facility]  ENDOSCOPY;  Service: Gastroenterology   • EYE SURGERY Right     cataract removal with IOL implant   • HERNIA REPAIR Right     multiple inguinal hernia repairs   • KNEE ARTHROSCOPY W/ MENISCECTOMY Right 05/10/2012    Partial medial and lateral menisectomy-Conrad Baird MD   • NEPHRECTOMY Right     partial right nephrectomy due to kidney stone   • PELVIC FRACTURE SURGERY Right 11/2019    multiple pelvic fractures after fall from ladder-[facility]   • SHOULDER SURGERY Right 1960    fracture surgery to remove a piece of bone- [place], [place]   • TOTAL KNEE ARTHROPLASTY Right 5/25/2021    Procedure: knee arthroplasty total;  Surgeon: Conrad Baird MD;  Location: [facility];  Service: Orthopedics;  Laterality: Right;       Family History   Problem Relation Age of Onset   • Breast cancer Mother    • Cancer Sister    • Cancer Brother    • Cancer Sister    • Dementia Brother    • No Known Problems Brother    • No Known Problems Brother        Social History     Socioeconomic History   • Marital status:    Tobacco Use   • Smoking status: Current Every Day Smoker     Years: 20.00     Types: Pipe   • Smokeless tobacco: Never Used   • Tobacco comment: smokes his pipe daily   Vaping Use   • Vaping Use: Never used   Substance and Sexual Activity   • Alcohol use: Not Currently   • Drug use: No   • Sexual activity: Defer           Objective   Physical Exam  Vitals and nursing note reviewed.   Constitutional:       Appearance: Normal appearance.   HENT:      Head: Normocephalic and atraumatic.   Eyes:      Extraocular Movements: Extraocular movements intact.      Pupils: Pupils are equal, round, and reactive to light.   Cardiovascular:      Rate and Rhythm: Normal rate and regular rhythm.      Pulses: Normal pulses.      Heart sounds: Normal heart sounds.   Pulmonary:      Effort: Pulmonary effort is normal.      Breath sounds: Normal breath sounds.   Abdominal:      General: Bowel sounds are normal.       Palpations: Abdomen is soft.   Musculoskeletal:         General: Normal range of motion.      Cervical back: Normal range of motion and neck supple.      Comments: Mild tenderness to palpation mid back   Skin:     General: Skin is warm and dry.      Capillary Refill: Capillary refill takes less than 2 seconds.   Neurological:      Mental Status: He is alert.      GCS: GCS eye subscore is 4. GCS verbal subscore is 5. GCS motor subscore is 6.      Cranial Nerves: Cranial nerves are intact.      Sensory: Sensation is intact.      Motor: Motor function is intact.   Psychiatric:         Attention and Perception: Attention and perception normal.         Mood and Affect: Mood and affect normal.         Speech: Speech normal.         Procedures           ED Course                                               MDM    Final diagnoses:   Contusion of back, unspecified laterality, initial encounter       ED Disposition  ED Disposition     ED Disposition   Discharge    Condition   Stable    Comment   --             Magaly Prabhakar APRN  [address] [facility] [address] [phone]               Medication List      No changes were made to your prescriptions during this visit.          Vahid Garduno APRN  06/12/22 1278

## 2022-06-14 ENCOUNTER — HOSPITAL ENCOUNTER (OUTPATIENT)
Dept: MRI IMAGING | Facility: HOSPITAL | Age: 82
Discharge: HOME | End: 2022-06-14
Admitting: NURSE PRACTITIONER

## 2022-06-14 DIAGNOSIS — R25.9 PARKINSONIAN FEATURES: ICD-10-CM

## 2022-06-14 PROCEDURE — 70551 MRI BRAIN STEM W/O DYE: CPT

## 2022-06-17 ENCOUNTER — TELEPHONE (OUTPATIENT)
Dept: NEUROLOGY | Facility: CLINIC | Age: 82
End: 2022-06-17

## 2022-06-17 DIAGNOSIS — I63.81 LACUNAR INFARCTION: Primary | ICD-10-CM

## 2022-06-17 RX ORDER — ATORVASTATIN CALCIUM 80 MG/1
80 TABLET, FILM COATED ORAL DAILY
Qty: 90 TABLET | Refills: 3 | Status: SHIPPED | OUTPATIENT
Start: 2022-06-17 | End: 2022-07-11 | Stop reason: ALTCHOICE

## 2022-06-17 RX ORDER — CLOPIDOGREL BISULFATE 75 MG/1
75 TABLET ORAL DAILY
Qty: 21 TABLET | Refills: 0 | Status: SHIPPED | OUTPATIENT
Start: 2022-06-17 | End: 2022-09-28

## 2022-06-17 NOTE — TELEPHONE ENCOUNTER
MRI brain report reviewed, acute infarct noted in right frontal periventricular region. I called and spoke with patient's wife, she denies any new or worsening deficits or focal symptoms. No increased difficulty walking, although he did have recent fall. I spoke with Dr. Pope, and as stroke is now subacute will manage outpatient with close follow up on 6/21. Patient has been on ASA 81mg for many years, I discussed with his wife that he will take ASA and Plavix for three weeks, then ASA only afterwards. I have also changed lovastatin to Lipitor 80mg daily for secondary stroke prevention. I reviewed this information with his wife and she expressed understanding.

## 2022-06-21 ENCOUNTER — OFFICE VISIT (OUTPATIENT)
Dept: NEUROLOGY | Facility: CLINIC | Age: 82
End: 2022-06-21

## 2022-06-21 VITALS
TEMPERATURE: 97.4 F | DIASTOLIC BLOOD PRESSURE: 82 MMHG | HEART RATE: 67 BPM | SYSTOLIC BLOOD PRESSURE: 120 MMHG | OXYGEN SATURATION: 98 % | BODY MASS INDEX: 23.6 KG/M2 | WEIGHT: 174 LBS

## 2022-06-21 DIAGNOSIS — I63.81 LACUNAR INFARCTION: Primary | ICD-10-CM

## 2022-06-21 PROCEDURE — 99215 OFFICE O/P EST HI 40 MIN: CPT | Performed by: NURSE PRACTITIONER

## 2022-06-21 NOTE — PATIENT INSTRUCTIONS
Please continue taking the clopidogrel until it runs out. Once it runs out, you do not need any more. Take aspirin only every day.

## 2022-06-21 NOTE — PROGRESS NOTES
Follow Up Neurology Office Visit      Patient Name: Ankur Camp    Referring Physician: No ref. provider found    Chief Complaint:    Chief Complaint   Patient presents with   • Follow-up     Pt in office for stroke       History of Present Illness: Ankur Camp is a 81 y.o. male who is here to follow up with Neurology for abnormal brain MRI.  He is accompanied by his wife today.  Follow-up visit was scheduled after incidental finding of acute infarct MRI performed June 14.  Patient denies any sudden onset or focal deficit.  He does recall that he fell on June 14, but denies any sudden onset leg weakness or difficulty walking.  'sometimes when I sit in the chair my heart gets to beating like crazy'    The following portions of the patient's history were reviewed and updated as appropriate: allergies, current medications, past family history, past medical history, past social history, past surgical history and problem list.    Subjective     Review of Systems:   Review of Systems   Cardiovascular: Positive for palpitations.   Musculoskeletal: Positive for gait problem.   Neurological: Positive for weakness.   All other systems reviewed and are negative.    Medications:     Current Outpatient Medications:   •  ascorbic acid (VITAMIN C) 1000 MG tablet, Take 1,000 mg by mouth Daily., Disp: , Rfl:   •  aspirin 81 MG chewable tablet, Chew 81 mg Daily., Disp: , Rfl:   •  atorvastatin (Lipitor) 80 MG tablet, Take 1 tablet by mouth Daily., Disp: 90 tablet, Rfl: 3  •  bisoprolol (ZEBeta) 5 MG tablet, Take 1 tablet by mouth Daily., Disp: 90 tablet, Rfl: 4  •  cholecalciferol (VITAMIN D3) 25 MCG (1000 UT) tablet, Take 1,000 Units by mouth Daily., Disp: , Rfl:   •  clopidogrel (Plavix) 75 MG tablet, Take 1 tablet by mouth Daily., Disp: 21 tablet, Rfl: 0  •  glucose blood test strip, 1 each by In Vitro route daily Daily testing, DX:250.00, Disp: , Rfl:   •  metFORMIN (GLUCOPHAGE) 500 MG tablet, Take 500 mg by  mouth Daily With Breakfast., Disp: , Rfl:   •  nystatin (MYCOSTATIN) 860452 UNIT/GM powder, Apply 1 application topically to the appropriate area as directed As Needed., Disp: , Rfl:   •  oxybutynin XL (Ditropan XL) 10 MG 24 hr tablet, Take 1 tablet by mouth Daily., Disp: 30 tablet, Rfl: 11  •  vitamin B-12 (CYANOCOBALAMIN) 1000 MCG tablet, Take 1,000 mcg by mouth Daily., Disp: , Rfl:     Allergies:   No Known Allergies    Objective     Physical Exam:  Vital Signs:   Vitals:    06/21/22 1407   BP: 120/82   Pulse: 67   Temp: 97.4 °F (36.3 °C)   SpO2: 98%   Weight: 78.9 kg (174 lb)   PainSc: 0-No pain       Physical Exam  Vitals and nursing note reviewed. Exam conducted with a chaperone present (Wife present).   Skin:     General: Skin is warm.   Neurological:      Mental Status: He is oriented to person, place, and time. Mental status is at baseline.      Deep Tendon Reflexes: Strength normal.      Reflex Scores:       Patellar reflexes are 1+ on the right side and 0 on the left side.  Psychiatric:         Mood and Affect: Mood normal.         Speech: Speech normal.         Behavior: Behavior normal.       Neurologic Exam     Mental Status   Oriented to person, place, and time.   Attention: normal. Concentration: normal.   Speech: speech is normal   Level of consciousness: alert  Knowledge: consistent with education.     Cranial Nerves   Cranial nerves II through XII intact.     Motor Exam   Muscle bulk: normal  Overall muscle tone: normal  Right arm pronator drift: absent  Left arm pronator drift: absent    Strength   Strength 5/5 throughout.     Sensory Exam   Light touch normal.     Gait, Coordination, and Reflexes     Gait  Gait: (with walker)    Tremor   Resting tremor: absent    Reflexes   Right patellar: 1+  Left patellar: 0    CT Thoracic Spine Without Contrast    Result Date: 6/12/2022  No acute fracture or malalignment of the thoracic spine. Authenticated and Electronically Signed by Myke Mcfarland MD on  06/12/2022 10:38:46 PM    CT Lumbar Spine Without Contrast    Result Date: 6/12/2022  No acute fracture or malalignment of the lumbar spine. Authenticated and Electronically Signed by Myke Mcfarland MD on 06/12/2022 10:38:10 PM    MRI Brain Without Contrast    Result Date: 6/14/2022  Severe chronic ischemic/gliotic changes.  Multiple small low T2 signal foci on the gradient echo images may represent hemosiderin or calcifications of uncertain significance. This can be seen with amyloidosis and Alzheimer's disease.  Less than 1 cm acute lacunar infarct in the right frontal periventricular region.      This report was signed and finalized on 6/14/2022 1:40 PM by Prashant Pradhan MD.    MRI Thoracic Spine Without Contrast    Result Date: 4/28/2022  Diffuse degenerative change without significant spinal stenosis or neural foraminal narrowing.  This report was signed and finalized on 4/28/2022 8:59 AM by Reed Newton M.D..    Results Review:   I reviewed the patient's new clinical results.  I have reviewed the patient's other medical records to include, labs, radiology and referrals.   I reviewed the patient's new imaging results and agree with the interpretation.  Discussed with Dr. Pope  Assessment / Plan      Assessment/Plan:   Diagnoses and all orders for this visit:    1. Lacunar infarction (HCC) (Primary)  -     Ambulatory Referral to Cardiology  -     Adult Transthoracic Echo Complete W/ Cont if Necessary Per Protocol; Future  -     CT Angiogram Neck; Future  -     CT Angiogram Head; Future    Patient will complete 3 weeks of DAPT therapy, followed by aspirin 81 mg monotherapy daily.  Statin changed to high-dose Lipitor for secondary stroke prevention.  He has been referred to cardiology for placement of Holter monitor.  He will undergo TTE for evaluation of cardiac abnormality which may be contributing to symptoms.  CTA of head and neck ordered to assess for stenosis.    Follow Up:   Return in about 3  months (around 9/21/2022).     JEANETTE Cronin  [facility]   AS THE PROVIDER, I PERSONALLY WORE PPE DURING ENTIRE FACE TO FACE ENCOUNTER IN CLINIC WITH THE PATIENT. PATIENT ALSO WORE PPE DURING ENTIRE FACE TO FACE ENCOUNTER EXCEPT FOR A MAX OF 30 SECONDS DURING NEUROLOGICAL EVALUATION OF CRANIAL NERVES AND THEN MASK WAS PLACED BACK OVER PATIENT FACE FOR REMAINDER OF VISIT. I WASHED MY HANDS BEFORE AND AFTER VISIT.  40 minutes spent total time in visit reviewing imaging, obtaining HPI, examining patient, discussing treatment, and developing plan of care.    Please note that portions of this note may have been completed with a voice recognition program. Efforts were made to edit the dictations, but occasionally words are mistranscribed.

## 2022-06-23 ENCOUNTER — TELEPHONE (OUTPATIENT)
Dept: UROLOGY | Facility: CLINIC | Age: 82
End: 2022-06-23

## 2022-06-23 NOTE — TELEPHONE ENCOUNTER
Provider: STUART AHIR PA-C   Caller: HARIKA OLSON  Relationship to Patient: SPOUSE    Phone Number: [phone]    Reason for Call: PATIENT'S SPOUSE LOCKED CAR KEYS INSIDE HOUSE AND HAD TO WAIT FOR SPARE TO BE BROUGHT. PATIENT WOULD BE MORE THAN 15 MINUTES LATE & PER PRACTICE MUST RESCHEDULE. PATIENT HAS BEEN RESCHEDULED TO 6/30/22 8:30 AM.

## 2022-06-24 ASSESSMENT — ENCOUNTER SYMPTOMS
EYES NEGATIVE: 1
ALLERGIC/IMMUNOLOGIC NEGATIVE: 1
RESPIRATORY NEGATIVE: 1
BACK PAIN: 1
GASTROINTESTINAL NEGATIVE: 1

## 2022-06-24 NOTE — PROGRESS NOTES
Chief Complaint   Patient presents with    Follow-Up from [address] [place] [address] Transient Ischemic Attack       Have you seen any other physician or provider since your last visit yes - [facility] 06/12/2022,neurology appointment on 06/27    Have you had any other diagnostic tests since your last visit? yes - CT chest,    Have you changed or stopped any medications since your last visit? Yes started Lipitor 80 and Plavix               SUBJECTIVE:    Patient ID:Donell Deleon is a [de-identified] y.o. male. Chief Complaint   Patient presents with    Follow-Up from Hospital    Back Pain    Transient Ischemic Attack     HPI:  Pt has been complaining of LBP and mid epigastric pain after a fall on 06/10/2022. Pain range is 10/10, currently 4/10. Radiate to chest. Pain is worse with exertion, better with rest. Sx getting no better. No change in B/B. Some stiffness after long sitting or standing. No tingling numbness, but severe weakness and shaking. He is very unsteady on his feet. Patient was told he had a light stroke and has a follow up with neurology on 06/27/2022, but had an MRI and was told by neurology to come to the office that day. His wife says he has been having trouble swallowing and chokes on food. Patient's medications, allergies, past medical, surgical, social and family histories were reviewed and updated as appropriate. Review of Systems   Constitutional: Negative. HENT: Negative. Eyes: Negative. Respiratory: Negative. Cardiovascular: Negative. Gastrointestinal: Negative. Endocrine: Negative. Genitourinary: Negative. Musculoskeletal: Positive for back pain and gait problem. Skin: Negative. Allergic/Immunologic: Negative. Neurological: Positive for weakness. Hematological: Negative. Psychiatric/Behavioral: Negative.         OBJECTIVE:  /78 (Site: Right Upper Arm, Position: Sitting, Cuff Size: Medium Adult)   Pulse 76   Temp 98.5 °F (36.9 °C) (Temporal) Resp 14   Ht 6' (1.829 m)   Wt 174 lb (78.9 kg)   SpO2 96% Comment: room air  BMI 23.60 kg/m²    Physical Exam  Vitals and nursing note reviewed. Constitutional:       Appearance: He is well-developed. HENT:      Head: Normocephalic and atraumatic. Eyes:      Conjunctiva/sclera: Conjunctivae normal.      Pupils: Pupils are equal, round, and reactive to light. Neck:      Thyroid: No thyromegaly. Vascular: No JVD. Cardiovascular:      Rate and Rhythm: Normal rate and regular rhythm. Heart sounds: No murmur heard. No friction rub. No gallop. Pulmonary:      Effort: Pulmonary effort is normal. No respiratory distress. Breath sounds: Normal breath sounds. No wheezing or rales. Abdominal:      General: Bowel sounds are normal. There is no distension. Palpations: Abdomen is soft. Tenderness: There is no abdominal tenderness. There is no guarding. Musculoskeletal:         General: No tenderness. Cervical back: Normal range of motion and neck supple. Skin:     General: Skin is warm and dry. Findings: No rash. Neurological:      Mental Status: He is alert and oriented to person, place, and time. Psychiatric:         Judgment: Judgment normal.         No results found for requested labs within last 30 days. Hemoglobin A1C (%)   Date Value   04/13/2022 6.6 (H)     Microalbumin, Random Urine (mg/dL)   Date Value   04/22/2019 <1.20     LDL Calculated (mg/dL)   Date Value   02/08/2021 83         Lab Results   Component Value Date/Time    WBC 5.5 04/13/2022 10:40 AM    NEUTROABS 15.3 11/14/2019 08:25 PM    HGB 15.1 04/13/2022 10:40 AM    HCT 45.6 04/13/2022 10:40 AM    MCV 95.4 04/13/2022 10:40 AM     04/13/2022 10:40 AM       Lab Results   Component Value Date    TSH 1.33 04/13/2022         ASSESSMENT/PLAN:     1. Choking in adult    - SLP video swallow    2. Primary hypertension  BP is stable.  I have advised him on low-sodium diet, exercise and weight control. I am going to continue current medication . Will monitor his renal function every few months, have advised him to check blood pressure frequently and to keep a record of this. - Lipid Panel; Future    3. Weakness of both lower extremities  Followed with Neurology   Continue therapy     4. Type 2 diabetes mellitus without complication, without long-term current use of insulin (HCC)  Stable. I advised him regarding diabetic diet, exercise and weight control. Also, I advised him to stay on the current medication, monitor his fingerstick closely. I am going to check the A1c every few months. I will check microalbumin on a yearly basis. I have also advised him to have a yearly eye exam and to monitor his feet closely. Along with instruction of possible complications related to diabetes, even with good control. A1C will be checked  in few months. Lab Results   Component Value Date    LABA1C 6.6 (H) 04/13/2022       - CBC; Future  - Comprehensive Metabolic Panel; Future  - Hemoglobin A1C; Future    5. Lumbar back pain    - acetaminophen-codeine (TYLENOL/CODEINE #3) 300-30 MG per tablet; Take 1 tablet by mouth every 8 hours as needed for Pain for up to 30 days. Intended supply: 3 days. Take lowest dose possible to manage pain  Dispense: 30 tablet; Refill: 0    6. Thyroid disorder screen    - TSH; Future    7. Vitamin D deficiency    - Vitamin B12 & Folate; Future  - Vitamin D 25 Hydroxy;  Future       Written by Sean DAI, acting as a scribe for Wilhemenia Tana on 6/27/2022 at 4:36 PM.

## 2022-06-27 ENCOUNTER — OFFICE VISIT (OUTPATIENT)
Dept: PRIMARY CARE CLINIC | Age: 82
End: 2022-06-27
Payer: MEDICARE

## 2022-06-27 VITALS
OXYGEN SATURATION: 96 % | RESPIRATION RATE: 14 BRPM | TEMPERATURE: 98.5 F | HEIGHT: 72 IN | BODY MASS INDEX: 23.57 KG/M2 | HEART RATE: 76 BPM | WEIGHT: 174 LBS | SYSTOLIC BLOOD PRESSURE: 130 MMHG | DIASTOLIC BLOOD PRESSURE: 78 MMHG

## 2022-06-27 DIAGNOSIS — M54.50 LUMBAR BACK PAIN: ICD-10-CM

## 2022-06-27 DIAGNOSIS — E55.9 VITAMIN D DEFICIENCY: ICD-10-CM

## 2022-06-27 DIAGNOSIS — Z13.29 THYROID DISORDER SCREEN: ICD-10-CM

## 2022-06-27 DIAGNOSIS — R29.898 WEAKNESS OF BOTH LOWER EXTREMITIES: ICD-10-CM

## 2022-06-27 DIAGNOSIS — E11.9 TYPE 2 DIABETES MELLITUS WITHOUT COMPLICATION, WITHOUT LONG-TERM CURRENT USE OF INSULIN (HCC): ICD-10-CM

## 2022-06-27 DIAGNOSIS — R09.89 CHOKING IN ADULT: Primary | ICD-10-CM

## 2022-06-27 DIAGNOSIS — I10 PRIMARY HYPERTENSION: ICD-10-CM

## 2022-06-27 PROCEDURE — 3044F HG A1C LEVEL LT 7.0%: CPT | Performed by: NURSE PRACTITIONER

## 2022-06-27 PROCEDURE — 1123F ACP DISCUSS/DSCN MKR DOCD: CPT | Performed by: NURSE PRACTITIONER

## 2022-06-27 PROCEDURE — 99214 OFFICE O/P EST MOD 30 MIN: CPT | Performed by: NURSE PRACTITIONER

## 2022-06-27 RX ORDER — ACETAMINOPHEN AND CODEINE PHOSPHATE 300; 30 MG/1; MG/1
1 TABLET ORAL EVERY 8 HOURS PRN
Qty: 30 TABLET | Refills: 0 | Status: SHIPPED | OUTPATIENT
Start: 2022-06-27 | End: 2022-07-27

## 2022-06-27 RX ORDER — ATORVASTATIN CALCIUM 80 MG/1
80 TABLET, FILM COATED ORAL DAILY
COMMUNITY
Start: 2022-06-17

## 2022-06-27 RX ORDER — CLOPIDOGREL BISULFATE 75 MG/1
TABLET ORAL
COMMUNITY
Start: 2022-06-17 | End: 2022-08-15 | Stop reason: ALTCHOICE

## 2022-06-27 ASSESSMENT — PATIENT HEALTH QUESTIONNAIRE - PHQ9
1. LITTLE INTEREST OR PLEASURE IN DOING THINGS: 0
SUM OF ALL RESPONSES TO PHQ QUESTIONS 1-9: 0
SUM OF ALL RESPONSES TO PHQ QUESTIONS 1-9: 0
2. FEELING DOWN, DEPRESSED OR HOPELESS: 0
SUM OF ALL RESPONSES TO PHQ9 QUESTIONS 1 & 2: 0
SUM OF ALL RESPONSES TO PHQ QUESTIONS 1-9: 0
SUM OF ALL RESPONSES TO PHQ QUESTIONS 1-9: 0

## 2022-07-05 ENCOUNTER — HOSPITAL ENCOUNTER (OUTPATIENT)
Dept: GENERAL RADIOLOGY | Facility: HOSPITAL | Age: 82
Discharge: HOME | End: 2022-07-05
Payer: MEDICARE

## 2022-07-05 ENCOUNTER — HOSPITAL ENCOUNTER (OUTPATIENT)
Dept: SPEECH THERAPY | Facility: HOSPITAL | Age: 82
Setting detail: THERAPIES SERIES
Discharge: HOME | End: 2022-07-05
Payer: MEDICARE

## 2022-07-05 DIAGNOSIS — R13.10 DYSPHAGIA, UNSPECIFIED TYPE: Primary | ICD-10-CM

## 2022-07-05 DIAGNOSIS — R09.89 CHOKING SENSATION: ICD-10-CM

## 2022-07-05 PROCEDURE — 92611 MOTION FLUOROSCOPY/SWALLOW: CPT

## 2022-07-05 PROCEDURE — 74230 X-RAY XM SWLNG FUNCJ C+: CPT

## 2022-07-05 NOTE — PROCEDURES
INSTRUMENTAL SWALLOW REPORT  MODIFIED BARIUM SWALLOW    NAME: Kleber Armstrong   : 1940  MRN: [id number]       Date of Eval: 2022     Ordering Physician: SINDY Urias  Radiologist: Yasmeen Leary     Referring Diagnosis(es): Referring Diagnosis: Dysphagia    Past Medical History:  has a past medical history of Chronic kidney disease, Hyperlipidemia, Hypertension, and Type 2 diabetes mellitus without complication ([facility] [address] Past Surgical History:  has a past surgical history that includes Kidney removal; eye surgery; hernia repair; fracture surgery; Pleural scarification (Right); and Knee Arthroplasty (Right, 2021). Date of Prior Study: N/A  Type of Study: Initial MBS  Results of Prior Study: N/A    Recent CXR/CT of Chest: Date 2022     Impression       1.  Stable size of thoracic aorta. No evidence of aneurysm, dissection, or intramural hematoma. Moderate aortic atherosclerosis. 2.  Severe coronary artery calcifications. 3.  Likely adherent secretions in the trachea and left mainstem bronchus. Recommend thorough coughing to expel any secretions prior to follow up examination. Recommend follow up in 6 months. Patient Complaints/Reason for Referral:  Kleber Armstrong was referred for a MBS to assess the efficiency of his/her swallow function, assess for aspiration, and to make recommendations regarding safe dietary consistencies, effective compensatory strategies, and safe eating environment. Patient complaints: Patient complains of trouble swallowing, globus sensation. Patient complains of having to \"drink a lot\" to get food to go down. Onset of problem:   Date of Onset: 2022    General Comment  Comments: Patient reports often having difficulty with swallowing foods and globus sensation. Patient reports he has to \"drink a lot\" to get food to go down. Subjective  Subjective: Patient pleasant and agreeable to MBSS.  Patient alert and oriented x4; reports difficulty swallowing at times. Behavior/Cognition/Vision/Hearing:  Behavior/Cognition: Alert; Cooperative;Pleasant mood  Vision: Impaired  Vision Exceptions: Wears glasses at all times  Hearing: Within functional limits    Impressions:  Treatment Dx and ICD 10: R13.10   Patient Position: Lateral and      Consistencies Administered: Regular;Pureed; Thin straw; Moderately Thick cup    Compensatory Swallowing Strategies Attempted: Alternate solids and liquids;Eat/Feed slowly;Upright as possible for all oral intake;Remain upright for 30-45 minutes after meals; Check for pocketing of food on the Left; Check for pocketing of food on the Right;Swallow 2 times per bite/sip;Small bites/sips  Postural Changes and/or Swallow Maneuvers Trialed: OOB; Upright 30 min after meal;Upright 90 degrees;Upright    Oral Phase: Patient presented with regular diet texture, thin liquids via straw and NTL via cup sips. Patient demonstrated delayed oral transit time and poor endurance due to impaired oral strength. Minimal oral stasis indicated, however, cleared effectively with liquid wash and reswallow. Pharyngeal: Patient presented with trials of regular and pureed diet textures, thin liquds via straw and NTL via cup sips. Patient presented with velopharyngeal dysfunction, characterized by reduced BOT and pharyngeal strength. Decreased laryngeal elevation, reduced epiglottis retraction and reduced peristalsis evident with all trials. Premature loss of bolus to the valleculae indicated with trials of regular texture and thin liquids. Pharyngeal residue of the valleculae, pyriforms and posterior pharynx indicated following trials of regular and puree texture and thin liquids. With thin liquids via straw, patient demonstrated trace aspiration; no cough attempted or awareness of aspiration/pooling indicated. Dysphagia Outcome Severity Scale: Level 4: Mild moderate dysphagia- Intermittent supervision/cueing.  One - two diet consistencies restricted  Penetration-Aspiration Scale (PAS): 8 - Material Enters the airway, passes below the vocal folds, and no effort is made to eject    Recommended Diet:  Solid consistency: Soft and Bite-Sized  Liquid consistency: Mildly Thick  Liquid administration via: Cup    Medication administration: Meds in puree    Safe Swallow Protocol:  Supervision: Close  Compensatory Swallowing Strategies : Alternate solids and liquids;Eat/Feed slowly;Upright as possible for all oral intake;Remain upright for 30-45 minutes after meals; Check for pocketing of food on the Left; Check for pocketing of food on the Right;Lingual sweep;Swallow 2 times per bite/sip; External pacing;Small bites/sips    Recommendations/Treatment  Requires SLP Intervention: No  Recommendations: GI Eval;[place] Protocol  Recommendations comment: Recommend diet downgrade to NTL, soft and bite sized texture; recommend skilled ST services to address oropharyngeal dysphagia, however, patient reported he would consider following upcoming MD appointment regarding possible lung issues. D/C Recommendations: To be determined  Postural Changes and/or Swallow Maneuvers: Upright;Upright 30 min after meal;Upright 90 degrees;OOB    Recommended Exercises:    Therapeutic Interventions: Patient/Family education    Referral To: GI;Dietician    Education: Images and recommendations were reviewed with Patient and family member (wife) following this exam.   Patient Education: Patient and wife educated regarding study results, diet recommendation, aspiration precaution guidelines and safe swallow strategies; patient and wife verbalized understanding and agreement.   Patient Education Response: Verbalizes understanding;Demonstrated understanding    Prognosis  Prognosis for safe diet advancement: good  Barriers to reach goals: age  Barriers/Prognosis: Good for recommended diet  Duration/Frequency of Treatment  Duration of Treatment: N/A - Skilled ST services not indicated at this time. Frequency of Treatment: N/A - Skilled ST services not indicated at this time. Safety Devices  Safety Devices in place: Not Applicable  Restraints Initially in Place: No    Goals:    Long Term:   Timeframe for Long-term Goals: N/A - Skilled ST services not indicated at this time. Short Term:  N/A - Skilled ST services not indicated at this time. Oral Preparation / Oral Phase  Oral/Pharyngeal Phase: Impaired  Assessment Method: Observation  Patient Position: Upright; standing  Vocal Quality: No impairment   Consistency Presented: Thin, mildly thick, regular, puree  How Presented: self-fed/presented, SLP-fed/presented   Bolus Acceptance: No impairment  Bolus Formation/control: No impairment   Type of Impairment: mastication; delayed  Oral residue: 10-50%  Initiation of Swallow: delayed 8-10 seconds  Laryngeal Elevation: decreased  Pharyngeal Phase Characteristics: Easily fatigued; Multiple swallows; Poor endurance; Suspected pharyngeal residue  Cues for Modifications: Minimal  Oral Phase: Patient presented with regular diet texture, thin liquids via straw and NTL via cup sips. Patient demonstrated delayed oral transit time and poor endurance due to impaired oral strength. Minimal oral stasis indicated, however, cleared effectively with liquid wash and reswallow. Pharyngeal Phase  Pharyngeal Phase: Impaired  Pharyngeal Phase - Major Contributing Deficits  Delayed Swallow Initiation: Reg solid; Thin straw;Nectar cup  Premature Spillage to Valleculae: Reg solid; Thin straw  Reduced Pharyngeal Peristalsis: Reg solid; Nectar cup; Thin straw  Delayed Epiglottic Retraction: Reg solid; Nectar cup; Thin straw;Puree  Reduced Laryngeal Elevation: Reg solid; Nectar cup; Thin straw  Pooling Valleculae: Reg solid; Thin straw  Reduced Airway/laryngeal Closure: Reg solid; Thin straw  Reduced Tongue Base: Reg solid; Nectar cup; Thin straw;Puree  Deep Penetration During: Thin straw  Trace Aspiration:  Thin straw  No Cough Reflex: Reg solid; Thin straw;Puree  No Bolus Expelled: Reg solid; Thin straw;Puree  Pharyngeal Residue - Valleculae: Reg solid; Thin straw;Puree  Pharyngeal Residue - Pyriform: Reg solid;Puree; Thin straw  Pharyngeal Residue - Posterior Pharynx: Reg solid; Thin straw;Puree  Pharyngeal Wall - Weakness: Reg solid; Thin straw;Puree;Nectar cup  Fatigue of Mechanism: Reg solid; Thin straw      Esophageal Phase  Esophageal Screen: Impaired  Upper Esophageal Screen- Major Contributing Deficits  Major Contributing Deficits: Decreased Esophageal Clearance  Decreased Esophageal Clearance: Reduced esophageal motility    Pain   None indicated or reported.       Therapy Time:   Individual Concurrent Group Co-treatment   Time In 1121         Time Out 1145         Minutes [address] [place], [place], 7/5/2022, 5:53 PM

## 2022-07-11 ENCOUNTER — OFFICE VISIT (OUTPATIENT)
Dept: PULMONOLOGY | Facility: CLINIC | Age: 82
End: 2022-07-11

## 2022-07-11 VITALS
HEIGHT: 72 IN | BODY MASS INDEX: 22.77 KG/M2 | RESPIRATION RATE: 16 BRPM | WEIGHT: 168.13 LBS | DIASTOLIC BLOOD PRESSURE: 80 MMHG | OXYGEN SATURATION: 97 % | SYSTOLIC BLOOD PRESSURE: 140 MMHG | TEMPERATURE: 98.6 F | HEART RATE: 65 BPM

## 2022-07-11 DIAGNOSIS — R06.09 DOE (DYSPNEA ON EXERTION): ICD-10-CM

## 2022-07-11 DIAGNOSIS — Z00.6 EXAMINATION FOR NORMAL COMPARISON OR CONTROL IN CLINICAL RESEARCH: Primary | ICD-10-CM

## 2022-07-11 DIAGNOSIS — R91.8 ENDOBRONCHIAL MASS: Primary | ICD-10-CM

## 2022-07-11 DIAGNOSIS — R13.12 OROPHARYNGEAL DYSPHAGIA: ICD-10-CM

## 2022-07-11 PROCEDURE — 94375 RESPIRATORY FLOW VOLUME LOOP: CPT | Performed by: INTERNAL MEDICINE

## 2022-07-11 PROCEDURE — 94726 PLETHYSMOGRAPHY LUNG VOLUMES: CPT | Performed by: INTERNAL MEDICINE

## 2022-07-11 PROCEDURE — 94729 DIFFUSING CAPACITY: CPT | Performed by: INTERNAL MEDICINE

## 2022-07-11 PROCEDURE — 99204 OFFICE O/P NEW MOD 45 MIN: CPT | Performed by: INTERNAL MEDICINE

## 2022-07-11 NOTE — PROGRESS NOTES
New Patient Pulmonary Office Visit      Patient Name: Ankur Camp    Referring Physician: Cliff Lema III, MD    Chief Complaint:    Chief Complaint   Patient presents with   • Dyspnea on Exertion       History of Present Illness: Ankur Camp is a 81 y.o. male who is here today to establish care with Pulmonary.  Patient's past medical history significant for hypertension, hyperlipidemia, coronary artery disease, diabetes mellitus type 2, and osteoarthritis.  Who was referred to pulmonary for evaluation of an airway evaluation.  He was seen Dr. Lema who had ordered a CT of the chest for his thoracic aortic aneurysm which showed to be stable but the CT scan showed some debris in the left mainstem.  He denies any chest pain, nausea, fever, or chills.  Denies any significant shortness of breath.  He is really been having difficulty with his legs and the strength in his legs as of lately.  He does smoke a pipe, but quit smoking all other forms of tobacco many years ago.  He has no other acute complaints.    Review of Systems:   Review of Systems   Constitutional: Negative for chills, fatigue and fever.   HENT: Negative for congestion and voice change.    Eyes: Negative for blurred vision.   Respiratory: Negative for cough, shortness of breath and wheezing.    Cardiovascular: Negative for chest pain.   Skin: Negative for dry skin.   Neurological: Positive for weakness.   Hematological: Negative for adenopathy.   Psychiatric/Behavioral: Negative for agitation and depressed mood.       Past Medical History:   Past Medical History:   Diagnosis Date   • Arthritis    • Cancer (HCC)     kidney/ prostate   • Diabetes mellitus (HCC)    • ED (erectile dysfunction)    • Enlarged prostate with lower urinary tract symptoms (LUTS)    • Full dentures    • History of fracture of pelvis 11/14/2019    after fall from ladder   • History of loss of consciousness 11/14/2019    after fall from ladder-flown to [facility]  [place]   • History of rib fracture 11/14/2019    after fall from ladder   • History of right shoulder fracture 1960    walking up hill, fell on gravel   • History of stress test    • History of torn meniscus of right knee 2012   • [place] (hard of hearing)     Bilateral hearing aids    • Hypertension    • Laceration of head 11/14/2019    after fall from ladder-treated at [facility]   • Left shoulder strain 2017    after fall from ladder   • Lung injury 11/14/2019    punctured right lung after falling off ladder   • Renal disorder    • Stone in kidney    • Stroke (HCC)        Past Surgical History:   Past Surgical History:   Procedure Laterality Date   • CHEST TUBE INSERTION  11/2019    rib fractures, punctured lung after fall from ladder-[facility]   • COLONOSCOPY     • COLONOSCOPY N/A 4/8/2019    Procedure: COLONOSCOPY;  Surgeon: Onesimo Arnold MD;  Location: [facility] ENDOSCOPY;  Service: Gastroenterology   • EYE SURGERY Right     cataract removal with IOL implant   • HERNIA REPAIR Right     multiple inguinal hernia repairs   • KNEE ARTHROSCOPY W/ MENISCECTOMY Right 05/10/2012    Partial medial and lateral menisectomy-Conrad Baird MD   • NEPHRECTOMY Right     partial right nephrectomy due to kidney stone   • PELVIC FRACTURE SURGERY Right 11/2019    multiple pelvic fractures after fall from ladder-[facility]   • SHOULDER SURGERY Right 1960    fracture surgery to remove a piece of bone- [place], [place]   • TOTAL KNEE ARTHROPLASTY Right 5/25/2021    Procedure: knee arthroplasty total;  Surgeon: Conrad Baird MD;  Location: [facility] OR;  Service: Orthopedics;  Laterality: Right;       Family History:   Family History   Problem Relation Age of Onset   • Breast cancer Mother    • Cancer Sister    • Cancer Brother    • Cancer Sister    • Dementia Brother    • No Known Problems Brother    • No Known Problems Brother        Social History:   Social History     Socioeconomic History  "  • Marital status:    Tobacco Use   • Smoking status: Current Every Day Smoker     Years: 20.00     Types: Pipe   • Smokeless tobacco: Never Used   • Tobacco comment: smokes his pipe daily   Vaping Use   • Vaping Use: Never used   Substance and Sexual Activity   • Alcohol use: Not Currently   • Drug use: No   • Sexual activity: Defer       Medications:     Current Outpatient Medications:   •  ascorbic acid (VITAMIN C) 1000 MG tablet, Take 1,000 mg by mouth Daily., Disp: , Rfl:   •  aspirin 81 MG chewable tablet, Chew 81 mg Daily., Disp: , Rfl:   •  bisoprolol (ZEBeta) 5 MG tablet, Take 1 tablet by mouth Daily., Disp: 90 tablet, Rfl: 4  •  cholecalciferol (VITAMIN D3) 25 MCG (1000 UT) tablet, Take 1,000 Units by mouth Daily., Disp: , Rfl:   •  clopidogrel (Plavix) 75 MG tablet, Take 1 tablet by mouth Daily., Disp: 21 tablet, Rfl: 0  •  glucose blood test strip, 1 each by In Vitro route daily Daily testing, DX:250.00, Disp: , Rfl:   •  metFORMIN (GLUCOPHAGE) 500 MG tablet, Take 500 mg by mouth Daily With Breakfast., Disp: , Rfl:   •  nystatin (MYCOSTATIN) 722861 UNIT/GM powder, Apply 1 application topically to the appropriate area as directed As Needed., Disp: , Rfl:   •  oxybutynin XL (Ditropan XL) 10 MG 24 hr tablet, Take 1 tablet by mouth Daily., Disp: 30 tablet, Rfl: 11  •  vitamin B-12 (CYANOCOBALAMIN) 1000 MCG tablet, Take 1,000 mcg by mouth Daily., Disp: , Rfl:   •  lovastatin (ALTOPREV) 40 MG 24 hr tablet, Take 40 mg by mouth Every Night., Disp: , Rfl:     Allergies:   No Known Allergies    Physical Exam:  Vital Signs:   Vitals:    07/11/22 0912   BP: 140/80   BP Location: Right arm   Patient Position: Sitting   Cuff Size: Adult   Pulse: 65   Resp: 16   Temp: 98.6 °F (37 °C)   SpO2: 97%   Weight: 76.3 kg (168 lb 2 oz)   Height: 182.9 cm (72\")       Physical Exam  Vitals and nursing note reviewed.   Constitutional:       General: He is not in acute distress.     Appearance: He is well-developed and " normal weight. He is not ill-appearing or toxic-appearing.   Cardiovascular:      Rate and Rhythm: Normal rate and regular rhythm.      Pulses: Normal pulses.      Heart sounds: Normal heart sounds. No murmur heard.    No gallop.   Pulmonary:      Effort: Pulmonary effort is normal.      Breath sounds: Normal breath sounds. No wheezing, rhonchi or rales.   Musculoskeletal:      Right lower leg: No edema.      Left lower leg: No edema.   Neurological:      Mental Status: He is alert.         Immunization History   Administered Date(s) Administered   • FLUAD TRI 65YR+ 02/21/2020   • Fluzone Split Quad (Multi-dose) 10/15/2020   • Hepatitis A 11/21/2018   • Influenza, Unspecified 11/16/2021   • Pneumococcal Conjugate 13-Valent (PCV13) 04/10/2017   • Pneumococcal Polysaccharide (PPSV23) 12/05/2018   • Tdap 11/25/2017       Results Review:   - I personally reviewed the pts imaging from CT scan of the chest from May 2022 showed debris in the left mainstem at the more proximal location, and then a second area more distal.  There is no significant obstruction seen, no nodules, masses in the parenchyma or infiltrates.  - I personally reviewed the pts PFT from 7/11/2022 shows no obstruction or restriction with a normal DLCO  - I personally reviewed the pts chart with regards to evaluation by cardiology for the patient's thoracic aneurysm    Assessment / Plan:   1. Endobronchial lesion (5/2022) (Primary)  -Endobronchial lesion is likely just secretions.  He does have some level of dysphagia and recently did a swallow study which could be the other thing that could be causing this.  I explained to the patient that I think the best option at this point be to repeat the CT scan just to be ensure that there is still something there as it has been almost 2 months.  And then if there is still something in the airway I would recommend that we pursue a bronchoscopy.  He already has a CT scan scheduled for the head and the neck to be  performed on July 20.  I informed her that ideally we would just add on the chest to that set of scans, I can review them and then call them with the results.  And then if there is still something in the airway we will set up a bronchoscopy to be done prior to him returning in 6 weeks.  Him and his wife both verbalized understanding of this.    2.  Dysphagia  -With regards to the swallowing issues that he is having, that could be the cause of the endobronchial lesion that we saw back in May.  Would recommend working with speech therapy.  And then follow-up on his recent swallow evaluation.  I have requested the records from [facility] to be sent to our office for me to review.      Follow Up:   Return in about 6 weeks (around 8/22/2022) for CT of the chest, and possible bronchoscopy.     DEBBY Hernadez, DO  Pulmonary and Critical Care Medicine  Note Electronically Signed    Part of this note may be an electronic transcription/translation of spoken language to printed text using the Dragon Dictation System.

## 2022-07-12 DIAGNOSIS — R91.8 ENDOBRONCHIAL MASS: Primary | ICD-10-CM

## 2022-07-15 ENCOUNTER — TELEPHONE (OUTPATIENT)
Dept: PRIMARY CARE CLINIC | Age: 82
End: 2022-07-15

## 2022-07-18 ENCOUNTER — OFFICE VISIT (OUTPATIENT)
Dept: UROLOGY | Facility: CLINIC | Age: 82
End: 2022-07-18

## 2022-07-18 VITALS
HEIGHT: 72 IN | HEART RATE: 51 BPM | WEIGHT: 168 LBS | OXYGEN SATURATION: 97 % | BODY MASS INDEX: 22.75 KG/M2 | DIASTOLIC BLOOD PRESSURE: 63 MMHG | SYSTOLIC BLOOD PRESSURE: 164 MMHG | TEMPERATURE: 97.1 F

## 2022-07-18 DIAGNOSIS — N39.41 URGE INCONTINENCE: Primary | ICD-10-CM

## 2022-07-18 PROCEDURE — 51798 US URINE CAPACITY MEASURE: CPT | Performed by: PHYSICIAN ASSISTANT

## 2022-07-18 PROCEDURE — 99214 OFFICE O/P EST MOD 30 MIN: CPT | Performed by: PHYSICIAN ASSISTANT

## 2022-07-18 PROCEDURE — 81003 URINALYSIS AUTO W/O SCOPE: CPT | Performed by: PHYSICIAN ASSISTANT

## 2022-07-18 RX ORDER — ATORVASTATIN CALCIUM 80 MG/1
80 TABLET, FILM COATED ORAL DAILY
COMMUNITY

## 2022-07-18 NOTE — PROGRESS NOTES
Chief Complaint   Patient presents with   • over active bladder     fu        HPI  Mr. Camp is a 81 y.o. male with history of BPH s/p Urolift on 08/17/21, Prostate CA on AS who presents for follow up.     At this visit, he unfortunately suffered a stroke and was hospitalized.  He has since seen neurology who recommends stopping oxybutynin due to the patient's age.  He also admits he has seen very minimal benefit from oxybutynin and continues to have urgency incontinence and dribbling.    Past Medical History:   Diagnosis Date   • Arthritis    • Cancer (HCC)     kidney/ prostate   • Diabetes mellitus (HCC)    • ED (erectile dysfunction)    • Enlarged prostate with lower urinary tract symptoms (LUTS)    • Full dentures    • History of fracture of pelvis 11/14/2019    after fall from ladder   • History of loss of consciousness 11/14/2019    after fall from ladder-flown to [facility]   • History of rib fracture 11/14/2019    after fall from ladder   • History of right shoulder fracture 1960    walking up hill, fell on gravel   • History of stress test    • History of torn meniscus of right knee 2012   • [place] (hard of hearing)     Bilateral hearing aids    • Hypertension    • Laceration of head 11/14/2019    after fall from ladder-treated at [facility]   • Left shoulder strain 2017    after fall from ladder   • Lung injury 11/14/2019    punctured right lung after falling off ladder   • Renal disorder    • Stone in kidney    • Stroke (HCC)        Past Surgical History:   Procedure Laterality Date   • CHEST TUBE INSERTION  11/2019    rib fractures, punctured lung after fall from [facility]   • COLONOSCOPY     • COLONOSCOPY N/A 4/8/2019    Procedure: COLONOSCOPY;  Surgeon: Onesimo Arnold MD;  Location: [facility] ENDOSCOPY;  Service: Gastroenterology   • EYE SURGERY Right     cataract removal with IOL implant   • HERNIA REPAIR Right     multiple inguinal hernia repairs   • KNEE  "ARTHROSCOPY W/ MENISCECTOMY Right 05/10/2012    Partial medial and lateral menisectomy-Conrad Baird MD   • NEPHRECTOMY Right     partial right nephrectomy due to kidney stone   • PELVIC FRACTURE SURGERY Right 11/2019    multiple pelvic fractures after fall from ladder-[facility]   • SHOULDER SURGERY Right 1960    fracture surgery to remove a piece of bone- [place], [place]   • TOTAL KNEE ARTHROPLASTY Right 5/25/2021    Procedure: knee arthroplasty total;  Surgeon: Conrad Baird MD;  Location: [facility];  Service: Orthopedics;  Laterality: Right;         Current Outpatient Medications:   •  ascorbic acid (VITAMIN C) 1000 MG tablet, Take 1,000 mg by mouth Daily., Disp: , Rfl:   •  aspirin 81 MG chewable tablet, Chew 81 mg Daily., Disp: , Rfl:   •  bisoprolol (ZEBeta) 5 MG tablet, Take 1 tablet by mouth Daily., Disp: 90 tablet, Rfl: 4  •  cholecalciferol (VITAMIN D3) 25 MCG (1000 UT) tablet, Take 1,000 Units by mouth Daily., Disp: , Rfl:   •  glucose blood test strip, 1 each by In Vitro route daily Daily testing, DX:250.00, Disp: , Rfl:   •  metFORMIN (GLUCOPHAGE) 500 MG tablet, Take 500 mg by mouth Daily With Breakfast., Disp: , Rfl:   •  vitamin B-12 (CYANOCOBALAMIN) 1000 MCG tablet, Take 1,000 mcg by mouth Daily., Disp: , Rfl:   •  atorvastatin (LIPITOR) 80 MG tablet, Take 80 mg by mouth Daily., Disp: , Rfl:   •  carbidopa-levodopa (SINEMET)  MG per tablet, Take 1 tablet by mouth 3 (Three) Times a Day., Disp: , Rfl:   •  clopidogrel (Plavix) 75 MG tablet, Take 1 tablet by mouth Daily., Disp: 21 tablet, Rfl: 0  •  lovastatin (ALTOPREV) 40 MG 24 hr tablet, Take 40 mg by mouth Every Night., Disp: , Rfl:   •  nystatin (MYCOSTATIN) 070905 UNIT/GM powder, Apply 1 application topically to the appropriate area as directed As Needed., Disp: , Rfl:      Physical Exam  Visit Vitals  /63   Pulse 51   Temp 97.1 °F (36.2 °C)   Ht 182.9 cm (72\")   Wt 76.2 kg (168 lb)   SpO2 97%   BMI 22.78 kg/m² "       Labs  Brief Urine Lab Results     None          Lab Results   Component Value Date    GLUCOSE 190 (H) 05/26/2021    CALCIUM 8.5 (L) 05/26/2021     05/26/2021    K 3.6 05/26/2021    CO2 25.9 05/26/2021     05/26/2021    BUN 16 05/20/2022    CREATININE 1 05/20/2022    EGFRIFAFRI >59 05/20/2022    EGFRIFNONA >59 05/20/2022    BCR 15.8 05/26/2021    ANIONGAP 8.1 05/26/2021       Lab Results   Component Value Date    WBC 5.5 04/13/2022    HGB 15.1 04/13/2022    HCT 45.6 04/13/2022    MCV 95.4 04/13/2022     (L) 04/13/2022            Lab Results   Component Value Date    PSA 7.450 (H) 03/21/2022    PSA 8.2 (H) 08/18/2020    PSA 5.910 (H) 02/12/2020           PVR  Post-void residual performed with ultrasound scanner by staff and interpreted by me - 0cc    I have reviewed above labs and imaging.     Assessment  81 y.o. male with history of BPH s/p UroLift, prostate cancer on active surveillance presenting for follow-up of OAB.  His urine is benign today, his PVR is 0.  He has not seen benefit from anticholinergic therapy.  I agree, given the patient's history, new treatment for neurologic disease with Sinemet, anticholinergic therapy is no longer appropriate for the patient, and he did not see benefit from it.  We will switch to beta agonist therapy and reassess.    Otherwise, PSA remains elevated, but stable as compared with the previous 2 years.    Plan  1.  Start Gemtesa, samples given today  2.  Stop oxybutynin  3.  Follow-up with Dr. Fernandez in 3 months to discuss ongoing urgency incontinence symptoms, goals for ongoing active surveillance

## 2022-07-19 LAB
BILIRUB BLD-MCNC: NEGATIVE MG/DL
CLARITY, POC: CLEAR
COLOR UR: YELLOW
EXPIRATION DATE: ABNORMAL
GLUCOSE UR STRIP-MCNC: ABNORMAL MG/DL
KETONES UR QL: NEGATIVE
LEUKOCYTE EST, POC: NEGATIVE
Lab: ABNORMAL
NITRITE UR-MCNC: NEGATIVE MG/ML
PH UR: 6 [PH] (ref 5–8)
PROT UR STRIP-MCNC: ABNORMAL MG/DL
RBC # UR STRIP: NEGATIVE /UL
SP GR UR: 1.03 (ref 1–1.03)
UROBILINOGEN UR QL: NORMAL

## 2022-07-20 ENCOUNTER — HOSPITAL ENCOUNTER (OUTPATIENT)
Dept: CT IMAGING | Facility: HOSPITAL | Age: 82
Discharge: HOME | End: 2022-07-20

## 2022-07-20 ENCOUNTER — HOSPITAL ENCOUNTER (OUTPATIENT)
Dept: CARDIOLOGY | Facility: HOSPITAL | Age: 82
Discharge: HOME | End: 2022-07-20

## 2022-07-20 VITALS — BODY MASS INDEX: 22.75 KG/M2 | HEIGHT: 72 IN | WEIGHT: 168 LBS

## 2022-07-20 DIAGNOSIS — R91.8 ENDOBRONCHIAL MASS: ICD-10-CM

## 2022-07-20 DIAGNOSIS — I63.81 LACUNAR INFARCTION: ICD-10-CM

## 2022-07-20 LAB — CREAT BLDA-MCNC: 0.8 MG/DL (ref 0.6–1.3)

## 2022-07-20 PROCEDURE — 70496 CT ANGIOGRAPHY HEAD: CPT

## 2022-07-20 PROCEDURE — 93306 TTE W/DOPPLER COMPLETE: CPT

## 2022-07-20 PROCEDURE — 82565 ASSAY OF CREATININE: CPT

## 2022-07-20 PROCEDURE — 93306 TTE W/DOPPLER COMPLETE: CPT | Performed by: INTERNAL MEDICINE

## 2022-07-20 PROCEDURE — 25010000002 IOPAMIDOL 61 % SOLUTION: Performed by: NURSE PRACTITIONER

## 2022-07-20 PROCEDURE — 70498 CT ANGIOGRAPHY NECK: CPT

## 2022-07-20 PROCEDURE — 71250 CT THORAX DX C-: CPT

## 2022-07-20 RX ADMIN — IOPAMIDOL 100 ML: 612 INJECTION, SOLUTION INTRAVENOUS at 17:12

## 2022-07-21 LAB
BH CV ECHO MEAS - AI P1/2T: 1000 MSEC
BH CV ECHO MEAS - AO MAX PG: 6.3 MMHG
BH CV ECHO MEAS - AO MEAN PG: 3 MMHG
BH CV ECHO MEAS - AO ROOT DIAM: 3.6 CM
BH CV ECHO MEAS - AO V2 MAX: 125 CM/SEC
BH CV ECHO MEAS - AO V2 VTI: 26.4 CM
BH CV ECHO MEAS - AVA(I,D): 3.3 CM2
BH CV ECHO MEAS - EDV(CUBED): 156.6 ML
BH CV ECHO MEAS - EDV(MOD-SP2): 93.9 ML
BH CV ECHO MEAS - EDV(MOD-SP4): 76.3 ML
BH CV ECHO MEAS - EF(MOD-BP): 53.5 %
BH CV ECHO MEAS - EF(MOD-SP2): 52.3 %
BH CV ECHO MEAS - EF(MOD-SP4): 56.4 %
BH CV ECHO MEAS - ESV(CUBED): 63.5 ML
BH CV ECHO MEAS - ESV(MOD-SP2): 44.8 ML
BH CV ECHO MEAS - ESV(MOD-SP4): 33.3 ML
BH CV ECHO MEAS - FS: 26 %
BH CV ECHO MEAS - IVS/LVPW: 0.86 CM
BH CV ECHO MEAS - IVSD: 1.42 CM
BH CV ECHO MEAS - LA DIMENSION: 4.6 CM
BH CV ECHO MEAS - LAT PEAK E' VEL: 4.1 CM/SEC
BH CV ECHO MEAS - LV MASS(C)D: 375.9 GRAMS
BH CV ECHO MEAS - LV MAX PG: 4.8 MMHG
BH CV ECHO MEAS - LV MEAN PG: 3 MMHG
BH CV ECHO MEAS - LV V1 MAX: 109 CM/SEC
BH CV ECHO MEAS - LV V1 VTI: 23.4 CM
BH CV ECHO MEAS - LVIDD: 5.4 CM
BH CV ECHO MEAS - LVIDS: 4 CM
BH CV ECHO MEAS - LVOT AREA: 3.7 CM2
BH CV ECHO MEAS - LVOT DIAM: 2.18 CM
BH CV ECHO MEAS - LVPWD: 1.66 CM
BH CV ECHO MEAS - MED PEAK E' VEL: 3.9 CM/SEC
BH CV ECHO MEAS - MV A MAX VEL: 65.5 CM/SEC
BH CV ECHO MEAS - MV DEC SLOPE: 141 CM/SEC2
BH CV ECHO MEAS - MV DEC TIME: 0.32 MSEC
BH CV ECHO MEAS - MV E MAX VEL: 48.5 CM/SEC
BH CV ECHO MEAS - MV E/A: 0.74
BH CV ECHO MEAS - MV MAX PG: 2.4 MMHG
BH CV ECHO MEAS - MV MEAN PG: 1 MMHG
BH CV ECHO MEAS - MV P1/2T: 132.5 MSEC
BH CV ECHO MEAS - MV V2 VTI: 26.4 CM
BH CV ECHO MEAS - MVA(P1/2T): 1.66 CM2
BH CV ECHO MEAS - MVA(VTI): 3.3 CM2
BH CV ECHO MEAS - PA ACC TIME: 0.14 SEC
BH CV ECHO MEAS - PA PR(ACCEL): 14.2 MMHG
BH CV ECHO MEAS - PA V2 MAX: 88.9 CM/SEC
BH CV ECHO MEAS - PULM A REVS DUR: 0.12 SEC
BH CV ECHO MEAS - PULM A REVS VEL: 32 CM/SEC
BH CV ECHO MEAS - PULM DIAS VEL: 34.9 CM/SEC
BH CV ECHO MEAS - PULM S/D: 1.08
BH CV ECHO MEAS - PULM SYS VEL: 37.8 CM/SEC
BH CV ECHO MEAS - RAP SYSTOLE: 3 MMHG
BH CV ECHO MEAS - RV MAX PG: 1.93 MMHG
BH CV ECHO MEAS - RV V1 MAX: 69.5 CM/SEC
BH CV ECHO MEAS - RV V1 VTI: 15.5 CM
BH CV ECHO MEAS - RVSP: 18.5 MMHG
BH CV ECHO MEAS - SV(LVOT): 87.3 ML
BH CV ECHO MEAS - SV(MOD-SP2): 49.1 ML
BH CV ECHO MEAS - SV(MOD-SP4): 43 ML
BH CV ECHO MEAS - TAPSE (>1.6): 2.48 CM
BH CV ECHO MEAS - TR MAX PG: 15.5 MMHG
BH CV ECHO MEAS - TR MAX VEL: 197 CM/SEC
BH CV ECHO MEASUREMENTS AVERAGE E/E' RATIO: 12.13
BH CV XLRA - RV BASE: 3.2 CM
BH CV XLRA - RV LENGTH: 7.5 CM
BH CV XLRA - RV MID: 3 CM
BH CV XLRA - TDI S': 14.4 CM/SEC
LEFT ATRIUM VOLUME INDEX: 47.3 ML/M2
LV EF 2D ECHO EST: 54 %
MAXIMAL PREDICTED HEART RATE: 140 BPM
STRESS TARGET HR: 119 BPM

## 2022-07-22 ENCOUNTER — TELEPHONE (OUTPATIENT)
Dept: NEUROLOGY | Facility: CLINIC | Age: 82
End: 2022-07-22

## 2022-07-22 DIAGNOSIS — R91.8 ENDOBRONCHIAL MASS: Primary | ICD-10-CM

## 2022-07-22 NOTE — TELEPHONE ENCOUNTER
Caller: Zoë, Ankur Gene    Relationship: Self    Best call back number: [phone]    What was the call regarding:   PT HAD MISSED CALL ABOUT 5 PM ON 7-21-22 AND NO MSG LEFT. DIDN'T SEE WHERE THE OFFICE TRIED TO CALL PT.    PLEASE CALL PT BACK IF NEEDED.

## 2022-08-01 ENCOUNTER — HOSPITAL ENCOUNTER (OUTPATIENT)
Facility: HOSPITAL | Age: 82
Discharge: HOME | End: 2022-08-01
Payer: MEDICARE

## 2022-08-01 DIAGNOSIS — E11.9 TYPE 2 DIABETES MELLITUS WITHOUT COMPLICATION, WITHOUT LONG-TERM CURRENT USE OF INSULIN (HCC): ICD-10-CM

## 2022-08-01 DIAGNOSIS — E55.9 VITAMIN D DEFICIENCY: ICD-10-CM

## 2022-08-01 DIAGNOSIS — I10 PRIMARY HYPERTENSION: ICD-10-CM

## 2022-08-01 DIAGNOSIS — Z13.29 THYROID DISORDER SCREEN: ICD-10-CM

## 2022-08-01 LAB
A/G RATIO: 2 (ref 0.8–2)
ALBUMIN SERPL-MCNC: 4.1 G/DL (ref 3.4–4.8)
ALP BLD-CCNC: 138 U/L (ref 25–100)
ALT SERPL-CCNC: 14 U/L (ref 4–36)
ANION GAP SERPL CALCULATED.3IONS-SCNC: 13 MMOL/L (ref 3–16)
AST SERPL-CCNC: 17 U/L (ref 8–33)
BILIRUB SERPL-MCNC: 0.8 MG/DL (ref 0.3–1.2)
BUN BLDV-MCNC: 12 MG/DL (ref 6–20)
CALCIUM SERPL-MCNC: 9.2 MG/DL (ref 8.5–10.5)
CHLORIDE BLD-SCNC: 108 MMOL/L (ref 98–107)
CHOLESTEROL, TOTAL: 93 MG/DL (ref 0–200)
CO2: 25 MMOL/L (ref 20–30)
CREAT SERPL-MCNC: 0.9 MG/DL (ref 0.4–1.2)
FOLATE: 13.92 NG/ML
GFR AFRICAN AMERICAN: >59
GFR NON-AFRICAN AMERICAN: >59
GLOBULIN: 2.1 G/DL
GLUCOSE BLD-MCNC: 133 MG/DL (ref 74–106)
HBA1C MFR BLD: 6.9 %
HCT VFR BLD CALC: 44.5 % (ref 40–54)
HDLC SERPL-MCNC: 37 MG/DL (ref 40–60)
HEMOGLOBIN: 15.1 G/DL (ref 13–18)
LDL CHOLESTEROL CALCULATED: 40 MG/DL
MCH RBC QN AUTO: 31.5 PG (ref 27–32)
MCHC RBC AUTO-ENTMCNC: 33.9 G/DL (ref 31–35)
MCV RBC AUTO: 92.7 FL (ref 80–100)
PDW BLD-RTO: 13.8 % (ref 11–16)
PLATELET # BLD: 164 K/UL (ref 150–400)
PMV BLD AUTO: 11.2 FL (ref 6–10)
POTASSIUM SERPL-SCNC: 3.8 MMOL/L (ref 3.4–5.1)
RBC # BLD: 4.8 M/UL (ref 4.5–6)
SODIUM BLD-SCNC: 146 MMOL/L (ref 136–145)
TOTAL PROTEIN: 6.2 G/DL (ref 6.4–8.3)
TRIGL SERPL-MCNC: 80 MG/DL (ref 0–249)
TSH SERPL DL<=0.05 MIU/L-ACNC: 2.33 UIU/ML (ref 0.27–4.2)
VITAMIN B-12: >2000 PG/ML (ref 211–911)
VITAMIN D 25-HYDROXY: 60 (ref 32–100)
VLDLC SERPL CALC-MCNC: 16 MG/DL
WBC # BLD: 7.8 K/UL (ref 4–11)

## 2022-08-01 PROCEDURE — 80061 LIPID PANEL: CPT

## 2022-08-01 PROCEDURE — 85027 COMPLETE CBC AUTOMATED: CPT

## 2022-08-01 PROCEDURE — 82607 VITAMIN B-12: CPT

## 2022-08-01 PROCEDURE — 83036 HEMOGLOBIN GLYCOSYLATED A1C: CPT

## 2022-08-01 PROCEDURE — 82306 VITAMIN D 25 HYDROXY: CPT

## 2022-08-01 PROCEDURE — 80053 COMPREHEN METABOLIC PANEL: CPT

## 2022-08-01 PROCEDURE — 84443 ASSAY THYROID STIM HORMONE: CPT

## 2022-08-01 PROCEDURE — 82746 ASSAY OF FOLIC ACID SERUM: CPT

## 2022-08-11 ENCOUNTER — OFFICE VISIT (OUTPATIENT)
Dept: CARDIOLOGY | Facility: CLINIC | Age: 82
End: 2022-08-11

## 2022-08-11 VITALS
DIASTOLIC BLOOD PRESSURE: 78 MMHG | SYSTOLIC BLOOD PRESSURE: 130 MMHG | OXYGEN SATURATION: 97 % | HEART RATE: 54 BPM | HEIGHT: 66 IN | WEIGHT: 169.2 LBS | BODY MASS INDEX: 27.19 KG/M2

## 2022-08-11 DIAGNOSIS — I10 PRIMARY HYPERTENSION: ICD-10-CM

## 2022-08-11 DIAGNOSIS — I71.20 THORACIC AORTIC ANEURYSM WITHOUT RUPTURE: ICD-10-CM

## 2022-08-11 DIAGNOSIS — E78.2 MIXED HYPERLIPIDEMIA: ICD-10-CM

## 2022-08-11 DIAGNOSIS — I25.84 CORONARY ARTERY CALCIFICATION: Primary | ICD-10-CM

## 2022-08-11 DIAGNOSIS — I25.10 CORONARY ARTERY CALCIFICATION: Primary | ICD-10-CM

## 2022-08-11 PROCEDURE — 99214 OFFICE O/P EST MOD 30 MIN: CPT | Performed by: INTERNAL MEDICINE

## 2022-08-11 NOTE — PROGRESS NOTES
[facility]  Office visit  Ankur Camp  1940  [phone]  There is no work phone number on file.    VISIT DATE:  8/11/2022    PCP: Magaly Prabhakar APRN [address] [place] [address]    CC:  Chief Complaint   Patient presents with   • Thoracic aortic aneurysm (TAA)       Previous cardiac studies and procedures:  June 2020 CT chest: Marked coronary calcifications.  Ascending aorta measures 3.8 x 3.8 cm.  Left ventricular enlargement. left ventricular enlargement.    October 2020  [place] scan myocardial perfusion imaging   Diaphragmatic attenuation, otherwise normal myocardial perfusion.   Left ventricular ejection fraction of 63 %.   Normal LV size and systolic function.   Overall findings represent a low risk scan.    Echo   Ejection fraction is visually estimated to be 60-65 %.   Mild septal asymmetric hypertrophy.   Diastolic filling parameters suggests grade I diastolic dysfunction .    June/July 2022   MRI brain  -Severe chronic ischemic/gliotic changes.  -Multiple small low T2 signal foci on the gradient echo images may  represent hemosiderin or calcifications of uncertain significance. This  can be seen with amyloidosis and Alzheimer's disease.  -Less than 1 cm acute lacunar infarct in the right frontal  periventricular region.    CTA head neck: Negative  CT chest: Proximal ascending aorta measures 3.9 cm.    TTE  · Estimated right ventricular systolic pressure from tricuspid regurgitation is normal (<35 mmHg).  · Left ventricular wall thickness is consistent with mild to moderate concentric hypertrophy.  · Estimated left ventricular EF = 54% Left ventricular ejection fraction appears to be 51 - 55%. Left ventricular systolic function is normal.  · Left ventricular diastolic function is consistent with (grade I) impaired relaxation.  · Left atrial volume is moderately increased.  · Saline test results are negative.      ASSESSMENT:   Diagnosis Plan   1.  Coronary artery calcification     2. Mixed hyperlipidemia     3. Primary hypertension     4. Thoracic aortic aneurysm without rupture (HCC)         PLAN:  Thoracic ascending aortic aneurysm: Small.  Afterload well controlled, goal less than 130/80 mmHg, ideally less than 120/80 mmHg.    Stable on serial CT imaging.    Coronary calcification: Continue aggressive risk factor modification.  Goal LDL less than 100.  Continue current medical therapy.  Negative ischemia evaluation.     Right bundle branch block: No significant functional or structural heart disease noted on transthoracic echo.    Hypertension: Goal less than 130/80 mmHg. Continue bisoprolol 5 mg p.o. daily.  Currently reasonably well controlled.  Associated lacunar infarct.  History of blood pressure lability.    Subjective  Interval assessment: Using a wheeled walker for ambulation. Gait instability, Parkinson's-like, ongoing neurology evaluation.  Denies chest pain, palpitations or dyspnea on exertion.  Compliant with medical therapy.  Currently smoking a pipe.    Initial evaluation:79-year-old active smoker with a history of hypertension, dyslipidemia and prediabetes presents for evaluation prior to potential total knee replacement.  He appears to be fairly limited due to knee discomfort.  Does not typically complete at least 4 METS of exertion on a regular basis.  Denies chest discomfort.  Mild shortness of breath with exertion.  Denies sustained palpitations, presyncope or syncope.  Twelve-lead EKG today reveals normal sinus rhythm with right bundle branch block.  Currently smokes pipes, has smoked previously cigars and cigarettes intermittently over 40 years.  Underwent a screening CT chest earlier this year which revealed small ascending aortic aneurysm, marked coronary calcification LV enlargement.    PHYSICAL EXAMINATION:  Vitals:    08/11/22 0922   BP: 130/78   BP Location: Right arm   Patient Position: Sitting   Pulse: 54   SpO2: 97%   Weight:  "76.7 kg (169 lb 3.2 oz)   Height: 167.6 cm (66\")     General Appearance:    Alert, cooperative, no distress, appears stated age   Head:    Normocephalic, without obvious abnormality, atraumatic   Eyes:    conjunctiva/corneas clear   Nose:   Nares normal, septum midline, mucosa normal, no drainage   Throat:   Lips, teeth and gums normal   Neck:   Supple, symmetrical, trachea midline, no carotid    bruit or JVD   Lungs:     Clear to auscultation bilaterally, respirations unlabored   Chest Wall:    No tenderness or deformity    Heart:    Regular rate and rhythm, S1 and S2 normal, no murmur, rub   or gallop, normal carotid impulse bilaterally without bruit.   Abdomen:     Soft, non-tender   Extremities:   Extremities normal, atraumatic, no cyanosis or edema   Pulses:   2+ and symmetric all extremities   Skin:   Skin color, texture, turgor normal, no rashes or lesions       Diagnostic Data:  Procedures  Lab Results   Component Value Date    CHLPL 93 08/01/2022    TRIG 80 08/01/2022    HDL 37 (L) 08/01/2022     Lab Results   Component Value Date    GLUCOSE 190 (H) 05/26/2021    BUN 16 05/20/2022    CREATININE 0.80 07/20/2022     05/26/2021    K 3.6 05/26/2021     05/26/2021    CO2 25.9 05/26/2021     Lab Results   Component Value Date    HGBA1C 6.9 (H) 08/01/2022     Lab Results   Component Value Date    WBC 7.8 08/01/2022    HGB 15.1 08/01/2022    HCT 44.5 08/01/2022     08/01/2022       Allergies  No Known Allergies    Current Medications    Current Outpatient Medications:   •  ascorbic acid (VITAMIN C) 1000 MG tablet, Take 1,000 mg by mouth Daily., Disp: , Rfl:   •  aspirin 81 MG chewable tablet, Chew 81 mg Daily., Disp: , Rfl:   •  atorvastatin (LIPITOR) 80 MG tablet, Take 80 mg by mouth Daily., Disp: , Rfl:   •  bisoprolol (ZEBeta) 5 MG tablet, Take 1 tablet by mouth Daily., Disp: 90 tablet, Rfl: 4  •  carbidopa-levodopa (SINEMET)  MG per tablet, Take 1 tablet by mouth 3 (Three) Times a Day., " Disp: , Rfl:   •  cholecalciferol (VITAMIN D3) 25 MCG (1000 UT) tablet, Take 1,000 Units by mouth Daily., Disp: , Rfl:   •  clopidogrel (Plavix) 75 MG tablet, Take 1 tablet by mouth Daily., Disp: 21 tablet, Rfl: 0  •  glucose blood test strip, 1 each by In Vitro route daily Daily testing, DX:250.00, Disp: , Rfl:   •  lovastatin (ALTOPREV) 40 MG 24 hr tablet, Take 40 mg by mouth Every Night., Disp: , Rfl:   •  metFORMIN (GLUCOPHAGE) 500 MG tablet, Take 500 mg by mouth Daily With Breakfast., Disp: , Rfl:   •  nystatin (MYCOSTATIN) 296778 UNIT/GM powder, Apply 1 application topically to the appropriate area as directed As Needed., Disp: , Rfl:   •  vitamin B-12 (CYANOCOBALAMIN) 1000 MCG tablet, Take 1,000 mcg by mouth Daily., Disp: , Rfl:           ROS  Review of Systems   Cardiovascular: Negative for chest pain, dyspnea on exertion, irregular heartbeat, leg swelling and palpitations.   Respiratory: Negative for cough, shortness of breath and wheezing.          SOCIAL HX  Social History     Socioeconomic History   • Marital status:    Tobacco Use   • Smoking status: Current Every Day Smoker     Years: 20.00     Types: Pipe   • Smokeless tobacco: Never Used   • Tobacco comment: smokes his pipe daily   Vaping Use   • Vaping Use: Never used   Substance and Sexual Activity   • Alcohol use: Not Currently   • Drug use: No   • Sexual activity: Defer       FAMILY HX  Family History   Problem Relation Age of Onset   • Breast cancer Mother    • Cancer Sister    • Cancer Brother    • Dementia Brother    • No Known Problems Brother    • No Known Problems Brother              Cliff Lema III, MD, FACC

## 2022-08-15 ENCOUNTER — OFFICE VISIT (OUTPATIENT)
Dept: PRIMARY CARE CLINIC | Age: 82
End: 2022-08-15
Payer: MEDICARE

## 2022-08-15 VITALS
OXYGEN SATURATION: 98 % | RESPIRATION RATE: 14 BRPM | HEART RATE: 70 BPM | TEMPERATURE: 97.3 F | HEIGHT: 72 IN | BODY MASS INDEX: 23.49 KG/M2 | DIASTOLIC BLOOD PRESSURE: 78 MMHG | WEIGHT: 173.4 LBS | SYSTOLIC BLOOD PRESSURE: 128 MMHG

## 2022-08-15 DIAGNOSIS — G20 PARKINSON DISEASE (HCC): ICD-10-CM

## 2022-08-15 DIAGNOSIS — Z00.00 MEDICARE ANNUAL WELLNESS VISIT, SUBSEQUENT: Primary | ICD-10-CM

## 2022-08-15 PROCEDURE — 1123F ACP DISCUSS/DSCN MKR DOCD: CPT | Performed by: NURSE PRACTITIONER

## 2022-08-15 PROCEDURE — G0439 PPPS, SUBSEQ VISIT: HCPCS | Performed by: NURSE PRACTITIONER

## 2022-08-15 RX ORDER — VIBEGRON 75 MG/1
1 TABLET, FILM COATED ORAL DAILY
COMMUNITY
End: 2022-10-05 | Stop reason: ALTCHOICE

## 2022-08-15 ASSESSMENT — PATIENT HEALTH QUESTIONNAIRE - PHQ9
SUM OF ALL RESPONSES TO PHQ QUESTIONS 1-9: 0
1. LITTLE INTEREST OR PLEASURE IN DOING THINGS: 0
2. FEELING DOWN, DEPRESSED OR HOPELESS: 0
SUM OF ALL RESPONSES TO PHQ QUESTIONS 1-9: 0
SUM OF ALL RESPONSES TO PHQ QUESTIONS 1-9: 0
SUM OF ALL RESPONSES TO PHQ9 QUESTIONS 1 & 2: 0
SUM OF ALL RESPONSES TO PHQ QUESTIONS 1-9: 0

## 2022-08-15 ASSESSMENT — LIFESTYLE VARIABLES: HOW OFTEN DO YOU HAVE A DRINK CONTAINING ALCOHOL: NEVER

## 2022-08-15 NOTE — PROGRESS NOTES
Chief Complaint   Patient presents with    Medicare AWV       Have you seen any other physician or provider since your last visit yes - Neurology,cardiology,urology    Have you had any other diagnostic tests since your last visit? yes - labs    Have you changed or stopped any medications since your last visit? yes - stopped oxybutynin,Plavix and started Myrbetriq and Gemtesa     Patient is here for his AWV.

## 2022-08-15 NOTE — PATIENT INSTRUCTIONS
Keep a list of your medicines with you. List all of the prescription medicines, nonprescription medicines, supplements, natural remedies, and vitamins that you take. Tell your healthcare providers who treat you about all of the products you are taking. Your provider can provide you with a form to keep track of them. Just ask. Follow the directions that come with your medicine, including information about food or alcohol. Make sure you know how and when to take your medicine. Do not take more or less than you are supposed to take. Keep all medicines out of the reach of children. Store medicines according to the directions on the label. Monitor yourself. Learn to know how your body reacts to your new medicine and keep track of how it makes you feel before attempting (If your provider has allowed you to do so) to drive or go to work. Seek emergency medical attention if you think you have used too much of this medicine. An overdose of any prescription medicine can be fatal. Overdose symptoms may include extreme drowsiness, muscle weakness, confusion, cold and clammy skin, pinpoint pupils, shallow breathing, slow heart rate, fainting, or coma. Don't share prescription medicines with others, even when they seem to have the same symptoms. What may be good for you may be harmful to others. If you are no longer taking a prescribed medication and you have pills left please take your pills out of their original containers. Mix crushed pills with an undesirable substance, such as cat litter or used coffee grounds. Put the mixture into a disposable container with a lid, such as an empty margarine tub, or into a sealable bag. Cover up or remove any of your personal information on the empty containers by covering it with black permanent marker or duct tape. Place the sealed container with the mixture, and the empty drug containers, in the trash.    If you use a medication that is in the form of a patch, dispose of used patches by folding them in half so that the sticky sides meet, and then flushing them down a toilet. They should not be placed in the household trash where children or pets can find them. If you have any questions, ask your provider or pharmacist for more information. Be sure to keep all appointments for provider visits or tests. We are committed to providing you with the best care possible. In order to help us achieve these goals please remember to bring all medications, herbal products, and over the counter supplements with you to each visit. If your provider has ordered testing for you, please be sure to follow up with our office if you have not received results within 7 days after the testing took place. *If you receive a survey after visiting one of our offices, please take time to share your experience concerning your physician office visit. These surveys are confidential and no health information about you is shared. We are eager to improve for you and we are counting on your feedback to help make that happen. ips to Help You Stop Smoking       Cigarette smoking is a preventable cause of death in the [place]. If you have thought about quitting but haven't been able to, here are some reasons why you should and some ways to do it. Here's Why   Quitting smoking now can decrease your risk of getting smoking-related illnesses like:   Heart disease   Stroke   Several types of cancer, including:   Lung   Mouth   Esophagus   Larynx   Bladder   Pancreas   Kidney   Chronic lung diseases:   Bronchitis   Emphysema   Asthma   Cataracts   Macular degeneration   Thyroid conditions   Hearing loss   Erectile dysfunction   Dementia   Osteoporosis   Here's How   Once you've decided to quit smoking, set your target quit date a few weeks away.  In the time leading up to your quit day, try some of these ideas offered by the [address] [place] [address] of the [employer] to help you successfully quit smoking. For the best results, work with your doctor. Together, you can test your lung function and compare the results to those of a nonsmoking person. The results can be given to you as your lung age. Finding out your lung age right after having the test done may help you to stop smoking. Your doctor can also discuss with you all of your options and refer you to smoking-cessation support groups. You may wish to use nicotine replacement (gum, patches, inhaler) or one of the prescription medications that have been shown to increase quit rates and prolong abstinence from smoking. But whatever you and your doctor decide on these matters, it will still be you who decides when an how to quit. Here are some techniques:   Switch Brands   Switch to a brand you find distasteful. Change to a brand that is low in tar and nicotine a couple of weeks before your target quit date. This will help change your smoking behavior. However, do not smoke more cigarettes, inhale them more often or more deeply, or place your fingertips over the holes in the filters. All of these actions will increase your nicotine intake, and the idea is to get your body used to functioning without nicotine. Cut Down the Number of Cigarettes You Smoke   Smoke only half of each cigarette. Each day, postpone the lighting of your first cigarette by one hour. Decide you'll only smoke during odd or even hours of the day. Decide beforehand how many cigarettes you'll smoke during the day. For each additional cigarette, give a dollar to your favorite aamir. Change your eating habits to help you cut down. For example, drink milk, which many people consider incompatible with smoking. End meals or snacks with something that won't lead to a cigarette. Reach for a glass of juice instead of a cigarette for a \"pick-me-up. \"   Remember: Cutting down can help you quit, but it's not a substitute for quitting.  If you're down to about seven cigarettes a day, it's time to set your target quit date, and get ready to stick to it. Don't Smoke \"Automatically\"   Smoke only those cigarettes you really want. Catch yourself before you light up a cigarette out of pure habit. Don't empty your ashtrays. This will remind you of how many cigarettes you've smoked each day, and the sight and the smell of stale cigarettes butts will be very unpleasant. Make yourself aware of each cigarette by using the opposite hand or putting cigarettes in an unfamiliar location or a different pocket to break the automatic reach. If you light up many times during the day without even thinking about it, try to look in a mirror each time you put a match to your cigarette. You may decide you don't need it. Make Smoking Inconvenient   Stop buying cigarettes by the carton. Wait until one pack is empty before you buy another. Stop carrying cigarettes with you at home or at work. Make them difficult to get to. Make Smoking Unpleasant   Smoke only under circumstances that aren't especially pleasurable for you. If you like to smoke with others, smoke alone. Turn your chair to an empty corner and focus only on the cigarette you are smoking and all its many negative effects. Collect all your cigarette butts in one large glass container as a visual reminder of the filth made by smoking. Just Before Quitting   Practice going without cigarettes. Don't think of never smoking again. Think of quitting in terms of one day at a time . Tell yourself you won't smoke today, and then don't. Clean your clothes to rid them of the cigarette smell, which can linger a long time. On the Day You Quit   Throw away all your cigarettes and matches. Hide your lighters and ashtrays. Visit the dentist and have your teeth cleaned to get rid of tobacco stains. Notice how nice they look and resolve to keep them that way.    Make a list of things you'd like to buy for yourself or someone else. Estimate the cost in terms of packs of cigarettes, and put the money aside to buy these presents. Keep very busy on the big day. Go to the movies, exercise, take long walks, or go bike riding. Remind your family and friends that this is your quit date, and ask them to help you over the rough spots of the first couple of days and weeks. Buy yourself a treat or do something special to celebrate. Telephone and Internet Support   Telephone, web-, and computer-based programs can offer you the support that you need to quit and to stay smoke-free. You can find many programs online, like the American [employer] Association's Fifty Six from Smoking . Immediately After Quitting   Develop a clean, fresh, nonsmoking environment around yourselfat work and at home. Buy yourself flowersyou may be surprised how much you can enjoy their scent now. The first few days after you quit, spend as much free time as possible in places where smoking isn't allowed, such as [address] [place] [address], museums, theaters, department stores, and churches. Drink large quantities of water and fruit juice (but avoid sodas that contain caffeine). Try to avoid alcohol, coffee, and other beverages that you associate with cigarette smoking. Strike up conversation instead of a match for a cigarette. If you miss the sensation of having a cigarette in your hand, play with something elsea pencil, a paper clip, a marble. If you miss having something in your mouth, try toothpicks or a fake cigarette.

## 2022-08-15 NOTE — PROGRESS NOTES
Medicare Annual Wellness Visit    Cornelia Thomas is here for Medicare AWV    Assessment & Plan   Medicare annual wellness visit, subsequent  Parkinson disease [facility])    Recommendations for Preventive Services Due: see orders and patient instructions/AVS.  Recommended screening schedule for the next 5-10 years is provided to the patient in written form: see Patient Instructions/AVS.     Return in about 4 months (around 12/15/2022). Subjective   The following acute and/or chronic problems were also addressed today:  Weakness and fatigue. Patient's complete Health Risk Assessment and screening values have been reviewed and are found in Flowsheets. The following problems were reviewed today and where indicated follow up appointments were made and/or referrals ordered.     Positive Risk Factor Screenings with Interventions:    Fall Risk:  Do you feel unsteady or are you worried about falling? : (!) yes  2 or more falls in past year?: (!) yes  Fall with injury in past year?: no   Fall Risk Interventions:    Home exercises provided to promote strength and balance  Using a cane or walker         Substance Use - Tobacco Interventions:  patient is not ready to work toward tobacco cessation at this time         General Health and ACP:  General  In general, how would you say your health is?: Very Good  In the past 7 days, have you experienced any of the following: New or Increased Pain, New or Increased Fatigue, Loneliness, Social Isolation, Stress or Anger?: No  Do you get the social and emotional support that you need?: Yes  Do you have a Living Will?: Yes    Advance Directives       Power of  Living Will ACP-Advance Directive ACP-Power of     Not on File Not on File Not on File Not on File        General Health Risk Interventions:  His wife does most of his care     Hearing/Vision:  Do you or your family notice any trouble with your hearing that hasn't been managed with hearing aids?: (!) Yes  Do you have difficulty driving, watching TV, or doing any of your daily activities because of your eyesight?: No  Have you had an eye exam within the past year?: Yes  No results found. Hearing/Vision Interventions:  Vision concerns:  glasses are about 3year old     Safety:  Do you have working smoke detectors?: Yes  Do you have any tripping hazards - loose or unsecured carpets or rugs?: (!) Yes  Do you have any tripping hazards - clutter in doorways, halls, or stairs?: No  Do you have either shower bars, grab bars, non-slip mats or non-slip surfaces in your shower or bathtub?: Yes  Do all of your stairways have a railing or banister?: Yes  Do you always fasten your seatbelt when you are in a car?: Yes  Safety Interventions:  Walks with a walker or cane. ADLs:  In the past 7 days, did you need help from others to perform any of the following everyday activities: Eating, dressing, grooming, bathing, toileting, or walking/balance?: (!) Yes  Select all that apply: (!) Dressing  In the past 7 days, did you need help from others to take care of any of the following: Laundry, housekeeping, banking/finances, shopping, telephone use, food preparation, transportation, or taking medications?: No  ADL Interventions:  Patient declines any further evaluation/treatment for this issue          Objective   Vitals:    08/15/22 1017 08/15/22 1038 08/15/22 1109   BP: (!) 158/80 (!) 148/74 128/78   Site: Right Upper Arm Right Upper Arm Right Upper Arm   Position: Sitting Sitting Sitting   Cuff Size: Medium Adult Medium Adult Medium Adult   Pulse: 70     Resp: 14     Temp: 97.3 °F (36.3 °C)     TempSrc: Temporal     SpO2: 98%     Weight: 173 lb 6.4 oz (78.7 kg)     Height: 6' (1.829 m)        Body mass index is 23.52 kg/m². No Known Allergies  Prior to Visit Medications    Medication Sig Taking?  Authorizing Provider   carbidopa-levodopa (SINEMET)  MG per tablet TAKE TWO TABLETS BY MOUTH THREE TIMES A DAY Yes Historical Provider, MD   Vibegron (Betdot Azalea) 75 MG TABS Take 1 tablet by mouth daily Yes Historical Provider, MD   Mirabegron (MYRBETRIQ PO) Take by mouth Yes Historical Provider, MD   atorvastatin (LIPITOR) 80 MG tablet  Yes Historical Provider, MD   bisoprolol (ZEBETA) 5 MG tablet TAKE 1 TABLET BY MOUTH DAILY. Yes Historical Provider, MD   metFORMIN (GLUCOPHAGE) 500 MG tablet TAKE ONE TABLET BY MOUTH EVERY DAY WITH BREAKFAST Yes SINDY Byers   Lancets MISC 1 each by Does not apply route 2 times daily Dx E11.9 KS Yes SINDY Byers   Glucose Blood DISK 1 each by In Vitro route 3 times daily KX Yes SINDY Byers   blood glucose monitor strips Test 3 times a day & as needed for symptoms of irregular blood glucose. Dx E11. 9. Breeze 2 Yes SINDY Coreas   Multiple Vitamins-Minerals (RA VISION-STONEY PRESERVE PO) Take by mouth 2 times daily Drops bid Yes Historical Provider, MD   aspirin 81 MG tablet Take 81 mg by mouth daily. Yes Historical Provider, MD   vitamin B-12 (CYANOCOBALAMIN) 1000 MCG tablet Take 1,000 mcg by mouth daily. Yes Historical Provider, MD   Vitamin D (CHOLECALCIFEROL) 1000 UNITS CAPS capsule Take 1,000 Units by mouth daily. Yes Historical Provider, MD   Ascorbic Acid (VITAMIN C) 500 MG tablet Take 1,000 mg by mouth daily.  Yes Historical Provider, MD [place] (Including outside providers/suppliers regularly involved in providing care):   Patient Care Team:  SINDY Byers as PCP - General (Family Nurse Practitioner)  SINDY Byers as PCP - [facility] Empaneled Provider     Reviewed and updated this visit:  Tobacco  Allergies  Meds  Problems  Med Hx  Surg Hx  Soc Hx  Fam Hx

## 2022-09-12 ENCOUNTER — OFFICE VISIT (OUTPATIENT)
Dept: UROLOGY | Facility: CLINIC | Age: 82
End: 2022-09-12

## 2022-09-12 VITALS
BODY MASS INDEX: 27.19 KG/M2 | TEMPERATURE: 98.6 F | HEIGHT: 66 IN | SYSTOLIC BLOOD PRESSURE: 140 MMHG | OXYGEN SATURATION: 98 % | WEIGHT: 169.2 LBS | DIASTOLIC BLOOD PRESSURE: 88 MMHG | HEART RATE: 61 BPM

## 2022-09-12 DIAGNOSIS — N39.41 URGE INCONTINENCE: Primary | ICD-10-CM

## 2022-09-12 DIAGNOSIS — C61 PROSTATE CANCER: ICD-10-CM

## 2022-09-12 LAB
BILIRUB BLD-MCNC: NEGATIVE MG/DL
CLARITY, POC: CLEAR
COLOR UR: ABNORMAL
EXPIRATION DATE: ABNORMAL
GLUCOSE UR STRIP-MCNC: NEGATIVE MG/DL
KETONES UR QL: ABNORMAL
LEUKOCYTE EST, POC: NEGATIVE
Lab: ABNORMAL
NITRITE UR-MCNC: NEGATIVE MG/ML
PH UR: 6 [PH] (ref 5–8)
PROT UR STRIP-MCNC: ABNORMAL MG/DL
RBC # UR STRIP: NEGATIVE /UL
SP GR UR: 1.02 (ref 1–1.03)
UROBILINOGEN UR QL: NORMAL

## 2022-09-12 PROCEDURE — 81003 URINALYSIS AUTO W/O SCOPE: CPT | Performed by: UROLOGY

## 2022-09-12 PROCEDURE — 99214 OFFICE O/P EST MOD 30 MIN: CPT | Performed by: UROLOGY

## 2022-09-12 RX ORDER — ATORVASTATIN CALCIUM 80 MG/1
80 TABLET, FILM COATED ORAL DAILY
COMMUNITY
Start: 2022-06-17 | End: 2022-09-28

## 2022-09-12 RX ORDER — VIBEGRON 75 MG/1
1 TABLET, FILM COATED ORAL DAILY
COMMUNITY
End: 2022-09-12

## 2022-09-12 NOTE — PROGRESS NOTES
Chief Complaint   Patient presents with   • Urge incontinence     5 week follow up to discuss urge incontinence.       HPI  Ms. Camp is a 81 y.o. male with history below in assessment, who presents for follow up.     At this visit still struggling with UUI.  He overall has a number of different complaints, most relating to decreasing health in general.      Past Medical History:   Diagnosis Date   • Arthritis    • Cancer (HCC)     kidney/ prostate   • Diabetes mellitus (HCC)    • ED (erectile dysfunction)    • Enlarged prostate with lower urinary tract symptoms (LUTS)    • Full dentures    • History of fracture of pelvis 11/14/2019    after fall from ladder   • History of loss of consciousness 11/14/2019    after fall from ladder-flown [facility]   • History of rib fracture 11/14/2019    after fall from ladder   • History of right shoulder fracture 1960    walking up hill, fell on gravel   • History of stress test    • History of torn meniscus of right knee 2012   • [place] (hard of hearing)     Bilateral hearing aids    • Hypertension    • Laceration of head 11/14/2019    after fall from ladder-treated at [facility]   • Left shoulder strain 2017    after fall from ladder   • Lung injury 11/14/2019    punctured right lung after falling off ladder   • Renal disorder    • Stone in kidney    • Stroke (HCC)        Past Surgical History:   Procedure Laterality Date   • CHEST TUBE INSERTION  11/2019    rib fractures, punctured lung after fall from [facility]   • COLONOSCOPY     • COLONOSCOPY N/A 4/8/2019    Procedure: COLONOSCOPY;  Surgeon: Onesimo Arnold MD;  Location: [facility] ENDOSCOPY;  Service: Gastroenterology   • EYE SURGERY Right     cataract removal with IOL implant   • HERNIA REPAIR Right     multiple inguinal hernia repairs   • KNEE ARTHROSCOPY W/ MENISCECTOMY Right 05/10/2012    Partial medial and lateral menisectomy-Conrad Baird MD   • NEPHRECTOMY Right      partial right nephrectomy due to kidney stone   • PELVIC FRACTURE SURGERY Right 11/2019    multiple pelvic fractures after fall from ladder[facility]   • SHOULDER SURGERY Right 1960    fracture surgery to remove a piece of bone- [place]   • TOTAL KNEE ARTHROPLASTY Right 5/25/2021    Procedure: knee arthroplasty total;  Surgeon: Conrad Baird MD;  Location: [facility];  Service: Orthopedics;  Laterality: Right;         Current Outpatient Medications:   •  ascorbic acid (VITAMIN C) 1000 MG tablet, Take 1,000 mg by mouth Daily., Disp: , Rfl:   •  aspirin 81 MG chewable tablet, Chew 81 mg Daily., Disp: , Rfl:   •  atorvastatin (LIPITOR) 80 MG tablet, Take 80 mg by mouth Daily., Disp: , Rfl:   •  bisoprolol (ZEBeta) 5 MG tablet, Take 1 tablet by mouth Daily., Disp: 90 tablet, Rfl: 4  •  carbidopa-levodopa (SINEMET)  MG per tablet, Take 1 tablet by mouth 3 (Three) Times a Day., Disp: , Rfl:   •  cholecalciferol (VITAMIN D3) 25 MCG (1000 UT) tablet, Take 1,000 Units by mouth Daily., Disp: , Rfl:   •  glucose blood test strip, 1 each by In Vitro route daily Daily testing, DX:250.00, Disp: , Rfl:   •  metFORMIN (GLUCOPHAGE) 500 MG tablet, Take 500 mg by mouth Daily With Breakfast., Disp: , Rfl:   •  nystatin (MYCOSTATIN) 438772 UNIT/GM powder, Apply 1 application topically to the appropriate area as directed As Needed., Disp: , Rfl:   •  Vibegron (Gemtesa) 75 MG tablet, Take 1 tablet by mouth Daily., Disp: , Rfl:   •  vitamin B-12 (CYANOCOBALAMIN) 1000 MCG tablet, Take 1,000 mcg by mouth Daily., Disp: , Rfl:   •  atorvastatin (LIPITOR) 80 MG tablet, Take 80 mg by mouth Daily., Disp: , Rfl:   •  clopidogrel (Plavix) 75 MG tablet, Take 1 tablet by mouth Daily., Disp: 21 tablet, Rfl: 0  •  lovastatin (ALTOPREV) 40 MG 24 hr tablet, Take 40 mg by mouth Every Night., Disp: , Rfl:      Physical Exam  Visit Vitals  /88 (BP Location: Right arm, Patient Position: Sitting, Cuff Size: Adult)  "  Pulse 61   Temp 98.6 °F (37 °C) (Temporal)   Ht 167.6 cm (66\")   Wt 76.7 kg (169 lb 3.2 oz)   SpO2 98%   BMI 27.31 kg/m²       Labs  Brief Urine Lab Results  (Last result in the past 365 days)      Color   Clarity   Blood   Leuk Est   Nitrite   Protein   CREAT   Urine HCG        09/12/22 1354 Dark Yellow   Clear   Negative   Negative   Negative   Trace                 Lab Results   Component Value Date    GLUCOSE 190 (H) 05/26/2021    CALCIUM 8.5 (L) 05/26/2021     05/26/2021    K 3.6 05/26/2021    CO2 25.9 05/26/2021     05/26/2021    BUN 16 05/20/2022    CREATININE 0.80 07/20/2022    EGFRIFAFRI >59 05/20/2022    EGFRIFNONA >59 05/20/2022    BCR 15.8 05/26/2021    ANIONGAP 8.1 05/26/2021       Lab Results   Component Value Date    WBC 7.8 08/01/2022    HGB 15.1 08/01/2022    HCT 44.5 08/01/2022    MCV 92.7 08/01/2022     08/01/2022       Lab Results   Component Value Date    PSA 7.450 (H) 03/21/2022    PSA 8.2 (H) 08/18/2020    PSA 5.910 (H) 02/12/2020           Radiographic Studies  CT Angiogram Neck  Result Date: 7/20/2022  No evidence of cervical carotid stenosis   This study was performed with techniques to keep radiation doses as low as reasonably achievable (ALARA). Individualized dose reduction techniques using automated exposure control or adjustment of vA and/or kV according to the patient size were employed.  This report was signed and finalized on 7/20/2022 6:59 PM by Abdiaziz Ahumada MD.      I have reviewed above labs and imaging.     Assessment  81 y.o. male with BPH s/p UroLift, prostate cancer on active surveillance (1 core makeda 3+4=7). Both LUTS and PCa are now stable.   He is presenting for follow-up of OAB.  At last visit he was switched from oxybutynin to vibegron, which wife states helped some but still req 2-3 ppd.    I offered the patient multiple different third line therapies for urge incontinence, however he is ambivalent about if he wants anything else " done.    Plan  1. FU in 6 mo w/ repeat PSA  2. Hold off on any Rx for now.  He will call us sooner if he wants to consider any other therapies for the incontinence     I spent a total of 30 minutes with the patient and the chart engaging in data gathering and interpretation, patient interaction, as well as counseling on the risks, benefits, and alternatives of the therapy and coordinating care.

## 2022-09-16 DIAGNOSIS — E11.9 TYPE 2 DIABETES MELLITUS WITHOUT COMPLICATION, WITHOUT LONG-TERM CURRENT USE OF INSULIN (HCC): ICD-10-CM

## 2022-09-28 ENCOUNTER — OFFICE VISIT (OUTPATIENT)
Dept: NEUROLOGY | Facility: CLINIC | Age: 82
End: 2022-09-28

## 2022-09-28 VITALS
HEART RATE: 51 BPM | OXYGEN SATURATION: 99 % | TEMPERATURE: 98.2 F | DIASTOLIC BLOOD PRESSURE: 84 MMHG | BODY MASS INDEX: 27.9 KG/M2 | HEIGHT: 66 IN | SYSTOLIC BLOOD PRESSURE: 130 MMHG | WEIGHT: 173.6 LBS

## 2022-09-28 DIAGNOSIS — I63.81 LACUNAR INFARCTION: Primary | ICD-10-CM

## 2022-09-28 DIAGNOSIS — R25.9 PARKINSONIAN FEATURES: ICD-10-CM

## 2022-09-28 PROCEDURE — 99214 OFFICE O/P EST MOD 30 MIN: CPT | Performed by: NURSE PRACTITIONER

## 2022-09-28 NOTE — PATIENT INSTRUCTIONS
11/28/2022  3:30 PM          Providers    Julien Reeves MD  Neurology  NPI: [id number]  [address]  [facility] [address]     Phone: [phone]

## 2022-09-28 NOTE — PROGRESS NOTES
Follow Up Neurology Office Visit      Patient Name: Ankur Camp    Referring Physician: No ref. provider found    Chief Complaint:    Chief Complaint   Patient presents with   • Follow-up     Pt in office to follow up on Lacunar infarction        History of Present Illness: Ankur Camp is a 81 y.o. male who is here to follow up with Neurology for movement disorder and incidental finding of lacunar infarct in June 2022.  He was recently evaluated by [facility], confirm diagnosis of vascular parkinsonism, wife reports that he is currently taking Sinemet as prescribed, 2 pills 3 times daily.  Uncertain if this is been beneficial.  No additional episodes concerning for stroke.  He completed 3 weeks of Plavix, continues taking aspirin and statin daily.  No additional falls.    The following portions of the patient's history were reviewed and updated as appropriate: allergies, current medications, past family history, past medical history, past social history, past surgical history and problem list.    Subjective     Review of Systems:   Review of Systems   Musculoskeletal: Positive for arthralgias and gait problem.   Neurological: Positive for tremors and weakness.   All other systems reviewed and are negative.    Medications:     Current Outpatient Medications:   •  ascorbic acid (VITAMIN C) 1000 MG tablet, Take 1,000 mg by mouth Daily., Disp: , Rfl:   •  aspirin 81 MG chewable tablet, Chew 81 mg Daily., Disp: , Rfl:   •  atorvastatin (LIPITOR) 80 MG tablet, Take 80 mg by mouth Daily., Disp: , Rfl:   •  bisoprolol (ZEBeta) 5 MG tablet, Take 1 tablet by mouth Daily., Disp: 90 tablet, Rfl: 4  •  carbidopa-levodopa (SINEMET)  MG per tablet, Take 1 tablet by mouth 3 (Three) Times a Day., Disp: , Rfl:   •  cholecalciferol (VITAMIN D3) 25 MCG (1000 UT) tablet, Take 1,000 Units by mouth Daily., Disp: , Rfl:   •  glucose blood test strip, 1 each by In Vitro route daily Daily  "testing, DX:250.00, Disp: , Rfl:   •  metFORMIN (GLUCOPHAGE) 500 MG tablet, Take 500 mg by mouth Daily With Breakfast., Disp: , Rfl:   •  vitamin B-12 (CYANOCOBALAMIN) 1000 MCG tablet, Take 1,000 mcg by mouth Daily., Disp: , Rfl:     Allergies:   No Known Allergies    Objective     Physical Exam:  Vital Signs:   Vitals:    09/28/22 0929   BP: 130/84   BP Location: Right arm   Patient Position: Sitting   Cuff Size: Adult   Pulse: 51   Temp: 98.2 °F (36.8 °C)   SpO2: 99%   Weight: 78.7 kg (173 lb 9.6 oz)   Height: 167.6 cm (66\")   PainSc: 0-No pain     Physical Exam  Vitals and nursing note reviewed. Exam conducted with a chaperone present (wife).   Pulmonary:      Effort: Pulmonary effort is normal.   Skin:     General: Skin is warm.   Neurological:      Mental Status: He is oriented to person, place, and time.   Psychiatric:         Behavior: Behavior normal.       Neurologic Exam     Mental Status   Oriented to person, place, and time.   Speech: (Increased hypophonia from previous  )  Level of consciousness: alert  Knowledge: consistent with education.     Cranial Nerves   Cranial nerves II through XII intact.     Motor Exam   Muscle bulk: normal  Overall muscle tone: normal  Increased bradykinesia from previous visits     Gait, Coordination, and Reflexes     Gait  Gait: shuffling (multiple attempts to rise from standing)    Tremor   Resting tremor: absent    Results Review:   I reviewed the patient's new clinical results.  I have reviewed the patient's other medical records to include, labs, radiology and referrals.     Assessment / Plan      Assessment/Plan:   Diagnoses and all orders for this visit:    1. Lacunar infarction (HCC) (Primary)  Patient completed 21-day course of Plavix, continue daily aspirin and statin. Discussed signs and symptoms of stroke and when to call 911. Instructed to follow a low fat diet including the Mediterranean diet. Instructed to take all medications daily as prescribed. Encouraged " 30 minutes of exercise 3-4 times a week as tolerated. Stay well hydrated. Discussed potential side effects of new medications and signs and symptoms to report. If patient is currently using tobacco products, smoking cessation was encouraged. Reviewed stroke risk factors and stroke prevention plan. Patient and/or family verbalizes understanding and agrees with plan.     2. Parkinsonian features     I Movement Disorder Assessment:  Diagnosis Plan   1. Vascular parkinsonism (CMS/HCC) Ambulatory referral to Physical Therapy   2. Parkinsonian features Ambulatory referral to Neurology     81 year old male with history of right cerebellar and right frontal stroke presents to the [facility] as a new patient today for ruling out parkinsonism. His outside MR head and other records were personally viewed. On MRI, there are significant white matter changes in periventricular region and chronic strokes in bilateral cerebellar hemispheres and right frontal area. 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 Given older age of onset, parkinson plus syndromes are very less likely and also, pt does not exhibit other signs symptoms indicating MSA, PSP and CBS. Giving trial of Carbidopa/Levodopa is the best next step in management and should help with the dx as dx is usually clinical.     Plan:  -Begin Sinemet (25/100) three times a day (8-12-16). Titration and side effects were explained to patient and wife  Week 1 (Pills): 1-1-1  Week 2 (Pills): 2-2-2  Week 3 (Pills): 3-3-3  -Recommend to switch Oxybutynin to Myrbetriq (Mirabegron)    RTC: 3 months      Follow Up:   Return in about 6 months (around 3/28/2023).     Cuca Woodard APRISAIAH  [facility]   AS THE PROVIDER, I PERSONALLY WORE PPE  DURING ENTIRE FACE TO FACE ENCOUNTER IN CLINIC WITH THE PATIENT. PATIENT ALSO WORE PPE DURING ENTIRE FACE TO FACE ENCOUNTER EXCEPT FOR A MAX OF 30 SECONDS DURING NEUROLOGICAL EVALUATION OF CRANIAL NERVES AND THEN MASK WAS PLACED BACK OVER PATIENT FACE FOR REMAINDER OF VISIT. I WASHED MY HANDS BEFORE AND AFTER VISIT.  30 minutes total time spent in visit today reviewing previous providers documentation from movement disorder and cardiology, reviewing interval medical history with patient and his wife, examining patient, discussing diagnoses, and discussing plan of care.  Please note that portions of this note may have been completed with a voice recognition program. Efforts were made to edit the dictations, but occasionally words are mistranscribed.

## 2022-10-05 ENCOUNTER — OFFICE VISIT (OUTPATIENT)
Dept: PRIMARY CARE CLINIC | Age: 82
End: 2022-10-05
Payer: MEDICARE

## 2022-10-05 VITALS
DIASTOLIC BLOOD PRESSURE: 64 MMHG | RESPIRATION RATE: 14 BRPM | WEIGHT: 168.6 LBS | SYSTOLIC BLOOD PRESSURE: 139 MMHG | HEIGHT: 72 IN | OXYGEN SATURATION: 99 % | TEMPERATURE: 97.8 F | BODY MASS INDEX: 22.84 KG/M2 | HEART RATE: 59 BPM

## 2022-10-05 DIAGNOSIS — E11.9 TYPE 2 DIABETES MELLITUS WITHOUT COMPLICATION, WITHOUT LONG-TERM CURRENT USE OF INSULIN (HCC): ICD-10-CM

## 2022-10-05 DIAGNOSIS — J01.00 ACUTE NON-RECURRENT MAXILLARY SINUSITIS: Primary | ICD-10-CM

## 2022-10-05 DIAGNOSIS — Z23 NEED FOR INFLUENZA VACCINATION: ICD-10-CM

## 2022-10-05 PROCEDURE — 99214 OFFICE O/P EST MOD 30 MIN: CPT | Performed by: NURSE PRACTITIONER

## 2022-10-05 PROCEDURE — 3074F SYST BP LT 130 MM HG: CPT | Performed by: NURSE PRACTITIONER

## 2022-10-05 PROCEDURE — G0008 ADMIN INFLUENZA VIRUS VAC: HCPCS | Performed by: NURSE PRACTITIONER

## 2022-10-05 PROCEDURE — 90694 VACC AIIV4 NO PRSRV 0.5ML IM: CPT | Performed by: NURSE PRACTITIONER

## 2022-10-05 PROCEDURE — 1123F ACP DISCUSS/DSCN MKR DOCD: CPT | Performed by: NURSE PRACTITIONER

## 2022-10-05 PROCEDURE — 3078F DIAST BP <80 MM HG: CPT | Performed by: NURSE PRACTITIONER

## 2022-10-05 PROCEDURE — 3044F HG A1C LEVEL LT 7.0%: CPT | Performed by: NURSE PRACTITIONER

## 2022-10-05 RX ORDER — AMOXICILLIN 875 MG/1
875 TABLET, COATED ORAL 2 TIMES DAILY
Qty: 14 TABLET | Refills: 0 | Status: SHIPPED | OUTPATIENT
Start: 2022-10-05 | End: 2022-10-12

## 2022-10-05 ASSESSMENT — ENCOUNTER SYMPTOMS
GASTROINTESTINAL NEGATIVE: 1
WHEEZING: 1
EYES NEGATIVE: 1
ALLERGIC/IMMUNOLOGIC NEGATIVE: 1

## 2022-10-05 NOTE — PATIENT INSTRUCTIONS
Keep a list of your medicines with you. List all of the prescription medicines, nonprescription medicines, supplements, natural remedies, and vitamins that you take. Tell your healthcare providers who treat you about all of the products you are taking. Your provider can provide you with a form to keep track of them. Just ask. Follow the directions that come with your medicine, including information about food or alcohol. Make sure you know how and when to take your medicine. Do not take more or less than you are supposed to take. Keep all medicines out of the reach of children. Store medicines according to the directions on the label. Monitor yourself. Learn to know how your body reacts to your new medicine and keep track of how it makes you feel before attempting (If your provider has allowed you to do so) to drive or go to work. Seek emergency medical attention if you think you have used too much of this medicine. An overdose of any prescription medicine can be fatal. Overdose symptoms may include extreme drowsiness, muscle weakness, confusion, cold and clammy skin, pinpoint pupils, shallow breathing, slow heart rate, fainting, or coma. Don't share prescription medicines with others, even when they seem to have the same symptoms. What may be good for you may be harmful to others. If you are no longer taking a prescribed medication and you have pills left please take your pills out of their original containers. Mix crushed pills with an undesirable substance, such as cat litter or used coffee grounds. Put the mixture into a disposable container with a lid, such as an empty margarine tub, or into a sealable bag. Cover up or remove any of your personal information on the empty containers by covering it with black permanent marker or duct tape. Place the sealed container with the mixture, and the empty drug containers, in the trash.    If you use a medication that is in the form of a patch, dispose of used patches by folding them in half so that the sticky sides meet, and then flushing them down a toilet. They should not be placed in the household trash where children or pets can find them. If you have any questions, ask your provider or pharmacist for more information. Be sure to keep all appointments for provider visits or tests. We are committed to providing you with the best care possible. In order to help us achieve these goals please remember to bring all medications, herbal products, and over the counter supplements with you to each visit. If your provider has ordered testing for you, please be sure to follow up with our office if you have not received results within 7 days after the testing took place. *If you receive a survey after visiting one of our offices, please take time to share your experience concerning your physician office visit. These surveys are confidential and no health information about you is shared. We are eager to improve for you and we are counting on your feedback to help make that happen. ips to Help You Stop Smoking       Cigarette smoking is a preventable cause of death in the [place]. If you have thought about quitting but haven't been able to, here are some reasons why you should and some ways to do it. Here's Why   Quitting smoking now can decrease your risk of getting smoking-related illnesses like:   Heart disease   Stroke   Several types of cancer, including:   Lung   Mouth   Esophagus   Larynx   Bladder   Pancreas   Kidney   Chronic lung diseases:   Bronchitis   Emphysema   Asthma   Cataracts   Macular degeneration   Thyroid conditions   Hearing loss   Erectile dysfunction   Dementia   Osteoporosis   Here's How   Once you've decided to quit smoking, set your target quit date a few weeks away.  In the time leading up to your quit day, try some of these ideas offered by the [address] [place] [address] of the [employer] to help you successfully quit smoking. For the best results, work with your doctor. Together, you can test your lung function and compare the results to those of a nonsmoking person. The results can be given to you as your lung age. Finding out your lung age right after having the test done may help you to stop smoking. Your doctor can also discuss with you all of your options and refer you to smoking-cessation support groups. You may wish to use nicotine replacement (gum, patches, inhaler) or one of the prescription medications that have been shown to increase quit rates and prolong abstinence from smoking. But whatever you and your doctor decide on these matters, it will still be you who decides when an how to quit. Here are some techniques:   Switch Brands   Switch to a brand you find distasteful. Change to a brand that is low in tar and nicotine a couple of weeks before your target quit date. This will help change your smoking behavior. However, do not smoke more cigarettes, inhale them more often or more deeply, or place your fingertips over the holes in the filters. All of these actions will increase your nicotine intake, and the idea is to get your body used to functioning without nicotine. Cut Down the Number of Cigarettes You Smoke   Smoke only half of each cigarette. Each day, postpone the lighting of your first cigarette by one hour. Decide you'll only smoke during odd or even hours of the day. Decide beforehand how many cigarettes you'll smoke during the day. For each additional cigarette, give a dollar to your favorite aamir. Change your eating habits to help you cut down. For example, drink milk, which many people consider incompatible with smoking. End meals or snacks with something that won't lead to a cigarette. Reach for a glass of juice instead of a cigarette for a \"pick-me-up. \"   Remember: Cutting down can help you quit, but it's not a substitute for quitting.  If you're down to about seven cigarettes a day, it's time to set your target quit date, and get ready to stick to it. Don't Smoke \"Automatically\"   Smoke only those cigarettes you really want. Catch yourself before you light up a cigarette out of pure habit. Don't empty your ashtrays. This will remind you of how many cigarettes you've smoked each day, and the sight and the smell of stale cigarettes butts will be very unpleasant. Make yourself aware of each cigarette by using the opposite hand or putting cigarettes in an unfamiliar location or a different pocket to break the automatic reach. If you light up many times during the day without even thinking about it, try to look in a mirror each time you put a match to your cigarette. You may decide you don't need it. Make Smoking Inconvenient   Stop buying cigarettes by the carton. Wait until one pack is empty before you buy another. Stop carrying cigarettes with you at home or at work. Make them difficult to get to. Make Smoking Unpleasant   Smoke only under circumstances that aren't especially pleasurable for you. If you like to smoke with others, smoke alone. Turn your chair to an empty corner and focus only on the cigarette you are smoking and all its many negative effects. Collect all your cigarette butts in one large glass container as a visual reminder of the filth made by smoking. Just Before Quitting   Practice going without cigarettes. Don't think of never smoking again. Think of quitting in terms of one day at a time . Tell yourself you won't smoke today, and then don't. Clean your clothes to rid them of the cigarette smell, which can linger a long time. On the Day You Quit   Throw away all your cigarettes and matches. Hide your lighters and ashtrays. Visit the dentist and have your teeth cleaned to get rid of tobacco stains. Notice how nice they look and resolve to keep them that way.    Make a list of things you'd like to buy for yourself or someone else. Estimate the cost in terms of packs of cigarettes, and put the money aside to buy these presents. Keep very busy on the big day. Go to the movies, exercise, take long walks, or go bike riding. Remind your family and friends that this is your quit date, and ask them to help you over the rough spots of the first couple of days and weeks. Buy yourself a treat or do something special to celebrate. Telephone and Internet Support   Telephone, web-, and computer-based programs can offer you the support that you need to quit and to stay smoke-free. You can find many programs online, like the American [employer] Association's [place] from Smoking . Immediately After Quitting   Develop a clean, fresh, nonsmoking environment around yourselfat work and at home. Buy yourself flowersyou may be surprised how much you can enjoy their scent now. The first few days after you quit, spend as much free time as possible in places where smoking isn't allowed, such as [address] [place] [address], museums, theaters, department stores, and churches. Drink large quantities of water and fruit juice (but avoid sodas that contain caffeine). Try to avoid alcohol, coffee, and other beverages that you associate with cigarette smoking. Strike up conversation instead of a match for a cigarette. If you miss the sensation of having a cigarette in your hand, play with something elsea pencil, a paper clip, a marble. If you miss having something in your mouth, try toothpicks or a fake cigarette.

## 2022-10-05 NOTE — PROGRESS NOTES
Chief Complaint   Patient presents with    Follow-up     Signify health        Have you seen any other physician or provider since your last visit yes - neurology,urology    Have you had any other diagnostic tests since your last visit? no    Have you changed or stopped any medications since your last visit? no     Vaccine Information Sheet, \"Influenza - Inactivated\"  given to Marko Schulz, or parent/legal guardian of  Marko Schulz and verbalized understanding. Patient responses:    Have you ever had a reaction to a flu vaccine? No  Do you have any current illness? No  Have you ever had [place] Syndrome? No  Do you have a serious allergy to any of the follow: Neomycin, Polymyxin, Thimerosal, eggs or egg products? No    Flu vaccine given per order. Please see immunization tab. Risks and benefits explained. Current VIS given. Immunizations Administered       Name Date Dose Route    Influenza, FLUAD, (age 72 y+), Adjuvanted, 0.5mL 10/5/2022 0.5 mL Intramuscular    Site: Deltoid- Left    Lot: [id number]    NDC: 07611-362-77               SUBJECTIVE:    Patient ID:Donell Fontaine is a 81 y.o. male. Chief Complaint   Patient presents with    Follow-up     Signify health      HPI:  Patient is here to follow up on a home visit from [facility]. Wife stated that the last few days patient has been woke up due to her  making a sound like gravel in his throat. He has been having a lot of sinus drainage. He has been taking an allergy medication. He denies reflux. Patient's medications, allergies, past medical, surgical, social and family histories were reviewed and updated as appropriate. Review of Systems   Constitutional: Negative. HENT: Negative. Eyes: Negative. Respiratory:  Positive for wheezing. Cardiovascular: Negative. Gastrointestinal: Negative. Endocrine: Negative. Genitourinary: Negative. Musculoskeletal: Negative. Skin: Negative. Allergic/Immunologic: Negative. Neurological: Negative. Hematological: Negative. Psychiatric/Behavioral: Negative. OBJECTIVE:  /64 (Site: Right Upper Arm, Position: Sitting, Cuff Size: Medium Adult)   Pulse 59   Temp 97.8 °F (36.6 °C) (Temporal)   Resp 14   Ht 6' (1.829 m)   Wt 168 lb 9.6 oz (76.5 kg)   SpO2 99% Comment: room air  BMI 22.87 kg/m²    Physical Exam  Vitals and nursing note reviewed. Constitutional:       Appearance: He is well-developed. HENT:      Head: Normocephalic and atraumatic. Nose:      Right Sinus: Maxillary sinus tenderness present. Left Sinus: Maxillary sinus tenderness present. Eyes:      Conjunctiva/sclera: Conjunctivae normal.      Pupils: Pupils are equal, round, and reactive to light. Neck:      Thyroid: No thyromegaly. Vascular: No JVD. Cardiovascular:      Rate and Rhythm: Normal rate and regular rhythm. Heart sounds: No murmur heard. No friction rub. No gallop. Pulmonary:      Effort: Pulmonary effort is normal. No respiratory distress. Breath sounds: Normal breath sounds. No wheezing or rales. Abdominal:      General: Bowel sounds are normal. There is no distension. Palpations: Abdomen is soft. Tenderness: There is no abdominal tenderness. There is no guarding. Musculoskeletal:         General: No tenderness. Cervical back: Normal range of motion and neck supple. Skin:     General: Skin is warm and dry. Findings: No rash. Neurological:      Mental Status: He is alert and oriented to person, place, and time. Psychiatric:         Judgment: Judgment normal.       No results found for requested labs within last 30 days.        Hemoglobin A1C (%)   Date Value   08/01/2022 6.9 (H)     Microalbumin, Random Urine (mg/dL)   Date Value   04/22/2019 <1.20     LDL Calculated (mg/dL)   Date Value   08/01/2022 40         Lab Results   Component Value Date/Time    WBC 7.8 08/01/2022 09:10 AM NEUTROABS 15.3 11/14/2019 08:25 PM    HGB 15.1 08/01/2022 09:10 AM    HCT 44.5 08/01/2022 09:10 AM    MCV 92.7 08/01/2022 09:10 AM     08/01/2022 09:10 AM       Lab Results   Component Value Date    TSH 2.33 08/01/2022         ASSESSMENT/PLAN:     1. Acute non-recurrent maxillary sinusitis    - amoxicillin (AMOXIL) 875 MG tablet; Take 1 tablet by mouth 2 times daily for 7 days  Dispense: 14 tablet; Refill: 0    2. Type 2 diabetes mellitus without complication, without long-term current use of insulin (HCC)  Stable. I advised him regarding diabetic diet, exercise and weight control. Also, I advised him to stay on the current medication, monitor his fingerstick closely. I am going to check the A1c every few months. I will check microalbumin on a yearly basis. I have also advised him to have a yearly eye exam and to monitor his feet closely. Along with instruction of possible complications related to diabetes, even with good control. A1C will be checked  in few months. Lab Results   Component Value Date    LABA1C 6.9 (H) 08/01/2022         3.  Need for influenza vaccination    - Influenza, FLUAD, (age 72 y+), IM, Preservative Free, 0.5 mL       Written by Ines DAI, acting as a scribe for [employer] on 10/5/2022 at 5:03 PM.

## 2022-11-17 RX ORDER — BISOPROLOL FUMARATE 5 MG/1
5 TABLET, FILM COATED ORAL DAILY
Qty: 90 TABLET | Refills: 1 | Status: SHIPPED | OUTPATIENT
Start: 2022-11-17 | End: 2023-01-04 | Stop reason: HOSPADM

## 2022-11-22 ENCOUNTER — TELEPHONE (OUTPATIENT)
Dept: NEUROLOGY | Facility: CLINIC | Age: 82
End: 2022-11-22

## 2022-11-22 NOTE — TELEPHONE ENCOUNTER
Caller: HARIKA     Relationship: WIFE    Best call back number: [phone] PLEASE CALL AROUND 3:30     What was the call regarding: PT WIFE CALLING SAID PT IS GETTING DISORIENTED PAST WEEK. SAID WANTED TO MOVE CAMPER AND PUT GAS IN . ITS NOT A MOBILE HOME? SAW PERSON IN DRIVEWAY?  SEEMS TO HAPPEN LATE EVENING AROUND SUPPER, SLEEPS ALL THOUGH NIGHT , AND IN CHAIR DURING DAY.     Do you require a callback: YES  TO DISCUSS . CALL AROUND 3:30 PLEASE     PLEASE ADVISE

## 2022-11-30 NOTE — TELEPHONE ENCOUNTER
PT SEEN ANOTHER NEUROLOGIST AT . PRESCRIBED A MEDICATION AND SHES GOING TO TRY THIS MEDICATION FIRST. WILL CALL IF HE NEEDS ANYTHING.

## 2022-12-01 ENCOUNTER — OFFICE VISIT (OUTPATIENT)
Dept: PRIMARY CARE CLINIC | Age: 82
End: 2022-12-01
Payer: MEDICARE

## 2022-12-01 VITALS
DIASTOLIC BLOOD PRESSURE: 80 MMHG | TEMPERATURE: 97.9 F | SYSTOLIC BLOOD PRESSURE: 146 MMHG | HEART RATE: 66 BPM | OXYGEN SATURATION: 97 %

## 2022-12-01 DIAGNOSIS — I10 PRIMARY HYPERTENSION: Primary | ICD-10-CM

## 2022-12-01 PROCEDURE — 3074F SYST BP LT 130 MM HG: CPT | Performed by: NURSE PRACTITIONER

## 2022-12-01 PROCEDURE — 99212 OFFICE O/P EST SF 10 MIN: CPT | Performed by: NURSE PRACTITIONER

## 2022-12-01 PROCEDURE — 3078F DIAST BP <80 MM HG: CPT | Performed by: NURSE PRACTITIONER

## 2022-12-01 PROCEDURE — 1123F ACP DISCUSS/DSCN MKR DOCD: CPT | Performed by: NURSE PRACTITIONER

## 2022-12-01 ASSESSMENT — ENCOUNTER SYMPTOMS
SHORTNESS OF BREATH: 0
GASTROINTESTINAL NEGATIVE: 1
CHEST TIGHTNESS: 0
EYES NEGATIVE: 1
COUGH: 0

## 2022-12-01 NOTE — PROGRESS NOTES
SUBJECTIVE:    Patient ID: Rufina Prieto is a 81 y.o.male. Chief Complaint   Patient presents with    Hypertension     Home health come to see him and wanted him to be seen due to his bp being 190/104         HPI:    Pt presetns for high BP reading at home per home health nurse and was told to come to PCP.  at home. Now in clinic, SBP 140s and baseline for him. He takes bisoprolol 5mg daily and has not had that med yet. No CP, palpitations, SOB, swelling, headache. She reports he has been doing well and has home health coming to help starting today. Asymptomatic. Wife reports Home health RN took BP over patients sweater and coat and with electric cuff and read 190; did not recheck it or do a manual one. He advised them to come on to PCP or ED. Of note, med changes: Pt with hx Parkinson and neuro increased med 3 pills TID instead of 2. Patient's medications, allergies, past medical, surgical, social and family histories were reviewed and updated as appropriate in electronic medical record. No outpatient medications have been marked as taking for the 12/1/22 encounter (Office Visit) with SINDY Zuniga CNP. Review of Systems   Constitutional:  Negative for chills, diaphoresis, fatigue and fever. HENT: Negative. Eyes: Negative. Respiratory:  Negative for cough, chest tightness and shortness of breath. Cardiovascular:  Negative for chest pain, palpitations and leg swelling. Gastrointestinal: Negative. Neurological: Negative.       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    KNEE ARTHROPLASTY Right 05/2021    Dr Kojo Nation Right Family History   Problem Relation Age of Onset    Cancer Mother         breast    Diabetes Father     High Blood Pressure Father     Diabetes Brother       Social History     Tobacco Use   Smoking Status Every Day    Packs/day: 1.00    Years: 40.00    Pack years: 40.00    Types: Pipe, Cigars, Cigarettes   Smokeless Tobacco Never       OBJECTIVE:   Wt Readings from Last 3 Encounters:   10/05/22 168 lb 9.6 oz (76.5 kg)   08/15/22 173 lb 6.4 oz (78.7 kg)   06/27/22 174 lb (78.9 kg)     BP Readings from Last 3 Encounters:   12/01/22 (!) 146/80   10/05/22 139/64   08/15/22 128/78       BP (!) 146/80   Pulse 66   Temp 97.9 °F (36.6 °C)   SpO2 97%      Physical Exam  Vitals and nursing note reviewed. Constitutional:       General: He is not in acute distress. Appearance: Normal appearance. HENT:      Head: Normocephalic. Nose: Nose normal.   Eyes:      Conjunctiva/sclera: Conjunctivae normal.   Cardiovascular:      Rate and Rhythm: Normal rate and regular rhythm. Pulmonary:      Effort: Pulmonary effort is normal.      Breath sounds: Normal breath sounds. Abdominal:      Tenderness: There is no guarding. Musculoskeletal:         General: No swelling. Cervical back: Normal range of motion. Right lower leg: No edema. Left lower leg: No edema. Comments: Walker/wheelchair   Neurological:      Mental Status: He is alert and oriented to person, place, and time. Mental status is at baseline.    Psychiatric:         Mood and Affect: Mood normal.         Behavior: Behavior normal.       Lab Results   Component Value Date/Time     08/01/2022 09:10 AM    K 3.8 08/01/2022 09:10 AM     08/01/2022 09:10 AM    CO2 25 08/01/2022 09:10 AM    GLUCOSE 133 08/01/2022 09:10 AM    BUN 12 08/01/2022 09:10 AM    CREATININE 0.9 08/01/2022 09:10 AM    CALCIUM 9.2 08/01/2022 09:10 AM    PROT 6.2 08/01/2022 09:10 AM    LABALBU 4.1 08/01/2022 09:10 AM    BILITOT 0.8 08/01/2022 09:10 AM    ALT 14 08/01/2022 09:10 AM    AST 17 08/01/2022 09:10 AM       Hemoglobin A1C (%)   Date Value   08/01/2022 6.9 (H)     Microalbumin, Random Urine (mg/dL)   Date Value   04/22/2019 <1.20     LDL Calculated (mg/dL)   Date Value   08/01/2022 40         Lab Results   Component Value Date/Time    WBC 7.8 08/01/2022 09:10 AM    NEUTROABS 15.3 11/14/2019 08:25 PM    HGB 15.1 08/01/2022 09:10 AM    HCT 44.5 08/01/2022 09:10 AM    MCV 92.7 08/01/2022 09:10 AM     08/01/2022 09:10 AM       Lab Results   Component Value Date    TSH 2.33 08/01/2022         ASSESSMENT/PLAN:     1. Primary hypertension  Suspect inaccurate BP measurement at home. Stable and asymptomatic today with SBP baseline 140s pr chart review. Cont home meds. Advised wife to check BID at home (not over clothing) and keep record and call Monday with readings unless SBP>150, then RTC. Educated on proper ways to take BP-wife verbalized understanding. F/u PRN. No orders of the defined types were placed in this encounter.

## 2022-12-21 NOTE — TELEPHONE ENCOUNTER
May want to check for underlying UTI and assess in clinic. Can he see karol tomorrow? Then determine plans of care from there. Thanks.

## 2022-12-21 NOTE — TELEPHONE ENCOUNTER
Patient's wife called concerned that he has not eaten very much in the last week. States he has a hx of Parkinson's disease. His therapist instructed her to call and possible get something to take that would help increase his appetite. Please advise if patient needs appointment or if medication can be sent.

## 2022-12-22 ENCOUNTER — APPOINTMENT (OUTPATIENT)
Dept: GENERAL RADIOLOGY | Facility: HOSPITAL | Age: 82
DRG: 177 | End: 2022-12-22
Payer: MEDICARE

## 2022-12-22 ENCOUNTER — HOSPITAL ENCOUNTER (INPATIENT)
Facility: HOSPITAL | Age: 82
LOS: 13 days | Discharge: NURSING, REHAB OR LONG-TERM CARE | DRG: 177 | End: 2023-01-04
Attending: EMERGENCY MEDICINE
Payer: MEDICARE

## 2022-12-22 ENCOUNTER — OFFICE VISIT (OUTPATIENT)
Dept: PRIMARY CARE CLINIC | Age: 82
End: 2022-12-22
Payer: MEDICARE

## 2022-12-22 VITALS
SYSTOLIC BLOOD PRESSURE: 130 MMHG | RESPIRATION RATE: 14 BRPM | TEMPERATURE: 97.2 F | DIASTOLIC BLOOD PRESSURE: 65 MMHG | OXYGEN SATURATION: 90 % | HEIGHT: 72 IN | HEART RATE: 58 BPM | WEIGHT: 153.8 LBS | BODY MASS INDEX: 20.83 KG/M2

## 2022-12-22 DIAGNOSIS — N28.9 ACUTE RENAL INSUFFICIENCY: ICD-10-CM

## 2022-12-22 DIAGNOSIS — R13.10 DYSPHAGIA, UNSPECIFIED TYPE: ICD-10-CM

## 2022-12-22 DIAGNOSIS — J96.01 ACUTE RESPIRATORY FAILURE WITH HYPOXIA: ICD-10-CM

## 2022-12-22 DIAGNOSIS — I10 PRIMARY HYPERTENSION: ICD-10-CM

## 2022-12-22 DIAGNOSIS — R63.0 LOSS OF APPETITE: ICD-10-CM

## 2022-12-22 DIAGNOSIS — N30.00 ACUTE CYSTITIS WITHOUT HEMATURIA: Primary | ICD-10-CM

## 2022-12-22 DIAGNOSIS — E11.9 TYPE 2 DIABETES MELLITUS WITHOUT COMPLICATION, WITHOUT LONG-TERM CURRENT USE OF INSULIN (HCC): ICD-10-CM

## 2022-12-22 DIAGNOSIS — U07.1 COVID-19: ICD-10-CM

## 2022-12-22 DIAGNOSIS — R09.02 HYPOXIA: ICD-10-CM

## 2022-12-22 DIAGNOSIS — R53.1 WEAKNESS: ICD-10-CM

## 2022-12-22 DIAGNOSIS — G20 PARKINSON DISEASE (HCC): ICD-10-CM

## 2022-12-22 DIAGNOSIS — N39.0 URINARY TRACT INFECTION WITHOUT HEMATURIA, SITE UNSPECIFIED: Primary | ICD-10-CM

## 2022-12-22 DIAGNOSIS — R05.1 ACUTE COUGH: ICD-10-CM

## 2022-12-22 DIAGNOSIS — E87.20 LACTIC ACIDOSIS: ICD-10-CM

## 2022-12-22 LAB
ALBUMIN SERPL-MCNC: 3.3 G/DL (ref 3.5–5.2)
ALBUMIN/GLOB SERPL: 0.9 G/DL
ALP SERPL-CCNC: 94 U/L (ref 39–117)
ALT SERPL W P-5'-P-CCNC: <5 U/L (ref 1–41)
ANION GAP SERPL CALCULATED.3IONS-SCNC: 14.2 MMOL/L (ref 5–15)
AST SERPL-CCNC: 23 U/L (ref 1–40)
BACTERIA UR QL AUTO: ABNORMAL /HPF
BASOPHILS # BLD AUTO: 0 10*3/MM3 (ref 0–0.2)
BASOPHILS NFR BLD AUTO: 0 % (ref 0–1.5)
BILIRUB SERPL-MCNC: 1.2 MG/DL (ref 0–1.2)
BILIRUB UR QL STRIP: ABNORMAL
BILIRUBIN, POC: ABNORMAL
BLOOD URINE, POC: ABNORMAL
BUN SERPL-MCNC: 31 MG/DL (ref 8–23)
BUN/CREAT SERPL: 23.7 (ref 7–25)
CALCIUM SPEC-SCNC: 9.1 MG/DL (ref 8.6–10.5)
CHLORIDE SERPL-SCNC: 105 MMOL/L (ref 98–107)
CLARITY UR: ABNORMAL
CLARITY, POC: ABNORMAL
CO2 SERPL-SCNC: 24.8 MMOL/L (ref 22–29)
COLOR UR: ABNORMAL
COLOR, POC: ABNORMAL
CREAT SERPL-MCNC: 1.31 MG/DL (ref 0.76–1.27)
CRP SERPL-MCNC: 9.91 MG/DL (ref 0–0.5)
D-LACTATE SERPL-SCNC: 2 MMOL/L (ref 0.5–2)
D-LACTATE SERPL-SCNC: 2 MMOL/L (ref 0.5–2)
D-LACTATE SERPL-SCNC: 2.1 MMOL/L (ref 0.5–2)
D-LACTATE SERPL-SCNC: 2.9 MMOL/L (ref 0.5–2)
DEPRECATED RDW RBC AUTO: 44.7 FL (ref 37–54)
EGFRCR SERPLBLD CKD-EPI 2021: 54.7 ML/MIN/1.73
EOSINOPHIL # BLD AUTO: 0 10*3/MM3 (ref 0–0.4)
EOSINOPHIL NFR BLD AUTO: 0 % (ref 0.3–6.2)
ERYTHROCYTE [DISTWIDTH] IN BLOOD BY AUTOMATED COUNT: 13.2 % (ref 12.3–15.4)
FLUAV RNA RESP QL NAA+PROBE: NOT DETECTED
FLUBV RNA RESP QL NAA+PROBE: NOT DETECTED
GLOBULIN UR ELPH-MCNC: 3.6 GM/DL
GLUCOSE BLDC GLUCOMTR-MCNC: 135 MG/DL (ref 70–130)
GLUCOSE BLDC GLUCOMTR-MCNC: 154 MG/DL (ref 70–130)
GLUCOSE SERPL-MCNC: 171 MG/DL (ref 65–99)
GLUCOSE UR STRIP-MCNC: NEGATIVE MG/DL
GLUCOSE URINE, POC: ABNORMAL
HBA1C MFR BLD: 8.1 % (ref 4.8–5.6)
HCT VFR BLD AUTO: 44.8 % (ref 37.5–51)
HGB BLD-MCNC: 14.8 G/DL (ref 13–17.7)
HGB UR QL STRIP.AUTO: NEGATIVE
HOLD SPECIMEN: NORMAL
HOLD SPECIMEN: NORMAL
HYALINE CASTS UR QL AUTO: ABNORMAL /LPF
IMM GRANULOCYTES # BLD AUTO: 0.04 10*3/MM3 (ref 0–0.05)
IMM GRANULOCYTES NFR BLD AUTO: 0.6 % (ref 0–0.5)
KETONES UR QL STRIP: ABNORMAL
KETONES, POC: ABNORMAL
LEUKOCYTE EST, POC: ABNORMAL
LEUKOCYTE ESTERASE UR QL STRIP.AUTO: ABNORMAL
LYMPHOCYTES # BLD AUTO: 0.89 10*3/MM3 (ref 0.7–3.1)
LYMPHOCYTES NFR BLD AUTO: 14.1 % (ref 19.6–45.3)
MCH RBC QN AUTO: 30.7 PG (ref 26.6–33)
MCHC RBC AUTO-ENTMCNC: 33 G/DL (ref 31.5–35.7)
MCV RBC AUTO: 92.9 FL (ref 79–97)
MONOCYTES # BLD AUTO: 0.39 10*3/MM3 (ref 0.1–0.9)
MONOCYTES NFR BLD AUTO: 6.2 % (ref 5–12)
NEUTROPHILS NFR BLD AUTO: 4.97 10*3/MM3 (ref 1.7–7)
NEUTROPHILS NFR BLD AUTO: 79.1 % (ref 42.7–76)
NITRITE UR QL STRIP: POSITIVE
NITRITE, POC: POSITIVE
NRBC BLD AUTO-RTO: 0 /100 WBC (ref 0–0.2)
NT-PROBNP SERPL-MCNC: 2194 PG/ML (ref 0–1800)
PH UR STRIP.AUTO: 5.5 [PH] (ref 5–8)
PH, POC: 6
PLATELET # BLD AUTO: 148 10*3/MM3 (ref 140–450)
PMV BLD AUTO: 12.1 FL (ref 6–12)
POTASSIUM SERPL-SCNC: 3.7 MMOL/L (ref 3.5–5.2)
PROCALCITONIN SERPL-MCNC: 0.24 NG/ML (ref 0–0.25)
PROT SERPL-MCNC: 6.9 G/DL (ref 6–8.5)
PROT UR QL STRIP: ABNORMAL
PROTEIN, POC: ABNORMAL
RBC # BLD AUTO: 4.82 10*6/MM3 (ref 4.14–5.8)
RBC # UR STRIP: ABNORMAL /HPF
RBC CASTS #/AREA URNS LPF: ABNORMAL /LPF
REF LAB TEST METHOD: ABNORMAL
SARS-COV-2 RNA RESP QL NAA+PROBE: DETECTED
SODIUM SERPL-SCNC: 144 MMOL/L (ref 136–145)
SP GR UR STRIP: 1.02 (ref 1–1.03)
SPECIFIC GRAVITY, POC: 1.02
SQUAMOUS #/AREA URNS HPF: ABNORMAL /HPF
TROPONIN T SERPL-MCNC: 0.03 NG/ML (ref 0–0.03)
UROBILINOGEN UR QL STRIP: ABNORMAL
UROBILINOGEN, POC: 0.2
WBC # UR STRIP: ABNORMAL /HPF
WBC NRBC COR # BLD: 6.29 10*3/MM3 (ref 3.4–10.8)
WHOLE BLOOD HOLD COAG: NORMAL
WHOLE BLOOD HOLD SPECIMEN: NORMAL

## 2022-12-22 PROCEDURE — 87636 SARSCOV2 & INF A&B AMP PRB: CPT | Performed by: EMERGENCY MEDICINE

## 2022-12-22 PROCEDURE — 25010000002 ENOXAPARIN PER 10 MG: Performed by: NURSE PRACTITIONER

## 2022-12-22 PROCEDURE — 83036 HEMOGLOBIN GLYCOSYLATED A1C: CPT | Performed by: NURSE PRACTITIONER

## 2022-12-22 PROCEDURE — 85025 COMPLETE CBC W/AUTO DIFF WBC: CPT | Performed by: EMERGENCY MEDICINE

## 2022-12-22 PROCEDURE — 36415 COLL VENOUS BLD VENIPUNCTURE: CPT

## 2022-12-22 PROCEDURE — 93005 ELECTROCARDIOGRAM TRACING: CPT | Performed by: EMERGENCY MEDICINE

## 2022-12-22 PROCEDURE — 87186 SC STD MICRODIL/AGAR DIL: CPT | Performed by: EMERGENCY MEDICINE

## 2022-12-22 PROCEDURE — 81002 URINALYSIS NONAUTO W/O SCOPE: CPT | Performed by: PHYSICIAN ASSISTANT

## 2022-12-22 PROCEDURE — 82962 GLUCOSE BLOOD TEST: CPT

## 2022-12-22 PROCEDURE — 3078F DIAST BP <80 MM HG: CPT | Performed by: PHYSICIAN ASSISTANT

## 2022-12-22 PROCEDURE — 25010000002 CEFTRIAXONE SODIUM-DEXTROSE 1-3.74 GM-%(50ML) RECONSTITUTED SOLUTION: Performed by: EMERGENCY MEDICINE

## 2022-12-22 PROCEDURE — 3044F HG A1C LEVEL LT 7.0%: CPT | Performed by: PHYSICIAN ASSISTANT

## 2022-12-22 PROCEDURE — 99222 1ST HOSP IP/OBS MODERATE 55: CPT | Performed by: NURSE PRACTITIONER

## 2022-12-22 PROCEDURE — 99285 EMERGENCY DEPT VISIT HI MDM: CPT

## 2022-12-22 PROCEDURE — 84484 ASSAY OF TROPONIN QUANT: CPT | Performed by: EMERGENCY MEDICINE

## 2022-12-22 PROCEDURE — 84145 PROCALCITONIN (PCT): CPT | Performed by: EMERGENCY MEDICINE

## 2022-12-22 PROCEDURE — 1123F ACP DISCUSS/DSCN MKR DOCD: CPT | Performed by: PHYSICIAN ASSISTANT

## 2022-12-22 PROCEDURE — 83880 ASSAY OF NATRIURETIC PEPTIDE: CPT | Performed by: EMERGENCY MEDICINE

## 2022-12-22 PROCEDURE — 87086 URINE CULTURE/COLONY COUNT: CPT | Performed by: EMERGENCY MEDICINE

## 2022-12-22 PROCEDURE — 81001 URINALYSIS AUTO W/SCOPE: CPT | Performed by: EMERGENCY MEDICINE

## 2022-12-22 PROCEDURE — 3074F SYST BP LT 130 MM HG: CPT | Performed by: PHYSICIAN ASSISTANT

## 2022-12-22 PROCEDURE — 83605 ASSAY OF LACTIC ACID: CPT | Performed by: EMERGENCY MEDICINE

## 2022-12-22 PROCEDURE — 80053 COMPREHEN METABOLIC PANEL: CPT | Performed by: EMERGENCY MEDICINE

## 2022-12-22 PROCEDURE — 87040 BLOOD CULTURE FOR BACTERIA: CPT | Performed by: EMERGENCY MEDICINE

## 2022-12-22 PROCEDURE — 99214 OFFICE O/P EST MOD 30 MIN: CPT | Performed by: PHYSICIAN ASSISTANT

## 2022-12-22 PROCEDURE — 71045 X-RAY EXAM CHEST 1 VIEW: CPT

## 2022-12-22 PROCEDURE — 86140 C-REACTIVE PROTEIN: CPT | Performed by: EMERGENCY MEDICINE

## 2022-12-22 PROCEDURE — 87088 URINE BACTERIA CULTURE: CPT | Performed by: EMERGENCY MEDICINE

## 2022-12-22 RX ORDER — INSULIN ASPART 100 [IU]/ML
0-9 INJECTION, SOLUTION INTRAVENOUS; SUBCUTANEOUS
Status: DISCONTINUED | OUTPATIENT
Start: 2022-12-22 | End: 2022-12-30

## 2022-12-22 RX ORDER — CEFTRIAXONE 1 G/50ML
1 INJECTION, SOLUTION INTRAVENOUS ONCE
Status: COMPLETED | OUTPATIENT
Start: 2022-12-22 | End: 2022-12-22

## 2022-12-22 RX ORDER — NICOTINE POLACRILEX 4 MG
15 LOZENGE BUCCAL
Status: DISCONTINUED | OUTPATIENT
Start: 2022-12-22 | End: 2023-01-04 | Stop reason: HOSPADM

## 2022-12-22 RX ORDER — ASPIRIN 81 MG/1
81 TABLET, CHEWABLE ORAL DAILY
Status: DISCONTINUED | OUTPATIENT
Start: 2022-12-23 | End: 2023-01-04 | Stop reason: HOSPADM

## 2022-12-22 RX ORDER — POLYETHYLENE GLYCOL 3350 17 G/17G
17 POWDER, FOR SOLUTION ORAL DAILY PRN
Status: DISCONTINUED | OUTPATIENT
Start: 2022-12-22 | End: 2023-01-04 | Stop reason: HOSPADM

## 2022-12-22 RX ORDER — ONDANSETRON 2 MG/ML
4 INJECTION INTRAMUSCULAR; INTRAVENOUS EVERY 6 HOURS PRN
Status: DISCONTINUED | OUTPATIENT
Start: 2022-12-22 | End: 2023-01-04 | Stop reason: HOSPADM

## 2022-12-22 RX ORDER — ONDANSETRON 4 MG/1
4 TABLET, FILM COATED ORAL EVERY 6 HOURS PRN
Status: DISCONTINUED | OUTPATIENT
Start: 2022-12-22 | End: 2023-01-04 | Stop reason: HOSPADM

## 2022-12-22 RX ORDER — AMOXICILLIN 250 MG
2 CAPSULE ORAL 2 TIMES DAILY
Status: DISCONTINUED | OUTPATIENT
Start: 2022-12-22 | End: 2023-01-04 | Stop reason: HOSPADM

## 2022-12-22 RX ORDER — SODIUM CHLORIDE 0.9 % (FLUSH) 0.9 %
10 SYRINGE (ML) INJECTION AS NEEDED
Status: DISCONTINUED | OUTPATIENT
Start: 2022-12-22 | End: 2023-01-03

## 2022-12-22 RX ORDER — ACETAMINOPHEN 325 MG/1
650 TABLET ORAL EVERY 4 HOURS PRN
Status: DISCONTINUED | OUTPATIENT
Start: 2022-12-22 | End: 2023-01-04 | Stop reason: HOSPADM

## 2022-12-22 RX ORDER — SODIUM CHLORIDE 9 MG/ML
75 INJECTION, SOLUTION INTRAVENOUS CONTINUOUS
Status: DISCONTINUED | OUTPATIENT
Start: 2022-12-22 | End: 2022-12-23

## 2022-12-22 RX ORDER — SODIUM CHLORIDE 0.9 % (FLUSH) 0.9 %
10 SYRINGE (ML) INJECTION EVERY 12 HOURS SCHEDULED
Status: DISCONTINUED | OUTPATIENT
Start: 2022-12-22 | End: 2023-01-04 | Stop reason: HOSPADM

## 2022-12-22 RX ORDER — ENOXAPARIN SODIUM 100 MG/ML
40 INJECTION SUBCUTANEOUS NIGHTLY
Status: DISCONTINUED | OUTPATIENT
Start: 2022-12-22 | End: 2023-01-04 | Stop reason: HOSPADM

## 2022-12-22 RX ORDER — BISACODYL 10 MG
10 SUPPOSITORY, RECTAL RECTAL DAILY PRN
Status: DISCONTINUED | OUTPATIENT
Start: 2022-12-22 | End: 2023-01-04 | Stop reason: HOSPADM

## 2022-12-22 RX ORDER — BISOPROLOL FUMARATE 5 MG/1
5 TABLET, FILM COATED ORAL DAILY
Status: DISCONTINUED | OUTPATIENT
Start: 2022-12-23 | End: 2022-12-24

## 2022-12-22 RX ORDER — BISACODYL 5 MG/1
5 TABLET, DELAYED RELEASE ORAL DAILY PRN
Status: DISCONTINUED | OUTPATIENT
Start: 2022-12-22 | End: 2023-01-04 | Stop reason: HOSPADM

## 2022-12-22 RX ORDER — DEXTROSE MONOHYDRATE 25 G/50ML
25 INJECTION, SOLUTION INTRAVENOUS
Status: DISCONTINUED | OUTPATIENT
Start: 2022-12-22 | End: 2023-01-04 | Stop reason: HOSPADM

## 2022-12-22 RX ORDER — ATORVASTATIN CALCIUM 80 MG/1
80 TABLET, FILM COATED ORAL DAILY
Status: DISCONTINUED | OUTPATIENT
Start: 2022-12-23 | End: 2023-01-04 | Stop reason: HOSPADM

## 2022-12-22 RX ADMIN — SODIUM CHLORIDE 1000 ML: 9 INJECTION, SOLUTION INTRAVENOUS at 10:48

## 2022-12-22 RX ADMIN — Medication 10 ML: at 16:34

## 2022-12-22 RX ADMIN — ENOXAPARIN SODIUM 40 MG: 100 INJECTION SUBCUTANEOUS at 21:34

## 2022-12-22 RX ADMIN — SENNOSIDES AND DOCUSATE SODIUM 2 TABLET: 50; 8.6 TABLET ORAL at 21:34

## 2022-12-22 RX ADMIN — CEFTRIAXONE 1 G: 1 INJECTION, SOLUTION INTRAVENOUS at 13:08

## 2022-12-22 RX ADMIN — CARBIDOPA AND LEVODOPA 1 TABLET: 25; 100 TABLET ORAL at 17:52

## 2022-12-22 RX ADMIN — CARBIDOPA AND LEVODOPA 1 TABLET: 25; 100 TABLET ORAL at 21:34

## 2022-12-22 RX ADMIN — SODIUM CHLORIDE 75 ML/HR: 9 INJECTION, SOLUTION INTRAVENOUS at 16:33

## 2022-12-22 SDOH — ECONOMIC STABILITY: FOOD INSECURITY: WITHIN THE PAST 12 MONTHS, YOU WORRIED THAT YOUR FOOD WOULD RUN OUT BEFORE YOU GOT MONEY TO BUY MORE.: NEVER TRUE

## 2022-12-22 SDOH — ECONOMIC STABILITY: FOOD INSECURITY: WITHIN THE PAST 12 MONTHS, THE FOOD YOU BOUGHT JUST DIDN'T LAST AND YOU DIDN'T HAVE MONEY TO GET MORE.: NEVER TRUE

## 2022-12-22 ASSESSMENT — ENCOUNTER SYMPTOMS
EYE PAIN: 0
COUGH: 1
SORE THROAT: 0
ABDOMINAL PAIN: 0
CONSTIPATION: 0
DIARRHEA: 0
SHORTNESS OF BREATH: 0

## 2022-12-22 ASSESSMENT — SOCIAL DETERMINANTS OF HEALTH (SDOH): HOW HARD IS IT FOR YOU TO PAY FOR THE VERY BASICS LIKE FOOD, HOUSING, MEDICAL CARE, AND HEATING?: NOT HARD AT ALL

## 2022-12-22 NOTE — PROGRESS NOTES
Polly Mclean (:  1940) is a 81 y.o. male,Established patient, here for evaluation of the following chief complaint(s):  Anorexia           Subjective   SUBJECTIVE/OBJECTIVE:  HPI  Mr. Maribeth Muhammad is a 81year old male with PMH HTN, TIA, T2DM and Parkinsons disease who presents with his wife today. She reports over the past 2 weeks he has had a major decline in overall function. He has lost documented 15 lbs since 22 and is not eating much at all. He has stopped ambulating and is requiring full assistance with transfers per wife and PT. He has had extreme weakness. He will eat a bite or two only for meals. He will drink 2 bottles of water a day. His wife had Covid 2 weeks ago and she thinks he may have had too ; he was never tested. He has had a nonproductive cough and she assumes he had it too. He has urinary incontinence and is followed by Dr. Brenda Berry. He wears depends. She reports history of worsening dysphagia with food and medications since Parkinson's diagnosis in July; he is followed by [facility]. Review of Systems   Constitutional:  Positive for appetite change (loss) and unexpected weight change (weight loss). Negative for chills, fatigue and fever. HENT:  Negative for congestion, ear pain, nosebleeds and sore throat. Eyes:  Negative for pain and visual disturbance. Respiratory:  Positive for cough. Negative for shortness of breath. Cardiovascular:  Negative for chest pain and palpitations. Gastrointestinal:  Negative for abdominal pain, constipation and diarrhea. Trouble swallowing   Genitourinary:  Negative for difficulty urinating and flank pain. Musculoskeletal:  Negative for arthralgias, gait problem and myalgias. Skin:  Negative for rash. Neurological:  Positive for weakness. Negative for syncope, light-headedness and headaches. Psychiatric/Behavioral:  Negative for behavioral problems, confusion and dysphoric mood.          Objective     Vitals:    22 0842   BP: 130/65   Site: Right Upper Arm   Position: Sitting   Cuff Size: Medium Adult   Pulse: 58   Resp: 14   Temp: 97.2 °F (36.2 °C)   TempSrc: Temporal   SpO2: 90%   Weight: 153 lb 12.8 oz (69.8 kg)   Height: 6' (1.829 m)     Physical Exam  Vitals reviewed. Constitutional:       Appearance: Normal appearance. HENT:      Head: Normocephalic and atraumatic. Right Ear: External ear normal.      Left Ear: External ear normal.      Nose: Nose normal.      Mouth/Throat:      Mouth: Mucous membranes are moist.      Pharynx: Oropharynx is clear. Eyes:      Extraocular Movements: Extraocular movements intact. Conjunctiva/sclera: Conjunctivae normal.      Pupils: Pupils are equal, round, and reactive to light. Cardiovascular:      Rate and Rhythm: Normal rate and regular rhythm. Pulses: Normal pulses. Heart sounds: Normal heart sounds. Pulmonary:      Effort: Pulmonary effort is normal.      Breath sounds: Decreased air movement present. Decreased breath sounds present. Musculoskeletal:         General: Normal range of motion. Cervical back: Normal range of motion. Skin:     General: Skin is warm. Findings: No rash. Neurological:      General: No focal deficit present. Mental Status: He is alert. Psychiatric:         Mood and Affect: Mood normal.         Thought Content: Thought content normal.               ASSESSMENT/PLAN:  1. Acute cystitis without hematuria  Results for POC orders placed in visit on 12/22/22   POCT Urinalysis no Micro   Result Value Ref Range    Color, UA dark niki     Clarity, UA cloudy     Glucose, UA POC neg     Bilirubin, UA neg     Ketones, UA trace     Spec Grav, UA 1.020     Blood, UA POC trace     pH, UA 6.0     Protein, UA POC 1+     Urobilinogen, UA 0.2     Leukocytes, UA 3+     Nitrite, UA positive    Sent to [address] [place] [address] ED for overall acute work-up including labs.       2. Hypoxia  Today pulse oxygen is fluctuating between 87 and 90% on room air ; this is not baseline for him as last visit his O2 was 97% on room air. I have sent him to the ED to rule out underlying pneumonia; possibly aspiration due to dysphagia    3. Acute cough  Wife with COVID infection 2 weeks ago and patient was never checked for COVID he has continued to have nonproductive cough with hypoxia today    4. Loss of appetite  15 pounds documented since December 1 due to decreased oral intake and dysphagia  - POCT Urinalysis no Micro    5. Weakness  History of TIA but overall decline in the past 2 to 3 weeks; he is requiring assistance with transfers and ambulating only when assisted    6. Primary hypertension  Stable today    7. Parkinson disease ([facility] [address]  Followed by [id number] [address] neurology on Sinemet    8. Type 2 diabetes mellitus without complication, without long-term current use of insulin ([facility] [address]  Patient does not monitor glucose; likely needs to hold metformin in the setting of decreased oral intake        Return in about 1 week (around 12/29/2022) for PCP. An electronic signature was used to authenticate this note.     --Kelsey Aguirre PA-C

## 2022-12-22 NOTE — ED PROVIDER NOTES
"Subjective   History of Present Illness  82-year-old male presenting with weakness.  Patient states that he feels fine and nothing is wrong, states that \"they made me come\".  Patient's wife provides most of the history, she states that for last 2 weeks patient has been generally weak, has had no appetite, has lost weight.  Patient denies any pain, shortness of breath, fevers, vomiting, diarrhea.  Patient's wife notes that he was seen by his primary doctor and was diagnosed with a UTI today, has not started antibiotics.        Review of Systems   Constitutional: Positive for appetite change, fatigue and unexpected weight change.   HENT: Negative.    Eyes: Negative.    Respiratory: Negative.    Cardiovascular: Negative.    Gastrointestinal: Negative.    Genitourinary: Negative.    Musculoskeletal: Negative.    Skin: Negative.    Neurological: Negative.    Psychiatric/Behavioral: Negative.        Past Medical History:   Diagnosis Date   • Arthritis    • Cancer (HCC)     kidney/ prostate   • Diabetes mellitus (HCC)    • ED (erectile dysfunction)    • Enlarged prostate with lower urinary tract symptoms (LUTS)    • Full dentures    • History of fracture of pelvis 11/14/2019    after fall from ladder   • History of loss of consciousness 11/14/2019    after fall from ladder-flown to [facility]   • History of rib fracture 11/14/2019    after fall from ladder   • History of right shoulder fracture 1960    walking up hill, fell on gravel   • History of stress test    • History of torn meniscus of right knee 2012   • [place] (hard of hearing)     Bilateral hearing aids    • Hypertension    • Laceration of head 11/14/2019    after fall from ladder-treated at [facility]   • Left shoulder strain 2017    after fall from ladder   • Lung injury 11/14/2019    punctured right lung after falling off ladder   • Renal disorder    • Stone in kidney    • Stroke (HCC)        No Known Allergies    Past Surgical History: "   Procedure Laterality Date   • CHEST TUBE INSERTION  11/2019    rib fractures, punctured lung after fall from [facility]   • COLONOSCOPY     • COLONOSCOPY N/A 4/8/2019    Procedure: COLONOSCOPY;  Surgeon: Onesimo Arnold MD;  Location: [facility] ENDOSCOPY;  Service: Gastroenterology   • EYE SURGERY Right     cataract removal with IOL implant   • HERNIA REPAIR Right     multiple inguinal hernia repairs   • KNEE ARTHROSCOPY W/ MENISCECTOMY Right 05/10/2012    Partial medial and lateral menisectomy-Conrad Baird MD   • NEPHRECTOMY Right     partial right nephrectomy due to kidney stone   • PELVIC FRACTURE SURGERY Right 11/2019    multiple pelvic fractures after fall from [facility]   • SHOULDER SURGERY Right 1960    fracture surgery to remove a piece of bone- [place]   • TOTAL KNEE ARTHROPLASTY Right 5/25/2021    Procedure: knee arthroplasty total;  Surgeon: Conrad Baird MD;  Location: [facility] OR;  Service: Orthopedics;  Laterality: Right;       Family History   Problem Relation Age of Onset   • Breast cancer Mother    • Cancer Sister    • Cancer Brother    • Dementia Brother    • No Known Problems Brother    • No Known Problems Brother        Social History     Socioeconomic History   • Marital status:    Tobacco Use   • Smoking status: Every Day     Types: Pipe   • Smokeless tobacco: Never   • Tobacco comments:     smokes his pipe daily   Vaping Use   • Vaping Use: Never used   Substance and Sexual Activity   • Alcohol use: Not Currently   • Drug use: No   • Sexual activity: Defer           Objective   Physical Exam  Vitals reviewed.   Constitutional:       General: He is not in acute distress.     Appearance: Normal appearance. He is not ill-appearing, toxic-appearing or diaphoretic.   HENT:      Head: Normocephalic and atraumatic.      Right Ear: External ear normal.      Left Ear: External ear normal.      Nose: Nose normal.   Eyes:      Extraocular  Movements: Extraocular movements intact.   Cardiovascular:      Rate and Rhythm: Normal rate and regular rhythm.      Pulses: Normal pulses.      Heart sounds: Normal heart sounds.   Pulmonary:      Effort: Pulmonary effort is normal. No respiratory distress.      Breath sounds: Normal breath sounds.   Abdominal:      General: Bowel sounds are normal. There is no distension.      Tenderness: There is no abdominal tenderness.   Musculoskeletal:         General: No swelling, tenderness or deformity. Normal range of motion.      Cervical back: Normal range of motion and neck supple.   Skin:     General: Skin is warm and dry.      Capillary Refill: Capillary refill takes less than 2 seconds.      Findings: No rash.   Neurological:      General: No focal deficit present.      Mental Status: He is alert and oriented to person, place, and time.   Psychiatric:         Mood and Affect: Mood normal.         Behavior: Behavior normal.         Procedures           ED Course                                           MDM  Number of Diagnoses or Management Options  Acute renal insufficiency  Acute respiratory failure with hypoxia (HCC)  COVID-19  Lactic acidosis  Urinary tract infection without hematuria, site unspecified  Diagnosis management comments: 82-year-old male with general weakness, weight loss.  Well-developed, well-nourished elderly man in no distress with exam as above.  His vital signs are normal.  His exam is nonfocal.  Will obtain basic labs, EKG and chest x-ray.  Will give IV fluid bolus.  Disposition pending.    DDx: UTI, pneumonia, viral illness, electrolyte abnormality, dehydration    EKG interpreted by me: Sinus bradycardia, right bundle branch block, no acute ST/T changes, this is an abnormal EKG    Labs are notable for lactic acidosis, elevated troponin, renal insufficiency.  Urinalysis consistent with infection.  Viral panel positive for COVID.  Patient requiring oxygen to maintain his saturations.   Discussed with Dr. Cardoso for admission.       Amount and/or Complexity of Data Reviewed  Decide to obtain previous medical records or to obtain history from someone other than the patient: yes        Final diagnoses:   Urinary tract infection without hematuria, site unspecified   COVID-19   Acute respiratory failure with hypoxia (HCC)   Lactic acidosis   Acute renal insufficiency          Jeremy Huber MD  12/22/22 [id number]

## 2022-12-22 NOTE — H&P
[facility] HOSPITALIST   HISTORY AND PHYSICAL      Name:  Ankur Camp   Age:  82 y.o.  Sex:  male  :  1940  MRN:  [id number]   Visit Number:  [id number]  Admission Date:  2022  Date Of Service:  22  Primary Care Physician:  Magaly Prabhakar APRN    Chief Complaint:     Fatigue    History Of Presenting Illness:      Patient is an 82 year old male who presents to the hospital with complaints of weakness who was sent from PCP office after being diagnosed with UTI.  Patient wife advised on arrival that patient had had generalized weakness, less appetite with weight loss over the past 2 weeks.  Patient with past health history significant for diabetes mellitus type 2, hypertension, hyperlipidemia, and Parkinson's disease.  On exam, patient is resting in bed on 4L with sats in low 90s.  Denies any urinary symptoms.  Complains of worsening shortness of breath the past 2 days and cough.  Uses a walker/cane at baseline.    Work up in the emergency department noted troponin-0.033, BNP-2194, glucose-171, creatinine-1.31 with baseline 0.8, BUN-31,  albumin-3.3, CRP-9.9, CBC unremarkable, lactate-2.9 with repeat 2.1, procalcitonin-0.24.  Urinalysis noted infection with 2+ bacteria, 31-50 WBC, +nitrites, and +3 leuks.  CXR noted hypoinflation with scattered mild atelectasis. Covid 19 testing was positive.  Hospitalist contacted for admission.    Review Of Systems:    All systems were reviewed and negative except as mentioned in history of presenting illness, assessment and plan.    Past Medical History: Patient  has a past medical history of Arthritis, Cancer (HCC), Diabetes mellitus (HCC), ED (erectile dysfunction), Enlarged prostate with lower urinary tract symptoms (LUTS), Full dentures, History of fracture of pelvis (2019), History of loss of consciousness (2019), History of rib fracture (2019), History of right shoulder fracture (), History of stress test,  History of torn meniscus of right knee (2012), [place] (hard of hearing), Hypertension, Laceration of head (11/14/2019), Left shoulder strain (2017), Lung injury (11/14/2019), Renal disorder, Stone in kidney, and Stroke (HCC).    Past Surgical History: Patient  has a past surgical history that includes Nephrectomy (Right); Eye surgery (Right); Colonoscopy; Hernia repair (Right); Colonoscopy (N/A, 4/8/2019); Knee arthroscopy w/ meniscectomy (Right, 05/10/2012); Shoulder surgery (Right, 1960); Pelvic fracture surgery (Right, 11/2019); Chest tube insertion (11/2019); and Total knee arthroplasty (Right, 5/25/2021).    Social History: Patient  reports that he has been smoking pipe. He has never used smokeless tobacco. He reports that he does not currently use alcohol. He reports that he does not use drugs.    Family History:  Patient's family history has been reviewed and found to be noncontributory.     Allergies:      Patient has no known allergies.    Home Medications:    Prior to Admission Medications     Prescriptions Last Dose Informant Patient Reported? Taking?    ascorbic acid (VITAMIN C) 1000 MG tablet   Yes No    Take 1,000 mg by mouth Daily.    aspirin 81 MG chewable tablet   Yes No    Chew 81 mg Daily.    atorvastatin (LIPITOR) 80 MG tablet   Yes No    Take 80 mg by mouth Daily.    bisoprolol (ZEBeta) 5 MG tablet   No No    TAKE 1 TABLET BY MOUTH DAILY.    carbidopa-levodopa (SINEMET)  MG per tablet   Yes No    Take 1 tablet by mouth 3 (Three) Times a Day.    cholecalciferol (VITAMIN D3) 25 MCG (1000 UT) tablet   Yes No    Take 1,000 Units by mouth Daily.    glucose blood test strip   Yes No    1 each by In Vitro route daily Daily testing, DX:250.00    metFORMIN (GLUCOPHAGE) 500 MG tablet  Spouse/Significant Other Yes No    Take 500 mg by mouth Daily With Breakfast.    vitamin B-12 (CYANOCOBALAMIN) 1000 MCG tablet  Spouse/Significant Other Yes No    Take 1,000 mcg by mouth Daily.        ED  "Medications:    Medications   sodium chloride 0.9 % flush 10 mL (has no administration in time range)   sodium chloride 0.9 % bolus 1,000 mL (0 mL Intravenous Stopped 12/22/22 1219)   cefTRIAXone (ROCEPHIN) IVPB 1 g/50ml dextrose (premix) (0 g Intravenous Stopped 12/22/22 1338)     Vital Signs:  Temp:  [98.4 °F (36.9 °C)] 98.4 °F (36.9 °C)  Heart Rate:  [45-61] 48  Resp:  [16] 16  BP: (134-159)/(67-78) 137/69        12/22/22  1015   Weight: 78.5 kg (173 lb)     Body mass index is 27.92 kg/m².    Physical Exam:     Most recent vital Signs: /69   Pulse (!) 48   Temp 98.4 °F (36.9 °C) (Oral)   Resp 16   Ht 167.6 cm (66\")   Wt 78.5 kg (173 lb)   SpO2 94%   BMI 27.92 kg/m²     Physical Exam  Vitals and nursing note reviewed.   Constitutional:       General: He is not in acute distress.     Appearance: He is not ill-appearing or toxic-appearing.   HENT:      Head: Normocephalic and atraumatic.      Mouth/Throat:      Mouth: Mucous membranes are moist.   Eyes:      Pupils: Pupils are equal, round, and reactive to light.   Cardiovascular:      Rate and Rhythm: Normal rate and regular rhythm.      Pulses: Normal pulses.      Heart sounds: Normal heart sounds.   Pulmonary:      Effort: Pulmonary effort is normal.      Breath sounds: Wheezing and rhonchi present.      Comments: Utilizing 4L with sats in low 90s  Abdominal:      General: Bowel sounds are normal.      Palpations: Abdomen is soft.   Musculoskeletal:         General: Normal range of motion.   Skin:     General: Skin is warm and dry.      Capillary Refill: Capillary refill takes less than 2 seconds.   Neurological:      General: No focal deficit present.      Mental Status: He is alert and oriented to person, place, and time. Mental status is at baseline.      Motor: Weakness (generalized) present.      Comments: Oriented to self and place, unsure of month or year   Psychiatric:         Mood and Affect: Mood normal.         Behavior: Behavior normal.   "       Thought Content: Thought content normal.         Laboratory data:    I have reviewed the labs done in the emergency room.    Results from last 7 days   Lab Units 12/22/22  1042   SODIUM mmol/L 144   POTASSIUM mmol/L 3.7   CHLORIDE mmol/L 105   CO2 mmol/L 24.8   BUN mg/dL 31*   CREATININE mg/dL 1.31*   CALCIUM mg/dL 9.1   BILIRUBIN mg/dL 1.2   ALK PHOS U/L 94   ALT (SGPT) U/L <5   AST (SGOT) U/L 23   GLUCOSE mg/dL 171*     Results from last 7 days   Lab Units 12/22/22  1042   WBC 10*3/mm3 6.29   HEMOGLOBIN g/dL 14.8   HEMATOCRIT % 44.8   PLATELETS 10*3/mm3 148         Results from last 7 days   Lab Units 12/22/22  1042   TROPONIN T ng/mL 0.033*     Results from last 7 days   Lab Units 12/22/22  1042   PROBNP pg/mL 2,194.0*                 Results from last 7 days   Lab Units 12/22/22  1143   COLOR UA  Dark Yellow*   GLUCOSE UA  Negative   KETONES UA  Trace*   LEUKOCYTES UA  Large (3+)*   PH, URINE  5.5   BILIRUBIN UA  Small (1+)*   UROBILINOGEN UA  1.0 E.U./dL   RBC UA /HPF None Seen   WBC UA /HPF 31-50*       Pain Management Panel     Pain Management Panel Latest Ref Rng & Units 7/15/2014 7/15/2014    AMPHETAMINES SCREEN, URINE NEGATIVE Negative -    BARBITURATES SCREEN NEGATIVE Negative -    BENZODIAZEPINE SCREEN, URINE NEGATIVE Negative Negative    COCAINE SCREEN, URINE NEGATIVE Negative -    METHADONE SCREEN, URINE NEGATIVE Negative -          EKG:      Sinus bradycardia with right BBB--Abnormal    Radiology:    XR Chest 1 View    Result Date: 12/22/2022  PROCEDURE: XR CHEST 1 VW-  HISTORY: SOA  COMPARISON: 07/20/2022.  FINDINGS: The heart is normal in size. The mediastinum is unremarkable. The lungs are hypoinflated with scattered mild atelectasis. There is no pneumothorax.  There are no acute osseous abnormalities.      Hypoinflation with scattered mild atelectasis.     Images were reviewed, interpreted, and dictated by Dr. Abdiaziz Ahumada MD Transcribed by Shalom Lennon PA-C.  This report was signed and  finalized on 12/22/2022 11:33 AM by Abdiaziz Ahumada MD.      Assessment:    1. Acute UTI  2. Acute Respiratory Failure with Hypoxia  3. Covid 19 +  4. Lactic Acidosis  5. LAWSON  6. Parkinsons Disease  7. Diabetes Mellitus Type 2 on oral anti-diabetics  8. Hyperlipidemia    Plan:    Admit to Med/Surg with telemetry and continuous oximetry    Acute UTI/ LAWSON/ Lactic Acidosis  -Rocephin 1GM IV for 5 days  -Follow urine culture  -Gentle IV hydration  -Follow blood cultures  -Repeat Lactate    Acute Respiratory Failure/ Covid 19+  -Supplemental oxygen to keep saturations >90%  -Albuterol inhaler  -Decadron 6mg daily as patient is hypoxic  -Procalcitonin negative  -Supportive care   -PT/OT evaluations for weakness    Diabetes Mellitus  -Cover with sliding scale  -Will check A1c    Chronic-  -Home medications as reconciled    Patient is a full code on admission    Risk Assessment: Moderate due to co-morbidities  DVT Prophylaxis: Lovenox  Code Status: Full Code  Diet: Carb Consistent    Advance Care Planning   ACP discussion was held with the patient during this visit. Patient has an advance directive in EMR which is still valid.        Pam Hallman, APRN  12/22/22  14:44 EST    Dictated utilizing Dragon dictation.

## 2022-12-22 NOTE — PHARMACY RECOMMENDATION
"Pharmacy Consult - Enoxaparin Dosing  Pharmacy was consulted to dose enoxaparin for  Ankur Camp, a 82 y.o. male  167.6 cm (66\") 78.5 kg (173 lb)    Indication: VTE prophylaxis    Allergies  Patient has no known allergies.    Relevant clinical data and objective history reviewed:   [Ht: 167.6 cm (66\"); Wt: 78.5 kg (173 lb)]  Body mass index is 27.92 kg/m².  Estimated Creatinine Clearance: 43.6 mL/min (A) (by C-G formula based on SCr of 1.31 mg/dL (H)).  Results from last 7 days   Lab Units 12/22/22  1042   HEMOGLOBIN g/dL 14.8   HEMATOCRIT % 44.8   PLATELETS 10*3/mm3 148   CREATININE mg/dL 1.31*       Asessment/Plan  Initiate enoxaparin 40 mg SQ every 24 hours  Pharmacy will monitor renal function and clinical status and adjust the dose and/or frequency as needed.    Thanks,   Vida Obrien, PharmD, BCPS  12/22/2022  16:01 EST    "

## 2022-12-22 NOTE — PATIENT INSTRUCTIONS
Keep a list of your medicines with you. List all of the prescription medicines, nonprescription medicines, supplements, natural remedies, and vitamins that you take. Tell your healthcare providers who treat you about all of the products you are taking. Your provider can provide you with a form to keep track of them. Just ask. Follow the directions that come with your medicine, including information about food or alcohol. Make sure you know how and when to take your medicine. Do not take more or less than you are supposed to take. Keep all medicines out of the reach of children. Store medicines according to the directions on the label. Monitor yourself. Learn to know how your body reacts to your new medicine and keep track of how it makes you feel before attempting (If your provider has allowed you to do so) to drive or go to work. Seek emergency medical attention if you think you have used too much of this medicine. An overdose of any prescription medicine can be fatal. Overdose symptoms may include extreme drowsiness, muscle weakness, confusion, cold and clammy skin, pinpoint pupils, shallow breathing, slow heart rate, fainting, or coma. Don't share prescription medicines with others, even when they seem to have the same symptoms. What may be good for you may be harmful to others. If you are no longer taking a prescribed medication and you have pills left please take your pills out of their original containers. Mix crushed pills with an undesirable substance, such as cat litter or used coffee grounds. Put the mixture into a disposable container with a lid, such as an empty margarine tub, or into a sealable bag. Cover up or remove any of your personal information on the empty containers by covering it with black permanent marker or duct tape. Place the sealed container with the mixture, and the empty drug containers, in the trash.    If you use a medication that is in the form of a patch, dispose of used patches by folding them in half so that the sticky sides meet, and then flushing them down a toilet. They should not be placed in the household trash where children or pets can find them. If you have any questions, ask your provider or pharmacist for more information. Be sure to keep all appointments for provider visits or tests. We are committed to providing you with the best care possible. In order to help us achieve these goals please remember to bring all medications, herbal products, and over the counter supplements with you to each visit. If your provider has ordered testing for you, please be sure to follow up with our office if you have not received results within 7 days after the testing took place. *If you receive a survey after visiting one of our offices, please take time to share your experience concerning your physician office visit. These surveys are confidential and no health information about you is shared. We are eager to improve for you and we are counting on your feedback to help make that happen. ips to Help You Stop Smoking       Cigarette smoking is a preventable cause of death in the [place]. If you have thought about quitting but haven't been able to, here are some reasons why you should and some ways to do it. Here's Why   Quitting smoking now can decrease your risk of getting smoking-related illnesses like:   Heart disease   Stroke   Several types of cancer, including:   Lung   Mouth   Esophagus   Larynx   Bladder   Pancreas   Kidney   Chronic lung diseases:   Bronchitis   Emphysema   Asthma   Cataracts   Macular degeneration   Thyroid conditions   Hearing loss   Erectile dysfunction   Dementia   Osteoporosis   Here's How   Once you've decided to quit smoking, set your target quit date a few weeks away.  In the time leading up to your quit day, try some of these ideas offered by the [address] [place] [address] of the [employer] to help you successfully quit smoking. For the best results, work with your doctor. Together, you can test your lung function and compare the results to those of a nonsmoking person. The results can be given to you as your lung age. Finding out your lung age right after having the test done may help you to stop smoking. Your doctor can also discuss with you all of your options and refer you to smoking-cessation support groups. You may wish to use nicotine replacement (gum, patches, inhaler) or one of the prescription medications that have been shown to increase quit rates and prolong abstinence from smoking. But whatever you and your doctor decide on these matters, it will still be you who decides when an how to quit. Here are some techniques:   Switch Brands   Switch to a brand you find distasteful. Change to a brand that is low in tar and nicotine a couple of weeks before your target quit date. This will help change your smoking behavior. However, do not smoke more cigarettes, inhale them more often or more deeply, or place your fingertips over the holes in the filters. All of these actions will increase your nicotine intake, and the idea is to get your body used to functioning without nicotine. Cut Down the Number of Cigarettes You Smoke   Smoke only half of each cigarette. Each day, postpone the lighting of your first cigarette by one hour. Decide you'll only smoke during odd or even hours of the day. Decide beforehand how many cigarettes you'll smoke during the day. For each additional cigarette, give a dollar to your favorite aamir. Change your eating habits to help you cut down. For example, drink milk, which many people consider incompatible with smoking. End meals or snacks with something that won't lead to a cigarette. Reach for a glass of juice instead of a cigarette for a \"pick-me-up. \"   Remember: Cutting down can help you quit, but it's not a substitute for quitting.  If you're down to about seven cigarettes a day, it's time to set your target quit date, and get ready to stick to it. Don't Smoke \"Automatically\"   Smoke only those cigarettes you really want. Catch yourself before you light up a cigarette out of pure habit. Don't empty your ashtrays. This will remind you of how many cigarettes you've smoked each day, and the sight and the smell of stale cigarettes butts will be very unpleasant. Make yourself aware of each cigarette by using the opposite hand or putting cigarettes in an unfamiliar location or a different pocket to break the automatic reach. If you light up many times during the day without even thinking about it, try to look in a mirror each time you put a match to your cigarette. You may decide you don't need it. Make Smoking Inconvenient   Stop buying cigarettes by the carton. Wait until one pack is empty before you buy another. Stop carrying cigarettes with you at home or at work. Make them difficult to get to. Make Smoking Unpleasant   Smoke only under circumstances that aren't especially pleasurable for you. If you like to smoke with others, smoke alone. Turn your chair to an empty corner and focus only on the cigarette you are smoking and all its many negative effects. Collect all your cigarette butts in one large glass container as a visual reminder of the filth made by smoking. Just Before Quitting   Practice going without cigarettes. Don't think of never smoking again. Think of quitting in terms of one day at a time . Tell yourself you won't smoke today, and then don't. Clean your clothes to rid them of the cigarette smell, which can linger a long time. On the Day You Quit   Throw away all your cigarettes and matches. Hide your lighters and ashtrays. Visit the dentist and have your teeth cleaned to get rid of tobacco stains. Notice how nice they look and resolve to keep them that way.    Make a list of things you'd like to buy for yourself or someone else. Estimate the cost in terms of packs of cigarettes, and put the money aside to buy these presents. Keep very busy on the big day. Go to the movies, exercise, take long walks, or go bike riding. Remind your family and friends that this is your quit date, and ask them to help you over the rough spots of the first couple of days and weeks. Buy yourself a treat or do something special to celebrate. Telephone and Internet Support   Telephone, web-, and computer-based programs can offer you the support that you need to quit and to stay smoke-free. You can find many programs online, like the [employer]'s [place] from Smoking . Immediately After Quitting   Develop a clean, fresh, nonsmoking environment around yourselfat work and at home. Buy yourself flowersyou may be surprised how much you can enjoy their scent now. The first few days after you quit, spend as much free time as possible in places where smoking isn't allowed, such as [address] [place] [address], museums, theaters, department stores, and churches. Drink large quantities of water and fruit juice (but avoid sodas that contain caffeine). Try to avoid alcohol, coffee, and other beverages that you associate with cigarette smoking. Strike up conversation instead of a match for a cigarette. If you miss the sensation of having a cigarette in your hand, play with something elsea pencil, a paper clip, a marble. If you miss having something in your mouth, try toothpicks or a fake cigarette.

## 2022-12-22 NOTE — PROGRESS NOTES
Chief Complaint   Patient presents with    Anorexia       Have you seen any other physician or provider since your last visit yes - physical therapy,Neurology     Have you had any other diagnostic tests since your last visit? no    Have you changed or stopped any medications since your last visit? yes - stopped eye vitamin      Patient presents today with loss of appetite for 3 weeks. He was diagnosed with Parkinson's in July of this year.

## 2022-12-23 LAB
ANION GAP SERPL CALCULATED.3IONS-SCNC: 10.2 MMOL/L (ref 5–15)
BASOPHILS # BLD AUTO: 0.01 10*3/MM3 (ref 0–0.2)
BASOPHILS NFR BLD AUTO: 0.2 % (ref 0–1.5)
BUN SERPL-MCNC: 24 MG/DL (ref 8–23)
BUN/CREAT SERPL: 27.9 (ref 7–25)
CALCIUM SPEC-SCNC: 8 MG/DL (ref 8.6–10.5)
CHLORIDE SERPL-SCNC: 109 MMOL/L (ref 98–107)
CO2 SERPL-SCNC: 24.8 MMOL/L (ref 22–29)
CREAT SERPL-MCNC: 0.86 MG/DL (ref 0.76–1.27)
D-LACTATE SERPL-SCNC: 1.7 MMOL/L (ref 0.5–2)
DEPRECATED RDW RBC AUTO: 44.4 FL (ref 37–54)
EGFRCR SERPLBLD CKD-EPI 2021: 87 ML/MIN/1.73
EOSINOPHIL # BLD AUTO: 0 10*3/MM3 (ref 0–0.4)
EOSINOPHIL NFR BLD AUTO: 0 % (ref 0.3–6.2)
ERYTHROCYTE [DISTWIDTH] IN BLOOD BY AUTOMATED COUNT: 13 % (ref 12.3–15.4)
GLUCOSE BLDC GLUCOMTR-MCNC: 128 MG/DL (ref 70–130)
GLUCOSE BLDC GLUCOMTR-MCNC: 195 MG/DL (ref 70–130)
GLUCOSE BLDC GLUCOMTR-MCNC: 217 MG/DL (ref 70–130)
GLUCOSE BLDC GLUCOMTR-MCNC: 263 MG/DL (ref 70–130)
GLUCOSE SERPL-MCNC: 147 MG/DL (ref 65–99)
HCT VFR BLD AUTO: 40.1 % (ref 37.5–51)
HGB BLD-MCNC: 13.6 G/DL (ref 13–17.7)
IMM GRANULOCYTES # BLD AUTO: 0.02 10*3/MM3 (ref 0–0.05)
IMM GRANULOCYTES NFR BLD AUTO: 0.4 % (ref 0–0.5)
LYMPHOCYTES # BLD AUTO: 0.78 10*3/MM3 (ref 0.7–3.1)
LYMPHOCYTES NFR BLD AUTO: 15.1 % (ref 19.6–45.3)
MCH RBC QN AUTO: 31.3 PG (ref 26.6–33)
MCHC RBC AUTO-ENTMCNC: 33.9 G/DL (ref 31.5–35.7)
MCV RBC AUTO: 92.4 FL (ref 79–97)
MONOCYTES # BLD AUTO: 0.29 10*3/MM3 (ref 0.1–0.9)
MONOCYTES NFR BLD AUTO: 5.6 % (ref 5–12)
NEUTROPHILS NFR BLD AUTO: 4.06 10*3/MM3 (ref 1.7–7)
NEUTROPHILS NFR BLD AUTO: 78.7 % (ref 42.7–76)
NRBC BLD AUTO-RTO: 0 /100 WBC (ref 0–0.2)
PLATELET # BLD AUTO: 141 10*3/MM3 (ref 140–450)
PMV BLD AUTO: 11.7 FL (ref 6–12)
POTASSIUM SERPL-SCNC: 3.4 MMOL/L (ref 3.5–5.2)
POTASSIUM SERPL-SCNC: 3.9 MMOL/L (ref 3.5–5.2)
RBC # BLD AUTO: 4.34 10*6/MM3 (ref 4.14–5.8)
SODIUM SERPL-SCNC: 144 MMOL/L (ref 136–145)
WBC NRBC COR # BLD: 5.16 10*3/MM3 (ref 3.4–10.8)

## 2022-12-23 PROCEDURE — 97165 OT EVAL LOW COMPLEX 30 MIN: CPT

## 2022-12-23 PROCEDURE — 83605 ASSAY OF LACTIC ACID: CPT | Performed by: EMERGENCY MEDICINE

## 2022-12-23 PROCEDURE — 25010000002 CEFTRIAXONE SODIUM-DEXTROSE 1-3.74 GM-%(50ML) RECONSTITUTED SOLUTION: Performed by: NURSE PRACTITIONER

## 2022-12-23 PROCEDURE — 84132 ASSAY OF SERUM POTASSIUM: CPT | Performed by: NURSE PRACTITIONER

## 2022-12-23 PROCEDURE — 63710000001 DEXAMETHASONE PER 0.25 MG: Performed by: NURSE PRACTITIONER

## 2022-12-23 PROCEDURE — 82962 GLUCOSE BLOOD TEST: CPT

## 2022-12-23 PROCEDURE — 80048 BASIC METABOLIC PNL TOTAL CA: CPT | Performed by: NURSE PRACTITIONER

## 2022-12-23 PROCEDURE — 63710000001 INSULIN ASPART PER 5 UNITS: Performed by: NURSE PRACTITIONER

## 2022-12-23 PROCEDURE — 97161 PT EVAL LOW COMPLEX 20 MIN: CPT

## 2022-12-23 PROCEDURE — 25010000002 ENOXAPARIN PER 10 MG: Performed by: NURSE PRACTITIONER

## 2022-12-23 PROCEDURE — 85025 COMPLETE CBC W/AUTO DIFF WBC: CPT | Performed by: NURSE PRACTITIONER

## 2022-12-23 PROCEDURE — 99232 SBSQ HOSP IP/OBS MODERATE 35: CPT | Performed by: NURSE PRACTITIONER

## 2022-12-23 RX ORDER — CEFTRIAXONE 1 G/50ML
1 INJECTION, SOLUTION INTRAVENOUS EVERY 24 HOURS
Status: COMPLETED | OUTPATIENT
Start: 2022-12-23 | End: 2022-12-26

## 2022-12-23 RX ORDER — POTASSIUM CHLORIDE 750 MG/1
40 CAPSULE, EXTENDED RELEASE ORAL EVERY 4 HOURS
Status: DISCONTINUED | OUTPATIENT
Start: 2022-12-23 | End: 2022-12-23

## 2022-12-23 RX ORDER — POTASSIUM CHLORIDE 1.5 G/1.77G
40 POWDER, FOR SOLUTION ORAL EVERY 4 HOURS
Status: COMPLETED | OUTPATIENT
Start: 2022-12-23 | End: 2022-12-23

## 2022-12-23 RX ADMIN — CARBIDOPA AND LEVODOPA 1 TABLET: 25; 100 TABLET ORAL at 09:51

## 2022-12-23 RX ADMIN — INSULIN ASPART 4 UNITS: 100 INJECTION, SOLUTION INTRAVENOUS; SUBCUTANEOUS at 11:18

## 2022-12-23 RX ADMIN — CARBIDOPA AND LEVODOPA 1 TABLET: 25; 100 TABLET ORAL at 16:38

## 2022-12-23 RX ADMIN — SENNOSIDES AND DOCUSATE SODIUM 2 TABLET: 50; 8.6 TABLET ORAL at 20:24

## 2022-12-23 RX ADMIN — CARBIDOPA AND LEVODOPA 1 TABLET: 25; 100 TABLET ORAL at 20:25

## 2022-12-23 RX ADMIN — CEFTRIAXONE 1 G: 1 INJECTION, SOLUTION INTRAVENOUS at 14:33

## 2022-12-23 RX ADMIN — INSULIN ASPART 2 UNITS: 100 INJECTION, SOLUTION INTRAVENOUS; SUBCUTANEOUS at 16:38

## 2022-12-23 RX ADMIN — ENOXAPARIN SODIUM 40 MG: 100 INJECTION SUBCUTANEOUS at 20:24

## 2022-12-23 RX ADMIN — ATORVASTATIN CALCIUM 80 MG: 80 TABLET, FILM COATED ORAL at 09:51

## 2022-12-23 RX ADMIN — ASPIRIN 81 MG CHEWABLE TABLET 81 MG: 81 TABLET CHEWABLE at 09:50

## 2022-12-23 RX ADMIN — DEXAMETHASONE 6 MG: 2 TABLET ORAL at 09:51

## 2022-12-23 RX ADMIN — Medication 10 ML: at 09:53

## 2022-12-23 RX ADMIN — POTASSIUM CHLORIDE 40 MEQ: 1.5 POWDER, FOR SOLUTION ORAL at 11:18

## 2022-12-23 RX ADMIN — SENNOSIDES AND DOCUSATE SODIUM 2 TABLET: 50; 8.6 TABLET ORAL at 09:50

## 2022-12-23 RX ADMIN — Medication 10 ML: at 20:25

## 2022-12-23 RX ADMIN — POTASSIUM CHLORIDE 40 MEQ: 1.5 POWDER, FOR SOLUTION ORAL at 14:29

## 2022-12-23 NOTE — THERAPY EVALUATION
Patient Name: Ankur Camp  : 1940    MRN: [id number]                              Today's Date: 2022       Admit Date: 2022    Visit Dx:     ICD-10-CM ICD-9-CM   1. Urinary tract infection without hematuria, site unspecified  N39.0 599.0   2. COVID-19  U07.1 079.89   3. Acute respiratory failure with hypoxia (HCC)  J96.01 518.81   4. Lactic acidosis  E87.20 276.2   5. Acute renal insufficiency  N28.9 593.9     Patient Active Problem List   Diagnosis   • Benign prostatic hypertrophy without urinary obstruction   • Hypertension   • Hyperlipidemia   • Type 2 diabetes mellitus (HCC)   • Vitamin D deficiency   • Colon cancer screening   • Coronary artery calcification   • Thoracic aortic aneurysm (TAA)   • Primary osteoarthritis of right knee   • Status post total knee replacement using cement, right   • Urinary tract infection without hematuria, site unspecified     Past Medical History:   Diagnosis Date   • Arthritis    • Cancer (HCC)     kidney/ prostate   • Diabetes mellitus (HCC)    • ED (erectile dysfunction)    • Enlarged prostate with lower urinary tract symptoms (LUTS)    • Full dentures    • History of fracture of pelvis 2019    after fall from ladder   • History of loss of consciousness 2019    after fall from ladder-flown to [facility]   • History of rib fracture 2019    after fall from ladder   • History of right shoulder fracture 1960    walking up hill, fell on gravel   • History of stress test    • History of torn meniscus of right knee    • [place] (hard of hearing)     Bilateral hearing aids    • Hypertension    • Laceration of head 2019    after fall from ladder-treated at [facility]   • Left shoulder strain 2017    after fall from ladder   • Lung injury 2019    punctured right lung after falling off ladder   • Renal disorder    • Stone in kidney    • Stroke (HCC)      Past Surgical History:   Procedure Laterality Date    • CHEST TUBE INSERTION  11/2019    rib fractures, punctured lung after fall from ladder-[facility]   • COLONOSCOPY     • COLONOSCOPY N/A 4/8/2019    Procedure: COLONOSCOPY;  Surgeon: Onesimo Arnold MD;  Location: [facility] ENDOSCOPY;  Service: Gastroenterology   • EYE SURGERY Right     cataract removal with IOL implant   • HERNIA REPAIR Right     multiple inguinal hernia repairs   • KNEE ARTHROSCOPY W/ MENISCECTOMY Right 05/10/2012    Partial medial and lateral menisectomy-Conrad Baird MD   • NEPHRECTOMY Right     partial right nephrectomy due to kidney stone   • PELVIC FRACTURE SURGERY Right 11/2019    multiple pelvic fractures after fall from [facility]   • SHOULDER SURGERY Right 1960    fracture surgery to remove a piece of bone- [place], [place]   • TOTAL KNEE ARTHROPLASTY Right 5/25/2021    Procedure: knee arthroplasty total;  Surgeon: Conrad Baird MD;  Location: [facility] OR;  Service: Orthopedics;  Laterality: Right;      General Information     Row Name 12/23/22 1144          Physical Therapy Time and Intention    Document Type evaluation  -TW     Mode of Treatment physical therapy  -TW     Row Name 12/23/22 1144          General Information    Patient Profile Reviewed yes  -TW     Prior Level of Function independent:;gait;min assist:;ADL's  pt has a w/c, st cane, FWW, BSC, shower chair and HHC services prior to admission.  -TW     Existing Precautions/Restrictions fall;oxygen therapy device and L/min  -TW     Barriers to Rehab medically complex;previous functional deficit;cognitive status  -TW     Row Name 12/23/22 1144          Living Environment    People in Home spouse  -TW     Row Name 12/23/22 1144          Home Main Entrance    Number of Stairs, Main Entrance other (see comments)  -TW     Row Name 12/23/22 1144          Stairs Within Home, Primary    Stairs, Within Home, Primary pt not able to provide home information as he is oriented to self only.  -     Row  Name 12/23/22 1144          Cognition    Orientation Status (Cognition) oriented to;person  -     Row Name 12/23/22 1144          Safety Issues, Functional Mobility    Safety Issues Affecting Function (Mobility) ability to follow commands;awareness of need for assistance;insight into deficits/self-awareness;positioning of assistive device;problem-solving;safety precaution awareness;safety precautions follow-through/compliance;sequencing abilities  -TW     Impairments Affecting Function (Mobility) balance;cognition;endurance/activity tolerance;motor control;motor planning;strength  -TW     Cognitive Impairments, Mobility Safety/Performance awareness, need for assistance;insight into deficits/self-awareness;problem-solving/reasoning;safety precaution awareness;safety precaution follow-through;sequencing abilities  -TW           User Key  (r) = Recorded By, (t) = Taken By, (c) = Cosigned By    Initials Name Provider Type    TW Abby Agrawal, PT Physical Therapist               Mobility     Row Name 12/23/22 1144          Bed Mobility    Bed Mobility supine-sit  -TW     Supine-Sit [place] (Bed Mobility) minimum assist (75% patient effort)  -     Assistive Device (Bed Mobility) bed rails;head of bed elevated  -     Row Name 12/23/22 1144          Bed-Chair Transfer    Bed-Chair [place] (Transfers) minimum assist (75% patient effort);verbal cues;1 person to manage equipment  -     Assistive Device (Bed-Chair Transfers) walker, front-wheeled  -     Comment, (Bed-Chair Transfer) assist for hand placement and to turn the walker. Pt took shuffle steps with limited wt shift. Assist needed to turn walker.  -     Row Name 12/23/22 1144          Sit-Stand Transfer    Sit-Stand [place] (Transfers) minimum assist (75% patient effort)  -     Assistive Device (Sit-Stand Transfers) walker, front-wheeled  -     Row Name 12/23/22 1144          Gait/Stairs (Locomotion)    [place] Level (Gait) not  tested  -TW           User Key  (r) = Recorded By, (t) = Taken By, (c) = Cosigned By    Initials Name Provider Type     Abby Agrawal PT Physical Therapist               Obj/Interventions     Row Name 12/23/22 1144          Range of Motion Comprehensive    Comment, General Range of Motion Grossly WFLs  -TW     Row Name 12/23/22 1144          Strength Comprehensive (MMT)    Comment, General Manual Muscle Testing (MMT) Assessment Grossly 3-/5 to 3+/5 BLE  -TW     Row Name 12/23/22 1144          Balance    Balance Assessment sitting static balance;sitting dynamic balance;standing static balance;standing dynamic balance;sit to stand dynamic balance  -TW     Static Sitting Balance standby assist  -TW     Dynamic Sitting Balance standby assist  -TW     Position, Sitting Balance unsupported;sitting edge of bed  -TW     Sit to Stand Dynamic Balance minimal assist  -TW     Static Standing Balance contact guard  -TW     Dynamic Standing Balance minimal assist  -TW     Position/Device Used, Standing Balance supported;walker, front-wheeled  -TW           User Key  (r) = Recorded By, (t) = Taken By, (c) = Cosigned By    Initials Name Provider Type    TW Abby Agrawal PT Physical Therapist               Goals/Plan     Row Name 12/23/22 1144          Bed Mobility Goal 1 (PT)    Activity/Assistive Device (Bed Mobility Goal 1, PT) bed mobility activities, all  -TW     [place] Level/Cues Needed (Bed Mobility Goal 1, PT) modified independence  -TW     Time Frame (Bed Mobility Goal 1, PT) short term goal (STG);4 days  -TW     Progress/Outcomes (Bed Mobility Goal 1, PT) goal ongoing  -TW     Row Name 12/23/22 1144          Transfer Goal 1 (PT)    Activity/Assistive Device (Transfer Goal 1, PT) sit-to-stand/stand-to-sit;bed-to-chair/chair-to-bed;toilet;walker, rolling  -TW     [place] Level/Cues Needed (Transfer Goal 1, PT) standby assist;verbal cues required  -TW     Time Frame (Transfer Goal 1, PT) 10 days  -TW      Progress/Outcome (Transfer Goal 1, PT) goal ongoing  -TW     Row Name 12/23/22 1144          Gait Training Goal 1 (PT)    Activity/Assistive Device (Gait Training Goal 1, PT) gait (walking locomotion);assistive device use;improve balance and speed;increase endurance/gait distance  -TW     [place] Level (Gait Training Goal 1, PT) contact guard required  -TW     Time Frame (Gait Training Goal 1, PT) 10 days  -TW     Progress/Outcome (Gait Training Goal 1, PT) goal ongoing  -TW     Row Name 12/23/22 1144          Patient Education Goal (PT)    Activity (Patient Education Goal, PT) BLE ther ex 1 x 10  -TW     [place]/Cues/Accuracy (Memory Goal 2, PT) demonstrates adequately;verbalizes understanding  -TW     Progress/Outcome (Patient Education Goal, PT) goal ongoing  -TW     Row Name 12/23/22 1144          Therapy Assessment/Plan (PT)    Planned Therapy Interventions (PT) balance training;bed mobility training;gait training;patient/family education;transfer training;strengthening  -TW           User Key  (r) = Recorded By, (t) = Taken By, (c) = Cosigned By    Initials Name Provider Type    TW Abby Agrawal, PT Physical Therapist               Clinical Impression     Row Name 12/23/22 1144          Pain    Pretreatment Pain Rating 0/10 - no pain  -TW     Posttreatment Pain Rating 0/10 - no pain  -TW     Row Name 12/23/22 1144          Plan of Care Review    Plan of Care Reviewed With patient  -TW     Outcome Evaluation PT evaluation completed bedside with pt oriented to self only but following directions well for his assessment. Pt had no c/o pain throughout and was on 4L NC O2. Pt needed min assist for supine to sit, sit to stand and to pivot to chair with rolling walker. Pt did have small shuffle type steps and therapist had to assist pt in turning walker to sit. Pt's O2 sat was 87% post pivot to chair and once sitting cassandra to 91%. Pt presents with deficits in strength, balance, and endurance. He is expected  to improve his functional mobility with continued PT services prior to d/c.  -TW     Row Name 12/23/22 1144          Therapy Assessment/Plan (PT)    Patient/Family Therapy Goals Statement (PT) None stated  -TW     Rehab Potential (PT) good, to achieve stated therapy goals  -TW     Criteria for Skilled Interventions Met (PT) yes;meets criteria  -TW     Therapy Frequency (PT) 5 times/wk  -TW     Predicted Duration of Therapy Intervention (PT) 10 days  -TW     Row Name 12/23/22 1144          Vital Signs    Pre SpO2 (%) 98  -TW     O2 Delivery Pre Treatment supplemental O2  4 L  -TW     Intra SpO2 (%) 87  -TW     O2 Delivery Intra Treatment supplemental O2  4 L  -TW     Post SpO2 (%) 91  -TW     O2 Delivery Post Treatment supplemental O2  4 L  -TW     Pre Patient Position Supine  -TW     Intra Patient Position Standing  -TW     Post Patient Position Sitting  -TW     Row Name 12/23/22 1144          Positioning and Restraints    Pre-Treatment Position in bed  -TW     Post Treatment Position chair  -TW     In Chair reclined;call light within reach;encouraged to call for assist;exit alarm on;notified nsg  -TW           User Key  (r) = Recorded By, (t) = Taken By, (c) = Cosigned By    Initials Name Provider Type    TW Abyb Agrawal, PT Physical Therapist               Outcome Measures     Row Name 12/23/22 1144 12/23/22 0830       How much help from another person do you currently need...    Turning from your back to your side while in flat bed without using bedrails? 3  -TW 3  -KJ    Moving from lying on back to sitting on the side of a flat bed without bedrails? 3  -TW 3  -KJ    Moving to and from a bed to a chair (including a wheelchair)? 3  -TW 2  -KJ    Standing up from a chair using your arms (e.g., wheelchair, bedside chair)? 3  -TW 2  -KJ    Climbing 3-5 steps with a railing? 1  -TW 1  -KJ    To walk in hospital room? 2  -TW 2  -KJ    AM-PAC 6 Clicks Score (PT) 15  -TW 13  -KJ    Highest level of mobility 4 -->  Transferred to chair/commode  - 4 --> Transferred to chair/commode  -DELMAR    Row Name 12/23/22 1306 12/23/22 1144       Functional Assessment    Outcome Measure Options AM-PAC 6 Clicks Daily Activity (OT)  -SD AM-PAC 6 Clicks Basic Mobility (PT)  -          User Key  (r) = Recorded By, (t) = Taken By, (c) = Cosigned By    Initials Name Provider Type    SD Jyoti Rosario, OT Occupational Therapist    Abby Alaniz, PT Physical Therapist    Marisa Saenz, RN Registered Nurse                             Physical Therapy Education     Title: PT OT SLP Therapies (In Progress)     Topic: Physical Therapy (In Progress)     Point: Mobility training (Done)     Learning Progress Summary           Patient Acceptance, E,D, DU by  at 12/23/2022 1144    Comment: Pt education for improving technique with walker for transfers                   Point: Home exercise program (Not Started)     Learner Progress:  Not documented in this visit.          Point: Body mechanics (Not Started)     Learner Progress:  Not documented in this visit.          Point: Precautions (Not Started)     Learner Progress:  Not documented in this visit.                      User Key     Initials Effective Dates Name Provider Type Discipline     06/16/21 -  Abby Agrawal, PT Physical Therapist PT              PT Recommendation and Plan  Planned Therapy Interventions (PT): balance training, bed mobility training, gait training, patient/family education, transfer training, strengthening  Plan of Care Reviewed With: patient  Outcome Evaluation: PT evaluation completed bedside with pt oriented to self only but following directions well for his assessment. Pt had no c/o pain throughout and was on 4L NC O2. Pt needed min assist for supine to sit, sit to stand and to pivot to chair with rolling walker. Pt did have small shuffle type steps and therapist had to assist pt in turning walker to sit. Pt's O2 sat was 87% post pivot to chair and once sitting  cassandra to 91%. Pt presents with deficits in strength, balance, and endurance. He is expected to improve his functional mobility with continued PT services prior to d/c.     Time Calculation:    PT Charges     Row Name 12/23/22 1144             Time Calculation    Stop Time 1144  -TW      PT Received On 12/23/22 -TW      PT Goal Re-Cert Due Date 01/02/23 -TW            User Key  (r) = Recorded By, (t) = Taken By, (c) = Cosigned By    Initials Name Provider Type    TW Abby Agrawal PT Physical Therapist              Therapy Charges for Today     Code Description Service Date Service Provider Modifiers Qty    [id number] HC PT EVAL LOW COMPLEXITY 3 12/23/2022 Abby Agrawal PT GP 1          PT G-Codes  Outcome Measure Options: AM-PAC 6 Clicks Daily Activity (OT)  AM-PAC 6 Clicks Score (PT): 15  AM-PAC 6 Clicks Score (OT): 15  PT Discharge Summary  Anticipated Discharge Disposition (PT): home with assist    Abby Agrawal PT  12/23/2022

## 2022-12-23 NOTE — PLAN OF CARE
Goal Outcome Evaluation:  Plan of Care Reviewed With: patient        Progress: no change  Outcome Evaluation: OT eval completed. Patient supine in bed on 3.5L O2 satting 98%. O2 removed for mobility. Patient moved to EOB with min A, required mod A to down gown, max A to valerio socks. Patient completed sit to stand and walked 3' to chair using RW with min A with patient demonstrating shuffling gait. O2 satting 87% following tf to chair and placed back on 4L. Patient is expected to benefit from continued OT services prior to DC. Patient may require STR if wife is unable to provide assistance.

## 2022-12-23 NOTE — THERAPY EVALUATION
Patient Name: Ankur Camp  : 1940    MRN: [id number]                              Today's Date: 2022       Admit Date: 2022    Visit Dx:     ICD-10-CM ICD-9-CM   1. Urinary tract infection without hematuria, site unspecified  N39.0 599.0   2. COVID-19  U07.1 079.89   3. Acute respiratory failure with hypoxia (HCC)  J96.01 518.81   4. Lactic acidosis  E87.20 276.2   5. Acute renal insufficiency  N28.9 593.9     Patient Active Problem List   Diagnosis   • Benign prostatic hypertrophy without urinary obstruction   • Hypertension   • Hyperlipidemia   • Type 2 diabetes mellitus (HCC)   • Vitamin D deficiency   • Colon cancer screening   • Coronary artery calcification   • Thoracic aortic aneurysm (TAA)   • Primary osteoarthritis of right knee   • Status post total knee replacement using cement, right   • Urinary tract infection without hematuria, site unspecified     Past Medical History:   Diagnosis Date   • Arthritis    • Cancer (HCC)     kidney/ prostate   • Diabetes mellitus (HCC)    • ED (erectile dysfunction)    • Enlarged prostate with lower urinary tract symptoms (LUTS)    • Full dentures    • History of fracture of pelvis 2019    after fall from ladder   • History of loss of consciousness 2019    after fall from ladder-flown to [facility]   • History of rib fracture 2019    after fall from ladder   • History of right shoulder fracture 1960    walking up hill, fell on gravel   • History of stress test    • History of torn meniscus of right knee    • [place] (hard of hearing)     Bilateral hearing aids    • Hypertension    • Laceration of head 2019    after fall from ladder-treated at [facility]   • Left shoulder strain 2017    after fall from ladder   • Lung injury 2019    punctured right lung after falling off ladder   • Renal disorder    • Stone in kidney    • Stroke (HCC)      Past Surgical History:   Procedure Laterality Date    • CHEST TUBE INSERTION  11/2019    rib fractures, punctured lung [facility]   • COLONOSCOPY     • COLONOSCOPY N/A 4/8/2019    Procedure: COLONOSCOPY;  Surgeon: Onesimo Arnold MD;  Location: [facility] ENDOSCOPY;  Service: Gastroenterology   • EYE SURGERY Right     cataract removal with IOL implant   • HERNIA REPAIR Right     multiple inguinal hernia repairs   • KNEE ARTHROSCOPY W/ MENISCECTOMY Right 05/10/2012    Partial medial and lateral menisectomy-Conrad Baird MD   • NEPHRECTOMY Right     partial right nephrectomy due to kidney stone   • PELVIC FRACTURE SURGERY Right 11/2019    multiple pelvic fractures after fall from [facility]   • SHOULDER SURGERY Right 1960    fracture surgery to remove a piece of bone- [place]   • TOTAL KNEE ARTHROPLASTY Right 5/25/2021    Procedure: knee arthroplasty total;  Surgeon: Conrad Baird MD;  Location: [facility] OR;  Service: Orthopedics;  Laterality: Right;      General Information     Row Name 12/23/22 1251          OT Time and Intention    Document Type evaluation  -SD     Mode of Treatment occupational therapy  -SD     Row Name 12/23/22 1251          General Information    Patient Profile Reviewed yes  -SD     Prior Level of Function independent:;all household mobility;min assist:;ADL's  patient has a cane, walker, WC, BSC, SC  -SD     Existing Precautions/Restrictions fall;oxygen therapy device and L/min  -SD     Barriers to Rehab medically complex;previous functional deficit;cognitive status  -SD     Row Name 12/23/22 1251          Living Environment    People in Home spouse  -SD     Row Name 12/23/22 1251          Cognition    Orientation Status (Cognition) oriented to;person  -SD     Row Name 12/23/22 1251          Safety Issues, Functional Mobility    Safety Issues Affecting Function (Mobility) sequencing abilities;safety precautions follow-through/compliance;safety precaution awareness;positioning of  assistive device;insight into deficits/self-awareness;ability to follow commands;awareness of need for assistance  -SD     Impairments Affecting Function (Mobility) balance;coordination;endurance/activity tolerance;motor control;postural/trunk control;strength  -SD           User Key  (r) = Recorded By, (t) = Taken By, (c) = Cosigned By    Initials Name Provider Type    SD Jyoti Rosario OT Occupational Therapist                 Mobility/ADL's     Row Name 12/23/22 125          Bed Mobility    Bed Mobility supine-sit  -SD     Supine-Sit [place] (Bed Mobility) minimum assist (75% patient effort)  -SD     Assistive Device (Bed Mobility) bed rails;head of bed elevated  -SD     Row Name 12/23/22 1252          Transfers    Transfers sit-stand transfer;bed-chair transfer  -SD     Row Name 12/23/22 1252          Bed-Chair Transfer    Bed-Chair [place] (Transfers) minimum assist (75% patient effort)  -SD     Assistive Device (Bed-Chair Transfers) walker, front-wheeled  -SD     Row Name 12/23/22 1252          Sit-Stand Transfer    Sit-Stand [place] (Transfers) minimum assist (75% patient effort)  -SD     Assistive Device (Sit-Stand Transfers) walker, front-wheeled  -SD     Row Name 12/23/22 1252          Functional Mobility    Functional Mobility- Ind. Level minimum assist (75% patient effort)  -SD     Functional Mobility- Device walker, front-wheeled  -SD     Functional Mobility-Distance (Feet) 3  -SD     Functional Mobility- Safety Issues balance decreased during turns;sequencing ability decreased;step length decreased;weight-shifting ability decreased  -SD     Row Name 12/23/22 1252          Activities of Daily Living    BADL Assessment/Intervention bathing;upper body dressing;lower body dressing;grooming;feeding;toileting  -SD     Row Name 12/23/22 1252          Bathing Assessment/Intervention    [place] Level (Bathing) upper body;moderate assist (50% patient effort);lower body;maximum assist (25%  patient effort)  -SD     Row Name 12/23/22 1252          Upper Body Dressing Assessment/Training    [place] Level (Upper Body Dressing) moderate assist (50% patient effort)  -SD     Row Name 12/23/22 1252          Lower Body Dressing Assessment/Training    [place] Level (Lower Body Dressing) don;socks;maximum assist (25% patient effort)  -SD     Row Name 12/23/22 1252          Grooming Assessment/Training    [place] Level (Grooming) moderate assist (50% patient effort)  -SD     Row Name 12/23/22 1252          Self-Feeding Assessment/Training    [place] Level (Feeding) minimum assist (75% patient effort)  -SD     Row Name 12/23/22 1252          Toileting Assessment/Training    [place] Level (Toileting) maximum assist (25% patient effort)  -SD           User Key  (r) = Recorded By, (t) = Taken By, (c) = Cosigned By    Initials Name Provider Type    Jyoti Pearl OT Occupational Therapist               Obj/Interventions     Row Name 12/23/22 1254          Range of Motion Comprehensive    General Range of Motion bilateral upper extremity ROM WFL  -SD     Comment, General Range of Motion 3-/5 - 3+/5  -SD           User Key  (r) = Recorded By, (t) = Taken By, (c) = Cosigned By    Initials Name Provider Type    Jyoti Pearl OT Occupational Therapist               Goals/Plan     Row Name 12/23/22 130          Transfer Goal 1 (OT)    Activity/Assistive Device (Transfer Goal 1, OT) sit-to-stand/stand-to-sit;walker, rolling  -SD     [place] Level/Cues Needed (Transfer Goal 1, OT) contact guard required  -SD     Time Frame (Transfer Goal 1, OT) long term goal (LTG)  -SD     Progress/Outcome (Transfer Goal 1, OT) goal ongoing  -SD     Row Name 12/23/22 1304          Dressing Goal 1 (OT)    Activity/Device (Dressing Goal 1, OT) lower body dressing  -SD     [place]/Cues Needed (Dressing Goal 1, OT) moderate assist (50-74% patient effort)  -SD     Time Frame (Dressing Goal 1, OT) 2  weeks  -SD     Progress/Outcome (Dressing Goal 1, OT) goal ongoing  -SD     Row Name 12/23/22 1306          Toileting Goal 1 (OT)    Activity/Device (Toileting Goal 1, OT) toileting skills, all;commode, bedside without drop arms  -SD     [place] Level/Cues Needed (Toileting Goal 1, OT) moderate assist (50-74% patient effort)  -SD     Time Frame (Toileting Goal 1, OT) 2 weeks  -SD     Row Name 12/23/22 130          Strength Goal 1 (OT)    Strength Goal 1 (OT) Patient to perform UB ther ex as tolerated  -SD     Time Frame (Strength Goal 1, OT) long term goal (LTG)  -SD     Progress/Outcome (Strength Goal 1, OT) goal ongoing  -SD     Row Name 12/23/22 1302          Therapy Assessment/Plan (OT)    Planned Therapy Interventions (OT) activity tolerance training;adaptive equipment training;BADL retraining;patient/caregiver education/training;ROM/therapeutic exercise;strengthening exercise;transfer/mobility retraining  -SD           User Key  (r) = Recorded By, (t) = Taken By, (c) = Cosigned By    Initials Name Provider Type    SD Jyoti Rosario OT Occupational Therapist               Clinical Impression     Row Name 12/23/22 1255          Pain Assessment    Pretreatment Pain Rating 0/10 - no pain  -SD     Posttreatment Pain Rating 0/10 - no pain  -SD     Row Name 12/23/22 1253          Plan of Care Review    Plan of Care Reviewed With patient  -SD     Progress no change  -SD     Outcome Evaluation OT eval completed. Patient supine in bed on 3.5L O2 satting 98%. O2 removed for mobility. Patient moved to EOB with min A, required mod A to down gown, max A to valerio socks. Patient completed sit to stand and walked 3' to chair using RW with min A with patient demonstrating shuffling gait. O2 satting 87% following tf to chair and placed back on 4L. Patient is expected to benefit from continued OT services prior to DC. Patient may require STR if wife is unable to provide assistance.  -SD     Row Name 12/23/22 0185           Therapy Assessment/Plan (OT)    Patient/Family Therapy Goal Statement (OT) increase strength and mobility  -SD     Rehab Potential (OT) good, to achieve stated therapy goals  -SD     Criteria for Skilled Therapeutic Interventions Met (OT) skilled treatment is necessary  -SD     Therapy Frequency (OT) 3 times/wk  5 times if indicated  -SD     Row Name 12/23/22 1254          Therapy Plan Review/Discharge Plan (OT)    Anticipated Discharge Disposition (OT) home with assist;home with home health;inpatient rehabilitation facility  -SD     Row Name 12/23/22 1254          Vital Signs    Pre SpO2 (%) 98  -SD     O2 Delivery Pre Treatment supplemental O2  -SD     Intra SpO2 (%) 87  -SD     O2 Delivery Intra Treatment room air  -SD     Post SpO2 (%) 94  -SD     O2 Delivery Post Treatment supplemental O2  -SD     Row Name 12/23/22 1254          Positioning and Restraints    Pre-Treatment Position in bed  -SD     Post Treatment Position chair  -SD     In Chair reclined;call light within reach;encouraged to call for assist;exit alarm on  -SD           User Key  (r) = Recorded By, (t) = Taken By, (c) = Cosigned By    Initials Name Provider Type    Jyoti Pearl, OT Occupational Therapist               Outcome Measures     Row Name 12/23/22 1306          How much help from another is currently needed...    Putting on and taking off regular lower body clothing? 2  -SD     Bathing (including washing, rinsing, and drying) 2  -SD     Toileting (which includes using toilet bed pan or urinal) 2  -SD     Putting on and taking off regular upper body clothing 3  -SD     Taking care of personal grooming (such as brushing teeth) 3  -SD     Eating meals 3  -SD     AM-PAC 6 Clicks Score (OT) 15  -SD     Row Name 12/23/22 0830          How much help from another person do you currently need...    Turning from your back to your side while in flat bed without using bedrails? 3  -KJ     Moving from lying on back to sitting on the side of a  flat bed without bedrails? 3  -KJ     Moving to and from a bed to a chair (including a wheelchair)? 2  -KJ     Standing up from a chair using your arms (e.g., wheelchair, bedside chair)? 2  -KJ     Climbing 3-5 steps with a railing? 1  -KJ     To walk in hospital room? 2  -KJ     AM-PAC 6 Clicks Score (PT) 13  -KJ     Highest level of mobility 4 --> Transferred to chair/commode  -KJ     Row Name 12/23/22 1306          Functional Assessment    Outcome Measure Options AM-PAC 6 Clicks Daily Activity (OT)  -SD           User Key  (r) = Recorded By, (t) = Taken By, (c) = Cosigned By    Initials Name Provider Type    Jyoti Pearl, OT Occupational Therapist    Marisa Saenz, RN Registered Nurse                Occupational Therapy Education     Title: PT OT SLP Therapies (In Progress)     Topic: Occupational Therapy (In Progress)     Point: ADL training (Done)     Description:   Instruct learner(s) on proper safety adaptation and remediation techniques during self care or transfers.   Instruct in proper use of assistive devices.              Learning Progress Summary           Patient Acceptance, E,TB, VU by SD at 12/23/2022 1306    Comment: OT POC                   Point: Home exercise program (Not Started)     Description:   Instruct learner(s) on appropriate technique for monitoring, assisting and/or progressing therapeutic exercises/activities.              Learner Progress:  Not documented in this visit.          Point: Precautions (Not Started)     Description:   Instruct learner(s) on prescribed precautions during self-care and functional transfers.              Learner Progress:  Not documented in this visit.          Point: Body mechanics (Not Started)     Description:   Instruct learner(s) on proper positioning and spine alignment during self-care, functional mobility activities and/or exercises.              Learner Progress:  Not documented in this visit.                      User Key     Initials  Effective Dates Name Provider Type Discipline    SD 06/16/21 -  Jyoti Rosario OT Occupational Therapist OT              OT Recommendation and Plan  Planned Therapy Interventions (OT): activity tolerance training, adaptive equipment training, BADL retraining, patient/caregiver education/training, ROM/therapeutic exercise, strengthening exercise, transfer/mobility retraining  Therapy Frequency (OT): 3 times/wk (5 times if indicated)  Plan of Care Review  Plan of Care Reviewed With: patient  Progress: no change  Outcome Evaluation: OT eval completed. Patient supine in bed on 3.5L O2 satting 98%. O2 removed for mobility. Patient moved to EOB with min A, required mod A to down gown, max A to valerio socks. Patient completed sit to stand and walked 3' to chair using RW with min A with patient demonstrating shuffling gait. O2 satting 87% following tf to chair and placed back on 4L. Patient is expected to benefit from continued OT services prior to DC. Patient may require STR if wife is unable to provide assistance.     Time Calculation:    Time Calculation- OT     Row Name 12/23/22 1307             Time Calculation- OT    OT Start Time 1127  -SD      OT Received On 12/23/22  -SD      OT Goal Re-Cert Due Date 01/04/23  -SD         Untimed Charges    OT Eval/Re-eval Minutes 45  -SD         Total Minutes    Untimed Charges Total Minutes 45  -SD       Total Minutes 45  -SD            User Key  (r) = Recorded By, (t) = Taken By, (c) = Cosigned By    Initials Name Provider Type    SD Jyoti Rosario OT Occupational Therapist              Therapy Charges for Today     Code Description Service Date Service Provider Modifiers Qty    [id number]  OT EVAL LOW COMPLEXITY 3 12/23/2022 Jyoti Rosario OT GO 1               Jyoti Rosario OT  12/23/2022

## 2022-12-23 NOTE — DISCHARGE PLACEMENT REQUEST
"STR Referral    Ankur Olson (82 y.o. Male)     Date of Birth   1940    Social Security Number       Address   [address] [place] [address]    Home Phone   [phone]    MRN   [id number]       Amish   [facility]    Marital Status                               Admission Date   22    Admission Type   Emergency    Admitting Provider       Attending Provider   Lilo Cardoso DO    Department, Room/Bed   [facility] TELEMETRY 3, 326/1       Discharge Date       Discharge Disposition       Discharge Destination                               Attending Provider: Lilo Cardoso DO    Allergies: No Known Allergies    Isolation: Enh Drop/Con   Infection: COVID (confirmed) (22)   Code Status: CPR    Ht: 167.6 cm (66\")   Wt: 68.6 kg (151 lb 3.8 oz)    Admission Cmt: None   Principal Problem: Urinary tract infection without hematuria, site unspecified [N39.0]                 Active Insurance as of 2022     Primary Coverage     Payor Plan Insurance Group Employer/Plan Group    ANTH MEDICARE REPLACEMENT ANTH MEDICARE ADVANTAGE [id number]     Payor Plan Address Payor Plan Phone Number Payor Plan Fax Number Effective Dates    [address] [phone]  2021 - None Entered    [facility] [id number]       Subscriber Name Subscriber Birth Date Member ID       ANKUR OLSON 1940 [id number]                 Emergency Contacts      (Rel.) Home Phone Work Phone Mobile Phone    Kianna Olson (Spouse) [phone] -- --    Elvi Moura (Daughter) -- -- [phone]               History & Physical      Pam Hallman APRN at 22 1443     Attestation signed by Lilo Cardoso DO at 22 1619    I have reviewed this documentation and agree.                    [facility] HOSPITALIST   HISTORY AND PHYSICAL      Name:  Ankur Olson   Age:  82 y.o.  Sex:  male  :  1940  MRN:  " [id number]   Visit Number:  [id number]  Admission Date:  12/22/2022  Date Of Service:  12/22/22  Primary Care Physician:  Magaly Prabhakar APRN    Chief Complaint:     Fatigue    History Of Presenting Illness:      Patient is an 82 year old male who presents to the hospital with complaints of weakness who was sent from PCP office after being diagnosed with UTI.  Patient wife advised on arrival that patient had had generalized weakness, less appetite with weight loss over the past 2 weeks.  Patient with past health history significant for diabetes mellitus type 2, hypertension, hyperlipidemia, and Parkinson's disease.  On exam, patient is resting in bed on 4L with sats in low 90s.  Denies any urinary symptoms.  Complains of worsening shortness of breath the past 2 days and cough.  Uses a walker/cane at baseline.    Work up in the emergency department noted troponin-0.033, BNP-2194, glucose-171, creatinine-1.31 with baseline 0.8, BUN-31,  albumin-3.3, CRP-9.9, CBC unremarkable, lactate-2.9 with repeat 2.1, procalcitonin-0.24.  Urinalysis noted infection with 2+ bacteria, 31-50 WBC, +nitrites, and +3 leuks.  CXR noted hypoinflation with scattered mild atelectasis. Covid 19 testing was positive.  Hospitalist contacted for admission.    Review Of Systems:    All systems were reviewed and negative except as mentioned in history of presenting illness, assessment and plan.    Past Medical History: Patient  has a past medical history of Arthritis, Cancer (HCC), Diabetes mellitus (HCC), ED (erectile dysfunction), Enlarged prostate with lower urinary tract symptoms (LUTS), Full dentures, History of fracture of pelvis (11/14/2019), History of loss of consciousness (11/14/2019), History of rib fracture (11/14/2019), History of right shoulder fracture (1960), History of stress test, History of torn meniscus of right knee (2012), [place] (hard of hearing), Hypertension, Laceration of head (11/14/2019), Left shoulder strain  (2017), Lung injury (11/14/2019), Renal disorder, Stone in kidney, and Stroke (HCC).    Past Surgical History: Patient  has a past surgical history that includes Nephrectomy (Right); Eye surgery (Right); Colonoscopy; Hernia repair (Right); Colonoscopy (N/A, 4/8/2019); Knee arthroscopy w/ meniscectomy (Right, 05/10/2012); Shoulder surgery (Right, 1960); Pelvic fracture surgery (Right, 11/2019); Chest tube insertion (11/2019); and Total knee arthroplasty (Right, 5/25/2021).    Social History: Patient  reports that he has been smoking pipe. He has never used smokeless tobacco. He reports that he does not currently use alcohol. He reports that he does not use drugs.    Family History:  Patient's family history has been reviewed and found to be noncontributory.     Allergies:      Patient has no known allergies.    Home Medications:    Prior to Admission Medications     Prescriptions Last Dose Informant Patient Reported? Taking?    ascorbic acid (VITAMIN C) 1000 MG tablet   Yes No    Take 1,000 mg by mouth Daily.    aspirin 81 MG chewable tablet   Yes No    Chew 81 mg Daily.    atorvastatin (LIPITOR) 80 MG tablet   Yes No    Take 80 mg by mouth Daily.    bisoprolol (ZEBeta) 5 MG tablet   No No    TAKE 1 TABLET BY MOUTH DAILY.    carbidopa-levodopa (SINEMET)  MG per tablet   Yes No    Take 1 tablet by mouth 3 (Three) Times a Day.    cholecalciferol (VITAMIN D3) 25 MCG (1000 UT) tablet   Yes No    Take 1,000 Units by mouth Daily.    glucose blood test strip   Yes No    1 each by In Vitro route daily Daily testing, DX:250.00    metFORMIN (GLUCOPHAGE) 500 MG tablet  Spouse/Significant Other Yes No    Take 500 mg by mouth Daily With Breakfast.    vitamin B-12 (CYANOCOBALAMIN) 1000 MCG tablet  Spouse/Significant Other Yes No    Take 1,000 mcg by mouth Daily.        ED Medications:    Medications   sodium chloride 0.9 % flush 10 mL (has no administration in time range)   sodium chloride 0.9 % bolus 1,000 mL (0 mL  "Intravenous Stopped 12/22/22 1219)   cefTRIAXone (ROCEPHIN) IVPB 1 g/50ml dextrose (premix) (0 g Intravenous Stopped 12/22/22 1338)     Vital Signs:  Temp:  [98.4 °F (36.9 °C)] 98.4 °F (36.9 °C)  Heart Rate:  [45-61] 48  Resp:  [16] 16  BP: (134-159)/(67-78) 137/69        12/22/22  1015   Weight: 78.5 kg (173 lb)     Body mass index is 27.92 kg/m².    Physical Exam:     Most recent vital Signs: /69   Pulse (!) 48   Temp 98.4 °F (36.9 °C) (Oral)   Resp 16   Ht 167.6 cm (66\")   Wt 78.5 kg (173 lb)   SpO2 94%   BMI 27.92 kg/m²     Physical Exam  Vitals and nursing note reviewed.   Constitutional:       General: He is not in acute distress.     Appearance: He is not ill-appearing or toxic-appearing.   HENT:      Head: Normocephalic and atraumatic.      Mouth/Throat:      Mouth: Mucous membranes are moist.   Eyes:      Pupils: Pupils are equal, round, and reactive to light.   Cardiovascular:      Rate and Rhythm: Normal rate and regular rhythm.      Pulses: Normal pulses.      Heart sounds: Normal heart sounds.   Pulmonary:      Effort: Pulmonary effort is normal.      Breath sounds: Wheezing and rhonchi present.      Comments: Utilizing 4L with sats in low 90s  Abdominal:      General: Bowel sounds are normal.      Palpations: Abdomen is soft.   Musculoskeletal:         General: Normal range of motion.   Skin:     General: Skin is warm and dry.      Capillary Refill: Capillary refill takes less than 2 seconds.   Neurological:      General: No focal deficit present.      Mental Status: He is alert and oriented to person, place, and time. Mental status is at baseline.      Motor: Weakness (generalized) present.      Comments: Oriented to self and place, unsure of month or year   Psychiatric:         Mood and Affect: Mood normal.         Behavior: Behavior normal.         Thought Content: Thought content normal.         Laboratory data:    I have reviewed the labs done in the emergency room.    Results from " last 7 days   Lab Units 12/22/22  1042   SODIUM mmol/L 144   POTASSIUM mmol/L 3.7   CHLORIDE mmol/L 105   CO2 mmol/L 24.8   BUN mg/dL 31*   CREATININE mg/dL 1.31*   CALCIUM mg/dL 9.1   BILIRUBIN mg/dL 1.2   ALK PHOS U/L 94   ALT (SGPT) U/L <5   AST (SGOT) U/L 23   GLUCOSE mg/dL 171*     Results from last 7 days   Lab Units 12/22/22  1042   WBC 10*3/mm3 6.29   HEMOGLOBIN g/dL 14.8   HEMATOCRIT % 44.8   PLATELETS 10*3/mm3 148         Results from last 7 days   Lab Units 12/22/22  1042   TROPONIN T ng/mL 0.033*     Results from last 7 days   Lab Units 12/22/22  1042   PROBNP pg/mL 2,194.0*                 Results from last 7 days   Lab Units 12/22/22  1143   COLOR UA  Dark Yellow*   GLUCOSE UA  Negative   KETONES UA  Trace*   LEUKOCYTES UA  Large (3+)*   PH, URINE  5.5   BILIRUBIN UA  Small (1+)*   UROBILINOGEN UA  1.0 E.U./dL   RBC UA /HPF None Seen   WBC UA /HPF 31-50*       Pain Management Panel     Pain Management Panel Latest Ref Rng & Units 7/15/2014 7/15/2014    AMPHETAMINES SCREEN, URINE NEGATIVE Negative -    BARBITURATES SCREEN NEGATIVE Negative -    BENZODIAZEPINE SCREEN, URINE NEGATIVE Negative Negative    COCAINE SCREEN, URINE NEGATIVE Negative -    METHADONE SCREEN, URINE NEGATIVE Negative -          EKG:      Sinus bradycardia with right BBB--Abnormal    Radiology:    XR Chest 1 View    Result Date: 12/22/2022  PROCEDURE: XR CHEST 1 VW-  HISTORY: SOA  COMPARISON: 07/20/2022.  FINDINGS: The heart is normal in size. The mediastinum is unremarkable. The lungs are hypoinflated with scattered mild atelectasis. There is no pneumothorax.  There are no acute osseous abnormalities.      Hypoinflation with scattered mild atelectasis.     Images were reviewed, interpreted, and dictated by Dr. Abdiaziz Ahumada MD Transcribed by Shalom Lennon PA-C.  This report was signed and finalized on 12/22/2022 11:33 AM by Abdiaziz Ahumada MD.      Assessment:    1. Acute UTI  2. Acute Respiratory Failure with Hypoxia  3. Covid 19  +  4. Lactic Acidosis  5. [place]  6. Parkinsons Disease  7. Diabetes Mellitus Type 2 on oral anti-diabetics  8. Hyperlipidemia    Plan:    Admit to Med/Surg with telemetry and continuous oximetry    Acute UTI/ [place]/ Lactic Acidosis  -Rocephin 1GM IV for 5 days  -Follow urine culture  -Gentle IV hydration  -Follow blood cultures  -Repeat Lactate    Acute Respiratory Failure/ Covid 19+  -Supplemental oxygen to keep saturations >90%  -Albuterol inhaler  -Decadron 6mg daily as patient is hypoxic  -Procalcitonin negative  -Supportive care   -PT/OT evaluations for weakness    Diabetes Mellitus  -Cover with sliding scale  -Will check A1c    Chronic-  -Home medications as reconciled    Patient is a full code on admission    Risk Assessment: Moderate due to co-morbidities  DVT Prophylaxis: Lovenox  Code Status: Full Code  Diet: Carb Consistent    Advance Care Planning   ACP discussion was held with the patient during this visit. Patient has an advance directive in EMR which is still valid.       Pam Hallman, APRN  12/22/22  14:44 EST    Dictated utilizing Dragon dictation.    Electronically signed by Lilo Cardoso DO at 12/22/22 1619          Physical Therapy Notes (last 24 hours)      Abby Agrawal PT at 12/23/22 1144  Version 1 of 1       Goal Outcome Evaluation:  Plan of Care Reviewed With: patient           Outcome Evaluation: PT evaluation completed bedside with pt oriented to self only but following directions well for his assessment. Pt had no c/o pain throughout and was on 4L NC O2. Pt needed min assist for supine to sit, sit to stand and to pivot to chair with rolling walker. Pt did have small shuffle type steps and therapist had to assist pt in turning walker to sit. Pt's O2 sat was 87% post pivot to chair and once sitting cassandra to 91%. Pt presents with deficits in strength, balance, and endurance. He is expected to improve his functional mobility with continued PT services prior to  d/c.    Electronically signed by Abby Agrawal, PT at 22 1424     Abby Agrawal, PT at 22 1144  Version 1 of 1         Patient Name: Ankur Camp  : 1940    MRN: [id number]                              Today's Date: 2022       Admit Date: 2022    Visit Dx:     ICD-10-CM ICD-9-CM   1. Urinary tract infection without hematuria, site unspecified  N39.0 599.0   2. COVID-19  U07.1 079.89   3. Acute respiratory failure with hypoxia (HCC)  J96.01 518.81   4. Lactic acidosis  E87.20 276.2   5. Acute renal insufficiency  N28.9 593.9     Patient Active Problem List   Diagnosis   • Benign prostatic hypertrophy without urinary obstruction   • Hypertension   • Hyperlipidemia   • Type 2 diabetes mellitus (HCC)   • Vitamin D deficiency   • Colon cancer screening   • Coronary artery calcification   • Thoracic aortic aneurysm (TAA)   • Primary osteoarthritis of right knee   • Status post total knee replacement using cement, right   • Urinary tract infection without hematuria, site unspecified     Past Medical History:   Diagnosis Date   • Arthritis    • Cancer (HCC)     kidney/ prostate   • Diabetes mellitus (HCC)    • ED (erectile dysfunction)    • Enlarged prostate with lower urinary tract symptoms (LUTS)    • Full dentures    • History of fracture of pelvis 2019    after fall from ladder   • History of loss of consciousness 2019    after fall from ladder-[facility]   • History of rib fracture 2019    after fall from ladder   • History of right shoulder fracture 1960    walking up hill, fell on gravel   • History of stress test    • History of torn meniscus of right knee    • [place] (hard of hearing)     Bilateral hearing aids    • Hypertension    • Laceration of head 2019    after fall from ladder-treated at [facility]   • Left shoulder strain 2017    after fall from ladder   • Lung injury 2019    punctured right lung after  falling off ladder   • Renal disorder    • Stone in kidney    • Stroke (HCC)      Past Surgical History:   Procedure Laterality Date   • CHEST TUBE INSERTION  11/2019    rib fractures, punctured lung after fall from [facility]   • COLONOSCOPY     • COLONOSCOPY N/A 4/8/2019    Procedure: COLONOSCOPY;  Surgeon: Onesimo Arnold MD;  Location: [facility] ENDOSCOPY;  Service: Gastroenterology   • EYE SURGERY Right     cataract removal with IOL implant   • HERNIA REPAIR Right     multiple inguinal hernia repairs   • KNEE ARTHROSCOPY W/ MENISCECTOMY Right 05/10/2012    Partial medial and lateral menisectomy-Conrad Baird MD   • NEPHRECTOMY Right     partial right nephrectomy due to kidney stone   • PELVIC FRACTURE SURGERY Right 11/2019    multiple pelvic fractures after fall from [facility]   • SHOULDER SURGERY Right 1960    fracture surgery to remove a piece of bone- [place]   • TOTAL KNEE ARTHROPLASTY Right 5/25/2021    Procedure: knee arthroplasty total;  Surgeon: Conrad Baird MD;  Location: [facility] OR;  Service: Orthopedics;  Laterality: Right;      General Information     Row Name 12/23/22 1144          Physical Therapy Time and Intention    Document Type evaluation  -TW     Mode of Treatment physical therapy  -TW     Row Name 12/23/22 1144          General Information    Patient Profile Reviewed yes  -TW     Prior Level of Function independent:;gait;min assist:;ADL's  pt has a w/c, st cane, FWW, BSC, shower chair and HHC services prior to admission.  -TW     Existing Precautions/Restrictions fall;oxygen therapy device and L/min  -TW     Barriers to Rehab medically complex;previous functional deficit;cognitive status  -TW     Row Name 12/23/22 1144          Living Environment    People in Home spouse  -TW     Row Name 12/23/22 1144          Home Main Entrance    Number of Stairs, Main Entrance other (see comments)  -TW     Row Name 12/23/22 1144          Stairs  Within Home, Primary    Stairs, Within Home, Primary pt not able to provide home information as he is oriented to self only.  -     Row Name 12/23/22 1144          Cognition    Orientation Status (Cognition) oriented to;person  -     Row Name 12/23/22 1144          Safety Issues, Functional Mobility    Safety Issues Affecting Function (Mobility) ability to follow commands;awareness of need for assistance;insight into deficits/self-awareness;positioning of assistive device;problem-solving;safety precaution awareness;safety precautions follow-through/compliance;sequencing abilities  -     Impairments Affecting Function (Mobility) balance;cognition;endurance/activity tolerance;motor control;motor planning;strength  -TW     Cognitive Impairments, Mobility Safety/Performance awareness, need for assistance;insight into deficits/self-awareness;problem-solving/reasoning;safety precaution awareness;safety precaution follow-through;sequencing abilities  -           User Key  (r) = Recorded By, (t) = Taken By, (c) = Cosigned By    Initials Name Provider Type    TW Abby Agrawal, PT Physical Therapist               Mobility     Row Name 12/23/22 1144          Bed Mobility    Bed Mobility supine-sit  -     Supine-Sit Pooler (Bed Mobility) minimum assist (75% patient effort)  -     Assistive Device (Bed Mobility) bed rails;head of bed elevated  -     Row Name 12/23/22 1144          Bed-Chair Transfer    Bed-Chair Pooler (Transfers) minimum assist (75% patient effort);verbal cues;1 person to manage equipment  -     Assistive Device (Bed-Chair Transfers) walker, front-wheeled  -TW     Comment, (Bed-Chair Transfer) assist for hand placement and to turn the walker. Pt took shuffle steps with limited wt shift. Assist needed to turn walker.  -     Row Name 12/23/22 1144          Sit-Stand Transfer    Sit-Stand Pooler (Transfers) minimum assist (75% patient effort)  -     Assistive Device (Sit-Stand  Transfers) walker, front-wheeled  -TW     Row Name 12/23/22 1144          Gait/Stairs (Locomotion)    [place] Level (Gait) not tested  -TW           User Key  (r) = Recorded By, (t) = Taken By, (c) = Cosigned By    Initials Name Provider Type    TW Abby Agrawal, ADA Physical Therapist               Obj/Interventions     Row Name 12/23/22 1144          Range of Motion Comprehensive    Comment, General Range of Motion Grossly WFLs  -TW     Row Name 12/23/22 1144          Strength Comprehensive (MMT)    Comment, General Manual Muscle Testing (MMT) Assessment Grossly 3-/5 to 3+/5 BLE  -TW     Row Name 12/23/22 1144          Balance    Balance Assessment sitting static balance;sitting dynamic balance;standing static balance;standing dynamic balance;sit to stand dynamic balance  -TW     Static Sitting Balance standby assist  -TW     Dynamic Sitting Balance standby assist  -TW     Position, Sitting Balance unsupported;sitting edge of bed  -TW     Sit to Stand Dynamic Balance minimal assist  -TW     Static Standing Balance contact guard  -TW     Dynamic Standing Balance minimal assist  -TW     Position/Device Used, Standing Balance supported;walker, front-wheeled  -TW           User Key  (r) = Recorded By, (t) = Taken By, (c) = Cosigned By    Initials Name Provider Type    TW Abby Agrawal, PT Physical Therapist               Goals/Plan     Row Name 12/23/22 1144          Bed Mobility Goal 1 (PT)    Activity/Assistive Device (Bed Mobility Goal 1, PT) bed mobility activities, all  -TW     [place] Level/Cues Needed (Bed Mobility Goal 1, PT) modified independence  -TW     Time Frame (Bed Mobility Goal 1, PT) short term goal (STG);4 days  -TW     Progress/Outcomes (Bed Mobility Goal 1, PT) goal ongoing  -TW     Row Name 12/23/22 1144          Transfer Goal 1 (PT)    Activity/Assistive Device (Transfer Goal 1, PT) sit-to-stand/stand-to-sit;bed-to-chair/chair-to-bed;toilet;walker, rolling  -TW     [place]  Level/Cues Needed (Transfer Goal 1, PT) standby assist;verbal cues required  -TW     Time Frame (Transfer Goal 1, PT) 10 days  -TW     Progress/Outcome (Transfer Goal 1, PT) goal ongoing  -TW     Row Name 12/23/22 1144          Gait Training Goal 1 (PT)    Activity/Assistive Device (Gait Training Goal 1, PT) gait (walking locomotion);assistive device use;improve balance and speed;increase endurance/gait distance  -TW     [place] Level (Gait Training Goal 1, PT) contact guard required  -TW     Time Frame (Gait Training Goal 1, PT) 10 days  -TW     Progress/Outcome (Gait Training Goal 1, PT) goal ongoing  -TW     Row Name 12/23/22 1144          Patient Education Goal (PT)    Activity (Patient Education Goal, PT) BLE ther ex 1 x 10  -TW     [place]/Cues/Accuracy (Memory Goal 2, PT) demonstrates adequately;verbalizes understanding  -TW     Progress/Outcome (Patient Education Goal, PT) goal ongoing  -TW     Row Name 12/23/22 1144          Therapy Assessment/Plan (PT)    Planned Therapy Interventions (PT) balance training;bed mobility training;gait training;patient/family education;transfer training;strengthening  -TW           User Key  (r) = Recorded By, (t) = Taken By, (c) = Cosigned By    Initials Name Provider Type    TW Abby Agrawal, PT Physical Therapist               Clinical Impression     Row Name 12/23/22 1144          Pain    Pretreatment Pain Rating 0/10 - no pain  -TW     Posttreatment Pain Rating 0/10 - no pain  -TW     Row Name 12/23/22 1144          Plan of Care Review    Plan of Care Reviewed With patient  -TW     Outcome Evaluation PT evaluation completed bedside with pt oriented to self only but following directions well for his assessment. Pt had no c/o pain throughout and was on 4L NC O2. Pt needed min assist for supine to sit, sit to stand and to pivot to chair with rolling walker. Pt did have small shuffle type steps and therapist had to assist pt in turning walker to sit. Pt's O2 sat  was 87% post pivot to chair and once sitting cassandra to 91%. Pt presents with deficits in strength, balance, and endurance. He is expected to improve his functional mobility with continued PT services prior to d/c.  -TW     Row Name 12/23/22 1144          Therapy Assessment/Plan (PT)    Patient/Family Therapy Goals Statement (PT) None stated  -TW     Rehab Potential (PT) good, to achieve stated therapy goals  -TW     Criteria for Skilled Interventions Met (PT) yes;meets criteria  -TW     Therapy Frequency (PT) 5 times/wk  -TW     Predicted Duration of Therapy Intervention (PT) 10 days  -TW     Row Name 12/23/22 1144          Vital Signs    Pre SpO2 (%) 98  -TW     O2 Delivery Pre Treatment supplemental O2  4 L  -TW     Intra SpO2 (%) 87  -TW     O2 Delivery Intra Treatment supplemental O2  4 L  -TW     Post SpO2 (%) 91  -TW     O2 Delivery Post Treatment supplemental O2  4 L  -TW     Pre Patient Position Supine  -TW     Intra Patient Position Standing  -TW     Post Patient Position Sitting  -TW     Row Name 12/23/22 1144          Positioning and Restraints    Pre-Treatment Position in bed  -TW     Post Treatment Position chair  -TW     In Chair reclined;call light within reach;encouraged to call for assist;exit alarm on;notified [facility]  -           User Key  (r) = Recorded By, (t) = Taken By, (c) = Cosigned By    Initials Name Provider Type    TW Abby Agrawal, PT Physical Therapist               Outcome Measures     Row Name 12/23/22 1144 12/23/22 0830       How much help from another person do you currently need...    Turning from your back to your side while in flat bed without using bedrails? 3  -TW 3  -KJ    Moving from lying on back to sitting on the side of a flat bed without bedrails? 3  -TW 3  -KJ    Moving to and from a bed to a chair (including a wheelchair)? 3  -TW 2  -KJ    Standing up from a chair using your arms (e.g., wheelchair, bedside chair)? 3  -TW 2  -KJ    Climbing 3-5 steps with a railing? 1  -TW  1  -KJ    To walk in hospital room? 2  -TW 2  -KJ    AM-PAC 6 Clicks Score (PT) 15  -TW 13  -KJ    Highest level of mobility 4 --> Transferred to chair/commode  -TW 4 --> Transferred to chair/commode  -KJ    Row Name 12/23/22 1306 12/23/22 1144       Functional Assessment    Outcome Measure Options AM-PAC 6 Clicks Daily Activity (OT)  -SD AM-PAC 6 Clicks Basic Mobility (PT)  -TW          User Key  (r) = Recorded By, (t) = Taken By, (c) = Cosigned By    Initials Name Provider Type    SD Jyoti Rosario, OT Occupational Therapist    TW Abby Agrawal, PT Physical Therapist    Marisa Saenz, RN Registered Nurse                             Physical Therapy Education     Title: PT OT SLP Therapies (In Progress)     Topic: Physical Therapy (In Progress)     Point: Mobility training (Done)     Learning Progress Summary           Patient Acceptance, E,D, DU by TW at 12/23/2022 1144    Comment: Pt education for improving technique with walker for transfers                   Point: Home exercise program (Not Started)     Learner Progress:  Not documented in this visit.          Point: Body mechanics (Not Started)     Learner Progress:  Not documented in this visit.          Point: Precautions (Not Started)     Learner Progress:  Not documented in this visit.                      User Key     Initials Effective Dates Name Provider Type Discipline    TW 06/16/21 -  Abby Agrawal, PT Physical Therapist PT              PT Recommendation and Plan  Planned Therapy Interventions (PT): balance training, bed mobility training, gait training, patient/family education, transfer training, strengthening  Plan of Care Reviewed With: patient  Outcome Evaluation: PT evaluation completed bedside with pt oriented to self only but following directions well for his assessment. Pt had no c/o pain throughout and was on 4L NC O2. Pt needed min assist for supine to sit, sit to stand and to pivot to chair with rolling walker. Pt did have small  shuffle type steps and therapist had to assist pt in turning walker to sit. Pt's O2 sat was 87% post pivot to chair and once sitting cassandra to 91%. Pt presents with deficits in strength, balance, and endurance. He is expected to improve his functional mobility with continued PT services prior to d/c.     Time Calculation:    PT Charges     Row Name 22 1144             Time Calculation    Stop Time 1144  -TW      PT Received On 22      PT Goal Re-Cert Due Date 23            User Key  (r) = Recorded By, (t) = Taken By, (c) = Cosigned By    Initials Name Provider Type    TW Abby Agrawal, PT Physical Therapist              Therapy Charges for Today     Code Description Service Date Service Provider Modifiers Qty    77133712513 HC PT EVAL LOW COMPLEXITY 3 2022 Abby Agrawal PT GP 1          PT G-Codes  Outcome Measure Options: AM-PAC 6 Clicks Daily Activity (OT)  AM-PAC 6 Clicks Score (PT): 15  AM-PAC 6 Clicks Score (OT): 15  PT Discharge Summary  Anticipated Discharge Disposition (PT): home with assist    Abby Agrawal PT  2022      Electronically signed by Abby Agrawal PT at 22 1425          Occupational Therapy Notes (last 24 hours)      Jyoti Rosario, OT at 22 1308          Patient Name: Ankur Camp  : 1940    MRN: [id number]                              Today's Date: 2022       Admit Date: 2022    Visit Dx:     ICD-10-CM ICD-9-CM   1. Urinary tract infection without hematuria, site unspecified  N39.0 599.0   2. COVID-19  U07.1 079.89   3. Acute respiratory failure with hypoxia (HCC)  J96.01 518.81   4. Lactic acidosis  E87.20 276.2   5. Acute renal insufficiency  N28.9 593.9     Patient Active Problem List   Diagnosis   • Benign prostatic hypertrophy without urinary obstruction   • Hypertension   • Hyperlipidemia   • Type 2 diabetes mellitus (HCC)   • Vitamin D deficiency   • Colon cancer screening   • Coronary artery calcification    • Thoracic aortic aneurysm (TAA)   • Primary osteoarthritis of right knee   • Status post total knee replacement using cement, right   • Urinary tract infection without hematuria, site unspecified     Past Medical History:   Diagnosis Date   • Arthritis    • Cancer (HCC)     kidney/ prostate   • Diabetes mellitus (HCC)    • ED (erectile dysfunction)    • Enlarged prostate with lower urinary tract symptoms (LUTS)    • Full dentures    • History of fracture of pelvis 11/14/2019    after fall from ladder   • History of loss of consciousness 11/14/2019    after fall from ladder-fl[facility] to [facility]   • History of rib fracture 11/14/2019    after fall from ladder   • History of right shoulder fracture 1960    walking up hill, fell on gravel   • History of stress test    • History of torn meniscus of right knee 2012   • [place] (hard of hearing)     Bilateral hearing aids    • Hypertension    • Laceration of head 11/14/2019    after fall from [facility]-treated at [facility]   • Left shoulder strain 2017    after fall from [employer] [facility]   • Lung injury 11/14/2019    punctured right lung after falling off ladder   • Renal disorder    • Stone in kidney    • Stroke (HCC)      Past Surgical History:   Procedure Laterality Date   • CHEST TUBE INSERTION  11/2019    rib fractures, punctured lung after fall from [facility]   • COLONOSCOPY     • COLONOSCOPY N/A 4/8/2019    Procedure: COLONOSCOPY;  Surgeon: Onesimo Arnold MD;  Location: [facility] ENDOSCOPY;  Service: Gastroenterology   • EYE SURGERY Right     cataract removal with IOL implant   • HERNIA REPAIR Right     multiple inguinal hernia repairs   • KNEE ARTHROSCOPY W/ MENISCECTOMY Right 05/10/2012    Partial medial and lateral menisectomy-Conrad Baird MD   • NEPHRECTOMY Right     partial right nephrectomy due to kidney stone   • PELVIC FRACTURE SURGERY Right 11/2019    multiple pelvic fractures after fall from [facility]   •  SHOULDER SURGERY Right 1960    fracture surgery to remove a piece of bone- [place]   • TOTAL KNEE ARTHROPLASTY Right 5/25/2021    Procedure: knee arthroplasty total;  Surgeon: Conrad Baird MD;  Location: [facility];  Service: Orthopedics;  Laterality: Right;      General Information     Row Name 12/23/22 1251          OT Time and Intention    Document Type evaluation  -SD     Mode of Treatment occupational therapy  -SD     Row Name 12/23/22 1251          General Information    Patient Profile Reviewed yes  -SD     Prior Level of Function independent:;all household mobility;min assist:;ADL's  patient has a cane, walker, WC, BSC, SC  -SD     Existing Precautions/Restrictions fall;oxygen therapy device and L/min  -SD     Barriers to Rehab medically complex;previous functional deficit;cognitive status  -SD     Row Name 12/23/22 1251          Living Environment    People in Home spouse  -SD     Row Name 12/23/22 1251          Cognition    Orientation Status (Cognition) oriented to;person  -SD     Row Name 12/23/22 1251          Safety Issues, Functional Mobility    Safety Issues Affecting Function (Mobility) sequencing abilities;safety precautions follow-through/compliance;safety precaution awareness;positioning of assistive device;insight into deficits/self-awareness;ability to follow commands;awareness of need for assistance  -SD     Impairments Affecting Function (Mobility) balance;coordination;endurance/activity tolerance;motor control;postural/trunk control;strength  -SD           User Key  (r) = Recorded By, (t) = Taken By, (c) = Cosigned By    Initials Name Provider Type    SD Jyoti Rosario OT Occupational Therapist                 Mobility/ADL's     Row Name 12/23/22 1252          Bed Mobility    Bed Mobility supine-sit  -SD     Supine-Sit [place] (Bed Mobility) minimum assist (75% patient effort)  -SD     Assistive Device (Bed Mobility) bed rails;head of bed elevated  -SD     Row Name  12/23/22 1252          Transfers    Transfers sit-stand transfer;bed-chair transfer  -[facility] Name 12/23/22 1252          Bed-Chair Transfer    Bed-Chair [place] (Transfers) minimum assist (75% patient effort)  -SD     Assistive Device (Bed-Chair Transfers) walker, front-wheeled  -[facility] Name 12/23/22 1252          Sit-Stand Transfer    Sit-Stand [place] (Transfers) minimum assist (75% patient effort)  -SD     Assistive Device (Sit-Stand Transfers) walker, front-wheeled  -[facility] Name 12/23/22 1252          Functional Mobility    Functional Mobility- Ind. Level minimum assist (75% patient effort)  -SD     Functional Mobility- Device walker, front-wheeled  -SD     Functional Mobility-Distance (Feet) 3  -SD     Functional Mobility- Safety Issues balance decreased during turns;sequencing ability decreased;step length decreased;weight-shifting ability decreased  -SD     Row Name 12/23/22 1252          Activities of Daily Living    BADL Assessment/Intervention bathing;upper body dressing;lower body dressing;grooming;feeding;toileting  -SD     Row Name 12/23/22 1252          Bathing Assessment/Intervention    [place] Level (Bathing) upper body;moderate assist (50% patient effort);lower body;maximum assist (25% patient effort)  -SD     Row Name 12/23/22 1252          Upper Body Dressing Assessment/Training    [place] Level (Upper Body Dressing) moderate assist (50% patient effort)  -SD     Row Name 12/23/22 1252          Lower Body Dressing Assessment/Training    [place] Level (Lower Body Dressing) don;socks;maximum assist (25% patient effort)  -SD     Row Name 12/23/22 1252          Grooming Assessment/Training    [place] Level (Grooming) moderate assist (50% patient effort)  -SD     Row Name 12/23/22 1252          Self-Feeding Assessment/Training    [place] Level (Feeding) minimum assist (75% patient effort)  -SD     Row Name 12/23/22 1252          Toileting  Assessment/Training    [place] Level (Toileting) maximum assist (25% patient effort)  -SD           User Key  (r) = Recorded By, (t) = Taken By, (c) = Cosigned By    Initials Name Provider Type    Jyoti Pearl OT Occupational Therapist               Obj/Interventions     Row Name 12/23/22 1254          Range of Motion Comprehensive    General Range of Motion bilateral upper extremity ROM WFL  -SD     Comment, General Range of Motion 3-/5 - 3+/5  -SD           User Key  (r) = Recorded By, (t) = Taken By, (c) = Cosigned By    Initials Name Provider Type    Jyoti Pearl OT Occupational Therapist               Goals/Plan     Row Name 12/23/22 1304          Transfer Goal 1 (OT)    Activity/Assistive Device (Transfer Goal 1, OT) sit-to-stand/stand-to-sit;walker, rolling  -SD     [place] Level/Cues Needed (Transfer Goal 1, OT) contact guard required  -SD     Time Frame (Transfer Goal 1, OT) long term goal (LTG)  -SD     Progress/Outcome (Transfer Goal 1, OT) goal ongoing  -SD     Row Name 12/23/22 1304          Dressing Goal 1 (OT)    Activity/Device (Dressing Goal 1, OT) lower body dressing  -SD     [place]/Cues Needed (Dressing Goal 1, OT) moderate assist (50-74% patient effort)  -SD     Time Frame (Dressing Goal 1, OT) 2 weeks  -SD     Progress/Outcome (Dressing Goal 1, OT) goal ongoing  -SD     Row Name 12/23/22 1309          Toileting Goal 1 (OT)    Activity/Device (Toileting Goal 1, OT) toileting skills, all;commode, bedside without drop arms  -SD     [place] Level/Cues Needed (Toileting Goal 1, OT) moderate assist (50-74% patient effort)  -SD     Time Frame (Toileting Goal 1, OT) 2 weeks  -SD     Row Name 12/23/22 1304          Strength Goal 1 (OT)    Strength Goal 1 (OT) Patient to perform UB ther ex as tolerated  -SD     Time Frame (Strength Goal 1, OT) long term goal (LTG)  -SD     Progress/Outcome (Strength Goal 1, OT) goal ongoing  -SD     Row Name 12/23/22 1305           Therapy Assessment/Plan (OT)    Planned Therapy Interventions (OT) activity tolerance training;adaptive equipment training;BADL retraining;patient/caregiver education/training;ROM/therapeutic exercise;strengthening exercise;transfer/mobility retraining  -SD           User Key  (r) = Recorded By, (t) = Taken By, (c) = Cosigned By    Initials Name Provider Type    Jyoti Pearl OT Occupational Therapist               Clinical Impression     Row Name 12/23/22 1258          Pain Assessment    Pretreatment Pain Rating 0/10 - no pain  -SD     Posttreatment Pain Rating 0/10 - no pain  -SD     Row Name 12/23/22 1254          Plan of Care Review    Plan of Care Reviewed With patient  -SD     Progress no change  -SD     Outcome Evaluation OT eval completed. Patient supine in bed on 3.5L O2 satting 98%. O2 removed for mobility. Patient moved to EOB with min A, required mod A to down gown, max A to valerio socks. Patient completed sit to stand and walked 3' to chair using RW with min A with patient demonstrating shuffling gait. O2 satting 87% following tf to chair and placed back on 4L. Patient is expected to benefit from continued OT services prior to DC. Patient may require STR if wife is unable to provide assistance.  -SD     Row Name 12/23/22 1250          Therapy Assessment/Plan (OT)    Patient/Family Therapy Goal Statement (OT) increase strength and mobility  -SD     Rehab Potential (OT) good, to achieve stated therapy goals  -SD     Criteria for Skilled Therapeutic Interventions Met (OT) skilled treatment is necessary  -SD     Therapy Frequency (OT) 3 times/wk  5 times if indicated  -SD     Row Name 12/23/22 1255          Therapy Plan Review/Discharge Plan (OT)    Anticipated Discharge Disposition (OT) home with assist;home with home health;inpatient rehabilitation facility  -SD     Row Name 12/23/22 1258          Vital Signs    Pre SpO2 (%) 98  -SD     O2 Delivery Pre Treatment supplemental O2  -SD     Intra SpO2  (%) 87  -SD     O2 Delivery Intra Treatment room air  -SD     Post SpO2 (%) 94  -SD     O2 Delivery Post Treatment supplemental O2  -SD     Row Name 12/23/22 1254          Positioning and Restraints    Pre-Treatment Position in bed  -SD     Post Treatment Position chair  -SD     In Chair reclined;call light within reach;encouraged to call for assist;exit alarm on  -SD           User Key  (r) = Recorded By, (t) = Taken By, (c) = Cosigned By    Initials Name Provider Type    Jyoti Pearl OT Occupational Therapist               Outcome Measures     Row Name 12/23/22 1306          How much help from another is currently needed...    Putting on and taking off regular lower body clothing? 2  -SD     Bathing (including washing, rinsing, and drying) 2  -SD     Toileting (which includes using toilet bed pan or urinal) 2  -SD     Putting on and taking off regular upper body clothing 3  -SD     Taking care of personal grooming (such as brushing teeth) 3  -SD     Eating meals 3  -SD     AM-PAC 6 Clicks Score (OT) 15  -SD     Row Name 12/23/22 0830          How much help from another person do you currently need...    Turning from your back to your side while in flat bed without using bedrails? 3  -KJ     Moving from lying on back to sitting on the side of a flat bed without bedrails? 3  -KJ     Moving to and from a bed to a chair (including a wheelchair)? 2  -KJ     Standing up from a chair using your arms (e.g., wheelchair, bedside chair)? 2  -KJ     Climbing 3-5 steps with a railing? 1  -KJ     To walk in hospital room? 2  -KJ     AM-PAC 6 Clicks Score (PT) 13  -KJ     Highest level of mobility 4 --> Transferred to chair/commode  -KJ     Row Name 12/23/22 1306          Functional Assessment    Outcome Measure Options AM-PAC 6 Clicks Daily Activity (OT)  -SD           User Key  (r) = Recorded By, (t) = Taken By, (c) = Cosigned By    Initials Name Provider Type    Jyoti Pearl OT Occupational Therapist    KJ  Marisa Pollard, RN Registered Nurse                Occupational Therapy Education     Title: PT OT SLP Therapies (In Progress)     Topic: Occupational Therapy (In Progress)     Point: ADL training (Done)     Description:   Instruct learner(s) on proper safety adaptation and remediation techniques during self care or transfers.   Instruct in proper use of assistive devices.              Learning Progress Summary           Patient Acceptance, E,TB, VU by SD at 12/23/2022 1306    Comment: OT POC                   Point: Home exercise program (Not Started)     Description:   Instruct learner(s) on appropriate technique for monitoring, assisting and/or progressing therapeutic exercises/activities.              Learner Progress:  Not documented in this visit.          Point: Precautions (Not Started)     Description:   Instruct learner(s) on prescribed precautions during self-care and functional transfers.              Learner Progress:  Not documented in this visit.          Point: Body mechanics (Not Started)     Description:   Instruct learner(s) on proper positioning and spine alignment during self-care, functional mobility activities and/or exercises.              Learner Progress:  Not documented in this visit.                      User Key     Initials Effective Dates Name Provider Type Discipline    SD 06/16/21 -  Jyoti Rosario OT Occupational Therapist OT              OT Recommendation and Plan  Planned Therapy Interventions (OT): activity tolerance training, adaptive equipment training, BADL retraining, patient/caregiver education/training, ROM/therapeutic exercise, strengthening exercise, transfer/mobility retraining  Therapy Frequency (OT): 3 times/wk (5 times if indicated)  Plan of Care Review  Plan of Care Reviewed With: patient  Progress: no change  Outcome Evaluation: OT eval completed. Patient supine in bed on 3.5L O2 satting 98%. O2 removed for mobility. Patient moved to EOB with min A, required mod A  to down gown, max A to valerio socks. Patient completed sit to stand and walked 3' to chair using RW with min A with patient demonstrating shuffling gait. O2 satting 87% following tf to chair and placed back on 4L. Patient is expected to benefit from continued OT services prior to DC. Patient may require STR if wife is unable to provide assistance.     Time Calculation:    Time Calculation- OT     Row Name 12/23/22 1307             Time Calculation- OT    OT Start Time 1127  -SD      OT Received On 12/23/22  -SD      OT Goal Re-Cert Due Date 01/04/23  -SD         Untimed Charges    OT Eval/Re-eval Minutes 45  -SD         Total Minutes    Untimed Charges Total Minutes 45  -SD       Total Minutes 45  -SD            User Key  (r) = Recorded By, (t) = Taken By, (c) = Cosigned By    Initials Name Provider Type    Jyoti Pearl OT Occupational Therapist              Therapy Charges for Today     Code Description Service Date Service Provider Modifiers Qty    96479180672 HC OT EVAL LOW COMPLEXITY 3 12/23/2022 Jyoti Rosario OT GO 1               Jyoti Rosario OT  12/23/2022    Electronically signed by Jyoti Rosario OT at 12/23/22 1308     Jyoti Rosario OT at 12/23/22 1307        Goal Outcome Evaluation:  Plan of Care Reviewed With: patient        Progress: no change  Outcome Evaluation: OT eval completed. Patient supine in bed on 3.5L O2 satting 98%. O2 removed for mobility. Patient moved to EOB with min A, required mod A to down gown, max A to valerio socks. Patient completed sit to stand and walked 3' to chair using RW with min A with patient demonstrating shuffling gait. O2 satting 87% following tf to chair and placed back on 4L. Patient is expected to benefit from continued OT services prior to DC. Patient may require STR if wife is unable to provide assistance.    Electronically signed by Jyoti Rosario OT at 12/23/22 1309

## 2022-12-23 NOTE — PAYOR COMM NOTE
"TO:BC  FROM:MATT MOORE, RN PHONE [phone]  IN CLINICALS REF# [id number]    Ankur Olson (82 y.o. Male)     Date of Birth   1940    Social Security Number       Address   [address] [place] [id number]    Home Phone   [phone]    MRN   [id number]       Yarsani   Voodoo    Marital Status                               Admission Date   12/22/22    Admission Type   Emergency    Admitting Provider       Attending Provider   Lilo Cardoso DO    Department, Room/Bed   [facility] TELEMETRY 3, 326/1       Discharge Date       Discharge Disposition       Discharge Destination                               Attending Provider: Lilo Cardoso DO    Allergies: No Known Allergies    Isolation: Enh Drop/Con   Infection: COVID (confirmed) (12/22/22)   Code Status: CPR    Ht: 167.6 cm (66\")   Wt: 68.6 kg (151 lb 3.8 oz)    Admission Cmt: None   Principal Problem: Urinary tract infection without hematuria, site unspecified [N39.0]                 Active Insurance as of 12/22/2022     Primary Coverage     Payor Plan Insurance Group Employer/Plan Group    ANTHEM MEDICARE REPLACEMENT ANTHEM MEDICARE ADVANTAGE [id number]     Payor Plan Address Payor Plan Phone Number Payor Plan Fax Number Effective Dates    [address] [phone]  1/1/2021 - None Entered    [facility] [address]       Subscriber Name Subscriber Birth Date Member ID       ANKUR OLSON 1940 [id number]                 Emergency Contacts      (Rel.) Home Phone Work Phone Mobile Phone    Kianna Olson (Spouse) [phone] -- --    Elvi Moura (Daughter) -- -- [phone]               History & Physical      Pam Hallman APRN at 12/22/22 1443     Attestation signed by Lilo Cardoso DO at 12/22/22 1619    I have reviewed this documentation and agree.                    [facility] " [facility]      Name:  Ankur Camp   Age:  82 y.o.  Sex:  male  :  1940  MRN:  [id number]   Visit Number:  [id number]  Admission Date:  2022  Date Of Service:  22  Primary Care Physician:  Magaly Prabhakar APRN    Chief Complaint:     Fatigue    History Of Presenting Illness:      Patient is an 82 year old male who presents to the hospital with complaints of weakness who was sent from PCP office after being diagnosed with UTI.  Patient wife advised on arrival that patient had had generalized weakness, less appetite with weight loss over the past 2 weeks.  Patient with past health history significant for diabetes mellitus type 2, hypertension, hyperlipidemia, and Parkinson's disease.  On exam, patient is resting in bed on 4L with sats in low 90s.  Denies any urinary symptoms.  Complains of worsening shortness of breath the past 2 days and cough.  Uses a walker/cane at baseline.    Work up in the emergency department noted troponin-0.033, BNP-2194, glucose-171, creatinine-1.31 with baseline 0.8, BUN-31,  albumin-3.3, CRP-9.9, CBC unremarkable, lactate-2.9 with repeat 2.1, procalcitonin-0.24.  Urinalysis noted infection with 2+ bacteria, 31-50 WBC, +nitrites, and +3 leuks.  CXR noted hypoinflation with scattered mild atelectasis. Covid 19 testing was positive.  Hospitalist contacted for admission.    Review Of Systems:    All systems were reviewed and negative except as mentioned in history of presenting illness, assessment and plan.    Past Medical History: Patient  has a past medical history of Arthritis, Cancer (HCC), Diabetes mellitus (HCC), ED (erectile dysfunction), Enlarged prostate with lower urinary tract symptoms (LUTS), Full dentures, History of fracture of pelvis (2019), History of loss of consciousness (2019), History of rib fracture (2019), History of right shoulder fracture (), History of stress test, History of torn meniscus of right knee (), [place]  (hard of hearing), Hypertension, Laceration of head (11/14/2019), Left shoulder strain (2017), Lung injury (11/14/2019), Renal disorder, Stone in kidney, and Stroke (HCC).    Past Surgical History: Patient  has a past surgical history that includes Nephrectomy (Right); Eye surgery (Right); Colonoscopy; Hernia repair (Right); Colonoscopy (N/A, 4/8/2019); Knee arthroscopy w/ meniscectomy (Right, 05/10/2012); Shoulder surgery (Right, 1960); Pelvic fracture surgery (Right, 11/2019); Chest tube insertion (11/2019); and Total knee arthroplasty (Right, 5/25/2021).    Social History: Patient  reports that he has been smoking pipe. He has never used smokeless tobacco. He reports that he does not currently use alcohol. He reports that he does not use drugs.    Family History:  Patient's family history has been reviewed and found to be noncontributory.     Allergies:      Patient has no known allergies.    Home Medications:    Prior to Admission Medications     Prescriptions Last Dose Informant Patient Reported? Taking?    ascorbic acid (VITAMIN C) 1000 MG tablet   Yes No    Take 1,000 mg by mouth Daily.    aspirin 81 MG chewable tablet   Yes No    Chew 81 mg Daily.    atorvastatin (LIPITOR) 80 MG tablet   Yes No    Take 80 mg by mouth Daily.    bisoprolol (ZEBeta) 5 MG tablet   No No    TAKE 1 TABLET BY MOUTH DAILY.    carbidopa-levodopa (SINEMET)  MG per tablet   Yes No    Take 1 tablet by mouth 3 (Three) Times a Day.    cholecalciferol (VITAMIN D3) 25 MCG (1000 UT) tablet   Yes No    Take 1,000 Units by mouth Daily.    glucose blood test strip   Yes No    1 each by In Vitro route daily Daily testing, DX:250.00    metFORMIN (GLUCOPHAGE) 500 MG tablet  Spouse/Significant Other Yes No    Take 500 mg by mouth Daily With Breakfast.    vitamin B-12 (CYANOCOBALAMIN) 1000 MCG tablet  Spouse/Significant Other Yes No    Take 1,000 mcg by mouth Daily.        ED Medications:    Medications   sodium chloride 0.9 % flush 10 mL (has  "no administration in time range)   sodium chloride 0.9 % bolus 1,000 mL (0 mL Intravenous Stopped 12/22/22 1219)   cefTRIAXone (ROCEPHIN) IVPB 1 g/50ml dextrose (premix) (0 g Intravenous Stopped 12/22/22 1338)     Vital Signs:  Temp:  [98.4 °F (36.9 °C)] 98.4 °F (36.9 °C)  Heart Rate:  [45-61] 48  Resp:  [16] 16  BP: (134-159)/(67-78) 137/69        12/22/22  1015   Weight: 78.5 kg (173 lb)     Body mass index is 27.92 kg/m².    Physical Exam:     Most recent vital Signs: /69   Pulse (!) 48   Temp 98.4 °F (36.9 °C) (Oral)   Resp 16   Ht 167.6 cm (66\")   Wt 78.5 kg (173 lb)   SpO2 94%   BMI 27.92 kg/m²     Physical Exam  Vitals and nursing note reviewed.   Constitutional:       General: He is not in acute distress.     Appearance: He is not ill-appearing or toxic-appearing.   HENT:      Head: Normocephalic and atraumatic.      Mouth/Throat:      Mouth: Mucous membranes are moist.   Eyes:      Pupils: Pupils are equal, round, and reactive to light.   Cardiovascular:      Rate and Rhythm: Normal rate and regular rhythm.      Pulses: Normal pulses.      Heart sounds: Normal heart sounds.   Pulmonary:      Effort: Pulmonary effort is normal.      Breath sounds: Wheezing and rhonchi present.      Comments: Utilizing 4L with sats in low 90s  Abdominal:      General: Bowel sounds are normal.      Palpations: Abdomen is soft.   Musculoskeletal:         General: Normal range of motion.   Skin:     General: Skin is warm and dry.      Capillary Refill: Capillary refill takes less than 2 seconds.   Neurological:      General: No focal deficit present.      Mental Status: He is alert and oriented to person, place, and time. Mental status is at baseline.      Motor: Weakness (generalized) present.      Comments: Oriented to self and place, unsure of month or year   Psychiatric:         Mood and Affect: Mood normal.         Behavior: Behavior normal.         Thought Content: Thought content normal.         Laboratory " data:    I have reviewed the labs done in the emergency room.    Results from last 7 days   Lab Units 12/22/22  1042   SODIUM mmol/L 144   POTASSIUM mmol/L 3.7   CHLORIDE mmol/L 105   CO2 mmol/L 24.8   BUN mg/dL 31*   CREATININE mg/dL 1.31*   CALCIUM mg/dL 9.1   BILIRUBIN mg/dL 1.2   ALK PHOS U/L 94   ALT (SGPT) U/L <5   AST (SGOT) U/L 23   GLUCOSE mg/dL 171*     Results from last 7 days   Lab Units 12/22/22  1042   WBC 10*3/mm3 6.29   HEMOGLOBIN g/dL 14.8   HEMATOCRIT % 44.8   PLATELETS 10*3/mm3 148         Results from last 7 days   Lab Units 12/22/22  1042   TROPONIN T ng/mL 0.033*     Results from last 7 days   Lab Units 12/22/22  1042   PROBNP pg/mL 2,194.0*                 Results from last 7 days   Lab Units 12/22/22  1143   COLOR UA  Dark Yellow*   GLUCOSE UA  Negative   KETONES UA  Trace*   LEUKOCYTES UA  Large (3+)*   PH, URINE  5.5   BILIRUBIN UA  Small (1+)*   UROBILINOGEN UA  1.0 E.U./dL   RBC UA /HPF None Seen   WBC UA /HPF 31-50*       Pain Management Panel     Pain Management Panel Latest Ref Rng & Units 7/15/2014 7/15/2014    AMPHETAMINES SCREEN, URINE NEGATIVE Negative -    BARBITURATES SCREEN NEGATIVE Negative -    BENZODIAZEPINE SCREEN, URINE NEGATIVE Negative Negative    COCAINE SCREEN, URINE NEGATIVE Negative -    METHADONE SCREEN, URINE NEGATIVE Negative -          EKG:      Sinus bradycardia with right BBB--Abnormal    Radiology:    XR Chest 1 View    Result Date: 12/22/2022  PROCEDURE: XR CHEST 1 VW-  HISTORY: SOA  COMPARISON: 07/20/2022.  FINDINGS: The heart is normal in size. The mediastinum is unremarkable. The lungs are hypoinflated with scattered mild atelectasis. There is no pneumothorax.  There are no acute osseous abnormalities.      Hypoinflation with scattered mild atelectasis.     Images were reviewed, interpreted, and dictated by Dr. Abdiaziz Ahumada MD Transcribed by Shalom Lennon PA-C.  This report was signed and finalized on 12/22/2022 11:33 AM by Abdiaziz Ahumada  "MD.      Assessment:    1. Acute UTI  2. Acute Respiratory Failure with Hypoxia  3. Covid 19 +  4. Lactic Acidosis  5. [place]  6. Parkinsons Disease  7. Diabetes Mellitus Type 2 on oral anti-diabetics  8. Hyperlipidemia    Plan:    Admit to Med/Surg with telemetry and continuous oximetry    Acute UTI/ LAWSON/ Lactic Acidosis  -Rocephin 1GM IV for 5 days  -Follow urine culture  -Gentle IV hydration  -Follow blood cultures  -Repeat Lactate    Acute Respiratory Failure/ Covid 19+  -Supplemental oxygen to keep saturations >90%  -Albuterol inhaler  -Decadron 6mg daily as patient is hypoxic  -Procalcitonin negative  -Supportive care   -PT/OT evaluations for weakness    Diabetes Mellitus  -Cover with sliding scale  -Will check A1c    Chronic-  -Home medications as reconciled    Patient is a full code on admission    Risk Assessment: Moderate due to co-morbidities  DVT Prophylaxis: Lovenox  Code Status: Full Code  Diet: Carb Consistent    Advance Care Planning   ACP discussion was held with the patient during this visit. Patient has an advance directive in EMR which is still valid.       Pam Hallman, APRN  12/22/22  14:44 EST    Dictated utilizing Dragon dictation.    Electronically signed by Lilo Cardoso DO at 12/22/22 1619          Emergency Department Notes      Jeremy Huber MD at 12/22/22 1039          Subjective   History of Present Illness  82-year-old male presenting with weakness.  Patient states that he feels fine and nothing is wrong, states that \"they made me come\".  Patient's wife provides most of the history, she states that for last 2 weeks patient has been generally weak, has had no appetite, has lost weight.  Patient denies any pain, shortness of breath, fevers, vomiting, diarrhea.  Patient's wife notes that he was seen by his primary doctor and was diagnosed with a UTI today, has not started antibiotics.        Review of Systems   Constitutional: Positive for appetite change, fatigue and " unexpected weight change.   HENT: Negative.    Eyes: Negative.    Respiratory: Negative.    Cardiovascular: Negative.    Gastrointestinal: Negative.    Genitourinary: Negative.    Musculoskeletal: Negative.    Skin: Negative.    Neurological: Negative.    Psychiatric/Behavioral: Negative.        Past Medical History:   Diagnosis Date   • Arthritis    • Cancer (HCC)     kidney/ prostate   • Diabetes mellitus (HCC)    • ED (erectile dysfunction)    • Enlarged prostate with lower urinary tract symptoms (LUTS)    • Full dentures    • History of fracture of pelvis 11/14/2019    after fall from ladder   • History of loss of consciousness 11/14/2019    after fall from ladder-flown to [facility]   • History of rib fracture 11/14/2019    after fall from ladder   • History of right shoulder fracture 1960    walking up hill, fell on gravel   • History of stress test    • History of torn meniscus of right knee 2012   • [place] (hard of hearing)     Bilateral hearing aids    • Hypertension    • Laceration of head 11/14/2019    after fall from ladder-treated at [facility]   • Left shoulder strain 2017    after fall from ladder   • Lung injury 11/14/2019    punctured right lung after falling off ladder   • Renal disorder    • Stone in kidney    • Stroke (HCC)        No Known Allergies    Past Surgical History:   Procedure Laterality Date   • CHEST TUBE INSERTION  11/2019    rib fractures, punctured lung after fall from [facility]   • COLONOSCOPY     • COLONOSCOPY N/A 4/8/2019    Procedure: COLONOSCOPY;  Surgeon: Onesimo Arnold MD;  Location: [facility] ENDOSCOPY;  Service: Gastroenterology   • EYE SURGERY Right     cataract removal with IOL implant   • HERNIA REPAIR Right     multiple inguinal hernia repairs   • KNEE ARTHROSCOPY W/ MENISCECTOMY Right 05/10/2012    Partial medial and lateral menisectomy-Conrad Baird MD   • NEPHRECTOMY Right     partial right nephrectomy due to kidney stone    • PELVIC FRACTURE SURGERY Right 11/2019    multiple pelvic fractures after fall from ladder-[facility]   • SHOULDER SURGERY Right 1960    fracture surgery to remove a piece of bone- [place], [place]   • TOTAL KNEE ARTHROPLASTY Right 5/25/2021    Procedure: knee arthroplasty total;  Surgeon: Conrad Baird MD;  Location: [facility];  Service: Orthopedics;  Laterality: Right;       Family History   Problem Relation Age of Onset   • Breast cancer Mother    • Cancer Sister    • Cancer Brother    • Dementia Brother    • No Known Problems Brother    • No Known Problems Brother        Social History     Socioeconomic History   • Marital status:    Tobacco Use   • Smoking status: Every Day     Types: Pipe   • Smokeless tobacco: Never   • Tobacco comments:     smokes his pipe daily   Vaping Use   • Vaping Use: Never used   Substance and Sexual Activity   • Alcohol use: Not Currently   • Drug use: No   • Sexual activity: Defer           Objective   Physical Exam  Vitals reviewed.   Constitutional:       General: He is not in acute distress.     Appearance: Normal appearance. He is not ill-appearing, toxic-appearing or diaphoretic.   HENT:      Head: Normocephalic and atraumatic.      Right Ear: External ear normal.      Left Ear: External ear normal.      Nose: Nose normal.   Eyes:      Extraocular Movements: Extraocular movements intact.   Cardiovascular:      Rate and Rhythm: Normal rate and regular rhythm.      Pulses: Normal pulses.      Heart sounds: Normal heart sounds.   Pulmonary:      Effort: Pulmonary effort is normal. No respiratory distress.      Breath sounds: Normal breath sounds.   Abdominal:      General: Bowel sounds are normal. There is no distension.      Tenderness: There is no abdominal tenderness.   Musculoskeletal:         General: No swelling, tenderness or deformity. Normal range of motion.      Cervical back: Normal range of motion and neck supple.   Skin:     General: Skin  is warm and dry.      Capillary Refill: Capillary refill takes less than 2 seconds.      Findings: No rash.   Neurological:      General: No focal deficit present.      Mental Status: He is alert and oriented to person, place, and time.   Psychiatric:         Mood and Affect: Mood normal.         Behavior: Behavior normal.         Procedures          ED Course                                           MDM  Number of Diagnoses or Management Options  Acute renal insufficiency  Acute respiratory failure with hypoxia (HCC)  COVID-19  Lactic acidosis  Urinary tract infection without hematuria, site unspecified  Diagnosis management comments: 82-year-old male with general weakness, weight loss.  Well-developed, well-nourished elderly man in no distress with exam as above.  His vital signs are normal.  His exam is nonfocal.  Will obtain basic labs, EKG and chest x-ray.  Will give IV fluid bolus.  Disposition pending.    DDx: UTI, pneumonia, viral illness, electrolyte abnormality, dehydration    EKG interpreted by me: Sinus bradycardia, right bundle branch block, no acute ST/T changes, this is an abnormal EKG    Labs are notable for lactic acidosis, elevated troponin, renal insufficiency.  Urinalysis consistent with infection.  Viral panel positive for COVID.  Patient requiring oxygen to maintain his saturations.  Discussed with Dr. Cardoso for admission.       Amount and/or Complexity of Data Reviewed  Decide to obtain previous medical records or to obtain history from someone other than the patient: yes        Final diagnoses:   Urinary tract infection without hematuria, site unspecified   COVID-19   Acute respiratory failure with hypoxia (HCC)   Lactic acidosis   Acute renal insufficiency          Jeremy Huber MD  12/22/22 1251      Electronically signed by Jeremy Huber MD at 12/22/22 1251       Vital Signs (last day)     Date/Time Temp Temp src Pulse Resp BP Patient Position SpO2    12/23/22 0950 --  -- 60 -- -- -- --    12/23/22 0724 99 (37.2) Axillary 58 18 167/97 Lying 89    12/23/22 0521 99.9 (37.7) Axillary 64 18 155/71 Lying 89    12/23/22 0058 99.7 (37.6) Oral 65 18 157/73 Lying 90    12/22/22 2028 100 (37.8) Axillary 59 18 151/82 Lying 93    12/22/22 1905 -- -- 54 -- -- -- 92    12/22/22 1653 -- -- 51 -- -- -- 94    12/22/22 1611 99.1 (37.3) Oral -- 18 162/90 Lying --    12/22/22 1432 -- -- 48 -- -- -- 94    12/22/22 1417 -- -- 61 -- -- -- 96    12/22/22 1408 -- -- 47 -- -- -- 95    12/22/22 1402 -- -- 56 -- 137/69 -- 94    12/22/22 1347 -- -- 56 -- -- -- 93    12/22/22 1338 -- -- 56 -- -- -- 91    12/22/22 1332 -- -- 50 -- -- -- 96    12/22/22 1330 -- -- 56 -- 142/69 -- 96    12/22/22 1317 -- -- 56 -- -- -- 95    12/22/22 1308 -- -- 55 -- -- -- 95    12/22/22 1302 -- -- 52 -- 139/75 -- 96    12/22/22 1247 -- -- 54 -- -- -- 96    12/22/22 1238 -- -- 55 -- -- -- 95    12/22/22 1232 -- -- 53 -- 159/73 -- 96    12/22/22 1217 -- -- 58 -- -- -- 97    12/22/22 1208 -- -- 52 -- -- -- 97    12/22/22 1202 -- -- 55 -- 151/78 -- 97    12/22/22 1147 -- -- 48 -- -- -- 98    12/22/22 1138 -- -- 57 -- -- -- 99    12/22/22 1132 -- -- 53 -- 139/78 -- 96    12/22/22 1117 -- -- 48 -- -- -- 97    12/22/22 1108 -- -- 48 -- -- -- 100    12/22/22 1102 -- -- 45 -- 134/67 -- 95    12/22/22 1047 -- -- 51 -- -- -- 96    12/22/22 1038 -- -- 51 -- 152/77 -- 88    12/22/22 1032 -- -- 58 -- -- -- 92    12/22/22 1015 98.4 (36.9) Oral 58 16 142/77 Sitting 90            Current Facility-Administered Medications   Medication Dose Route Frequency Provider Last Rate Last Admin   • acetaminophen (TYLENOL) tablet 650 mg  650 mg Oral Q4H PRN Pam Hallman APRN       • aspirin chewable tablet 81 mg  81 mg Oral Daily Pam Hallman APRN   81 mg at 12/23/22 0950   • atorvastatin (LIPITOR) tablet 80 mg  80 mg Oral Daily Pam Hallman APRN   80 mg at 12/23/22 0951   • sennosides-docusate (PERICOLACE) 8.6-50 MG per tablet 2 tablet  2  tablet Oral BID Pam Hallman APRN   2 tablet at 12/23/22 0950    And   • polyethylene glycol (MIRALAX) packet 17 g  17 g Oral Daily PRN Pam Hallman APRISAIAH        And   • bisacodyl (DULCOLAX) EC tablet 5 mg  5 mg Oral Daily PRN Pam Hallman APRISAIAH        And   • bisacodyl (DULCOLAX) suppository 10 mg  10 mg Rectal Daily PRN Pam Hallman APRISAIAH       • bisoprolol (ZEBeta) tablet 5 mg  5 mg Oral Daily Pam Hallman APRN   5 mg at 12/23/22 0951   • carbidopa-levodopa (SINEMET)  MG per tablet 1 tablet  1 tablet Oral TID Pam Hallman APRN   1 tablet at 12/23/22 0951   • dexamethasone (DECADRON) tablet 6 mg  6 mg Oral Daily With Breakfast Pam Hallman APRN   6 mg at 12/23/22 0951   • dextrose (D50W) (25 g/50 mL) IV injection 25 g  25 g Intravenous Q15 Min PRN Pam Hallman APRISAIAH       • dextrose (GLUTOSE) oral gel 15 g  15 g Oral Q15 Min PRN Pam Hallman APRISAIAH       • Enoxaparin Sodium (LOVENOX) syringe 40 mg  40 mg Subcutaneous Nightly Pam Hallman APRN   40 mg at 12/22/22 2134   • glucagon (human recombinant) (GLUCAGEN DIAGNOSTIC) injection 1 mg  1 mg Intramuscular Q15 Min PRN Pam Hallman APRISAIAH       • Insulin Aspart (novoLOG) injection 0-9 Units  0-9 Units Subcutaneous TID AC Pam Hallman, APRN       • ondansetron (ZOFRAN) tablet 4 mg  4 mg Oral Q6H PRN Pam Hallman APRISAIAH        Or   • ondansetron (ZOFRAN) injection 4 mg  4 mg Intravenous Q6H PRN Pam Hallman APRISAIAH       • Pharmacy to Dose enoxaparin (LOVENOX)   Does not apply Continuous PRN Pam Hallman APRISAIAH       • Potassium Replacement - Initiate Nurse/BPA Driven Protocol   Does not apply PRN Pam Hallman APRISAIAH       • sodium chloride 0.9 % flush 10 mL  10 mL Intravenous PRN Jeremy Huber MD       • sodium chloride 0.9 % flush 10 mL  10 mL Intravenous Q12H Pam Hallman APRN   10 mL at 12/23/22 0953   • sodium chloride 0.9 % flush 10 mL  10 mL Intravenous PRN Lexx  JEANETTE Hook   10 mL at 12/22/22 1634       Lab Results (last 24 hours)     Procedure Component Value Units Date/Time    POC Glucose Once [[id number]]  (Normal) Collected: 12/23/22 0617    Specimen: Blood Updated: 12/23/22 0622     Glucose 128 mg/dL      Comment: Serial Number: [id number]:  [id number]       Basic Metabolic Panel [[id number]]  (Abnormal) Collected: 12/23/22 0303    Specimen: Blood Updated: 12/23/22 0338     Glucose 147 mg/dL      BUN 24 mg/dL      Creatinine 0.86 mg/dL      Sodium 144 mmol/L      Potassium 3.4 mmol/L      Chloride 109 mmol/L      CO2 24.8 mmol/L      Calcium 8.0 mg/dL      BUN/Creatinine Ratio 27.9     Anion Gap 10.2 mmol/L      eGFR 87.0 mL/min/1.73      Comment: National Kidney Foundation and American Society of Nephrology (ASN) Task Force recommended calculation based on the Chronic Kidney Disease Epidemiology Collaboration (CKD-EPI) equation refit without adjustment for race.       Narrative:      GFR Normal >60  Chronic Kidney Disease <60  Kidney Failure <15    The GFR formula is only valid for adults with stable renal function between ages 18 and 70.    STAT Lactic Acid, Reflex [[id number]]  (Normal) Collected: 12/23/22 0303    Specimen: Blood Updated: 12/23/22 0334     Lactate 1.7 mmol/L     CBC Auto Differential [[id number]]  (Abnormal) Collected: 12/23/22 0303    Specimen: Blood Updated: 12/23/22 0317     WBC 5.16 10*3/mm3      RBC 4.34 10*6/mm3      Hemoglobin 13.6 g/dL      Hematocrit 40.1 %      MCV 92.4 fL      MCH 31.3 pg      MCHC 33.9 g/dL      RDW 13.0 %      RDW-SD 44.4 fl      MPV 11.7 fL      Platelets 141 10*3/mm3      Neutrophil % 78.7 %      Lymphocyte % 15.1 %      Monocyte % 5.6 %      Eosinophil % 0.0 %      Basophil % 0.2 %      Immature Grans % 0.4 %      Neutrophils, Absolute 4.06 10*3/mm3      Lymphocytes, Absolute 0.78 10*3/mm3      Monocytes, Absolute 0.29 10*3/mm3      Eosinophils, Absolute 0.00 10*3/mm3      Basophils, Absolute 0.01 10*3/mm3       Immature Grans, Absolute 0.02 10*3/mm3      nRBC 0.0 /100 WBC     STAT Lactic Acid, Reflex [[id number]]  (Normal) Collected: 12/22/22 2026    Specimen: Blood Updated: 12/22/22 2106     Lactate 2.0 mmol/L     POC Glucose Once [[id number]]  (Abnormal) Collected: 12/22/22 2039    Specimen: Blood Updated: 12/22/22 2048     Glucose 154 mg/dL      Comment: Serial Number: [id number]:  [id number]       STAT Lactic Acid, Reflex [[id number]]  (Normal) Collected: 12/22/22 1718    Specimen: Blood from Arm, Right Updated: 12/22/22 1844     Lactate 2.0 mmol/L     POC Glucose Once [[id number]]  (Abnormal) Collected: 12/22/22 1629    Specimen: Blood Updated: 12/22/22 1632     Glucose 135 mg/dL      Comment: Serial Number: [id number]:  [id number]       Hemoglobin A1c [[id number]]  (Abnormal) Collected: 12/22/22 1042    Specimen: Blood Updated: 12/22/22 1618     Hemoglobin A1C 8.10 %     Narrative:      Hemoglobin A1C Ranges:    Increased Risk for Diabetes  5.7% to 6.4%  Diabetes                     >= 6.5%  Diabetic Goal                < 7.0%    STAT Lactic Acid, Reflex [[id number]]  (Abnormal) Collected: 12/22/22 1340    Specimen: Blood Updated: 12/22/22 1449     Lactate 2.1 mmol/L     Blood Culture - Blood, Arm, Right [[id number]] Collected: 12/22/22 1340    Specimen: Blood from Arm, Right Updated: 12/22/22 1351    Blood Culture - Blood, Arm, Right [[id number]] Collected: 12/22/22 1225    Specimen: Blood from Arm, Right Updated: 12/22/22 1240    Urinalysis, Microscopic Only - Urine, Clean Catch [[id number]]  (Abnormal) Collected: 12/22/22 1143    Specimen: Urine, Clean Catch Updated: 12/22/22 1230     RBC, UA None Seen /HPF      WBC, UA 31-50 /HPF      Bacteria, UA 2+ /HPF      Squamous Epithelial Cells, UA 0-2 /HPF      Hyaline Casts, UA 0-2 /LPF      RBC Casts 0-2 /LPF      Methodology Manual Light Microscopy    Urine Culture - Urine, Urine, Clean Catch [[id number]] Collected: 12/22/22 1143    Specimen: Urine, Clean Catch  "Updated: 12/22/22 1230    Procalcitonin [[id number]]  (Normal) Collected: 12/22/22 1150    Specimen: Blood Updated: 12/22/22 1223     Procalcitonin 0.24 ng/mL     Narrative:      As a Marker for Sepsis (Non-Neonates):    1. <0.5 ng/mL represents a low risk of severe sepsis and/or septic shock.  2. >2 ng/mL represents a high risk of severe sepsis and/or septic shock.    As a Marker for Lower Respiratory Tract Infections that require antibiotic therapy:    PCT on Admission    Antibiotic Therapy       6-12 Hrs later    >0.5                Strongly Recommended  >0.25 - <0.5        Recommended   0.1 - 0.25          Discouraged              Remeasure/reassess PCT  <0.1                Strongly Discouraged     Remeasure/reassess PCT    As 28 day mortality risk marker: \"Change in Procalcitonin Result\" (>80% or <=80%) if Day 0 (or Day 1) and Day 4 values are available. Refer to http://[email or web address]-pct-calculator.[email or web address]    Change in PCT <=80%  A decrease of PCT levels below or equal to 80% defines a positive change in PCT test result representing a higher risk for 28-day all-cause mortality of patients diagnosed with severe sepsis for septic shock.    Change in PCT >80%  A decrease of PCT levels of more than 80% defines a negative change in PCT result representing a lower risk for 28-day all-cause mortality of patients diagnosed with severe sepsis or septic shock.       Urinalysis With Culture If Indicated - Urine, Clean Catch [[id number]]  (Abnormal) Collected: 12/22/22 1143    Specimen: Urine, Clean Catch Updated: 12/22/22 1202     Color, UA Dark Yellow     Appearance, UA Cloudy     pH, UA 5.5     Specific Gravity, UA 1.020     Glucose, UA Negative     Ketones, UA Trace     Bilirubin, UA Small (1+)     Blood, UA Negative     Protein, UA 30 mg/dL (1+)     Leuk Esterase, UA Large (3+)     Nitrite, UA Positive     Urobilinogen, UA 1.0 E.U./dL    Narrative:      In absence of clinical symptoms, the presence of pyuria, bacteria, and/or " nitrites on the urinalysis result does not correlate with infection.    C-reactive Protein [[id number]]  (Abnormal) Collected: 12/22/22 1042    Specimen: Blood Updated: 12/22/22 1157     C-Reactive Protein 9.91 mg/dL     [place] Draw [[id number]] Collected: 12/22/22 1042    Specimen: Blood Updated: 12/22/22 1146    Narrative:      The following orders were created for panel order [place] Draw.  Procedure                               Abnormality         Status                     ---------                               -----------         ------                     Green Top (Gel)[[id number]]                                  Final result               Lavender Top[[id number]]                                     Final result               Gold Top - SST[[id number]]                                   Final result               Light Blue Top[[id number]]                                   Final result                 Please view results for these tests on the individual orders.    Lavender Top [[id number]] Collected: 12/22/22 1042    Specimen: Blood Updated: 12/22/22 1146     Extra Tube hold for add-on     Comment: Auto resulted       Gold Top - SST [[id number]] Collected: 12/22/22 1042    Specimen: Blood Updated: 12/22/22 1146     Extra Tube Hold for add-ons.     Comment: Auto resulted.       Light Blue Top [[id number]] Collected: 12/22/22 1042    Specimen: Blood Updated: 12/22/22 1146     Extra Tube Hold for add-ons.     Comment: Auto resulted       Green Top (Gel) [[id number]] Collected: 12/22/22 1042    Specimen: Blood Updated: 12/22/22 1145     Extra Tube Hold for add-ons.     Comment: Auto resulted.       Troponin [[id number]]  (Abnormal) Collected: 12/22/22 1042    Specimen: Blood Updated: 12/22/22 1131     Troponin T 0.033 ng/mL     Narrative:      Troponin T Reference Range:  <= 0.03 ng/mL-   Negative for AMI  >0.03 ng/mL-     Abnormal for myocardial necrosis.  Clinicians would have to utilize clinical acumen, EKG,  Troponin and serial changes to determine if it is an Acute Myocardial Infarction or myocardial injury due to an underlying chronic condition.       Results may be falsely decreased if patient taking Biotin.      BNP [[id number]]  (Abnormal) Collected: 12/22/22 1042    Specimen: Blood Updated: 12/22/22 1130     proBNP 2,194.0 pg/mL     Narrative:      Among patients with dyspnea, NT-proBNP is highly sensitive for the detection of acute congestive heart failure. In addition NT-proBNP of <300 pg/ml effectively rules out acute congestive heart failure with 99% negative predictive value.    Results may be falsely decreased if patient taking Biotin.      Lactic Acid, Plasma [[id number]]  (Abnormal) Collected: 12/22/22 1044    Specimen: Blood Updated: 12/22/22 1130     Lactate 2.9 mmol/L     Comprehensive Metabolic Panel [[id number]]  (Abnormal) Collected: 12/22/22 1042    Specimen: Blood Updated: 12/22/22 1123     Glucose 171 mg/dL      BUN 31 mg/dL      Creatinine 1.31 mg/dL      Sodium 144 mmol/L      Potassium 3.7 mmol/L      Comment: Slight hemolysis detected by analyzer. Results may be affected.        Chloride 105 mmol/L      CO2 24.8 mmol/L      Calcium 9.1 mg/dL      Total Protein 6.9 g/dL      Albumin 3.30 g/dL      ALT (SGPT) <5 U/L      AST (SGOT) 23 U/L      Comment: Slight hemolysis detected by analyzer. Results may be affected.        Alkaline Phosphatase 94 U/L      Total Bilirubin 1.2 mg/dL      Globulin 3.6 gm/dL      A/G Ratio 0.9 g/dL      BUN/Creatinine Ratio 23.7     Anion Gap 14.2 mmol/L      eGFR 54.7 mL/min/1.73      Comment: National Kidney Foundation and American Society of Nephrology (ASN) Task Force recommended calculation based on the Chronic Kidney Disease Epidemiology Collaboration (CKD-EPI) equation refit without adjustment for race.       Narrative:      GFR Normal >60  Chronic Kidney Disease <60  Kidney Failure <15    The GFR formula is only valid for adults with stable renal function  between ages 18 and 70.    COVID-19 and FLU A/B PCR - Swab, Nasopharynx [[id number]]  (Abnormal) Collected: 12/22/22 1045    Specimen: Swab from Nasopharynx Updated: 12/22/22 1117     COVID19 Detected     Influenza A PCR Not Detected     Influenza B PCR Not Detected    Narrative:      Fact sheet for providers: https://www.fda.gov/media/335331/download    Fact sheet for patients: https://www.fda.gov/media/089711/download    Test performed by PCR.  Influenza A and Influenza B negative results should be considered presumptive in samples that have a positive SARS-CoV-2 result.    Competitive inhibition studies showed that SARS-CoV-2 virus, when present at concentrations above 3.6E+04 copies/mL, can inhibit the detection and amplification of influenza A and influenza B virus RNA if present at or below 1.8E+02 copies/mL or 4.9E+02 copies/mL, respectively, and may lead to false negative influenza virus results. If co-infection with influenza A or influenza B virus is suspected in samples with a positive SARS-CoV-2 result, the sample should be re-tested with another FDA cleared, approved, or authorized influenza test, if influenza virus detection would change clinical management.    CBC & Differential [[id number]]  (Abnormal) Collected: 12/22/22 1042    Specimen: Blood Updated: 12/22/22 1055    Narrative:      The following orders were created for panel order CBC & Differential.  Procedure                               Abnormality         Status                     ---------                               -----------         ------                     CBC Auto Differential[[id number]]        Abnormal            Final result                 Please view results for these tests on the individual orders.    CBC Auto Differential [[id number]]  (Abnormal) Collected: 12/22/22 1042    Specimen: Blood Updated: 12/22/22 1055     WBC 6.29 10*3/mm3      RBC 4.82 10*6/mm3      Hemoglobin 14.8 g/dL      Hematocrit 44.8 %      MCV 92.9 fL       MCH 30.7 pg      MCHC 33.0 g/dL      RDW 13.2 %      RDW-SD 44.7 fl      MPV 12.1 fL      Platelets 148 10*3/mm3      Neutrophil % 79.1 %      Lymphocyte % 14.1 %      Monocyte % 6.2 %      Eosinophil % 0.0 %      Basophil % 0.0 %      Immature Grans % 0.6 %      Neutrophils, Absolute 4.97 10*3/mm3      Lymphocytes, Absolute 0.89 10*3/mm3      Monocytes, Absolute 0.39 10*3/mm3      Eosinophils, Absolute 0.00 10*3/mm3      Basophils, Absolute 0.00 10*3/mm3      Immature Grans, Absolute 0.04 10*3/mm3      nRBC 0.0 /100 WBC         Imaging Results (Last 24 Hours)     Procedure Component Value Units Date/Time    XR Chest 1 View [[id number]] Collected: 12/22/22 1132     Updated: 12/22/22 1135    Narrative:      PROCEDURE: XR CHEST 1 VW-     HISTORY: SOA      COMPARISON: 07/20/2022.     FINDINGS: The heart is normal in size. The mediastinum is unremarkable.  The lungs are hypoinflated with scattered mild atelectasis. There is no  pneumothorax.  There are no acute osseous abnormalities.       Impression:      Hypoinflation with scattered mild atelectasis.              Images were reviewed, interpreted, and dictated by Dr. Abdiaziz Ahumada MD  Transcribed by Shalom Lennon PA-C.     This report was signed and finalized on 12/22/2022 11:33 AM by Abdiaziz Ahumada MD.        Physician Progress Notes (last 24 hours)  Notes from 12/22/22 0956 through 12/23/22 0956   No notes of this type exist for this encounter.         Consult Notes (last 24 hours)  Notes from 12/22/22 0956 through 12/23/22 0956   No notes of this type exist for this encounter.

## 2022-12-23 NOTE — PROGRESS NOTES
[facility]    Name:  Ankur Camp   Age:  82 y.o.  Sex:  male  :  1940  MRN:  [id number]   Visit Number:  [id number]  Admission Date:  2022  Date Of Service:  22  Primary Care Physician:  Magaly Prabhakar APRN     LOS: 1 day :    Chief Complaint:      Fatigue    Subjective:    Patient seen and examined at bedside this morning.  He is pleasant in conversation.  He tells me he is at the hospital, unsure of year or why he is here.  Updated that he has UTI and Covid.  He has no complaints on exam.  Per case management patient previously ambulatory with walker but since being ill required assistance with ambulation.  He has no complaints on exam.  He is unlabored on 3L at rest with no baseline requirement.    Hospital Course:    Patient is an 82 year old male who presents to the hospital with complaints of weakness who was sent from PCP office after being diagnosed with UTI.  Patient wife advised on arrival that patient had had generalized weakness, less appetite with weight loss over the past 2 weeks.  Patient with past health history significant for diabetes mellitus type 2, hypertension, hyperlipidemia, and Parkinson's disease.  On exam, patient is resting in bed on 4L with sats in low 90s.  Denies any urinary symptoms.  Complains of worsening shortness of breath the past 2 days and cough.  Uses a walker/cane at baseline.     Work up in the emergency department noted troponin-0.033, BNP-2194, glucose-171, creatinine-1.31 with baseline 0.8, BUN-31,  albumin-3.3, CRP-9.9, CBC unremarkable, lactate-2.9 with repeat 2.1, procalcitonin-0.24.  Urinalysis noted infection with 2+ bacteria, 31-50 WBC, +nitrites, and +3 leuks.  CXR noted hypoinflation with scattered mild atelectasis. Covid 19 testing was positive.     Admitted to the hospital and continued on Rocephin for UTI.  Started on Decadron for Covid 19.  PT/OT consulted for evaluation of  weakness.    Review of Systems:     All systems were reviewed and negative except as mentioned in subjective, assessment and plan.    Vital Signs:    Temp:  [98.9 °F (37.2 °C)-100 °F (37.8 °C)] 98.9 °F (37.2 °C)  Heart Rate:  [47-65] 48  Resp:  [18] 18  BP: (137-167)/(69-97) 156/74    Intake and output:    I/O last 3 completed shifts:  In: 1170 [P.O.:120; IV Piggyback:1050]  Out: -   No intake/output data recorded.    Physical Examination:    General Appearance:  Alert and cooperative, pleasant elderly male, no acute distress on exam   Head:  Atraumatic and normocephalic.   Eyes: Conjunctivae and sclerae normal, no icterus. No pallor.   Throat: No oral lesions, no thrush, oral mucosa moist.   Neck: Supple, trachea midline   Lungs:   Breath sounds heard bilaterally equally but diminished throughout.  Coarse breath sounds noted.  No wheezing or crackles. Unlabored on 3L   Heart:  Normal S1 and S2, no murmur, no gallop, no rub. No JVD.   Abdomen:   Normal bowel sounds, no masses, no organomegaly. Soft, nontender, nondistended, no rebound tenderness.   Extremities: Supple, no edema, no cyanosis, no clubbing.   Skin: No bleeding or rash.   Neurologic: Alert and oriented x 3. No facial asymmetry. Moves all four limbs. No tremors. Generalized weakness present.     Laboratory results:    Results from last 7 days   Lab Units 12/23/22  0303 12/22/22  1042   SODIUM mmol/L 144 144   POTASSIUM mmol/L 3.4* 3.7   CHLORIDE mmol/L 109* 105   CO2 mmol/L 24.8 24.8   BUN mg/dL 24* 31*   CREATININE mg/dL 0.86 1.31*   CALCIUM mg/dL 8.0* 9.1   BILIRUBIN mg/dL  --  1.2   ALK PHOS U/L  --  94   ALT (SGPT) U/L  --  <5   AST (SGOT) U/L  --  23   GLUCOSE mg/dL 147* 171*     Results from last 7 days   Lab Units 12/23/22  0303 12/22/22  1042   WBC 10*3/mm3 5.16 6.29   HEMOGLOBIN g/dL 13.6 14.8   HEMATOCRIT % 40.1 44.8   PLATELETS 10*3/mm3 141 148         Results from last 7 days   Lab Units 12/22/22  1042   TROPONIN T ng/mL 0.033*         No  results for input(s): PHART, FTZ5IUM, PO2ART, BML5OES, BASEEXCESS in the last 8760 hours.   I have reviewed the patient's laboratory results.    Radiology results:    XR Chest 1 View    Result Date: 12/22/2022  PROCEDURE: XR CHEST 1 VW-  HISTORY: SOA  COMPARISON: 07/20/2022.  FINDINGS: The heart is normal in size. The mediastinum is unremarkable. The lungs are hypoinflated with scattered mild atelectasis. There is no pneumothorax.  There are no acute osseous abnormalities.      Impression: Hypoinflation with scattered mild atelectasis.     Images were reviewed, interpreted, and dictated by Dr. Abdiaziz Ahumada MD Transcribed by Shalom Lennon PA-C.  This report was signed and finalized on 12/22/2022 11:33 AM by Abdiaziz Ahumada MD.    I have reviewed the patient's radiology reports.    Medication Review:     I have reviewed the patient's active and prn medications.     Problem List:      Urinary tract infection without hematuria, site unspecified      Assessment:    1. Acute UTI  2. Acute Respiratory Failure with Hypoxia  3. Covid 19 +  4. Lactic Acidosis  5. LAWSON  6. Parkinsons Disease  7. Diabetes Mellitus Type 2 on oral anti-diabetics  8. Hyperlipidemia    Plan:    Acute UTI/ LAWSON/ Lactic Acidosis  -Rocephin 1GM IV for 5 days  -Urine culture pending  -LAWSON resolved with IV fluids  -Follow blood cultures  -Repeat Lactate-normal     Acute Respiratory Failure/ Covid 19+  -Supplemental oxygen to keep saturations >90%--currently requiring 3L with saturations around 92%  -Albuterol inhaler  -Decadron 6mg daily as patient is hypoxic  -Procalcitonin negative  -Supportive care   -PT/OT evaluations for weakness today--home health vs STR     Diabetes Mellitus  -On oral anti-diabetics at home  -Cover with sliding scale  -A1c-8     Chronic-  -Home medications as reconciled    Further course after PT/OT evaluations.  Patient will likely need STR.    DVT Prophylaxis: Lovenox  Code Status: Full Code  Diet: Carb Consistent  Discharge Plan:  2-3 days    Pam Hallman, APRN  12/23/22  12:32 EST    Dictated utilizing Dragon dictation.

## 2022-12-23 NOTE — CASE MANAGEMENT/SOCIAL WORK
Discharge Planning Assessment  [facility]     Patient Name: Ankur Camp  MRN: [id number]  Today's Date: 2022    Admit Date: 2022    Plan: CM spoke w/patient @ bedside.  He provided some information but was confused regarding place and situation.  I did speak w/spouse, Kianna to finish DCP.  I confirmed pts name, , address and phone number.  PCP is Magaly Prabhakar NP and they use [facility] for pharmacy.  Pt does have Living Will in EMR.  Spouse will bring POA documents when possible.  Pt lives w/spouse.  Prior to illness pt able to ambulate w/walker, he did need assistance bathing, and dressing.  He is dependent for medication management, housekeeping, laundry and shopping.  He now needs assistance w/ambulation to get up to walker, toileting, bathing and dressing.  Pt has w/c, straight cane, rolling walker, shower chair and bsc that were obtained from family.  He is currently seen by [facility].  No financial concerns @ this time.  STR vs. home w/family and HH.  No barriers to d/c verbalized by spouse.  CM contact info given to spouse and written on whiteboard.   Discharge Needs Assessment     Row Name 22 0957       Living Environment    People in Home spouse    Name(s) of People in Home ora Roth    Current Living Arrangements home    Potentially Unsafe Housing Conditions unable to assess    Primary Care Provided by spouse/significant other    Provides Primary Care For no one, unable/limited ability to care for self    Family Caregiver if Needed spouse    Family Caregiver Names Kianna Camp, ora    Quality of Family Relationships helpful;involved    Able to Return to Prior Arrangements yes       Resource/Environmental Concerns    Resource/Environmental Concerns none    Transportation Concerns none       Transition Planning    Patient/Family Anticipates Transition to home with family;inpatient rehabilitation facility    Patient/Family Anticipated Services at  Transition rehabilitation services;home health care  Patient's spouse is not sure if pt will need STR vs. home    Transportation Anticipated family or friend will provide       Discharge Needs Assessment    Readmission Within the Last 30 Days no previous admission in last 30 days    Current Outpatient/Agency/Support Group homecare agency  [facility]    Equipment Currently Used at Home wheelchair;cane, straight;walker, rolling;commode;shower chair    Concerns to be Addressed discharge planning    Anticipated Changes Related to Illness inability to care for self    Equipment Needed After Discharge oxygen;pulse ox    Discharge Facility/Level of Care Needs nursing facility, skilled    Provided Post Acute Provider List? N/A    Provided Post Acute Provider Quality & Resource List? N/A    Current Discharge Risk dependent with mobility/activities of daily living               Discharge Plan     Row Name 22 1002       Plan    Plan CM spoke w/patient @ bedside.  He provided some information but was confused regarding place and situation.  I did speak w/spouse, Kianna to finish DCP.  I confirmed pts name, , address and phone number.  PCP is Magaly Prabhakar NP and they use [facility] for pharmacy.  Pt does have Living Will in EMR.  Spouse will bring POA documents when possible.  Pt lives w/spouse.  Prior to illness pt able to ambulate w/walker, he did need assistance bathing, and dressing.  He is dependent for medication management, housekeeping, laundry and shopping.  He now needs assistance w/ambulation to get up to walker, toileting, bathing and dressing.  Pt has w/c, straight cane, rolling walker, shower chair and bsc that were obtained from family.  He is currently seen by [facility].  No financial concerns @ this time.  STR vs. home w/family and HH.  No barriers to d/c verbalized by spouse.  CM contact info given to spouse and written on whiteboard.    Patient/Family in Agreement with Plan yes               Continued Care and Services - Admitted Since 12/22/2022    Coordination has not been started for this encounter.       Expected Discharge Date and Time     Expected Discharge Date Expected Discharge Time    Dec 27, 2022          Demographic Summary     Row Name 12/23/22 0952       General Information    Admission Type inpatient    Arrived From emergency department    Required Notices Provided Important Message from Medicare    Referral Source admission list    Reason for Consult discharge planning    Preferred Language English       Contact Information    Permission Granted to Share Info With family/designee    Contact Information Obtained for                Functional Status     Row Name 12/23/22 0953       Functional Status    Usual Activity Tolerance moderate    Current Activity Tolerance poor    Functional Status Comments Patient has been sick for about one week.  Prior to this episode of illness pt. able to ambulate with walker.  Currently patient needs help to ambulate and perform all ADL's.       Functional Status, IADL    Medications completely dependent    Meal Preparation completely dependent    Housekeeping completely dependent    Laundry completely dependent    Shopping completely dependent    IADL Comments Patient has been sick for about one week.  Prior to this episode of illness pt. able to ambulate with walker.  Currently patient needs help to ambulate and perform all ADL's.       Mental Status    General Appearance WDL WDL       Mental Status Summary    Recent Changes in Mental Status/Cognitive Functioning mental status    Mental Status Comments Confused to situation and place.       Employment/    Employment Status retired               Psychosocial    No documentation.                Abuse/Neglect    No documentation.                Legal    No documentation.                Substance Abuse    No documentation.                Patient Forms    No documentation.                    Adele Fernandez, RN

## 2022-12-23 NOTE — PLAN OF CARE
Goal Outcome Evaluation:     Pt has had slightly elevated BP during shift, MD aware. Pt has been sinus eligio on telemetry throughout the shift, MD aware. Pt is on 3 L NC maintaining o2 >90%. Medications administered per MAR. FSBS monitored per orders, insulin administered per MAR. Discharge is pending for 2-3 days.

## 2022-12-23 NOTE — CASE MANAGEMENT/SOCIAL WORK
Patient will most likely go home in 2-3 days.  Spouse can transport and accepting of pt coming home.  He has HH w/[facility], I tried calling [facility] and they did not answer.  May need home oxygen set up.  I did send referrals to [facility] and [facility] in case patient needs STR.

## 2022-12-23 NOTE — PLAN OF CARE
Goal Outcome Evaluation:  Plan of Care Reviewed With: patient           Outcome Evaluation: PT evaluation completed bedside with pt oriented to self only but following directions well for his assessment. Pt had no c/o pain throughout and was on 4L NC O2. Pt needed min assist for supine to sit, sit to stand and to pivot to chair with rolling walker. Pt did have small shuffle type steps and therapist had to assist pt in turning walker to sit. Pt's O2 sat was 87% post pivot to chair and once sitting cassandra to 91%. Pt presents with deficits in strength, balance, and endurance. He is expected to improve his functional mobility with continued PT services prior to d/c.

## 2022-12-24 LAB
ANION GAP SERPL CALCULATED.3IONS-SCNC: 9.7 MMOL/L (ref 5–15)
BACTERIA SPEC AEROBE CULT: ABNORMAL
BUN SERPL-MCNC: 21 MG/DL (ref 8–23)
BUN/CREAT SERPL: 28 (ref 7–25)
CALCIUM SPEC-SCNC: 8.9 MG/DL (ref 8.6–10.5)
CHLORIDE SERPL-SCNC: 109 MMOL/L (ref 98–107)
CO2 SERPL-SCNC: 23.3 MMOL/L (ref 22–29)
CREAT SERPL-MCNC: 0.75 MG/DL (ref 0.76–1.27)
CRP SERPL-MCNC: 10.37 MG/DL (ref 0–0.5)
DEPRECATED RDW RBC AUTO: 43.8 FL (ref 37–54)
EGFRCR SERPLBLD CKD-EPI 2021: 90.7 ML/MIN/1.73
ERYTHROCYTE [DISTWIDTH] IN BLOOD BY AUTOMATED COUNT: 12.8 % (ref 12.3–15.4)
GLUCOSE BLDC GLUCOMTR-MCNC: 182 MG/DL (ref 70–130)
GLUCOSE BLDC GLUCOMTR-MCNC: 197 MG/DL (ref 70–130)
GLUCOSE BLDC GLUCOMTR-MCNC: 213 MG/DL (ref 70–130)
GLUCOSE BLDC GLUCOMTR-MCNC: 217 MG/DL (ref 70–130)
GLUCOSE SERPL-MCNC: 201 MG/DL (ref 65–99)
HCT VFR BLD AUTO: 43.2 % (ref 37.5–51)
HGB BLD-MCNC: 14.6 G/DL (ref 13–17.7)
MCH RBC QN AUTO: 31.5 PG (ref 26.6–33)
MCHC RBC AUTO-ENTMCNC: 33.8 G/DL (ref 31.5–35.7)
MCV RBC AUTO: 93.1 FL (ref 79–97)
PLATELET # BLD AUTO: 158 10*3/MM3 (ref 140–450)
PMV BLD AUTO: 11.6 FL (ref 6–12)
POTASSIUM SERPL-SCNC: 3.9 MMOL/L (ref 3.5–5.2)
RBC # BLD AUTO: 4.64 10*6/MM3 (ref 4.14–5.8)
SODIUM SERPL-SCNC: 142 MMOL/L (ref 136–145)
WBC NRBC COR # BLD: 6.41 10*3/MM3 (ref 3.4–10.8)

## 2022-12-24 PROCEDURE — 97530 THERAPEUTIC ACTIVITIES: CPT

## 2022-12-24 PROCEDURE — 25010000002 ENOXAPARIN PER 10 MG: Performed by: NURSE PRACTITIONER

## 2022-12-24 PROCEDURE — 85027 COMPLETE CBC AUTOMATED: CPT | Performed by: NURSE PRACTITIONER

## 2022-12-24 PROCEDURE — 86140 C-REACTIVE PROTEIN: CPT | Performed by: NURSE PRACTITIONER

## 2022-12-24 PROCEDURE — 99232 SBSQ HOSP IP/OBS MODERATE 35: CPT | Performed by: INTERNAL MEDICINE

## 2022-12-24 PROCEDURE — 25010000002 CEFTRIAXONE SODIUM-DEXTROSE 1-3.74 GM-%(50ML) RECONSTITUTED SOLUTION: Performed by: NURSE PRACTITIONER

## 2022-12-24 PROCEDURE — 80048 BASIC METABOLIC PNL TOTAL CA: CPT | Performed by: NURSE PRACTITIONER

## 2022-12-24 PROCEDURE — 63710000001 DEXAMETHASONE PER 0.25 MG: Performed by: NURSE PRACTITIONER

## 2022-12-24 PROCEDURE — 63710000001 INSULIN ASPART PER 5 UNITS: Performed by: NURSE PRACTITIONER

## 2022-12-24 PROCEDURE — 82962 GLUCOSE BLOOD TEST: CPT

## 2022-12-24 PROCEDURE — 97110 THERAPEUTIC EXERCISES: CPT

## 2022-12-24 RX ORDER — LISINOPRIL 20 MG/1
20 TABLET ORAL
Status: DISCONTINUED | OUTPATIENT
Start: 2022-12-24 | End: 2022-12-28

## 2022-12-24 RX ADMIN — DEXAMETHASONE 6 MG: 2 TABLET ORAL at 08:54

## 2022-12-24 RX ADMIN — INSULIN ASPART 2 UNITS: 100 INJECTION, SOLUTION INTRAVENOUS; SUBCUTANEOUS at 10:56

## 2022-12-24 RX ADMIN — INSULIN ASPART 4 UNITS: 100 INJECTION, SOLUTION INTRAVENOUS; SUBCUTANEOUS at 16:54

## 2022-12-24 RX ADMIN — ATORVASTATIN CALCIUM 80 MG: 80 TABLET, FILM COATED ORAL at 08:53

## 2022-12-24 RX ADMIN — Medication 10 ML: at 20:46

## 2022-12-24 RX ADMIN — CARBIDOPA AND LEVODOPA 1 TABLET: 25; 100 TABLET ORAL at 08:54

## 2022-12-24 RX ADMIN — ASPIRIN 81 MG CHEWABLE TABLET 81 MG: 81 TABLET CHEWABLE at 08:54

## 2022-12-24 RX ADMIN — LISINOPRIL 20 MG: 20 TABLET ORAL at 14:42

## 2022-12-24 RX ADMIN — CARBIDOPA AND LEVODOPA 1 TABLET: 25; 100 TABLET ORAL at 20:46

## 2022-12-24 RX ADMIN — ENOXAPARIN SODIUM 40 MG: 100 INJECTION SUBCUTANEOUS at 20:46

## 2022-12-24 RX ADMIN — CEFTRIAXONE 1 G: 1 INJECTION, SOLUTION INTRAVENOUS at 14:42

## 2022-12-24 RX ADMIN — INSULIN ASPART 2 UNITS: 100 INJECTION, SOLUTION INTRAVENOUS; SUBCUTANEOUS at 06:52

## 2022-12-24 RX ADMIN — SENNOSIDES AND DOCUSATE SODIUM 2 TABLET: 50; 8.6 TABLET ORAL at 08:53

## 2022-12-24 RX ADMIN — CARBIDOPA AND LEVODOPA 1 TABLET: 25; 100 TABLET ORAL at 16:11

## 2022-12-24 RX ADMIN — SENNOSIDES AND DOCUSATE SODIUM 2 TABLET: 50; 8.6 TABLET ORAL at 20:46

## 2022-12-24 RX ADMIN — Medication 10 ML: at 08:55

## 2022-12-24 NOTE — PLAN OF CARE
Goal Outcome Evaluation:  Plan of Care Reviewed With: patient        Progress: no change  Outcome Evaluation: No acute events overnight. patient has rested well. Continue to monitor.

## 2022-12-24 NOTE — PROGRESS NOTES
[facility]    Name:  Ankur Camp   Age:  82 y.o.  Sex:  male  :  1940  MRN:  [id number]   Visit Number:  [id number]  Admission Date:  2022  Date Of Service:  22  Primary Care Physician:  Magaly Prabhakar APRN     LOS: 2 days :    Chief Complaint:      Fatigue    Subjective:    Patient's chart has been reviewed and events noted since admission.  He has been admitted because of UTI with generalized weakness but the same time he was found to be having hypoxemia with COVID-positive  Patient has been having a poor appetite.  His heart rate noted to be low and his bisoprolol has been held.  Though his blood pressure has been slightly above the higher range.  He states that he feels short of breath when he moves around and has not been able to walk much over the last few days    Hospital Course:    Patient is an 82 year old male who presents to the hospital with complaints of weakness who was sent from PCP office after being diagnosed with UTI.  Patient wife advised on arrival that patient had had generalized weakness, less appetite with weight loss over the past 2 weeks.  Patient with past health history significant for diabetes mellitus type 2, hypertension, hyperlipidemia, and Parkinson's disease.  On exam, patient is resting in bed on 4L with sats in low 90s.  Denies any urinary symptoms.  Complains of worsening shortness of breath the past 2 days and cough.  Uses a walker/cane at baseline.     Work up in the emergency department noted troponin-0.033, BNP-2194, glucose-171, creatinine-1.31 with baseline 0.8, BUN-31,  albumin-3.3, CRP-9.9, CBC unremarkable, lactate-2.9 with repeat 2.1, procalcitonin-0.24.  Urinalysis noted infection with 2+ bacteria, 31-50 WBC, +nitrites, and +3 leuks.  CXR noted hypoinflation with scattered mild atelectasis. Covid 19 testing was positive.     Admitted to the hospital and continued on Rocephin for UTI.   Started on Decadron for Covid 19.  PT/OT consulted for evaluation of weakness.    Review of Systems:     All systems were reviewed and negative except as mentioned in subjective, assessment and plan.    Vital Signs:    Temp:  [97.2 °F (36.2 °C)-97.7 °F (36.5 °C)] 97.7 °F (36.5 °C)  Heart Rate:  [49-65] 58  Resp:  [16-18] 18  BP: (143-169)/(64-95) 160/90    Intake and output:    I/O last 3 completed shifts:  In: 662 [P.O.:440; I.V.:222]  Out: -   I/O this shift:  In: 240 [P.O.:240]  Out: -     Physical Examination:    General Appearance:  Alert and cooperative, pleasant elderly male, no acute distress on exam   Head:  Atraumatic and normocephalic.   Eyes: Conjunctivae and sclerae normal, no icterus. No pallor.   Throat: No oral lesions, no thrush, oral mucosa moist.   Neck: Supple, trachea midline   Lungs:   Breath sounds heard bilaterally equally but diminished throughout.  Coarse breath sounds noted.  No wheezing or crackles.  Currently on oxygen   Heart:  Normal S1 and S2, no murmur, no gallop, no rub. No JVD.   Abdomen:   Normal bowel sounds, no masses, no organomegaly. Soft, nontender, nondistended, no rebound tenderness.   Extremities: Supple, no edema, no cyanosis, no clubbing.   Skin: No bleeding or rash.   Neurologic: Alert and oriented x 3. No facial asymmetry. Moves all four limbs. No tremors. Generalized weakness present.     Laboratory results:    Results from last 7 days   Lab Units 12/24/22  0600 12/23/22 2008 12/23/22  0303 12/22/22  1042   SODIUM mmol/L 142  --  144 144   POTASSIUM mmol/L 3.9 3.9 3.4* 3.7   CHLORIDE mmol/L 109*  --  109* 105   CO2 mmol/L 23.3  --  24.8 24.8   BUN mg/dL 21  --  24* 31*   CREATININE mg/dL 0.75*  --  0.86 1.31*   CALCIUM mg/dL 8.9  --  8.0* 9.1   BILIRUBIN mg/dL  --   --   --  1.2   ALK PHOS U/L  --   --   --  94   ALT (SGPT) U/L  --   --   --  <5   AST (SGOT) U/L  --   --   --  23   GLUCOSE mg/dL 201*  --  147* 171*     Results from last 7 days   Lab Units  12/24/22  0600 12/23/22  0303 12/22/22  1042   WBC 10*3/mm3 6.41 5.16 6.29   HEMOGLOBIN g/dL 14.6 13.6 14.8   HEMATOCRIT % 43.2 40.1 44.8   PLATELETS 10*3/mm3 158 141 148         Results from last 7 days   Lab Units 12/22/22  1042   TROPONIN T ng/mL 0.033*     Results from last 7 days   Lab Units 12/22/22  1340 12/22/22  1225 12/22/22  1143   BLOODCX  No growth at 24 hours No growth at 2 days  --    URINECX   --   --  >100,000 CFU/mL Escherichia coli*     No results for input(s): PHART, ERQ2NKV, PO2ART, AIY8FFN, BASEEXCESS in the last 8760 hours.   I have reviewed the patient's laboratory results.    Radiology results:    No radiology results from the last 24 hrs  I have reviewed the patient's radiology reports.    Medication Review:     I have reviewed the patient's active and prn medications.     Problem List:      Urinary tract infection without hematuria, site unspecified      Assessment:    1. Acute UTI  2. Acute Respiratory Failure with Hypoxia  3. Covid 19 +  4. Lactic Acidosis  5. LAWSON  6. Parkinsons Disease  7. Diabetes Mellitus Type 2 on oral anti-diabetics  8. Hyperlipidemia    Plan:    Acute UTI/ LAWSON/ Lactic Acidosis  -Rocephin 1GM IV for 5 days  -Urine culture shows E. coli and is sensitive to Rocephin  -Repeat Lactate-normal     Acute Respiratory Failure/ Covid 19+  -Supplemental oxygen to keep saturations >90%--currently on 4 L oxygen and will taper gradually as tolerated   -Decadron 6mg daily as patient is hypoxic  -Procalcitonin negative  -Supportive care   -PT/OT evaluations for weakness today--home health vs STR     Diabetes Mellitus  -On oral anti-diabetics at home  -Cover with sliding scale  -A1c-8     Bradycardia-heart rate has been in the 40s and will DC the bisoprolol.  For his hypertension we will add an ACE inhibitor    Hypertension-we will DC the bisoprolol due to bradycardia and start lisinopril 20 mg daily    Further course after PT/OT evaluations.  Patient will likely need STR.    DVT  Prophylaxis: Lovenox  Code Status: Full Code  Diet: Carb Consistent  Discharge Plan: 2-3 days    Filipe Rodgers MD  12/24/22  13:14 EST    Dictated utilizing Dragon dictation.

## 2022-12-24 NOTE — PLAN OF CARE
Goal Outcome Evaluation:  Plan of Care Reviewed With: patient        Progress: improving  Outcome Evaluation: Pt in supine and agreeable to PT. Performed supine to sit with min A and VC for sequencing, sit <->stand with MIN A and VC for hand -placement, side stepping with RW with decreased L foot clearance with shuffling gait pattern with min/CGA and VC for sequencing, sitting EOB x8 mins and performed AP, sit to supine with min A for B LE and VC and CGA for scooting to center of bed. Performed B LE ex in supine heelslides, hip abd, SAQ, and SLR with AROM for all except for SLR 1x10 reps each.Pt O2 SATS during therapy btw 87-90% with occ VC for PLB tech.  Con't with PT POC and progress as tolerated

## 2022-12-24 NOTE — PLAN OF CARE
Goal Outcome Evaluation:     Pt has had slightly elevated BP during shift, last reading being 149/87, MD notified and ordered lisinopril that was administered per order. Pt has been NSR and SB on telemetry throughout the shift, MD aware of pt being SB. Pt on 5 L NC maintaining o2 >90%. FSBS monitored per order and insulin administered per MAR. Scheduled medications administered per MAR. Anticipated discharge is in 2-3 days.

## 2022-12-24 NOTE — THERAPY TREATMENT NOTE
Patient Name: Ankur Camp  : 1940    MRN: [id number]                              Today's Date: 2022       Admit Date: 2022    Visit Dx:     ICD-10-CM ICD-9-CM   1. Urinary tract infection without hematuria, site unspecified  N39.0 599.0   2. COVID-19  U07.1 079.89   3. Acute respiratory failure with hypoxia (HCC)  J96.01 518.81   4. Lactic acidosis  E87.20 276.2   5. Acute renal insufficiency  N28.9 593.9     Patient Active Problem List   Diagnosis   • Benign prostatic hypertrophy without urinary obstruction   • Hypertension   • Hyperlipidemia   • Type 2 diabetes mellitus (HCC)   • Vitamin D deficiency   • Colon cancer screening   • Coronary artery calcification   • Thoracic aortic aneurysm (TAA)   • Primary osteoarthritis of right knee   • Status post total knee replacement using cement, right   • Urinary tract infection without hematuria, site unspecified     Past Medical History:   Diagnosis Date   • Arthritis    • Cancer (HCC)     kidney/ prostate   • Diabetes mellitus (HCC)    • ED (erectile dysfunction)    • Enlarged prostate with lower urinary tract symptoms (LUTS)    • Full dentures    • History of fracture of pelvis 2019    after fall from ladder   • History of loss of consciousness 2019    after fall from ladder-[facility]   • History of rib fracture 2019    after fall from ladder   • History of right shoulder fracture 1960    walking up hill, fell on gravel   • History of stress test    • History of torn meniscus of right knee    • Platinum (hard of hearing)     Bilateral hearing aids    • Hypertension    • Laceration of head 2019    after fall from ladder-treated at [facility]   • Left shoulder strain 2017    after fall from ladder   • Lung injury 2019    punctured right lung after falling off ladder   • Renal disorder    • Stone in kidney    • Stroke (HCC)      Past Surgical History:   Procedure Laterality Date    • CHEST TUBE [facility]ION  11/2019    [facility]   • COL[facility]OSCOPY     • COLONOSCOPY N/A 4/8/2019    Procedure: COLONOSCOPY;  Surgeon: Onesimo Arnold MD;  Location: [facility] ENDOSCOPY;  Service: Gastroenterology   • EYE SURGERY Right     cataract removal with IOL implant   • HERNIA REPAIR Right     multiple inguinal hernia repairs   • KNEE ARTHROSCOPY W/ MENISCECTOMY Right 05/10/2012    Partial medial and lateral menisectomy-Conrad Baird MD   • NEPHRECTOMY Right     partial right nephrectomy due to kidney stone   • PELVIC FRACTURE SURGERY Right 11/2019    multiple pelvic fractures after fall from [facility]   • SHOULDER SURGERY Right 1960    fracture surgery to remove a piece of bone- [place], [place]   • TOTAL KNEE ARTHROPLASTY Right 5/25/2021    Procedure: knee arthroplasty total;  Surgeon: Conrad Baird MD;  Location: [facility] OR;  Service: Orthopedics;  Laterality: Right;      General Information     Row Name 12/24/22 1503          Physical Therapy Time and Intention    Document Type therapy note (daily note)  -     Mode of Treatment physical therapy  -CC     Row Name 12/24/22 1504          General Information    Patient Profile Reviewed yes  -CC     Existing Precautions/Restrictions fall;oxygen therapy device and L/min  -CC     Row Name 12/24/22 1504          Safety Issues, Functional Mobility    Safety Issues Affecting Function (Mobility) awareness of need for assistance;insight into deficits/self-awareness;positioning of assistive device;problem-solving;safety precautions follow-through/compliance;sequencing abilities  -CC     Impairments Affecting Function (Mobility) balance;coordination;endurance/activity tolerance;motor control;postural/trunk control;strength  -CC           User Key  (r) = Recorded By, (t) = Taken By, (c) = Cosigned By    Initials Name Provider Type    CC Yesenia Gerardo, PTA Physical Therapist  Assistant               Mobility    No documentation.                Obj/Interventions    No documentation.                Goals/Plan    No documentation.                Clinical Impression     Row Name 12/24/22 1503          Pain    Pretreatment Pain Rating 0/10 - no pain  -CC     Posttreatment Pain Rating 0/10 - no pain  -CC     Additional Documentation Pain Scale: Numbers Pre/Post-Treatment (Group)  -CC     Row Name 12/24/22 1506          Plan of Care Review    Plan of Care Reviewed With patient  -CC     Progress improving  -CC     Outcome Evaluation Pt in supine and agreeable to PT. Performed supine to sit with min A and VC for sequencing, sit <->stand with MIN A and VC for hand -placement, side stepping with RW with decreased L foot clearance with shuffling gait pattern with min/CGA and VC for sequencing, sitting EOB x8 mins and performed AP, sit to supine with min A for B LE and VC and CGA for scooting to center of bed. Performed B LE ex in supine heelslides, hip abd, SAQ, and SLR with AROM for all except for SLR 1x10 reps each.Pt O2 SATS during therapy btw 87-90% with occ VC for PLB tech.  Con't with PT POC and progress as tolerated  -CC     Row Name 12/24/22 150          Positioning and Restraints    Pre-Treatment Position in bed  -CC     Post Treatment Position bed  -CC     In Bed sitting;supine;call light within reach;encouraged to call for assist;exit alarm on  -CC           User Key  (r) = Recorded By, (t) = Taken By, (c) = Cosigned By    Initials Name Provider Type    CC Yesenia Gerardo, PTA Physical Therapist Assistant               Outcome Measures     Row Name 12/24/22 1503 12/24/22 0845       How much help from another person do you currently need...    Turning from your back to your side while in flat bed without using bedrails? 4  -CC 3  -KJ    Moving from lying on back to sitting on the side of a flat bed without bedrails? 3  -CC 3  -KJ    Moving to and from a bed to a chair (including a  wheelchair)? 3  -CC 3  -KJ    Standing up from a chair using your arms (e.g., wheelchair, bedside chair)? 3  -CC 3  -KJ    Climbing 3-5 steps with a railing? 2  -CC 1  -KJ    To walk in hospital room? 2  -CC 2  -KJ    AM-PAC 6 Clicks Score (PT) 17  -CC 15  -KJ    Highest level of mobility 5 --> Static standing  -CC 4 --> Transferred to chair/commode  -KJ    Row Name 12/24/22 1503          Functional Assessment    Outcome Measure Options AM-PAC 6 Clicks Basic Mobility (PT)  -           User Key  (r) = Recorded By, (t) = Taken By, (c) = Cosigned By    Initials Name Provider Type     Yesenia Gerardo, EMMA Physical Therapist Assistant    Marisa Saenz RN Registered Nurse                             Physical Therapy Education     Title: PT OT SLP Therapies (In Progress)     Topic: Physical Therapy (In Progress)     Point: Mobility training (Done)     Learning Progress Summary           Patient Acceptance, E,D, DU by  at 12/23/2022 1144    Comment: Pt education for improving technique with walker for transfers                   Point: Home exercise program (Done)     Learning Progress Summary           Patient Acceptance, E,TB, VU,NR by  at 12/24/2022 1700    Comment: Importance of performing B LE ex btw therapy sessions                   Point: Body mechanics (Not Started)     Learner Progress:  Not documented in this visit.          Point: Precautions (Not Started)     Learner Progress:  Not documented in this visit.                      User Key     Initials Effective Dates Name Provider Type Discipline     06/16/21 -  Yesenia Gerardo PTA Physical Therapist Assistant PT     06/16/21 -  Abby Agrawal PT Physical Therapist PT              PT Recommendation and Plan     Plan of Care Reviewed With: patient  Progress: improving  Outcome Evaluation: Pt in supine and agreeable to PT. Performed supine to sit with min A and VC for sequencing, sit <->stand with MIN A and VC for hand -placement, side stepping  with RW with decreased L foot clearance with shuffling gait pattern with min/CGA and VC for sequencing, sitting EOB x8 mins and performed AP, sit to supine with min A for B LE and VC and CGA for scooting to center of bed. Performed B LE ex in supine heelslides, hip abd, SAQ, and SLR with AROM for all except for SLR 1x10 reps each.Pt O2 SATS during therapy btw 87-90% with occ VC for PLB tech.  Con't with PT POC and progress as tolerated     Time Calculation:    PT Charges     Row Name 12/24/22 1701             Time Calculation    Start Time 1503  -CC      Stop Time 1538  -CC      Time Calculation (min) 35 min  -CC      PT Received On 12/24/22  -CC      PT Goal Re-Cert Due Date 01/02/23  -CC         Timed Charges    20215 - PT Therapeutic Exercise Minutes 15  -CC      13629 - PT Therapeutic Activity Minutes 20  -CC         Total Minutes    Timed Charges Total Minutes 35  -CC       Total Minutes 35  -CC            User Key  (r) = Recorded By, (t) = Taken By, (c) = Cosigned By    Initials Name Provider Type    CC Yesenia Gerardo PTA Physical Therapist Assistant              Therapy Charges for Today     Code Description Service Date Service Provider Modifiers Qty    30654533014 HC PT THER PROC EA 15 MIN 12/24/2022 Yesenia Gerardo PTA GP 1    74894519564 HC PT THERAPEUTIC ACT EA 15 MIN 12/24/2022 Yesenia Gerardo PTA GP 1          PT G-Codes  Outcome Measure Options: AM-PAC 6 Clicks Basic Mobility (PT)  AM-PAC 6 Clicks Score (PT): 17  AM-PAC 6 Clicks Score (OT): 15       Yesenia Gerardo PTA  12/24/2022

## 2022-12-24 NOTE — CONSULTS
"Adult Nutrition  Assessment/PES    Patient Name:  Ankur Camp  YOB: 1940  MRN: [id number]  Admit Date:  12/22/2022    Assessment Date:  12/24/2022    Comments:    Pt on full liquid diet and PO intake averaging 45% - will order nutrition supplement to encourage PO intake.     1. Continue current diet order as medically appropriate.   2. Advance diet when medically appropriate.   3. Encourage PO intake to meet 50% of estimated needs.   4. Will order Boost Glucose Control TID.   5. Encourage intake of supplement ordered.     Will follow-up and available PRN.        Reason for Assessment     Row Name 12/24/22 1010          Reason for Assessment    Reason For Assessment diagnosis/disease state;identified at risk by screening criteria;nurse/nurse practitioner consult;per organizational policy     Diagnosis diabetes diagnosis/complications     Identified At Risk by Screening Criteria MST SCORE 2+;reduced oral intake over the last month                  Labs/Tests/Procedures/Meds     Row Name 12/24/22 1010          Labs/Procedures/Meds    Lab Results Reviewed reviewed, pertinent     Lab Results Comments High: glucose, A1C  Low: Cr        Medications    Pertinent Medications Reviewed reviewed, pertinent     Pertinent Medications Comments lipitor, levodopa, lovenox, insulin, docusate                Physical Findings     Row Name 12/24/22 1011          Physical Findings    Overall Physical Appearance normal wt for age                Estimated/Assessed Needs - Anthropometrics     Row Name 12/24/22 1012 12/24/22 0501       Anthropometrics    Weight -- 69.4 kg (153 lb)    Age for Calculations 82 --    Height for Calculation 1.676 m (5' 6\") --    Weight for Calculation 69.4 kg (153 lb) --       Estimated/Assessed Needs    Additional Documentation Estimated Calorie Needs (Group);[place]-St. Jeor Equation (Group);Protein Requirements (Group);Fluid Requirements (Group) --       Estimated Calorie Needs    " Estimated Calorie Requirement (kcal/day) 2146 --       Louisa-St. Jeor Equation    RMR (Louisa-St. Jeor Equation) 1341.75 --    Louisa-St. Jeor Activity Factors 1.6 - 1.7 --    Activity Factors (Louisa-St. Jeor) 2146.8 - 2280.975 --       Protein Requirements    Weight Used For Protein Calculations 69.4 kg (153 lb) --    Est Protein Requirement Amount (gms/kg) 1.2 gm protein --    Estimated Protein Requirements (gms/day) 83.28 --       Fluid Requirements    Fluid Requirements (mL/day) 2146  1mL/kcal --    RDA Method (mL) 2146 --               Nutrition Prescription Ordered     Row Name 12/24/22 1013          Nutrition Prescription PO    Current PO Diet Full Liquid     Fluid Consistency Thin                Evaluation of Received Nutrient/Fluid Intake     Row Name 12/24/22 1013          Intake Assessment    Energy/Calorie Requirement Assessment not meeting needs     Protein Requirement Assessment not meeting needs        PO Evaluation    Number of Days PO Intake Evaluated 2 days     Number of Meals 5     % PO Intake 45                   Problem/Interventions:   Problem 1     Row Name 12/24/22 1013          Nutrition Diagnoses Problem 1    Problem 1 Increased Nutrient Needs     Macronutrient Kcal;Protein     Etiology (related to) Factors Affecting Nutrition     Poor Intake of --  pt w/ reported poor oral intake and recent wt loss     Signs/Symptoms (evidenced by) Other (comment)  MST score of 3 and need for oral nutrition supplement to encourage PO intake                      Intervention Goal     Row Name 12/24/22 1015          Intervention Goal    General Maintain nutrition;Improved nutrition related lab(s);Reduce/improve symptoms;Meet nutritional needs for age/condition;Disease management/therapy     PO Establish PO;Tolerate PO;Increase intake;Maintain intake;Continue positive trend;Meet estimated needs     Weight Maintain weight                Nutrition Intervention     Row Name 12/24/22 1015          Nutrition  Intervention    RD/Tech Action Follow Tx progress;Await begin PO;Care plan reviewd;Encourage intake;Recommend/ordered     Recommended/Ordered Supplement;Snack                Nutrition Prescription     Row Name 12/24/22 1015          Nutrition Prescription PO    PO Prescription Begin/change supplement     Supplement Boost Glucose Control     Supplement Frequency 3 times a day;Snack time     Snack Times Bedtime     New PO Prescription Ordered? Yes        Other Orders    Obtain Weight Daily     Obtain Weight Ordered? No, recommended     Supplement Vitamin mineral supplement     Supplement Ordered? No, recommended     Labs Mg++;Na+     Labs Ordered? No, recommended                Education/Evaluation     Row Name 12/24/22 1021          Education    Education Education not appropriate at this time        Monitor/Evaluation    Monitor Per protocol;PO intake;Supplement intake;Pertinent labs;Skin status;Weight;Symptoms                 Electronically signed by:  Dov Hobson RD  12/24/22 10:22 EST

## 2022-12-25 LAB
ALBUMIN SERPL-MCNC: 2.6 G/DL (ref 3.5–5.2)
ALP SERPL-CCNC: 94 U/L (ref 39–117)
ALT SERPL W P-5'-P-CCNC: 8 U/L (ref 1–41)
AST SERPL-CCNC: 40 U/L (ref 1–40)
BILIRUB CONJ SERPL-MCNC: 0.3 MG/DL (ref 0–0.3)
BILIRUB INDIRECT SERPL-MCNC: 0.4 MG/DL
BILIRUB SERPL-MCNC: 0.7 MG/DL (ref 0–1.2)
CREAT SERPL-MCNC: 0.79 MG/DL (ref 0.76–1.27)
EGFRCR SERPLBLD CKD-EPI 2021: 89.2 ML/MIN/1.73
GLUCOSE BLDC GLUCOMTR-MCNC: 181 MG/DL (ref 70–130)
GLUCOSE BLDC GLUCOMTR-MCNC: 255 MG/DL (ref 70–130)
GLUCOSE BLDC GLUCOMTR-MCNC: 261 MG/DL (ref 70–130)
GLUCOSE BLDC GLUCOMTR-MCNC: 276 MG/DL (ref 70–130)
PROT SERPL-MCNC: 5.5 G/DL (ref 6–8.5)

## 2022-12-25 PROCEDURE — 82962 GLUCOSE BLOOD TEST: CPT

## 2022-12-25 PROCEDURE — 99233 SBSQ HOSP IP/OBS HIGH 50: CPT | Performed by: INTERNAL MEDICINE

## 2022-12-25 PROCEDURE — 82565 ASSAY OF CREATININE: CPT | Performed by: INTERNAL MEDICINE

## 2022-12-25 PROCEDURE — 25010000002 ENOXAPARIN PER 10 MG: Performed by: NURSE PRACTITIONER

## 2022-12-25 PROCEDURE — 63710000001 INSULIN ASPART PER 5 UNITS: Performed by: NURSE PRACTITIONER

## 2022-12-25 PROCEDURE — 25010000002 REMDESIVIR 100 MG/20ML SOLUTION 1 EACH VIAL: Performed by: INTERNAL MEDICINE

## 2022-12-25 PROCEDURE — 80076 HEPATIC FUNCTION PANEL: CPT | Performed by: INTERNAL MEDICINE

## 2022-12-25 PROCEDURE — 63710000001 DEXAMETHASONE PER 0.25 MG: Performed by: NURSE PRACTITIONER

## 2022-12-25 PROCEDURE — XW033E5 INTRODUCTION OF REMDESIVIR ANTI-INFECTIVE INTO PERIPHERAL VEIN, PERCUTANEOUS APPROACH, NEW TECHNOLOGY GROUP 5: ICD-10-PCS | Performed by: INTERNAL MEDICINE

## 2022-12-25 PROCEDURE — 25010000002 CEFTRIAXONE SODIUM-DEXTROSE 1-3.74 GM-%(50ML) RECONSTITUTED SOLUTION: Performed by: NURSE PRACTITIONER

## 2022-12-25 RX ORDER — ALBUTEROL SULFATE 90 UG/1
2 AEROSOL, METERED RESPIRATORY (INHALATION)
Status: DISCONTINUED | OUTPATIENT
Start: 2022-12-25 | End: 2023-01-04 | Stop reason: HOSPADM

## 2022-12-25 RX ADMIN — INSULIN ASPART 2 UNITS: 100 INJECTION, SOLUTION INTRAVENOUS; SUBCUTANEOUS at 06:48

## 2022-12-25 RX ADMIN — SENNOSIDES AND DOCUSATE SODIUM 2 TABLET: 50; 8.6 TABLET ORAL at 09:45

## 2022-12-25 RX ADMIN — ALBUTEROL SULFATE 2 PUFF: 90 AEROSOL, METERED RESPIRATORY (INHALATION) at 16:27

## 2022-12-25 RX ADMIN — INSULIN ASPART 6 UNITS: 100 INJECTION, SOLUTION INTRAVENOUS; SUBCUTANEOUS at 12:56

## 2022-12-25 RX ADMIN — ALBUTEROL SULFATE 2 PUFF: 90 AEROSOL, METERED RESPIRATORY (INHALATION) at 22:42

## 2022-12-25 RX ADMIN — ALBUTEROL SULFATE 2 PUFF: 90 AEROSOL, METERED RESPIRATORY (INHALATION) at 12:47

## 2022-12-25 RX ADMIN — Medication 10 ML: at 22:43

## 2022-12-25 RX ADMIN — LISINOPRIL 20 MG: 20 TABLET ORAL at 09:26

## 2022-12-25 RX ADMIN — CARBIDOPA AND LEVODOPA 1 TABLET: 25; 100 TABLET ORAL at 09:44

## 2022-12-25 RX ADMIN — REMDESIVIR 200 MG: 100 INJECTION, POWDER, LYOPHILIZED, FOR SOLUTION INTRAVENOUS at 14:58

## 2022-12-25 RX ADMIN — CARBIDOPA AND LEVODOPA 1 TABLET: 25; 100 TABLET ORAL at 16:27

## 2022-12-25 RX ADMIN — DEXAMETHASONE 6 MG: 2 TABLET ORAL at 09:26

## 2022-12-25 RX ADMIN — SENNOSIDES AND DOCUSATE SODIUM 2 TABLET: 50; 8.6 TABLET ORAL at 22:42

## 2022-12-25 RX ADMIN — Medication 10 ML: at 09:45

## 2022-12-25 RX ADMIN — INSULIN ASPART 6 UNITS: 100 INJECTION, SOLUTION INTRAVENOUS; SUBCUTANEOUS at 16:55

## 2022-12-25 RX ADMIN — ATORVASTATIN CALCIUM 80 MG: 80 TABLET, FILM COATED ORAL at 09:44

## 2022-12-25 RX ADMIN — ENOXAPARIN SODIUM 40 MG: 100 INJECTION SUBCUTANEOUS at 22:42

## 2022-12-25 RX ADMIN — ASPIRIN 81 MG CHEWABLE TABLET 81 MG: 81 TABLET CHEWABLE at 09:44

## 2022-12-25 RX ADMIN — CARBIDOPA AND LEVODOPA 1 TABLET: 25; 100 TABLET ORAL at 22:42

## 2022-12-25 RX ADMIN — CEFTRIAXONE 1 G: 1 INJECTION, SOLUTION INTRAVENOUS at 12:47

## 2022-12-25 NOTE — PLAN OF CARE
Goal Outcome Evaluation:  Plan of Care Reviewed With: patient        Progress: improving  Outcome Evaluation: No acute events overnight. Patient has rested. Oxygen was lowered from 5L to 4L and patient has been tollerating well. Confusion still remains. Continue to monitor.

## 2022-12-25 NOTE — PROGRESS NOTES
[facility]    Name:  Ankur Camp   Age:  82 y.o.  Sex:  male  :  1940  MRN:  [id number]   Visit Number:  [id number]  Admission Date:  2022  Date Of Service:  22  Primary Care Physician:  Magaly Prabhakar APRN     LOS: 3 days :    Chief Complaint:      Fatigue    Subjective:    Patient has been seen and examined today on .  He has still not been having a good appetite and very poor intake.  He does not feel short of breath though he is hypoxic needing 5 L of oxygen.  He has not been coughing much.  He still very weak    Hospital Course:    Patient is an 82 year old male who presents to the hospital with complaints of weakness who was sent from PCP office after being diagnosed with UTI.  Patient wife advised on arrival that patient had had generalized weakness, less appetite with weight loss over the past 2 weeks.  Patient with past health history significant for diabetes mellitus type 2, hypertension, hyperlipidemia, and Parkinson's disease.  On exam, patient is resting in bed on 4L with sats in low 90s.  Denies any urinary symptoms.  Complains of worsening shortness of breath the past 2 days and cough.  Uses a walker/cane at baseline.     Work up in the emergency department noted troponin-0.033, BNP-2194, glucose-171, creatinine-1.31 with baseline 0.8, BUN-31,  albumin-3.3, CRP-9.9, CBC unremarkable, lactate-2.9 with repeat 2.1, procalcitonin-0.24.  Urinalysis noted infection with 2+ bacteria, 31-50 WBC, +nitrites, and +3 leuks.  CXR noted hypoinflation with scattered mild atelectasis. Covid 19 testing was positive.     Admitted to the hospital and continued on Rocephin for UTI.  Started on Decadron for Covid 19.  PT/OT consulted for evaluation of weakness.    Review of Systems:     All systems were reviewed and negative except as mentioned in subjective, assessment and plan.    Vital Signs:    Temp:  [97.6 °F (36.4 °C)-98.6 °F  (37 °C)] 98.6 °F (37 °C)  Heart Rate:  [51-73] 73  Resp:  [16-18] 18  BP: (149-166)/() 154/90    Intake and output:    I/O last 3 completed shifts:  In: 734 [P.O.:680; I.V.:54]  Out: -   I/O this shift:  In: 720 [P.O.:720]  Out: -     Physical Examination:    General Appearance:  Alert and cooperative, pleasant elderly male, no acute distress on exam   Head:  Atraumatic and normocephalic.   Eyes: Conjunctivae and sclerae normal, no icterus. No pallor.   Throat: No oral lesions, no thrush, oral mucosa moist.   Neck: Supple, trachea midline   Lungs:   Breath sounds heard bilaterally equally but diminished throughout.  Coarse breath sounds noted.  No wheezing or crackles.  Currently on oxygen   Heart:  Normal S1 and S2, no murmur, no gallop, no rub. No JVD.   Abdomen:   Normal bowel sounds, no masses, no organomegaly. Soft, nontender, nondistended, no rebound tenderness.   Extremities: Supple, no edema, no cyanosis, no clubbing.   Skin: No bleeding or rash.   Neurologic: Alert and oriented x 3. No facial asymmetry. Moves all four limbs. No tremors. Generalized weakness present.     Laboratory results:    Results from last 7 days   Lab Units 12/24/22  0600 12/23/22 2008 12/23/22  0303 12/22/22  1042   SODIUM mmol/L 142  --  144 144   POTASSIUM mmol/L 3.9 3.9 3.4* 3.7   CHLORIDE mmol/L 109*  --  109* 105   CO2 mmol/L 23.3  --  24.8 24.8   BUN mg/dL 21  --  24* 31*   CREATININE mg/dL 0.75*  --  0.86 1.31*   CALCIUM mg/dL 8.9  --  8.0* 9.1   BILIRUBIN mg/dL  --   --   --  1.2   ALK PHOS U/L  --   --   --  94   ALT (SGPT) U/L  --   --   --  <5   AST (SGOT) U/L  --   --   --  23   GLUCOSE mg/dL 201*  --  147* 171*     Results from last 7 days   Lab Units 12/24/22  0600 12/23/22  0303 12/22/22  1042   WBC 10*3/mm3 6.41 5.16 6.29   HEMOGLOBIN g/dL 14.6 13.6 14.8   HEMATOCRIT % 43.2 40.1 44.8   PLATELETS 10*3/mm3 158 141 148         Results from last 7 days   Lab Units 12/22/22  1042   TROPONIN T ng/mL 0.033*     Results  from last 7 days   Lab Units 12/22/22  1340 12/22/22  1225 12/22/22  1143   BLOODCX  No growth at 2 days No growth at 3 days  --    URINECX   --   --  >100,000 CFU/mL Escherichia coli*     No results for input(s): PHART, ESI7JAK, PO2ART, HTB6KQX, BASEEXCESS in the last 8760 hours.   I have reviewed the patient's laboratory results.    Radiology results:    No radiology results from the last 24 hrs  I have reviewed the patient's radiology reports.    Medication Review:     I have reviewed the patient's active and prn medications.     Problem List:      Urinary tract infection without hematuria, site unspecified      Assessment:    1. Acute UTI  2. Acute Respiratory Failure with Hypoxia  3. Covid 19 +  4. Lactic Acidosis  5. LAWSON  6. Parkinsons Disease  7. Diabetes Mellitus Type 2 on oral anti-diabetics  8. Hyperlipidemia    Plan:    Acute UTI/ LAWSON/ Lactic Acidosis  -Rocephin 1GM IV for 5 days  -Urine culture shows E. coli and is sensitive to Rocephin  -Repeat Lactate-normal     Acute Respiratory Failure with hypoxemia/ Covid 19+  -Supplemental oxygen to keep saturations >90%--currently held due to increased from 4 to 5 L to maintain saturation  -Decadron 6mg daily as patient is hypoxic  Due to his age with diabetes and risk factor who is hypoxic will start remdesivir  -Procalcitonin negative  -Supportive care   -PT/OT evaluations for weakness today--home health vs STR     Diabetes Mellitus  -On oral anti-diabetics at home  -Cover with sliding scale  -A1c-8     Bradycardia- his bisoprolol has been stopped and it has improved to baseline    Hypertension- on 24th his bisoprolol was stopped due to bradycardia and he has since been on lisinopril 20 mg daily    Further course after PT/OT evaluations.  Patient will likely need STR.    DVT Prophylaxis: Lovenox  Code Status: Full Code  Diet: Carb Consistent  Discharge Plan: 2-3 days    Filipe Rodgers MD  12/25/22  13:04 EST    Dictated utilizing Dragon dictation.

## 2022-12-26 LAB
ALBUMIN SERPL-MCNC: 2.6 G/DL (ref 3.5–5.2)
ALP SERPL-CCNC: 100 U/L (ref 39–117)
ALT SERPL W P-5'-P-CCNC: 8 U/L (ref 1–41)
AST SERPL-CCNC: 30 U/L (ref 1–40)
BILIRUB CONJ SERPL-MCNC: 0.2 MG/DL (ref 0–0.3)
BILIRUB INDIRECT SERPL-MCNC: 0.5 MG/DL
BILIRUB SERPL-MCNC: 0.7 MG/DL (ref 0–1.2)
CREAT SERPL-MCNC: 0.76 MG/DL (ref 0.76–1.27)
EGFRCR SERPLBLD CKD-EPI 2021: 90.3 ML/MIN/1.73
GLUCOSE BLDC GLUCOMTR-MCNC: 191 MG/DL (ref 70–130)
GLUCOSE BLDC GLUCOMTR-MCNC: 198 MG/DL (ref 70–130)
GLUCOSE BLDC GLUCOMTR-MCNC: 244 MG/DL (ref 70–130)
GLUCOSE BLDC GLUCOMTR-MCNC: 247 MG/DL (ref 70–130)
PROT SERPL-MCNC: 5.9 G/DL (ref 6–8.5)

## 2022-12-26 PROCEDURE — 97116 GAIT TRAINING THERAPY: CPT

## 2022-12-26 PROCEDURE — 97110 THERAPEUTIC EXERCISES: CPT

## 2022-12-26 PROCEDURE — 25010000002 REMDESIVIR 100 MG/20ML SOLUTION 1 EACH VIAL: Performed by: INTERNAL MEDICINE

## 2022-12-26 PROCEDURE — 25010000002 CEFTRIAXONE SODIUM-DEXTROSE 1-3.74 GM-%(50ML) RECONSTITUTED SOLUTION: Performed by: NURSE PRACTITIONER

## 2022-12-26 PROCEDURE — 97530 THERAPEUTIC ACTIVITIES: CPT

## 2022-12-26 PROCEDURE — 25010000002 ENOXAPARIN PER 10 MG: Performed by: NURSE PRACTITIONER

## 2022-12-26 PROCEDURE — 82962 GLUCOSE BLOOD TEST: CPT

## 2022-12-26 PROCEDURE — 80076 HEPATIC FUNCTION PANEL: CPT | Performed by: INTERNAL MEDICINE

## 2022-12-26 PROCEDURE — 99232 SBSQ HOSP IP/OBS MODERATE 35: CPT | Performed by: INTERNAL MEDICINE

## 2022-12-26 PROCEDURE — 82565 ASSAY OF CREATININE: CPT | Performed by: INTERNAL MEDICINE

## 2022-12-26 PROCEDURE — 63710000001 INSULIN ASPART PER 5 UNITS: Performed by: NURSE PRACTITIONER

## 2022-12-26 PROCEDURE — 63710000001 DEXAMETHASONE PER 0.25 MG: Performed by: NURSE PRACTITIONER

## 2022-12-26 RX ADMIN — ALBUTEROL SULFATE 2 PUFF: 90 AEROSOL, METERED RESPIRATORY (INHALATION) at 11:38

## 2022-12-26 RX ADMIN — METFORMIN HYDROCHLORIDE 850 MG: 850 TABLET ORAL at 18:51

## 2022-12-26 RX ADMIN — Medication 10 ML: at 09:08

## 2022-12-26 RX ADMIN — CARBIDOPA AND LEVODOPA 1 TABLET: 25; 100 TABLET ORAL at 20:59

## 2022-12-26 RX ADMIN — ENOXAPARIN SODIUM 40 MG: 100 INJECTION SUBCUTANEOUS at 20:59

## 2022-12-26 RX ADMIN — LISINOPRIL 20 MG: 20 TABLET ORAL at 09:07

## 2022-12-26 RX ADMIN — CARBIDOPA AND LEVODOPA 1 TABLET: 25; 100 TABLET ORAL at 09:07

## 2022-12-26 RX ADMIN — ASPIRIN 81 MG CHEWABLE TABLET 81 MG: 81 TABLET CHEWABLE at 09:07

## 2022-12-26 RX ADMIN — INSULIN ASPART 2 UNITS: 100 INJECTION, SOLUTION INTRAVENOUS; SUBCUTANEOUS at 06:39

## 2022-12-26 RX ADMIN — SENNOSIDES AND DOCUSATE SODIUM 2 TABLET: 50; 8.6 TABLET ORAL at 20:59

## 2022-12-26 RX ADMIN — ALBUTEROL SULFATE 2 PUFF: 90 AEROSOL, METERED RESPIRATORY (INHALATION) at 06:39

## 2022-12-26 RX ADMIN — Medication 10 ML: at 20:59

## 2022-12-26 RX ADMIN — INSULIN ASPART 2 UNITS: 100 INJECTION, SOLUTION INTRAVENOUS; SUBCUTANEOUS at 11:38

## 2022-12-26 RX ADMIN — ALBUTEROL SULFATE 2 PUFF: 90 AEROSOL, METERED RESPIRATORY (INHALATION) at 03:39

## 2022-12-26 RX ADMIN — REMDESIVIR 100 MG: 100 INJECTION, POWDER, LYOPHILIZED, FOR SOLUTION INTRAVENOUS at 14:17

## 2022-12-26 RX ADMIN — CEFTRIAXONE 1 G: 1 INJECTION, SOLUTION INTRAVENOUS at 13:20

## 2022-12-26 RX ADMIN — METFORMIN HYDROCHLORIDE 850 MG: 850 TABLET ORAL at 12:35

## 2022-12-26 RX ADMIN — CARBIDOPA AND LEVODOPA 1 TABLET: 25; 100 TABLET ORAL at 15:37

## 2022-12-26 RX ADMIN — SENNOSIDES AND DOCUSATE SODIUM 2 TABLET: 50; 8.6 TABLET ORAL at 09:07

## 2022-12-26 RX ADMIN — ATORVASTATIN CALCIUM 80 MG: 80 TABLET, FILM COATED ORAL at 09:07

## 2022-12-26 RX ADMIN — ALBUTEROL SULFATE 2 PUFF: 90 AEROSOL, METERED RESPIRATORY (INHALATION) at 15:37

## 2022-12-26 RX ADMIN — INSULIN ASPART 4 UNITS: 100 INJECTION, SOLUTION INTRAVENOUS; SUBCUTANEOUS at 18:51

## 2022-12-26 RX ADMIN — ALBUTEROL SULFATE 2 PUFF: 90 AEROSOL, METERED RESPIRATORY (INHALATION) at 18:51

## 2022-12-26 RX ADMIN — DEXAMETHASONE 6 MG: 2 TABLET ORAL at 09:07

## 2022-12-26 NOTE — PLAN OF CARE
Goal Outcome Evaluation:  Plan of Care Reviewed With: patient        Progress: improving  Outcome Evaluation: Pt in supine and agreeable to PT. Performed supine to sit with min/CGA, sit <->stand with min/CCA and VC for hand placement, amb with shuffling gait pattern x2 feet, with RW, min A and VC for sequencing to transition from EOB to recliner,  performed AP, LAQ, hip abd, marching with VC and demonstration 1x10 reps with occ VC for focusing on task. Pt O2 SATS during therapy btw 89 - 90% with occ VC for PLB tech. Con't with PT POC and progress as tolerated

## 2022-12-26 NOTE — PLAN OF CARE
Goal Outcome Evaluation:  Plan of Care Reviewed With: patient        Progress: no change  Outcome Evaluation: VSS PT CON'T TO REQUIRE O2 AT 5 LITERS PER N/C TO MAINTAIN SATS >90%.  TOTAL ASSIST PER NURSING STAFF WITH ADL'S.

## 2022-12-26 NOTE — THERAPY TREATMENT NOTE
Patient Name: Ankur Camp  : 1940    MRN: [id number]                              Today's Date: 2022       Admit Date: 2022    Visit Dx:     ICD-10-CM ICD-9-CM   1. Urinary tract infection without hematuria, site unspecified  N39.0 599.0   2. COVID-19  U07.1 079.89   3. Acute respiratory failure with hypoxia (HCC)  J96.01 518.81   4. Lactic acidosis  E87.20 276.2   5. Acute renal insufficiency  N28.9 593.9     Patient Active Problem List   Diagnosis   • Benign prostatic hypertrophy without urinary obstruction   • Hypertension   • Hyperlipidemia   • Type 2 diabetes mellitus (HCC)   • Vitamin D deficiency   • Colon cancer screening   • Coronary artery calcification   • Thoracic aortic aneurysm (TAA)   • Primary osteoarthritis of right knee   • Status post total knee replacement using cement, right   • Urinary tract infection without hematuria, site unspecified     Past Medical History:   Diagnosis Date   • Arthritis    • Cancer (HCC)     kidney/ prostate   • Diabetes mellitus (HCC)    • ED (erectile dysfunction)    • Enlarged prostate with lower urinary tract symptoms (LUTS)    • Full dentures    • History of fracture of pelvis 2019    after fall from ladder   • History of loss of consciousness 2019    after fall from ladder-flown to [facility]   • History of rib fracture 2019    after fall from ladder   • History of right shoulder fracture 1960    walking up hill, fell on gravel   • History of stress test    • History of torn meniscus of right knee    • [place] (hard of hearing)     Bilateral hearing aids    • Hypertension    • Laceration of head 2019    after fall from ladder-treated at [facility]   • Left shoulder strain 2017    after fall from ladder   • Lung injury 2019    punctured right lung after falling off ladder   • Renal disorder    • Stone in kidney    • Stroke (HCC)      Past Surgical History:   Procedure Laterality Date    • CHEST TUBE INSERTION  11/2019    [facility]   • COL[facility]COPY     • COLONOSCOPY N/A 4/8/2019    Procedure: COLONOSCOPY;  Surgeon: Onesimo Arnold MD;  Location: [facility] ENDOSCOPY;  Service: Gastroenterology   • EYE SURGERY Right     cataract removal with IOL implant   • HERNIA REPAIR Right     multiple inguinal hernia repairs   • KNEE ARTHROSCOPY W/ MENISCECTOMY Right 05/10/2012    Partial medial and lateral menisectomy-Conrad Baird MD   • NEPHRECTOMY Right     partial right nephrectomy due to kidney stone   • PELVIC FRACTURE SURGERY Right 11/2019    multiple pelvic fractures after fall from [facility]   • SHOULDER SURGERY Right 1960    fracture surgery to remove a piece of bone- [place]   • TOTAL KNEE ARTHROPLASTY Right 5/25/2021    Procedure: knee arthroplasty total;  Surgeon: Conrad Baird MD;  Location: [facility] OR;  Service: Orthopedics;  Laterality: Right;      General Information     Row Name 12/26/22 1455          Physical Therapy Time and Intention    Document Type therapy note (daily note)  -CC     Mode of Treatment physical therapy  -CC     Row Name 12/26/22 1455          General Information    Patient Profile Reviewed yes  -CC     Existing Precautions/Restrictions fall;oxygen therapy device and L/min  -CC     Row Name 12/26/22 1455          Safety Issues, Functional Mobility    Safety Issues Affecting Function (Mobility) awareness of need for assistance;insight into deficits/self-awareness;positioning of assistive device;problem-solving;safety precautions follow-through/compliance  -CC     Impairments Affecting Function (Mobility) balance;coordination;endurance/activity tolerance;motor control;postural/trunk control;strength  -CC           User Key  (r) = Recorded By, (t) = Taken By, (c) = Cosigned By    Initials Name Provider Type    CC Yesenia Gerardo, PTA Physical Therapist Assistant                Mobility    No documentation.                Obj/Interventions    No documentation.                Goals/Plan    No documentation.                Clinical Impression     Row Name 12/26/22 1455          Pain    Pretreatment Pain Rating 0/10 - no pain  -CC     Posttreatment Pain Rating 0/10 - no pain  -CC     Additional Documentation Pain Scale: Numbers Pre/Post-Treatment (Group)  -CC     Row Name 12/26/22 1455          Plan of Care Review    Plan of Care Reviewed With patient  -CC     Progress improving  -CC     Outcome Evaluation Pt in supine and agreeable to PT. Performed supine to sit with min/CGA, sit <->stand with min/CCA and VC for hand placement, amb with shuffling gait pattern x2 feet, with RW, min A and VC for sequencing to transition from EOB to recliner,  performed AP, LAQ, hip abd, marching with VC and demonstration 1x10 reps with occ VC for focusing on task. Pt O2 SATS during therapy btw 89 - 90% with occ VC for PLB tech. Con't with PT POC and progress as tolerated  -CC     Row Name 12/26/22 1455          Positioning and Restraints    Pre-Treatment Position in bed  -CC     Post Treatment Position chair  -CC     In Chair reclined;call light within reach;encouraged to call for assist;exit alarm on;notified nsg  -CC           User Key  (r) = Recorded By, (t) = Taken By, (c) = Cosigned By    Initials Name Provider Type    CC Yesenia Gerardo, PTA Physical Therapist Assistant               Outcome Measures     Row Name 12/26/22 1455 12/26/22 0800       How much help from another person do you currently need...    Turning from your back to your side while in flat bed without using bedrails? 3  -CC 3  -KE    Moving from lying on back to sitting on the side of a flat bed without bedrails? 3  -CC 3  -KE    Moving to and from a bed to a chair (including a wheelchair)? 3  -CC 2  -KE    Standing up from a chair using your arms (e.g., wheelchair, bedside chair)? 3  -CC 2  -KE    Climbing 3-5 steps with a railing? 2   - 2  -KE    To walk in hospital room? 2  - 2  -KE    AM-PAC 6 Clicks Score (PT) 16  - 14  -KE    Highest level of mobility 5 --> Static standing  - 4 --> Transferred to chair/commode  -    Row Name 12/26/22 1455          Functional Assessment    Outcome Measure Options AM-PAC 6 Clicks Basic Mobility (PT)  -           User Key  (r) = Recorded By, (t) = Taken By, (c) = Cosigned By    Initials Name Provider Type     Yesenia Gerardo PTA Physical Therapist Assistant    Antoinette England RN Registered Nurse                             Physical Therapy Education     Title: PT OT SLP Therapies (In Progress)     Topic: Physical Therapy (In Progress)     Point: Mobility training (Done)     Learning Progress Summary           Patient Acceptance, E,TB, VU,NR by  at 12/26/2022 1713    Comment: Importance of increasing mob and VC with demonstration for foot clearance    Acceptance, E,D, DU by  at 12/23/2022 1144    Comment: Pt education for improving technique with walker for transfers                   Point: Home exercise program (Done)     Learning Progress Summary           Patient Acceptance, E,TB, VU,NR by  at 12/24/2022 1700    Comment: Importance of performing B LE ex btw therapy sessions                   Point: Body mechanics (Not Started)     Learner Progress:  Not documented in this visit.          Point: Precautions (Not Started)     Learner Progress:  Not documented in this visit.                      User Key     Initials Effective Dates Name Provider Type Discipline     06/16/21 -  Yesenia Gerardo PTA Physical Therapist Assistant PT     06/16/21 -  Abby Agrawal PT Physical Therapist PT              PT Recommendation and Plan     Plan of Care Reviewed With: patient  Progress: improving  Outcome Evaluation: Pt in supine and agreeable to PT. Performed supine to sit with min/CGA, sit <->stand with min/CCA and VC for hand placement, amb with shuffling gait pattern x2 feet, with RW,  min A and VC for sequencing to transition from EOB to recliner,  performed AP, LAQ, hip abd, marching with VC and demonstration 1x10 reps with occ VC for focusing on task. Pt O2 SATS during therapy btw 89 - 90% with occ VC for PLB tech. Con't with PT POC and progress as tolerated     Time Calculation:    PT Charges     Row Name 12/26/22 1714             Time Calculation    Start Time 1455  -CC      Stop Time 1535  -CC      Time Calculation (min) 40 min  -CC      PT Received On 12/26/22  -CC      PT Goal Re-Cert Due Date 01/02/23  -CC         Timed Charges    81494 - PT Therapeutic Exercise Minutes 15  -CC      54688 - Gait Training Minutes  10  -CC      53674 - PT Therapeutic Activity Minutes 15  -CC         Total Minutes    Timed Charges Total Minutes 40  -CC       Total Minutes 40  -CC            User Key  (r) = Recorded By, (t) = Taken By, (c) = Cosigned By    Initials Name Provider Type    CC Yesenia Gerardo PTA Physical Therapist Assistant              Therapy Charges for Today     Code Description Service Date Service Provider Modifiers Qty    23458723263 HC PT THER PROC EA 15 MIN 12/26/2022 Yesenia Gerardo, PTA GP 1    70892547773 HC GAIT TRAINING EA 15 MIN 12/26/2022 Yesenia Gerardo, PTA GP 1    14560440354 HC PT THERAPEUTIC ACT EA 15 MIN 12/26/2022 Yesenia Gerardo, EMMA GP 1          PT G-Codes  Outcome Measure Options: AM-PAC 6 Clicks Basic Mobility (PT)  AM-PAC 6 Clicks Score (PT): 16  AM-PAC 6 Clicks Score (OT): 15       Yesenia Gerardo PTA  12/26/2022

## 2022-12-26 NOTE — PROGRESS NOTES
[facility]    Name:  Ankur Camp   Age:  82 y.o.  Sex:  male  :  1940  MRN:  [id number]   Visit Number:  [id number]  Admission Date:  2022  Date Of Service:  22  Primary Care Physician:  Magaly Prabhakar APRN     LOS: 4 days :    Chief Complaint:      Fatigue    Subjective:    Patient has been seen and examined today on .  He is feeling very sleepy and tired.  Still not much energy.  Saturation is 90% on current 5 L.  He is being having a poor appetite but denies any chest pain or cough    Hospital Course:    Patient is an 82 year old male who presents to the hospital with complaints of weakness who was sent from PCP office after being diagnosed with UTI.  Patient wife advised on arrival that patient had had generalized weakness, less appetite with weight loss over the past 2 weeks.  Patient with past health history significant for diabetes mellitus type 2, hypertension, hyperlipidemia, and Parkinson's disease.  On exam, patient is resting in bed on 4L with sats in low 90s.  Denies any urinary symptoms.  Complains of worsening shortness of breath the past 2 days and cough.  Uses a walker/cane at baseline.     Work up in the emergency department noted troponin-0.033, BNP-2194, glucose-171, creatinine-1.31 with baseline 0.8, BUN-31,  albumin-3.3, CRP-9.9, CBC unremarkable, lactate-2.9 with repeat 2.1, procalcitonin-0.24.  Urinalysis noted infection with 2+ bacteria, 31-50 WBC, +nitrites, and +3 leuks.  CXR noted hypoinflation with scattered mild atelectasis. Covid 19 testing was positive.     Admitted to the hospital and continued on Rocephin for UTI.  Started on Decadron for Covid 19.  PT/OT consulted for evaluation of weakness.  Patient was still hypoxic not improving also on  he has been started on remdesivir for the next 5 days    Review of Systems:     All systems were reviewed and negative except as mentioned in  subjective, assessment and plan.    Vital Signs:    Temp:  [97.8 °F (36.6 °C)-98.7 °F (37.1 °C)] 98.7 °F (37.1 °C)  Heart Rate:  [73-83] 82  Resp:  [18-20] 20  BP: (133-156)/(70-92) 154/92    Intake and output:    I/O last 3 completed shifts:  In: 960 [P.O.:960]  Out: -   No intake/output data recorded.    Physical Examination:    General Appearance:   Awake and sleepy but  cooperative,  no acute distress on exam   Head:  Atraumatic and normocephalic.   Eyes: Conjunctivae and sclerae normal, no icterus. No pallor.   Throat: No oral lesions, no thrush, oral mucosa moist.   Neck: Supple, trachea midline   Lungs:   Breath sounds heard bilaterally equally but diminished throughout.  Coarse breath sounds noted.  No wheezing or crackles.  Currently on oxygen   Heart:  Normal S1 and S2, no murmur, no gallop, no rub. No JVD.   Abdomen:   Normal bowel sounds, no masses, no organomegaly. Soft, nontender, nondistended, no rebound tenderness.   Extremities: Supple, no edema, no cyanosis, no clubbing.   Skin: No bleeding or rash.   Neurologic: Alert and oriented x 3. No facial asymmetry. Moves all four limbs. No tremors. Generalized weakness present.     Laboratory results:    Results from last 7 days   Lab Units 12/26/22  0639 12/25/22  1353 12/24/22  0600 12/23/22  2008 12/23/22  0303 12/22/22  1042   SODIUM mmol/L  --   --  142  --  144 144   POTASSIUM mmol/L  --   --  3.9 3.9 3.4* 3.7   CHLORIDE mmol/L  --   --  109*  --  109* 105   CO2 mmol/L  --   --  23.3  --  24.8 24.8   BUN mg/dL  --   --  21  --  24* 31*   CREATININE mg/dL 0.76 0.79 0.75*  --  0.86 1.31*   CALCIUM mg/dL  --   --  8.9  --  8.0* 9.1   BILIRUBIN mg/dL 0.7 0.7  --   --   --  1.2   ALK PHOS U/L 100 94  --   --   --  94   ALT (SGPT) U/L 8 8  --   --   --  <5   AST (SGOT) U/L 30 40  --   --   --  23   GLUCOSE mg/dL  --   --  201*  --  147* 171*     Results from last 7 days   Lab Units 12/24/22  0600 12/23/22  0303 12/22/22  1042   WBC 10*3/mm3 6.41 5.16 6.29    HEMOGLOBIN g/dL 14.6 13.6 14.8   HEMATOCRIT % 43.2 40.1 44.8   PLATELETS 10*3/mm3 158 141 148         Results from last 7 days   Lab Units 12/22/22  1042   TROPONIN T ng/mL 0.033*     Results from last 7 days   Lab Units 12/22/22  1340 12/22/22  1225 12/22/22  1143   BLOODCX  No growth at 3 days No growth at 3 days  --    URINECX   --   --  >100,000 CFU/mL Escherichia coli*     No results for input(s): PHART, PQT6EBO, PO2ART, SNI6PZC, BASEEXCESS in the last 8760 hours.   I have reviewed the patient's laboratory results.    Radiology results:    No radiology results from the last 24 hrs  I have reviewed the patient's radiology reports.    Medication Review:     I have reviewed the patient's active and prn medications.     Problem List:      Urinary tract infection without hematuria, site unspecified      Assessment:    1. Acute UTI  2. Acute Respiratory Failure with Hypoxia  3. Covid 19 +  4. Lactic Acidosis  5. LAWSON  6. Parkinsons Disease  7. Diabetes Mellitus Type 2 on oral anti-diabetics  8. Hyperlipidemia    Plan:    Acute UTI/ LAWSON/ Lactic Acidosis  -Rocephin 1GM IV for 5 days  -Urine culture shows E. coli and is sensitive to Rocephin  -Repeat Lactate-normal  Tomorrow December this is June 27 would be 5 days of medicine and UA should be repeated     Acute Respiratory Failure with hypoxemia/ Covid 19+  -Supplemental oxygen to keep saturations >90%--currently held due to increased from 4 to 5 L to maintain saturation  -Decadron 6mg daily as patient is hypoxic and today will be day fifth  Due to his age with diabetes and risk factor who is hypoxic he has been started on remdesivir on December 25.  Today would be day second for it  -Procalcitonin negative  -Supportive care   -PT/OT evaluations for weakness today--home health vs STR     Diabetes Mellitus  -On oral anti-diabetics at home  -Cover with sliding scale as it is high due to ongoing steroids  -A1c-8     Bradycardia- his bisoprolol has been stopped and it has  improved to baseline    Hypertension- on 24th his bisoprolol was stopped due to bradycardia and he has since been on lisinopril 20 mg daily.  His blood pressure is a lot stable now    Further course after PT/OT evaluations.  Patient will likely need STR.    DVT Prophylaxis: Lovenox  Code Status: Full Code  Diet: Carb Consistent  Discharge Plan: 3 days after his remdesivir will be finished    Filipe Rodgers MD  12/26/22  10:12 EST    Dictated utilizing Dragon dictation.

## 2022-12-27 ENCOUNTER — APPOINTMENT (OUTPATIENT)
Dept: GENERAL RADIOLOGY | Facility: HOSPITAL | Age: 82
DRG: 177 | End: 2022-12-27
Payer: MEDICARE

## 2022-12-27 ENCOUNTER — APPOINTMENT (OUTPATIENT)
Dept: CT IMAGING | Facility: HOSPITAL | Age: 82
DRG: 177 | End: 2022-12-27
Payer: MEDICARE

## 2022-12-27 LAB
ALBUMIN SERPL-MCNC: 2.6 G/DL (ref 3.5–5.2)
ALP SERPL-CCNC: 91 U/L (ref 39–117)
ALT SERPL W P-5'-P-CCNC: 16 U/L (ref 1–41)
AST SERPL-CCNC: 36 U/L (ref 1–40)
BACTERIA SPEC AEROBE CULT: NORMAL
BACTERIA SPEC AEROBE CULT: NORMAL
BILIRUB CONJ SERPL-MCNC: 0.2 MG/DL (ref 0–0.3)
BILIRUB INDIRECT SERPL-MCNC: 0.5 MG/DL
BILIRUB SERPL-MCNC: 0.7 MG/DL (ref 0–1.2)
CREAT SERPL-MCNC: 0.66 MG/DL (ref 0.76–1.27)
DEPRECATED RDW RBC AUTO: 44.9 FL (ref 37–54)
EGFRCR SERPLBLD CKD-EPI 2021: 94.2 ML/MIN/1.73
ERYTHROCYTE [DISTWIDTH] IN BLOOD BY AUTOMATED COUNT: 13.2 % (ref 12.3–15.4)
GLUCOSE BLDC GLUCOMTR-MCNC: 135 MG/DL (ref 70–130)
GLUCOSE BLDC GLUCOMTR-MCNC: 190 MG/DL (ref 70–130)
GLUCOSE BLDC GLUCOMTR-MCNC: 214 MG/DL (ref 70–130)
HCT VFR BLD AUTO: 38.3 % (ref 37.5–51)
HGB BLD-MCNC: 12.8 G/DL (ref 13–17.7)
MCH RBC QN AUTO: 31.1 PG (ref 26.6–33)
MCHC RBC AUTO-ENTMCNC: 33.4 G/DL (ref 31.5–35.7)
MCV RBC AUTO: 93.2 FL (ref 79–97)
PLATELET # BLD AUTO: 191 10*3/MM3 (ref 140–450)
PMV BLD AUTO: 11.6 FL (ref 6–12)
PROT SERPL-MCNC: 5.5 G/DL (ref 6–8.5)
RBC # BLD AUTO: 4.11 10*6/MM3 (ref 4.14–5.8)
WBC NRBC COR # BLD: 9.03 10*3/MM3 (ref 3.4–10.8)

## 2022-12-27 PROCEDURE — 63710000001 INSULIN ASPART PER 5 UNITS: Performed by: NURSE PRACTITIONER

## 2022-12-27 PROCEDURE — 97535 SELF CARE MNGMENT TRAINING: CPT

## 2022-12-27 PROCEDURE — 82962 GLUCOSE BLOOD TEST: CPT

## 2022-12-27 PROCEDURE — 80076 HEPATIC FUNCTION PANEL: CPT | Performed by: INTERNAL MEDICINE

## 2022-12-27 PROCEDURE — 71045 X-RAY EXAM CHEST 1 VIEW: CPT

## 2022-12-27 PROCEDURE — 25010000002 ENOXAPARIN PER 10 MG: Performed by: NURSE PRACTITIONER

## 2022-12-27 PROCEDURE — 97530 THERAPEUTIC ACTIVITIES: CPT

## 2022-12-27 PROCEDURE — 63710000001 DEXAMETHASONE PER 0.25 MG: Performed by: NURSE PRACTITIONER

## 2022-12-27 PROCEDURE — 82565 ASSAY OF CREATININE: CPT | Performed by: INTERNAL MEDICINE

## 2022-12-27 PROCEDURE — 99232 SBSQ HOSP IP/OBS MODERATE 35: CPT | Performed by: NURSE PRACTITIONER

## 2022-12-27 PROCEDURE — 85027 COMPLETE CBC AUTOMATED: CPT | Performed by: NURSE PRACTITIONER

## 2022-12-27 PROCEDURE — 25010000002 REMDESIVIR 100 MG/20ML SOLUTION 1 EACH VIAL: Performed by: INTERNAL MEDICINE

## 2022-12-27 RX ADMIN — INSULIN ASPART 2 UNITS: 100 INJECTION, SOLUTION INTRAVENOUS; SUBCUTANEOUS at 18:09

## 2022-12-27 RX ADMIN — REMDESIVIR 100 MG: 100 INJECTION, POWDER, LYOPHILIZED, FOR SOLUTION INTRAVENOUS at 13:20

## 2022-12-27 RX ADMIN — ALBUTEROL SULFATE 2 PUFF: 90 AEROSOL, METERED RESPIRATORY (INHALATION) at 18:56

## 2022-12-27 RX ADMIN — Medication 10 ML: at 21:58

## 2022-12-27 RX ADMIN — METFORMIN HYDROCHLORIDE 850 MG: 850 TABLET ORAL at 18:09

## 2022-12-27 RX ADMIN — Medication 10 ML: at 09:24

## 2022-12-27 RX ADMIN — ALBUTEROL SULFATE 2 PUFF: 90 AEROSOL, METERED RESPIRATORY (INHALATION) at 16:11

## 2022-12-27 RX ADMIN — SENNOSIDES AND DOCUSATE SODIUM 2 TABLET: 50; 8.6 TABLET ORAL at 09:24

## 2022-12-27 RX ADMIN — ALBUTEROL SULFATE 2 PUFF: 90 AEROSOL, METERED RESPIRATORY (INHALATION) at 12:11

## 2022-12-27 RX ADMIN — CARBIDOPA AND LEVODOPA 1 TABLET: 25; 100 TABLET ORAL at 09:24

## 2022-12-27 RX ADMIN — SENNOSIDES AND DOCUSATE SODIUM 2 TABLET: 50; 8.6 TABLET ORAL at 21:58

## 2022-12-27 RX ADMIN — ATORVASTATIN CALCIUM 80 MG: 80 TABLET, FILM COATED ORAL at 09:24

## 2022-12-27 RX ADMIN — DEXAMETHASONE 6 MG: 2 TABLET ORAL at 09:24

## 2022-12-27 RX ADMIN — CARBIDOPA AND LEVODOPA 1 TABLET: 25; 100 TABLET ORAL at 17:13

## 2022-12-27 RX ADMIN — ASPIRIN 81 MG CHEWABLE TABLET 81 MG: 81 TABLET CHEWABLE at 09:24

## 2022-12-27 RX ADMIN — LISINOPRIL 20 MG: 20 TABLET ORAL at 09:25

## 2022-12-27 RX ADMIN — INSULIN ASPART 4 UNITS: 100 INJECTION, SOLUTION INTRAVENOUS; SUBCUTANEOUS at 12:12

## 2022-12-27 RX ADMIN — ENOXAPARIN SODIUM 40 MG: 100 INJECTION SUBCUTANEOUS at 21:58

## 2022-12-27 RX ADMIN — CARBIDOPA AND LEVODOPA 1 TABLET: 25; 100 TABLET ORAL at 21:58

## 2022-12-27 RX ADMIN — METFORMIN HYDROCHLORIDE 850 MG: 850 TABLET ORAL at 09:24

## 2022-12-27 NOTE — PROGRESS NOTES
[facility]    Name:  Ankur Camp   Age:  82 y.o.  Sex:  male  :  1940  MRN:  [id number]   Visit Number:  [id number]  Admission Date:  2022  Date Of Service:  22  Primary Care Physician:  Magaly Prabhakar APRN     LOS: 5 days :    Chief Complaint:      Fatigue    Subjective:    Patient seen and examined at bedside.  He is pleasantly confused in conversation.  States he is in the same place as yesterday.  Was noted to be on 5L this am which has been increased to 10L on my exam after working with therapy.  Patient has no complaints on exam, denies any pain.  No baseline oxygen use.    Hospital Course:    Patient is an 82 year old male who presents to the hospital with complaints of weakness who was sent from PCP office after being diagnosed with UTI.  Patient wife advised on arrival that patient had had generalized weakness, less appetite with weight loss over the past 2 weeks.  Patient with past health history significant for diabetes mellitus type 2, hypertension, hyperlipidemia, and Parkinson's disease.  On exam, patient is resting in bed on 4L with sats in low 90s.  Denies any urinary symptoms.  Complains of worsening shortness of breath the past 2 days and cough.  Uses a walker/cane at baseline.     Work up in the emergency department noted troponin-0.033, BNP-2194, glucose-171, creatinine-1.31 with baseline 0.8, BUN-31,  albumin-3.3, CRP-9.9, CBC unremarkable, lactate-2.9 with repeat 2.1, procalcitonin-0.24.  Urinalysis noted infection with 2+ bacteria, 31-50 WBC, +nitrites, and +3 leuks.  CXR noted hypoinflation with scattered mild atelectasis. Covid 19 testing was positive.      Admitted to the hospital and continued on Rocephin for UTI.  Started on Decadron for Covid 19.  PT/OT consulted for evaluation of weakness.  Patient was still hypoxic not improving also on  he has been started on remdesivir for the next 5 days.   Noted to have increased oxygen demands up to 10L.  CT chest to rule out PE ordered.  Repeat CXR noted concern for multifocal pneumonia.    Review of Systems:     All systems were reviewed and negative except as mentioned in subjective, assessment and plan.    Vital Signs:    Temp:  [96.7 °F (35.9 °C)-98.2 °F (36.8 °C)] 96.7 °F (35.9 °C)  Heart Rate:  [55-90] 89  Resp:  [18-20] 18  BP: (150-167)/(85-98) 159/94    Intake and output:    No intake/output data recorded.  No intake/output data recorded.    Physical Examination:    General Appearance:  Alert and cooperative, pleasantly confused on exam, no acute distress, elderly male   Head:  Atraumatic and normocephalic.   Eyes: Conjunctivae and sclerae normal, no icterus. No pallor.   Throat: No oral lesions, no thrush, oral mucosa moist.   Neck: Supple, trachea midline.   Lungs:   Breath sounds heard bilaterally equally but diminished.  Scattered wheezing without crackles. Unlabored at rest on 10L   Heart:  Normal S1 and S2, no murmur, no gallop, no rub. No JVD.   Abdomen:   Normal bowel sounds, no masses, no organomegaly. Soft, nontender, nondistended, no rebound tenderness.   Extremities: Supple, no edema, no cyanosis, no clubbing.   Skin: No bleeding or rash.   Neurologic: Alert and oriented x 3. No facial asymmetry. Moves all four limbs. No tremors.Generalized weakness is present.     Laboratory results:    Results from last 7 days   Lab Units 12/27/22  0629 12/26/22  0639 12/25/22  1353 12/24/22  0600 12/23/22  2008 12/23/22  0303 12/22/22  1042   SODIUM mmol/L  --   --   --  142  --  144 144   POTASSIUM mmol/L  --   --   --  3.9 3.9 3.4* 3.7   CHLORIDE mmol/L  --   --   --  109*  --  109* 105   CO2 mmol/L  --   --   --  23.3  --  24.8 24.8   BUN mg/dL  --   --   --  21  --  24* 31*   CREATININE mg/dL 0.66* 0.76 0.79 0.75*  --  0.86 1.31*   CALCIUM mg/dL  --   --   --  8.9  --  8.0* 9.1   BILIRUBIN mg/dL 0.7 0.7 0.7  --   --   --  1.2   ALK PHOS U/L 91 100 94  --    --   --  94   ALT (SGPT) U/L 16 8 8  --   --   --  <5   AST (SGOT) U/L 36 30 40  --   --   --  23   GLUCOSE mg/dL  --   --   --  201*  --  147* 171*     Results from last 7 days   Lab Units 12/27/22  0629 12/24/22  0600 12/23/22  0303   WBC 10*3/mm3 9.03 6.41 5.16   HEMOGLOBIN g/dL 12.8* 14.6 13.6   HEMATOCRIT % 38.3 43.2 40.1   PLATELETS 10*3/mm3 191 158 141         Results from last 7 days   Lab Units 12/22/22  1042   TROPONIN T ng/mL 0.033*     Results from last 7 days   Lab Units 12/22/22  1340 12/22/22  1225 12/22/22  1143   BLOODCX  No growth at 5 days No growth at 5 days  --    URINECX   --   --  >100,000 CFU/mL Escherichia coli*     No results for input(s): PHART, WZG0ZCS, PO2ART, KZN2RLV, BASEEXCESS in the last 8760 hours.   I have reviewed the patient's laboratory results.    Radiology results:    XR Chest 1 View    Result Date: 12/27/2022  CLINICAL INDICATION:  Covid/ SOA; N39.0-Urinary tract infection, site not specified; U07.1-COVID-19; J96.01-Acute respiratory failure with hypoxia; E87.20-Acidosis, unspecified; N28.9-Disorder of kidney and ureter, unspecified  EXAMINATION TECHNIQUE: XR CHEST 1 VW-  COMPARISON: Radiographs 12/22/2022.  FINDINGS: Bilateral multifocal patchy areas of groundglass opacities and airspace consolidations. No pneumothorax. No large pleural effusion. Probable trace bilateral pleural effusions. Heart is normal in size. Aortic atherosclerosis and tortuosity.      Impression: Bilateral multifocal patchy areas of airspace opacities, concerning for multifocal pneumonia/atypical viral pneumonia.    Images personally reviewed, interpreted and dictated by RICKI Ash.       This report was signed and finalized on 12/27/2022 9:32 AM by RICKI Ash.    I have reviewed the patient's radiology reports.    Medication Review:     I have reviewed the patient's active and prn medications.     Problem List:      Urinary tract infection without hematuria, site  unspecified      Assessment:    1. Acute UTI  2. Acute Respiratory Failure with Hypoxia  3. Covid 19 +  4. Pneumonia, multifocal  5. Lactic Acidosis  6. LAWSON  7. Parkinsons Disease  8. Diabetes Mellitus Type 2 on oral anti-diabetics  9. Hyperlipidemia    Plan:    Acute UTI/ LAWSON/ Lactic Acidosis  -Completed Rocephin 1GM IV for 5 days  -Urine culture shows E. coli and is sensitive to Rocephin  -Repeat Lactate-normal       Acute Respiratory Failure with hypoxemia/ Covid 19+  -Supplemental oxygen to keep saturations >90%--requiring 10L on exam with saturations around 90%  -Continue Decadron 6mg daily as patient is hypoxic   -Started on Remdesivir for Covid  -Procalcitonin negative on admission  -Recheck Procal in am  -Supportive care   -Continue PT/OT  -Repeat CXR today concerning for multifocal pneumonia--atypical/ viral  -Noted to have significant desaturations with therapy and needing more oxygen today--will order CT to rule out PE today       Diabetes Mellitus  -On oral anti-diabetics at home  -Cover with sliding scale as it is high due to ongoing steroids  -A1c-8     Bradycardia- his bisoprolol has been stopped and it has improved to baseline     Hypertension- on 24th his bisoprolol was stopped due to bradycardia and he has since been on lisinopril 20 mg daily.  His blood pressure is a lot stable now    Discharge disposition still pending with [facility] vs [facility]      DVT Prophylaxis: Lovenox  Code Status: Full Code  Diet: CC  Discharge Plan: several days    Pam Hallman, APRN  12/27/22  15:46 EST    Dictated utilizing Dragon dictation.

## 2022-12-27 NOTE — THERAPY TREATMENT NOTE
Patient Name: Ankur Camp  : 1940    MRN: [id number]                              Today's Date: 2022       Admit Date: 2022    Visit Dx:     ICD-10-CM ICD-9-CM   1. Urinary tract infection without hematuria, site unspecified  N39.0 599.0   2. COVID-19  U07.1 079.89   3. Acute respiratory failure with hypoxia (HCC)  J96.01 518.81   4. Lactic acidosis  E87.20 276.2   5. Acute renal insufficiency  N28.9 593.9     Patient Active Problem List   Diagnosis   • Benign prostatic hypertrophy without urinary obstruction   • Hypertension   • Hyperlipidemia   • Type 2 diabetes mellitus (HCC)   • Vitamin D deficiency   • Colon cancer screening   • Coronary artery calcification   • Thoracic aortic aneurysm (TAA)   • Primary osteoarthritis of right knee   • Status post total knee replacement using cement, right   • Urinary tract infection without hematuria, site unspecified     Past Medical History:   Diagnosis Date   • Arthritis    • Cancer (HCC)     kidney/ prostate   • Diabetes mellitus (HCC)    • ED (erectile dysfunction)    • Enlarged prostate with lower urinary tract symptoms (LUTS)    • Full dentures    • History of fracture of pelvis 2019    after fall from ladder   • History of loss of consciousness 2019    after fall from ladder-flown to [facility]   • History of rib fracture 2019    after fall from ladder   • History of right shoulder fracture 1960    walking up hill, fell on gravel   • History of stress test    • History of torn meniscus of right knee    • [place] (hard of hearing)     Bilateral hearing aids    • Hypertension    • Laceration of head 2019    after fall from ladder-treated at [facility]   • Left shoulder strain 2017    after fall from ladder   • Lung injury 2019    punctured right lung after falling off ladder   • Renal disorder    • Stone in kidney    • Stroke (HCC)      Past Surgical History:   Procedure Laterality Date    • CHEST TUBE INSERTION  11/2019    rib fractures, punctured lung after fall from [facility]   • COLONOSCOPY     • COLONOSCOPY N/A 4/8/2019    Procedure: COLONOSCOPY;  Surgeon: Onesimo Arnold MD;  Location: [facility] ENDOSCOPY;  Service: Gastroenterology   • EYE SURGERY Right     cataract removal with IOL implant   • HERNIA REPAIR Right     multiple inguinal hernia repairs   • KNEE ARTHROSCOPY W/ MENISCECTOMY Right 05/10/2012    Partial medial and lateral menisectomy-Conrad Baird MD   • NEPHRECTOMY Right     partial right nephrectomy due to kidney stone   • PELVIC FRACTURE SURGERY Right 11/2019    multiple pelvic fractures after fall from [facility]   • SHOULDER SURGERY Right 1960    fracture surgery to remove a piece of bone- [place]   • TOTAL KNEE ARTHROPLASTY Right 5/25/2021    Procedure: knee arthroplasty total;  Surgeon: Conrad Baird MD;  Location: [facility] OR;  Service: Orthopedics;  Laterality: Right;      General Information     Row Name 12/27/22 1253          OT Time and Intention    Document Type therapy note (daily note)  -SD     Mode of Treatment occupational therapy  -SD     Row Name 12/27/22 1253          General Information    Patient Profile Reviewed yes  -SD           User Key  (r) = Recorded By, (t) = Taken By, (c) = Cosigned By    Initials Name Provider Type    SD Jyoti Rosario OT Occupational Therapist                 Mobility/ADL's     Row Name 12/27/22 1253          Bed Mobility    Bed Mobility supine-sit  -SD     Supine-Sit [place] (Bed Mobility) contact guard  -SD     Assistive Device (Bed Mobility) bed rails;head of bed elevated  -SD     Row Name 12/27/22 1253          Transfers    Transfers sit-stand transfer  -SD     Row Name 12/27/22 1253          Sit-Stand Transfer    Sit-Stand [place] (Transfers) contact guard  -SD     Assistive Device (Sit-Stand Transfers) walker, front-wheeled  -SD     Row Name 12/27/22 125           Functional Mobility    Functional Mobility- Ind. Level minimum assist (75% patient effort)  -SD     Functional Mobility- Device walker, front-wheeled  -SD     Functional Mobility-Distance (Feet) 6  -SD     Functional Mobility- Comment shuffling gait  -SD     Row Name 12/27/22 1253          Grooming Assessment/Training    [place] Level (Grooming) wash face, hands;standby assist  -SD     Position (Grooming) edge of bed sitting  -SD           User Key  (r) = Recorded By, (t) = Taken By, (c) = Cosigned By    Initials Name Provider Type    SD Jyoti Rosario OT Occupational Therapist               Obj/Interventions    No documentation.                Goals/Plan    No documentation.                Clinical Impression     Row Name 12/27/22 1254          Pain Assessment    Pretreatment Pain Rating 0/10 - no pain  -SD     Posttreatment Pain Rating 0/10 - no pain  -SD     Row Name 12/27/22 1254          Plan of Care Review    Plan of Care Reviewed With patient  -SD     Progress improving  -SD     Outcome Evaluation OT tx completed. Patient supine in bed on 5L O2 satting 92% on NC, moved to EOB with CGA, completed grooming tasks with SBA. Patient completed sit to stand with CGA, required min A to walk 6' using RW d/t shuffling gait and assistance to move walker. O2 noted to be 80%, increased to 10L and rebounded to 87%, placed on HF NC and rebounded to 89% with VCs for breathing techniques. Continue OT POC  -SD     Row Name 12/27/22 1254          Vital Signs    Pre SpO2 (%) 92  -SD     O2 Delivery Pre Treatment supplemental O2  5L  -SD     Intra SpO2 (%) 80  -SD     O2 Delivery Intra Treatment supplemental O2  10L  -SD     Post SpO2 (%) 89  -SD     O2 Delivery Post Treatment hi-flow  10L  -SD     Row Name 12/27/22 1254          Positioning and Restraints    Pre-Treatment Position in bed  -SD     Post Treatment Position chair  -SD     In Chair reclined;encouraged to call for assist;call light within reach;exit alarm  on;with nsg  -SD           User Key  (r) = Recorded By, (t) = Taken By, (c) = Cosigned By    Initials Name Provider Type    Jyoti Pearl OT Occupational Therapist               Outcome Measures     Row Name 12/27/22 1259          How much help from another is currently needed...    Putting on and taking off regular lower body clothing? 2  -SD     Bathing (including washing, rinsing, and drying) 2  -SD     Toileting (which includes using toilet bed pan or urinal) 2  -SD     Putting on and taking off regular upper body clothing 3  -SD     Taking care of personal grooming (such as brushing teeth) 3  -SD     Eating meals 3  -SD     AM-PAC 6 Clicks Score (OT) 15  -SD     Row Name 12/27/22 0800          How much help from another person do you currently need...    Turning from your back to your side while in flat bed without using bedrails? 3  -HE     Moving from lying on back to sitting on the side of a flat bed without bedrails? 3  -HE     Moving to and from a bed to a chair (including a wheelchair)? 3  -HE     Standing up from a chair using your arms (e.g., wheelchair, bedside chair)? 3  -HE     Climbing 3-5 steps with a railing? 2  -HE     To walk in hospital room? 2  -HE     AM-PAC 6 Clicks Score (PT) 16  -HE     Highest level of mobility 5 --> Static standing  -HE     Row Name 12/27/22 1255          Functional Assessment    Outcome Measure Options AM-PAC 6 Clicks Daily Activity (OT)  -SD           User Key  (r) = Recorded By, (t) = Taken By, (c) = Cosigned By    Initials Name Provider Type    Jyoti Pearl OT Occupational Therapist    Nay Banerjee LPN Licensed Nurse                Occupational Therapy Education     Title: PT OT SLP Therapies (In Progress)     Topic: Occupational Therapy (In Progress)     Point: ADL training (Done)     Description:   Instruct learner(s) on proper safety adaptation and remediation techniques during self care or transfers.   Instruct in proper use of assistive  devices.              Learning Progress Summary           Patient Acceptance, E,TB, VU by SD at 12/27/2022 1259    Comment: Breathing techniques    Acceptance, E,TB, VU by SD at 12/23/2022 1306    Comment: OT POC                   Point: Home exercise program (Not Started)     Description:   Instruct learner(s) on appropriate technique for monitoring, assisting and/or progressing therapeutic exercises/activities.              Learner Progress:  Not documented in this visit.          Point: Precautions (Not Started)     Description:   Instruct learner(s) on prescribed precautions during self-care and functional transfers.              Learner Progress:  Not documented in this visit.          Point: Body mechanics (Not Started)     Description:   Instruct learner(s) on proper positioning and spine alignment during self-care, functional mobility activities and/or exercises.              Learner Progress:  Not documented in this visit.                      User Key     Initials Effective Dates Name Provider Type Discipline    SD 06/16/21 -  Jyoti Rosario OT Occupational Therapist OT              OT Recommendation and Plan  Planned Therapy Interventions (OT): activity tolerance training, adaptive equipment training, BADL retraining, patient/caregiver education/training, ROM/therapeutic exercise, strengthening exercise, transfer/mobility retraining  Therapy Frequency (OT): 3 times/wk (5 times if indicated)  Plan of Care Review  Plan of Care Reviewed With: patient  Progress: improving  Outcome Evaluation: OT tx completed. Patient supine in bed on 5L O2 satting 92% on NC, moved to EOB with CGA, completed grooming tasks with SBA. Patient completed sit to stand with CGA, required min A to walk 6' using RW d/t shuffling gait and assistance to move walker. O2 noted to be 80%, increased to 10L and rebounded to 87%, placed on HF NC and rebounded to 89% with VCs for breathing techniques. Continue OT POC     Time Calculation:     Time Calculation- OT     Row Name 12/27/22 1300             Time Calculation- OT    OT Start Time 1130  -SD      OT Stop Time 1154  -SD      OT Time Calculation (min) 24 min  -SD      OT Received On 12/27/22  -SD      OT Goal Re-Cert Due Date 01/04/23  -SD         Timed Charges    85641 - OT Therapeutic Activity Minutes 19  -SD      14814 - OT Self Care/Mgmt Minutes 5  -SD         Total Minutes    Timed Charges Total Minutes 24  -SD       Total Minutes 24  -SD            User Key  (r) = Recorded By, (t) = Taken By, (c) = Cosigned By    Initials Name Provider Type    SD Jyoti Rosario, OT Occupational Therapist              Therapy Charges for Today     Code Description Service Date Service Provider Modifiers Qty    58953727367 HC OT THERAPEUTIC ACT EA 15 MIN 12/27/2022 Jyoti Rosario OT GO 1    81416494587 HC OT SELF CARE/MGMT/TRAIN EA 15 MIN 12/27/2022 Jyoti Rosario OT GO 1               Jyoti Rosario OT  12/27/2022

## 2022-12-27 NOTE — PAYOR COMM NOTE
"  UPDATED CLINICALS  UR MANAGER; MATT MOORE [phone] AND [phone]      Ankur Olson (82 y.o. Male)     Date of Birth   1940    Social Security Number       Address   [address] [place] [id number]    Home Phone   [phone]    MRN   [id number]       Gnosticism   [facility]    Marital Status                               Admission Date   12/22/22    Admission Type   Emergency    Admitting Provider       Attending Provider   Oral Cano DO    [facility]       Discharge Date       Discharge Disposition       Discharge Destination                               Attending Provider: Oral Cano DO    Allergies: No Known Allergies    Isolation: Enh Drop/Con   Infection: COVID (confirmed) (12/22/22)   Code Status: CPR    Ht: 167.6 cm (66\")   Wt: 67.2 kg (148 lb 2.4 oz)    Admission Cmt: None   Principal Problem: Urinary tract infection without hematuria, site unspecified [N39.0]                 Active Insurance as of 12/22/2022     Primary Coverage     Payor Plan Insurance Group Employer/Plan Group    ANTHEM MEDICARE REPLACEMENT ANTHEM MEDICARE ADVANTAGE [id number]     Payor Plan Address Payor Plan Phone Number Payor Plan Fax Number Effective Dates    [address] [phone]  1/1/2021 - None Entered    [facility] [id number]       Subscriber Name Subscriber Birth Date Member ID       ANKUR OLSON 1940 [id number]                 Emergency Contacts      (Rel.) Home Phone Work Phone Mobile Phone    Kianna Olson (Spouse) [phone] -- --    Elvi Moura (Daughter) -- -- [phone]            Physician Progress Notes (last 24 hours)  Notes from 12/26/22 1418 through 12/27/22 1418   No notes of this type exist for this encounter.         Consult Notes (last 24 hours)  Notes from 12/26/22 1418 through 12/27/22 1418   No notes of this type exist for this encounter.         "

## 2022-12-27 NOTE — CASE MANAGEMENT/SOCIAL WORK
Case Management/Social Work    Patient Name:  Ankur Camp  YOB: 1940  MRN: [id number]  Admit Date:  12/22/2022      DEREK called [facility] to follow up on referral. Lakesha did not answer phone. SW left a VM requesting a call back at her earliest convience.     SW called Jackie Moura at [facility]. No answer. SW left VM requesting a call back.         15:13 EST'  SW received call back from Jackie. States at this time they can not accept pt. They do not currently have any COVID pts in the building.  Jackie states if pt is still in hospital on Monday to reach back out and they will take another look at pt.      Electronically signed by:  Kavita Germain  12/27/22 14:41 EST

## 2022-12-27 NOTE — PLAN OF CARE
Goal Outcome Evaluation:  Plan of Care Reviewed With: patient        Progress: improving  Outcome Evaluation: OT tx completed. Patient supine in bed on 5L O2 satting 92% on NC, moved to EOB with CGA, completed grooming tasks with SBA. Patient completed sit to stand with CGA, required min A to walk 6' using RW d/t shuffling gait and assistance to move walker. O2 noted to be 80%, increased to 10L and rebounded to 87%, placed on HF NC and rebounded to 89% with VCs for breathing techniques. Continue OT POC

## 2022-12-27 NOTE — THERAPY TREATMENT NOTE
Pt found sitting UIC with pt O2 sat at 91% when entering room. Pt began to speak O2 dropped to 87-88%. Will hold treatment until tomorrow.

## 2022-12-28 ENCOUNTER — APPOINTMENT (OUTPATIENT)
Dept: CT IMAGING | Facility: HOSPITAL | Age: 82
DRG: 177 | End: 2022-12-28
Payer: MEDICARE

## 2022-12-28 LAB
ALBUMIN SERPL-MCNC: 2.6 G/DL (ref 3.5–5.2)
ALP SERPL-CCNC: 95 U/L (ref 39–117)
ALT SERPL W P-5'-P-CCNC: 16 U/L (ref 1–41)
ANION GAP SERPL CALCULATED.3IONS-SCNC: 8.4 MMOL/L (ref 5–15)
AST SERPL-CCNC: 22 U/L (ref 1–40)
BILIRUB CONJ SERPL-MCNC: 0.2 MG/DL (ref 0–0.3)
BILIRUB INDIRECT SERPL-MCNC: 0.6 MG/DL
BILIRUB SERPL-MCNC: 0.8 MG/DL (ref 0–1.2)
BUN SERPL-MCNC: 24 MG/DL (ref 8–23)
BUN/CREAT SERPL: 35.3 (ref 7–25)
CALCIUM SPEC-SCNC: 8.9 MG/DL (ref 8.6–10.5)
CHLORIDE SERPL-SCNC: 109 MMOL/L (ref 98–107)
CO2 SERPL-SCNC: 24.6 MMOL/L (ref 22–29)
CREAT SERPL-MCNC: 0.68 MG/DL (ref 0.76–1.27)
DEPRECATED RDW RBC AUTO: 44.6 FL (ref 37–54)
EGFRCR SERPLBLD CKD-EPI 2021: 93.4 ML/MIN/1.73
ERYTHROCYTE [DISTWIDTH] IN BLOOD BY AUTOMATED COUNT: 13.1 % (ref 12.3–15.4)
GLUCOSE BLDC GLUCOMTR-MCNC: 127 MG/DL (ref 70–130)
GLUCOSE BLDC GLUCOMTR-MCNC: 154 MG/DL (ref 70–130)
GLUCOSE BLDC GLUCOMTR-MCNC: 285 MG/DL (ref 70–130)
GLUCOSE SERPL-MCNC: 162 MG/DL (ref 65–99)
HCT VFR BLD AUTO: 39.5 % (ref 37.5–51)
HGB BLD-MCNC: 13.2 G/DL (ref 13–17.7)
MCH RBC QN AUTO: 31.1 PG (ref 26.6–33)
MCHC RBC AUTO-ENTMCNC: 33.4 G/DL (ref 31.5–35.7)
MCV RBC AUTO: 93.2 FL (ref 79–97)
PLATELET # BLD AUTO: 180 10*3/MM3 (ref 140–450)
PMV BLD AUTO: 12.1 FL (ref 6–12)
POTASSIUM SERPL-SCNC: 3.3 MMOL/L (ref 3.5–5.2)
PROCALCITONIN SERPL-MCNC: 0.08 NG/ML (ref 0–0.25)
PROT SERPL-MCNC: 5.7 G/DL (ref 6–8.5)
RBC # BLD AUTO: 4.24 10*6/MM3 (ref 4.14–5.8)
SODIUM SERPL-SCNC: 142 MMOL/L (ref 136–145)
WBC NRBC COR # BLD: 9.09 10*3/MM3 (ref 3.4–10.8)

## 2022-12-28 PROCEDURE — 63710000001 INSULIN ASPART PER 5 UNITS: Performed by: NURSE PRACTITIONER

## 2022-12-28 PROCEDURE — 82962 GLUCOSE BLOOD TEST: CPT

## 2022-12-28 PROCEDURE — 92610 EVALUATE SWALLOWING FUNCTION: CPT

## 2022-12-28 PROCEDURE — 85027 COMPLETE CBC AUTOMATED: CPT | Performed by: NURSE PRACTITIONER

## 2022-12-28 PROCEDURE — 25010000002 IOPAMIDOL 61 % SOLUTION: Performed by: INTERNAL MEDICINE

## 2022-12-28 PROCEDURE — 25010000002 REMDESIVIR 100 MG/20ML SOLUTION 1 EACH VIAL: Performed by: INTERNAL MEDICINE

## 2022-12-28 PROCEDURE — 63710000001 DEXAMETHASONE PER 0.25 MG: Performed by: NURSE PRACTITIONER

## 2022-12-28 PROCEDURE — 84145 PROCALCITONIN (PCT): CPT | Performed by: NURSE PRACTITIONER

## 2022-12-28 PROCEDURE — 25010000002 ENOXAPARIN PER 10 MG: Performed by: NURSE PRACTITIONER

## 2022-12-28 PROCEDURE — 80076 HEPATIC FUNCTION PANEL: CPT | Performed by: INTERNAL MEDICINE

## 2022-12-28 PROCEDURE — 99232 SBSQ HOSP IP/OBS MODERATE 35: CPT | Performed by: NURSE PRACTITIONER

## 2022-12-28 PROCEDURE — 80048 BASIC METABOLIC PNL TOTAL CA: CPT | Performed by: NURSE PRACTITIONER

## 2022-12-28 PROCEDURE — 71275 CT ANGIOGRAPHY CHEST: CPT

## 2022-12-28 RX ORDER — LISINOPRIL 20 MG/1
40 TABLET ORAL
Status: DISCONTINUED | OUTPATIENT
Start: 2022-12-29 | End: 2023-01-04 | Stop reason: HOSPADM

## 2022-12-28 RX ORDER — POTASSIUM CHLORIDE 750 MG/1
40 CAPSULE, EXTENDED RELEASE ORAL EVERY 4 HOURS
Status: COMPLETED | OUTPATIENT
Start: 2022-12-28 | End: 2022-12-28

## 2022-12-28 RX ADMIN — ATORVASTATIN CALCIUM 80 MG: 80 TABLET, FILM COATED ORAL at 09:26

## 2022-12-28 RX ADMIN — INSULIN ASPART 6 UNITS: 100 INJECTION, SOLUTION INTRAVENOUS; SUBCUTANEOUS at 17:16

## 2022-12-28 RX ADMIN — LISINOPRIL 20 MG: 20 TABLET ORAL at 09:26

## 2022-12-28 RX ADMIN — ALBUTEROL SULFATE 2 PUFF: 90 AEROSOL, METERED RESPIRATORY (INHALATION) at 10:48

## 2022-12-28 RX ADMIN — Medication 10 ML: at 09:25

## 2022-12-28 RX ADMIN — REMDESIVIR 100 MG: 100 INJECTION, POWDER, LYOPHILIZED, FOR SOLUTION INTRAVENOUS at 14:31

## 2022-12-28 RX ADMIN — METFORMIN HYDROCHLORIDE 850 MG: 850 TABLET ORAL at 17:16

## 2022-12-28 RX ADMIN — INSULIN ASPART 6 UNITS: 100 INJECTION, SOLUTION INTRAVENOUS; SUBCUTANEOUS at 11:45

## 2022-12-28 RX ADMIN — CARBIDOPA AND LEVODOPA 1 TABLET: 25; 100 TABLET ORAL at 21:02

## 2022-12-28 RX ADMIN — METFORMIN HYDROCHLORIDE 850 MG: 850 TABLET ORAL at 09:26

## 2022-12-28 RX ADMIN — SENNOSIDES AND DOCUSATE SODIUM 2 TABLET: 50; 8.6 TABLET ORAL at 21:02

## 2022-12-28 RX ADMIN — POTASSIUM CHLORIDE 40 MEQ: 10 CAPSULE, COATED, EXTENDED RELEASE ORAL at 14:32

## 2022-12-28 RX ADMIN — ASPIRIN 81 MG CHEWABLE TABLET 81 MG: 81 TABLET CHEWABLE at 09:27

## 2022-12-28 RX ADMIN — CARBIDOPA AND LEVODOPA 1 TABLET: 25; 100 TABLET ORAL at 17:16

## 2022-12-28 RX ADMIN — CARBIDOPA AND LEVODOPA 1 TABLET: 25; 100 TABLET ORAL at 09:26

## 2022-12-28 RX ADMIN — ALBUTEROL SULFATE 2 PUFF: 90 AEROSOL, METERED RESPIRATORY (INHALATION) at 21:03

## 2022-12-28 RX ADMIN — ENOXAPARIN SODIUM 40 MG: 100 INJECTION SUBCUTANEOUS at 21:02

## 2022-12-28 RX ADMIN — POTASSIUM CHLORIDE 40 MEQ: 10 CAPSULE, COATED, EXTENDED RELEASE ORAL at 10:48

## 2022-12-28 RX ADMIN — ALBUTEROL SULFATE 2 PUFF: 90 AEROSOL, METERED RESPIRATORY (INHALATION) at 14:33

## 2022-12-28 RX ADMIN — ALBUTEROL SULFATE 2 PUFF: 90 AEROSOL, METERED RESPIRATORY (INHALATION) at 06:30

## 2022-12-28 RX ADMIN — Medication 10 ML: at 21:03

## 2022-12-28 RX ADMIN — DEXAMETHASONE 6 MG: 2 TABLET ORAL at 09:26

## 2022-12-28 RX ADMIN — IOPAMIDOL 100 ML: 612 INJECTION, SOLUTION INTRAVENOUS at 20:50

## 2022-12-28 RX ADMIN — INSULIN ASPART 2 UNITS: 100 INJECTION, SOLUTION INTRAVENOUS; SUBCUTANEOUS at 06:30

## 2022-12-28 RX ADMIN — SENNOSIDES AND DOCUSATE SODIUM 2 TABLET: 50; 8.6 TABLET ORAL at 09:26

## 2022-12-28 NOTE — PROGRESS NOTES
Adult Nutrition  Assessment/PES    Patient Name:  Ankur Camp  YOB: 1940  MRN: [id number]  Admit Date:  12/22/2022    Assessment Date:  12/28/2022    Comments:      Diet advanced to cardiac consistent carb mechanical ground nectar thick liquids diet Po intakes averaging 38%. Supplements provided.      1. Continue current diet order as medically appropriate.   2. Encourage PO intake to meet 50% of estimated needs.   3. Continue Boost Glucose Control TID.   4. Start Ensure pudding qd.   5. Encourage intake of supplement ordered.      Will follow-up and available PRN.      Reason for Assessment     Row Name 12/28/22 1412          Reason for Assessment    Reason For Assessment follow-up protocol                  Labs/Tests/Procedures/Meds     Row Name 12/28/22 1413          Labs/Procedures/Meds    Lab Results Reviewed reviewed, pertinent     Lab Results Comments Low k+, creat   High BUN, glu, hgba1c        Medications    Pertinent Medications Reviewed reviewed, pertinent     Pertinent Medications Comments lipitor, lovenox, lisinopril, metformin, kcl                Physical Findings     Row Name 12/28/22 1416          Physical Findings    Overall Physical Appearance normal wt for age                  Nutrition Prescription Ordered     Row Name 12/28/22 1416          Nutrition Prescription PO    Current PO Diet Soft Texture     Texture Ground     Fluid Consistency Nectar/syrup thick     Supplement Boost Glucose Control (Glucerna Shake)     Supplement Frequency 3 times a day     Common Modifiers Cardiac;Consistent Carbohydrate                Evaluation of Received Nutrient/Fluid Intake     Row Name 12/28/22 1417          Intake Assessment    Energy/Calorie Requirement Assessment not meeting needs     Protein Requirement Assessment not meeting needs     Fluid Requirement Assessment not meeting needs     Tolerance tolerating        PO Evaluation    Number of Days PO Intake Evaluated 2 days     Number  of Meals 2     % PO Intake 38                   Problem/Interventions:   Problem 1     Row Name 12/28/22 1418          Nutrition Diagnoses Problem 1    Problem 1 Increased Nutrient Needs     Macronutrient Kcal;Protein     Etiology (related to) Factors Affecting Nutrition     Poor Intake of --  pt w/ reported poor oral intake and recent wt loss     Signs/Symptoms (evidenced by) Other (comment)  MST score of 3 and need for oral nutrition supplement to encourage PO intake                      Intervention Goal     Row Name 12/28/22 1418          Intervention Goal    General Maintain nutrition;Reduce/improve symptoms;Meet nutritional needs for age/condition     PO Increase intake;Tolerate PO;Meet estimated needs     Weight Maintain weight                Nutrition Intervention     Row Name 12/28/22 1441          Nutrition Intervention    RD/Tech Action Follow Tx progress;Encourage intake;Recommend/ordered     Recommended/Ordered Supplement                Nutrition Prescription     Row Name 12/28/22 1441          Nutrition Prescription PO    New PO Prescription Ordered? No, recommended        Other Orders    Obtain Weight Daily     Obtain Weight Ordered? No, recommended     Supplement Vitamin mineral supplement     Supplement Ordered? No, recommended     Labs Mg++;Na+;Phos;K+     Labs Ordered? No, recommended                Education/Evaluation     Row Name 12/28/22 1441          Education    Education Will Instruct as appropriate        Monitor/Evaluation    Monitor Per protocol;PO intake;Supplement intake;Pertinent labs;Weight;Skin status;Symptoms                 Electronically signed by:  Minal Woods RD  12/28/22 14:42 EST

## 2022-12-28 NOTE — PLAN OF CARE
Goal Outcome Evaluation:  Plan of Care Reviewed With: patient   No acute events this shift. Remains pleasantly confused. VSS.     Progress: improving

## 2022-12-28 NOTE — THERAPY EVALUATION
Acute Care - Speech Language Pathology   Swallow Initial Evaluation  [facility]     Patient Name: Ankur Camp  : 1940  MRN: [id number]  Today's Date: 2022               Admit Date: 2022    Visit Dx:     ICD-10-CM ICD-9-CM   1. Urinary tract infection without hematuria, site unspecified  N39.0 599.0   2. COVID-19  U07.1 079.89   3. Acute respiratory failure with hypoxia (HCC)  J96.01 518.81   4. Lactic acidosis  E87.20 276.2   5. Acute renal insufficiency  N28.9 593.9     Patient Active Problem List   Diagnosis   • Benign prostatic hypertrophy without urinary obstruction   • Hypertension   • Hyperlipidemia   • Type 2 diabetes mellitus (HCC)   • Vitamin D deficiency   • Colon cancer screening   • Coronary artery calcification   • Thoracic aortic aneurysm (TAA)   • Primary osteoarthritis of right knee   • Status post total knee replacement using cement, right   • Urinary tract infection without hematuria, site unspecified     Past Medical History:   Diagnosis Date   • Arthritis    • Cancer (HCC)     kidney/ prostate   • Diabetes mellitus (HCC)    • ED (erectile dysfunction)    • Enlarged prostate with lower urinary tract symptoms (LUTS)    • Full dentures    • History of fracture of pelvis 2019    after fall from ladder   • History of loss of consciousness 2019    after fall from ladder-flown to [facility]   • History of rib fracture 2019    after fall from ladder   • History of right shoulder fracture 1960    walking up hill, fell on gravel   • History of stress test    • History of torn meniscus of right knee    • [place] (hard of hearing)     Bilateral hearing aids    • Hypertension    • Laceration of head 2019    after fall from ladder-treated at [facility]   • Left shoulder strain 2017    after fall from ladder   • Lung injury 2019    punctured right lung after falling off ladder   • Renal disorder    • Stone in kidney    • Stroke  (HCC)      Past Surgical History:   Procedure Laterality Date   • CHEST TUBE INSERTION  11/2019    rib fractures, punctured lung after fall from [facility]   • COLONOSCOPY     • COLONOSCOPY N/A 4/8/2019    Procedure: COLONOSCOPY;  Surgeon: Onesimo Aronld MD;  Location: [facility] ENDOSCOPY;  Service: Gastroenterology   • EYE SURGERY Right     cataract removal with IOL implant   • HERNIA REPAIR Right     multiple inguinal hernia repairs   • KNEE ARTHROSCOPY W/ MENISCECTOMY Right 05/10/2012    Partial medial and lateral menisectomy-Conrad Baird MD   • NEPHRECTOMY Right     partial right nephrectomy due to kidney stone   • PELVIC FRACTURE SURGERY Right 11/2019    multiple pelvic fractures after fall from [facility]   • SHOULDER SURGERY Right 1960    fracture surgery to remove a piece of bone- [place], [place]   • TOTAL KNEE ARTHROPLASTY Right 5/25/2021    Procedure: knee arthroplasty total;  Surgeon: Conrad Baird MD;  Location: [facility] OR;  Service: Orthopedics;  Laterality: Right;       SLP Recommendation and Plan  SLP Swallowing Diagnosis: mild, oral dysphagia, suspected pharyngeal dysphagia (12/28/22 0945)  SLP Diet Recommendation: mechanical ground textures, nectar thick liquids (12/28/22 0945)  Recommended Precautions and Strategies: upright posture during/after eating, general aspiration precautions, assist with feeding (12/28/22 0945)  SLP Rec. for Method of Medication Administration: meds whole, meds crushed, with puree, as tolerated (12/28/22 0945)     Monitor for Signs of Aspiration: yes, notify SLP if any concerns, cough, gurgly voice, right lower lobe infiltrates, pneumonia (12/28/22 0945)        Anticipated Discharge Disposition (SLP): unknown (12/28/22 0945)     Therapy Frequency (Swallow): PRN (12/28/22 0945)        Swallowing Considerations per Physician Discretion: other (see comments) (assess R lingual ulceration) (12/28/22 0945)                                   Progress: no change  Outcome Evaluation: Bedside eval of swallow completed with pt. seated upright in bed for po trials.  [facility] for white ulceration on R lateral tongue surface.  Pt. was given trials of regular solid, puree, and nectar-thick liquid.  Oral phase was mildly impaired due to generalized weakness with extended bolus prep and transit time.  Suspect pharyngeal phase dysphagia due to delayed initiation of pharyngeal swallow consistently and some weakness with reduced laryngeal elevation per palpation with all trials.  No overt s/s aspiration, but pt. is at increased risk due to cognitive deficits and weakness as described with high respiratory needs at this time.  Recommend:  1. mechanical soft diet with nectar-thick liq as arsalan, 2. meds with pudding/applesauce/puree, 3. aspiration precautions, 4. Provider may wish to further assess R (white) lingual ulceration.  Discussed with RN and APRN.      SWALLOW EVALUATION (last 72 hours)     SLP Adult Swallow Evaluation     Row Name 12/28/22 0945                   Rehab Evaluation    Document Type evaluation  -TM        Subjective Information no complaints  -TM        Patient Observations alert;cooperative  confusion  -TM        Patient/Family/Caregiver Comments/Observations no family present due to COVID  -TM        Patient Effort good  -TM           General Information    Patient Profile Reviewed yes  -TM        Pertinent History Of Current Problem UTI, DMII, pneu, Parkinson's, dementia  -TM        Current Method of Nutrition soft to chew textures;thin liquids  -TM        Precautions/Limitations, Vision WFL with corrective lenses  -TM        Precautions/Limitations, Hearing WFL;for purposes of eval  -TM        Prior Level of Function-Communication cognitive-linguistic impairment  -TM        Prior Level of Function-Swallowing unknown  -TM        Plans/Goals Discussed with other (see comments);patient  RN  -TM        Barriers to  Rehab medically complex;cognitive status  -TM        Patient's Goals for Discharge patient did not state  -TM           Pain    Additional Documentation Pain Scale: Numbers Pre/Post-Treatment (Group)  -TM           Pain Scale: Numbers Pre/Post-Treatment    Pretreatment Pain Rating 0/10 - no pain  -TM        Posttreatment Pain Rating 0/10 - no pain  -TM           Oral Motor Structure and Function    Oral Lesions or Structural Abnormalities and/or variants white ulceration on R lateral tongue  -TM        Dentition Assessment natural, present and adequate  -TM        Secretion Management WNL/WFL  -TM        Mucosal Quality moist, healthy  -TM        Volitional Cough reduced respiratory support  -TM           Oral Musculature and Cranial Nerve Assessment    Oral Motor General Assessment generalized oral motor weakness  -TM        Lingual Impairment, Detail. Cranial Nerves IX, XII (Glossopharyngeal and Hypoglossal) other (see comments)  white ulceration on R lateral tongue  -TM           General Eating/Swallowing Observations    Respiratory Support Currently in Use nasal cannula  -TM        O2 Liters other (see comments)  10L  -TM        Eating/Swallowing Skills fed by SLP  -TM        Positioning During Eating upright in bed  -TM        Utensils Used spoon;straw  -TM        Consistencies Trialed regular textures;pureed;nectar/syrup-thick liquids  -TM        Pre SpO2 (%) 91  -TM        Post SpO2 (%) 90  -TM           Respiratory    Respiratory Status WFL;during swallowing/eating  -TM           Clinical Swallow Eval    Oral Prep Phase impaired  -TM        Oral Transit impaired  -TM        Oral Residue WFL  -TM        Pharyngeal Phase suspected pharyngeal impairment  -TM        Clinical Swallow Evaluation Summary Bedside eval of swallow completed with pt. seated upright in bed for po trials.  Oral mech exam was remarkable for white ulceration on R lateral tongue surface.  Pt. was given trials of regular solid, puree, and  nectar-thick liquid.  Oral phase was mildly impaired due to generalized weakness with extended bolus prep and transit time.  Suspect pharyngeal phase dysphagia due to delayed initiation of pharyngeal swallow consistently and some weakness with reduced laryngeal elevation per palpation with all trials.  No overt s/s aspiration, but pt. is at increased risk due to cognitive deficits and weakness as described with high respiratory needs at this time.  Recommend:  1. mechanical soft diet with nectar-thick liq as arsalan, 2. meds with pudding/applesauce/puree, 3. aspiration precautions, 4. Provider may wish to further assess R (white) lingual ulceration.  Discussed with RN and APRN.  -TM           Oral Prep Concerns    Oral Prep Concerns increased prep time  -TM        Increased Prep Time regular consistencies  -TM           Oral Transit Concerns    Oral Transit Concerns increased oral transit time  -TM           Pharyngeal Phase Concerns    Pharyngeal Phase Concerns other (see comments)  delayed initiation of pharygneal swallow and weak laryngeal elevation per palpation  -TM           SLP Evaluation Clinical Impression    SLP Swallowing Diagnosis mild;oral dysphagia;suspected pharyngeal dysphagia  -TM        Functional Impact risk of aspiration/pneumonia;risk of malnutrition;risk of dehydration  -TM           Recommendations    Therapy Frequency (Swallow) PRN  -TM        SLP Diet Recommendation mechanical ground textures;nectar thick liquids  -TM        Recommended Precautions and Strategies upright posture during/after eating;general aspiration precautions;assist with feeding  -TM        Oral Care Recommendations Oral Care BID/PRN;Toothbrush  -TM        SLP Rec. for Method of Medication Administration meds whole;meds crushed;with puree;as tolerated  -TM        Monitor for Signs of Aspiration yes;notify SLP if any concerns;cough;gurgly voice;right lower lobe infiltrates;pneumonia  -TM        Anticipated Discharge  Disposition (SLP) unknown  -TM        Swallowing Considerations per Physician Discretion other (see comments)  assess R lingual ulceration  -TM              User Key  (r) = Recorded By, (t) = Taken By, (c) = Cosigned By    Initials Name Effective Dates    TM Meghna Blanc 06/16/21 -                 EDUCATION  The patient has been educated in the following areas:   Dysphagia (Swallowing Impairment) Oral Care/Hydration Modified Diet Instruction.              Time Calculation:    Time Calculation- SLP     Row Name 12/28/22 1124             Time Calculation- SLP    SLP Start Time 0945  -      SLP Received On 12/28/22  -         Untimed Charges    SLP Eval/Re-eval  ST Eval Oral Pharyng Swallow - 83097  -            User Key  (r) = Recorded By, (t) = Taken By, (c) = Cosigned By    Initials Name Provider Type     Meghna Blanc Speech and Language Pathologist                Therapy Charges for Today     Code Description Service Date Service Provider Modifiers Qty    37283016444 HC ST EVAL ORAL PHARYNG SWALLOW 4 12/28/2022 Meghna Blanc GN 1               Meghna Blanc  12/28/2022

## 2022-12-28 NOTE — PROGRESS NOTES
[facility]    [facility]    Name:  Ankur Camp   Age:  82 y.o.  Sex:  male  :  1940  MRN:  [id number]   Visit Number:  [id number]  Admission Date:  2022  Date Of Service:  22  Primary Care Physician:  Magaly Prabhakar APRN     LOS: 6 days :    Chief Complaint:      Fatigue    Subjective:    Patient seen and examined at bedside.  Patient states he is the same as yesterday.  He is pleasantly confused since admission.  Remains on 10L with saturations around 91%.  Patient has no complaints on exam without any complaints of pain as well.  No home oxygen use.  Nursing unable to get appropriate IV access for CT of chest yesterday.  Seen by speech today with noted white patch to tongue.  Patient states he is unaware of this area and it does not hurt.  Blood pressure has been elevated.    Hospital Course:    Patient is an 82 year old male who presents to the hospital with complaints of weakness who was sent from PCP office after being diagnosed with UTI.  Patient wife advised on arrival that patient had had generalized weakness, less appetite with weight loss over the past 2 weeks.  Patient with past health history significant for diabetes mellitus type 2, hypertension, hyperlipidemia, and Parkinson's disease.  On exam, patient is resting in bed on 4L with sats in low 90s.  Denies any urinary symptoms.  Complains of worsening shortness of breath the past 2 days and cough.  Uses a walker/cane at baseline.     Work up in the emergency department noted troponin-0.033, BNP-2194, glucose-171, creatinine-1.31 with baseline 0.8, BUN-31,  albumin-3.3, CRP-9.9, CBC unremarkable, lactate-2.9 with repeat 2.1, procalcitonin-0.24.  Urinalysis noted infection with 2+ bacteria, 31-50 WBC, +nitrites, and +3 leuks.  CXR noted hypoinflation with scattered mild atelectasis. Covid 19 testing was positive.      Admitted to the hospital and continued on Rocephin for UTI.  Started on  Decadron for Covid 19.  PT/OT consulted for evaluation of weakness.  Patient was still hypoxic not improving also on December 25 he has been started on remdesivir for the next 5 days.  Noted to have increased oxygen demands up to 10L.  CT chest to rule out PE ordered and still pending.  Repeat CXR noted concern for multifocal pneumonia.      Review of Systems:     All systems were reviewed and negative except as mentioned in subjective, assessment and plan.    Vital Signs:    Temp:  [97.1 °F (36.2 °C)-98.5 °F (36.9 °C)] 98.5 °F (36.9 °C)  Heart Rate:  [57-85] 77  Resp:  [18] 18  BP: (141-169)/(78-95) 156/78    Intake and output:    I/O last 3 completed shifts:  In: 60 [P.O.:60]  Out: -   I/O this shift:  In: 480 [P.O.:480]  Out: -     Physical Examination:  Examined again 12/28/2022    General Appearance:  Alert and cooperative, pleasantly confused on exam, no acute distress, elderly male   Head:  Atraumatic and normocephalic.   Eyes: Conjunctivae and sclerae normal, no icterus. No pallor.   Throat: No oral lesions, no thrush, oral mucosa moist.   Neck: Supple, trachea midline.   Lungs:   Breath sounds heard bilaterally equally but diminished.  Scattered wheezing without crackles. Unlabored at rest, utilizing 10L   Heart:  Normal S1 and S2, no murmur, no gallop, no rub. No JVD.   Abdomen:   Normal bowel sounds, no masses, no organomegaly. Soft, nontender, nondistended, no rebound tenderness.   Extremities: Supple, no edema, no cyanosis, no clubbing.   Skin: No bleeding or rash.   Neurologic: Alert and oriented x 3. No facial asymmetry. Moves all four limbs. No tremors.Generalized weakness is present.     Laboratory results:    Results from last 7 days   Lab Units 12/28/22  0731 12/27/22  0629 12/26/22  0639 12/25/22  1353 12/24/22  0600 12/23/22 2008 12/23/22  0303   SODIUM mmol/L 142  --   --   --  142  --  144   POTASSIUM mmol/L 3.3*  --   --   --  3.9 3.9 3.4*   CHLORIDE mmol/L 109*  --   --   --  109*  --  109*    CO2 mmol/L 24.6  --   --   --  23.3  --  24.8   BUN mg/dL 24*  --   --   --  21  --  24*   CREATININE mg/dL 0.68* 0.66* 0.76   < > 0.75*  --  0.86   CALCIUM mg/dL 8.9  --   --   --  8.9  --  8.0*   BILIRUBIN mg/dL 0.8 0.7 0.7   < >  --   --   --    ALK PHOS U/L 95 91 100   < >  --   --   --    ALT (SGPT) U/L 16 16 8   < >  --   --   --    AST (SGOT) U/L 22 36 30   < >  --   --   --    GLUCOSE mg/dL 162*  --   --   --  201*  --  147*    < > = values in this interval not displayed.     Results from last 7 days   Lab Units 12/28/22  0731 12/27/22  0629 12/24/22  0600   WBC 10*3/mm3 9.09 9.03 6.41   HEMOGLOBIN g/dL 13.2 12.8* 14.6   HEMATOCRIT % 39.5 38.3 43.2   PLATELETS 10*3/mm3 180 191 158         Results from last 7 days   Lab Units 12/22/22  1042   TROPONIN T ng/mL 0.033*     Results from last 7 days   Lab Units 12/22/22  1340 12/22/22  1225 12/22/22  1143   BLOODCX  No growth at 5 days No growth at 5 days  --    URINECX   --   --  >100,000 CFU/mL Escherichia coli*     No results for input(s): PHART, FOV9WUS, PO2ART, DSA7GOF, BASEEXCESS in the last 8760 hours.   I have reviewed the patient's laboratory results.    Radiology results:    XR Chest 1 View    Result Date: 12/27/2022  CLINICAL INDICATION:  Covid/ SOA; N39.0-Urinary tract infection, site not specified; U07.1-COVID-19; J96.01-Acute respiratory failure with hypoxia; E87.20-Acidosis, unspecified; N28.9-Disorder of kidney and ureter, unspecified  EXAMINATION TECHNIQUE: XR CHEST 1 VW-  COMPARISON: Radiographs 12/22/2022.  FINDINGS: Bilateral multifocal patchy areas of groundglass opacities and airspace consolidations. No pneumothorax. No large pleural effusion. Probable trace bilateral pleural effusions. Heart is normal in size. Aortic atherosclerosis and tortuosity.      Impression: Bilateral multifocal patchy areas of airspace opacities, concerning for multifocal pneumonia/atypical viral pneumonia.    Images personally reviewed, interpreted and dictated by  RICKI Ash.       This report was signed and finalized on 12/27/2022 9:32 AM by RICKI Ash.    I have reviewed the patient's radiology reports.    Medication Review:     I have reviewed the patient's active and prn medications.     Problem List:      Urinary tract infection without hematuria, site unspecified      Assessment:    1. Acute UTI  2. Acute Respiratory Failure with Hypoxia  3. Covid 19 +  4. Pneumonia, multifocal  5. Lactic Acidosis  6. LAWSON  7. Parkinsons Disease  8. Diabetes Mellitus Type 2 on oral anti-diabetics  9. Hyperlipidemia    Plan:    Acute UTI/ LAWSON/ Lactic Acidosis  -Completed Rocephin 1GM IV for 5 days  -Urine culture shows E. coli and is sensitive to Rocephin  -Repeat Lactate-normal    Acute Respiratory Failure with hypoxemia/ Covid 19+  -Supplemental oxygen to keep saturations >90%--still requiring 10L on exam with saturations around 90%  -Continue Decadron 6mg daily as patient is hypoxic for 10 days   -Started on Remdesivir for Covid for 5 days  -Procalcitonin negative on admission  -Recheck Procal 12/28 negative, no indication for antibiotics  -Supportive care   -Continue PT/OT  -Repeat CXR today concerning for multifocal pneumonia--atypical/ viral  -Noted to have significant desaturations with therapy and needing more oxygen today  -CT to rule out PE ordered and pending     Diabetes Mellitus  -On oral anti-diabetics at home  -Cover with sliding scale as it is high due to ongoing steroids  -A1c-8     Bradycardia- his bisoprolol has been stopped and it has improved to baseline     Hypertension- on 24th his bisoprolol was stopped due to bradycardia and he has since been on lisinopril 20 mg daily.  BP in 150s, increased Lisinopril to 40mg today    Discharge disposition still pending with STR vs Home Health    DVT Prophylaxis: Lovenox  Code Status: Full Code  Diet: CC  Discharge Plan: several days    Pam Hallman, APRN  12/28/22  11:50 EST    Dictated utilizing Dragon  dictation.

## 2022-12-28 NOTE — CASE MANAGEMENT/SOCIAL WORK
Case Management/Social Work    Patient Name:  Ankur Camp  YOB: 1940  MRN: [id number]  Admit Date:  12/22/2022        DEREK called and [facility] to [facility]. States she has been on vacation and has not been checking her email. Plans to send the referral on to the facility to have someone review it.  Someone will call DEREK back once a decision has been made. DEREK will continue to follow for placement.       Electronically signed by:  Kavita Germain  12/28/22 09:29 EST

## 2022-12-28 NOTE — PLAN OF CARE
Goal Outcome Evaluation:           Progress: no change  Outcome Evaluation: Bedside eval of swallow completed with pt. seated upright in bed for po trials.  Oral [facility] exam was remarkable for white ulceration on R lateral tongue surface.  Pt. was given trials of regular solid, puree, and nectar-thick liquid.  Oral phase was mildly impaired due to generalized weakness with extended bolus prep and transit time.  Suspect pharyngeal phase dysphagia due to delayed initiation of pharyngeal swallow consistently and some weakness with reduced laryngeal elevation per palpation with all trials.  No overt s/s aspiration, but pt. is at increased risk due to cognitive deficits and weakness as described with high respiratory needs at this time.  Recommend:  1. mechanical soft diet with nectar-thick liq as arsalan, 2. meds with pudding/applesauce/puree, 3. aspiration precautions, 4. Provider may wish to further assess R (white) lingual ulceration.  Discussed with RN and APRN.

## 2022-12-29 PROBLEM — U07.1 COVID-19 VIRUS DETECTED: Status: ACTIVE | Noted: 2022-12-29

## 2022-12-29 PROBLEM — G20 PARKINSON'S DISEASE DEMENTIA (HCC): Status: ACTIVE | Noted: 2022-12-29

## 2022-12-29 PROBLEM — F02.80 PARKINSON'S DISEASE DEMENTIA: Status: ACTIVE | Noted: 2022-12-29

## 2022-12-29 PROBLEM — G20.A1 PARKINSON'S DISEASE DEMENTIA: Status: ACTIVE | Noted: 2022-12-29

## 2022-12-29 PROBLEM — J96.01 ACUTE RESPIRATORY FAILURE WITH HYPOXIA: Status: ACTIVE | Noted: 2022-12-29

## 2022-12-29 LAB
ALBUMIN SERPL-MCNC: 2.4 G/DL (ref 3.5–5.2)
ALP SERPL-CCNC: 90 U/L (ref 39–117)
ALT SERPL W P-5'-P-CCNC: 14 U/L (ref 1–41)
ANION GAP SERPL CALCULATED.3IONS-SCNC: 9.6 MMOL/L (ref 5–15)
AST SERPL-CCNC: 20 U/L (ref 1–40)
BILIRUB CONJ SERPL-MCNC: 0.2 MG/DL (ref 0–0.3)
BILIRUB INDIRECT SERPL-MCNC: 0.5 MG/DL
BILIRUB SERPL-MCNC: 0.7 MG/DL (ref 0–1.2)
BUN SERPL-MCNC: 27 MG/DL (ref 8–23)
BUN/CREAT SERPL: 41.5 (ref 7–25)
CALCIUM SPEC-SCNC: 8.9 MG/DL (ref 8.6–10.5)
CHLORIDE SERPL-SCNC: 113 MMOL/L (ref 98–107)
CO2 SERPL-SCNC: 23.4 MMOL/L (ref 22–29)
CREAT SERPL-MCNC: 0.65 MG/DL (ref 0.76–1.27)
DEPRECATED RDW RBC AUTO: 45.2 FL (ref 37–54)
EGFRCR SERPLBLD CKD-EPI 2021: 94.7 ML/MIN/1.73
ERYTHROCYTE [DISTWIDTH] IN BLOOD BY AUTOMATED COUNT: 13.2 % (ref 12.3–15.4)
GLUCOSE BLDC GLUCOMTR-MCNC: 152 MG/DL (ref 70–130)
GLUCOSE BLDC GLUCOMTR-MCNC: 235 MG/DL (ref 70–130)
GLUCOSE BLDC GLUCOMTR-MCNC: 248 MG/DL (ref 70–130)
GLUCOSE SERPL-MCNC: 141 MG/DL (ref 65–99)
HCT VFR BLD AUTO: 39.6 % (ref 37.5–51)
HGB BLD-MCNC: 13.2 G/DL (ref 13–17.7)
MCH RBC QN AUTO: 31.7 PG (ref 26.6–33)
MCHC RBC AUTO-ENTMCNC: 33.3 G/DL (ref 31.5–35.7)
MCV RBC AUTO: 95.2 FL (ref 79–97)
PLATELET # BLD AUTO: 181 10*3/MM3 (ref 140–450)
PMV BLD AUTO: 11.7 FL (ref 6–12)
POTASSIUM SERPL-SCNC: 4 MMOL/L (ref 3.5–5.2)
PROT SERPL-MCNC: 5.5 G/DL (ref 6–8.5)
RBC # BLD AUTO: 4.16 10*6/MM3 (ref 4.14–5.8)
SODIUM SERPL-SCNC: 146 MMOL/L (ref 136–145)
WBC NRBC COR # BLD: 9.62 10*3/MM3 (ref 3.4–10.8)

## 2022-12-29 PROCEDURE — 63710000001 INSULIN ASPART PER 5 UNITS: Performed by: NURSE PRACTITIONER

## 2022-12-29 PROCEDURE — 80076 HEPATIC FUNCTION PANEL: CPT | Performed by: INTERNAL MEDICINE

## 2022-12-29 PROCEDURE — 80048 BASIC METABOLIC PNL TOTAL CA: CPT | Performed by: NURSE PRACTITIONER

## 2022-12-29 PROCEDURE — 82962 GLUCOSE BLOOD TEST: CPT

## 2022-12-29 PROCEDURE — 25010000002 ENOXAPARIN PER 10 MG: Performed by: NURSE PRACTITIONER

## 2022-12-29 PROCEDURE — 85027 COMPLETE CBC AUTOMATED: CPT | Performed by: NURSE PRACTITIONER

## 2022-12-29 PROCEDURE — 25010000002 REMDESIVIR 100 MG/20ML SOLUTION 1 EACH VIAL: Performed by: INTERNAL MEDICINE

## 2022-12-29 PROCEDURE — 97530 THERAPEUTIC ACTIVITIES: CPT

## 2022-12-29 PROCEDURE — 63710000001 DEXAMETHASONE PER 0.25 MG: Performed by: NURSE PRACTITIONER

## 2022-12-29 PROCEDURE — 99232 SBSQ HOSP IP/OBS MODERATE 35: CPT | Performed by: NURSE PRACTITIONER

## 2022-12-29 RX ORDER — ERYTHROMYCIN 5 MG/G
1 OINTMENT OPHTHALMIC NIGHTLY
COMMUNITY

## 2022-12-29 RX ADMIN — ALBUTEROL SULFATE 2 PUFF: 90 AEROSOL, METERED RESPIRATORY (INHALATION) at 23:27

## 2022-12-29 RX ADMIN — CARBIDOPA AND LEVODOPA 1 TABLET: 25; 100 TABLET ORAL at 09:49

## 2022-12-29 RX ADMIN — SENNOSIDES AND DOCUSATE SODIUM 2 TABLET: 50; 8.6 TABLET ORAL at 09:47

## 2022-12-29 RX ADMIN — ALBUTEROL SULFATE 2 PUFF: 90 AEROSOL, METERED RESPIRATORY (INHALATION) at 18:39

## 2022-12-29 RX ADMIN — REMDESIVIR 100 MG: 100 INJECTION, POWDER, LYOPHILIZED, FOR SOLUTION INTRAVENOUS at 14:08

## 2022-12-29 RX ADMIN — ALBUTEROL SULFATE 2 PUFF: 90 AEROSOL, METERED RESPIRATORY (INHALATION) at 09:47

## 2022-12-29 RX ADMIN — ENOXAPARIN SODIUM 40 MG: 100 INJECTION SUBCUTANEOUS at 20:52

## 2022-12-29 RX ADMIN — INSULIN ASPART 4 UNITS: 100 INJECTION, SOLUTION INTRAVENOUS; SUBCUTANEOUS at 11:58

## 2022-12-29 RX ADMIN — ALBUTEROL SULFATE 2 PUFF: 90 AEROSOL, METERED RESPIRATORY (INHALATION) at 11:58

## 2022-12-29 RX ADMIN — ALBUTEROL SULFATE 2 PUFF: 90 AEROSOL, METERED RESPIRATORY (INHALATION) at 06:01

## 2022-12-29 RX ADMIN — CARBIDOPA AND LEVODOPA 1 TABLET: 25; 100 TABLET ORAL at 20:52

## 2022-12-29 RX ADMIN — ATORVASTATIN CALCIUM 80 MG: 80 TABLET, FILM COATED ORAL at 09:47

## 2022-12-29 RX ADMIN — SENNOSIDES AND DOCUSATE SODIUM 2 TABLET: 50; 8.6 TABLET ORAL at 20:52

## 2022-12-29 RX ADMIN — Medication 10 ML: at 09:49

## 2022-12-29 RX ADMIN — ASPIRIN 81 MG CHEWABLE TABLET 81 MG: 81 TABLET CHEWABLE at 09:47

## 2022-12-29 RX ADMIN — INSULIN ASPART 2 UNITS: 100 INJECTION, SOLUTION INTRAVENOUS; SUBCUTANEOUS at 18:09

## 2022-12-29 RX ADMIN — BISACODYL 10 MG: 10 SUPPOSITORY RECTAL at 10:04

## 2022-12-29 RX ADMIN — Medication 10 ML: at 20:52

## 2022-12-29 RX ADMIN — ALBUTEROL SULFATE 2 PUFF: 90 AEROSOL, METERED RESPIRATORY (INHALATION) at 15:55

## 2022-12-29 RX ADMIN — CARBIDOPA AND LEVODOPA 1 TABLET: 25; 100 TABLET ORAL at 15:55

## 2022-12-29 RX ADMIN — DEXAMETHASONE 6 MG: 2 TABLET ORAL at 09:47

## 2022-12-29 RX ADMIN — LISINOPRIL 40 MG: 20 TABLET ORAL at 09:47

## 2022-12-29 NOTE — PROGRESS NOTES
"    [facility]    Name:  Ankur Camp   Age:  82 y.o.  Sex:  male  :  1940  MRN:  [id number]   Visit Number:  [id number]  Admission Date:  2022  Date Of Service:  22  Primary Care Physician:  Magaly Prabhakar APRN     LOS: 7 days :    Chief Complaint:      Shortness of air/fatigue    Subjective:    Patient seen and examined with RN at bedside. Prior provider documentation/labs/vitals reviewed. Patient able to tell me his name and that he is at \"some hospital\". Denies pain. RN aware no BM for several days. Bowel regimen ordered. Patient not eating much. No acute distress noted.    Hospital Course:    Patient is an 82 year old male who presented to the hospital with complaints of weakness who was sent from PCP office after being diagnosed with UTI.  Patient wife advised on arrival that patient had generalized weakness, less appetite with weight loss over the past 2 weeks.  Patient with past health history significant for diabetes mellitus type 2, hypertension, hyperlipidemia, and Parkinson's disease. Denied any urinary symptoms.       Work up in the emergency department noted troponin-0.033, BNP-2194, glucose-171, creatinine-1.31 with baseline 0.8, BUN-31,  albumin-3.3, CRP-9.9, CBC unremarkable, lactate-2.9 with repeat 2.1, procalcitonin-0.24.  Urinalysis noted infection with 2+ bacteria, 31-50 WBC, +nitrites, and +3 leuks.  CXR noted hypoinflation with scattered mild atelectasis. Covid 19 testing was positive.      Admitted to the hospital and continued on Rocephin for UTI.  Started on Decadron for Covid 19.  PT/OT consulted for evaluation of weakness.  Patient remained hypoxic with increased oxygen demands. Patient was initiated on remdesivir for 5 days.  Noted to have increased oxygen demands up to 10L.  CT chest to rule out PE ordered and was negative. Oxygen demand decreased to 6 liters.    Review of Systems:     All systems were reviewed " and negative except as mentioned in subjective, assessment and plan.    Vital Signs:    Temp:  [98 °F (36.7 °C)-98.5 °F (36.9 °C)] 98.4 °F (36.9 °C)  Heart Rate:  [78-86] 78  Resp:  [18-20] 20  BP: (127-166)/(75-95) 141/77    Intake and output:    I/O last 3 completed shifts:  In: 780 [P.O.:780]  Out: -   I/O this shift:  In: 240 [P.O.:240]  Out: -     Physical Examination:    General Appearance:  Alert and cooperative. Chronically ill/frail appearing elderly male.   Head:  Atraumatic and normocephalic.   Eyes: Conjunctivae and sclerae normal, no icterus. No pallor.   Throat: No oral lesions, no thrush, oral mucosa moist.   Neck: Supple, trachea midline, no thyromegaly.   Lungs:   Breath sounds heard bilaterally equally.  No wheezing or crackles. No Pleural rub or bronchial breathing. Mild coarseness throughout. Unlabored. On 6 liters with saturations 88-90%   Heart:  Normal S1 and S2, no murmur, no gallop, no rub. No JVD.   Abdomen:   Normal bowel sounds, no masses, no organomegaly. Soft, nontender, nondistended, no rebound tenderness.   Extremities: Supple, no edema, no cyanosis, no clubbing.   Skin: No bleeding or rash.   Neurologic: Alert and oriented x 2. No facial asymmetry. Moves all four limbs. No tremors. Generalized weakness noted.     Laboratory results:    Results from last 7 days   Lab Units 12/29/22  0711 12/28/22  0731 12/27/22  0629 12/25/22  1353 12/24/22  0600   SODIUM mmol/L 146* 142  --   --  142   POTASSIUM mmol/L 4.0 3.3*  --   --  3.9   CHLORIDE mmol/L 113* 109*  --   --  109*   CO2 mmol/L 23.4 24.6  --   --  23.3   BUN mg/dL 27* 24*  --   --  21   CREATININE mg/dL 0.65* 0.68* 0.66*   < > 0.75*   CALCIUM mg/dL 8.9 8.9  --   --  8.9   BILIRUBIN mg/dL 0.7 0.8 0.7   < >  --    ALK PHOS U/L 90 95 91   < >  --    ALT (SGPT) U/L 14 16 16   < >  --    AST (SGOT) U/L 20 22 36   < >  --    GLUCOSE mg/dL 141* 162*  --   --  201*    < > = values in this interval not displayed.     Results from last 7  days   Lab Units 12/29/22  0711 12/28/22  0731 12/27/22  0629   WBC 10*3/mm3 9.62 9.09 9.03   HEMOGLOBIN g/dL 13.2 13.2 12.8*   HEMATOCRIT % 39.6 39.5 38.3   PLATELETS 10*3/mm3 181 180 191                 No results for input(s): PHART, IGF4FSW, PO2ART, GPM0VDI, BASEEXCESS in the last 8760 hours.   I have reviewed the patient's laboratory results.    Radiology results:    CT Angiogram Chest Pulmonary Embolism    Result Date: 12/28/2022  FINAL REPORT TECHNIQUE: Multiple axial CT images were obtained through the chest following IV contrast using a CTA/PE protocol.  3D/MIP reconstruction images were also performed. This study was performed with techniques to keep radiation doses as low as reasonably achievable (ALARA). Individualized dose reduction techniques using automated exposure control or adjustment of mA and/or kV according to the patient's size were employed. CLINICAL HISTORY: Covid--increased O2 FINDINGS: PAs and aorta: No pulmonary embolus.  Thoracic aorta is unremarkable.   There is coronary artery disease. Heart/mediastinum: No evidence for right heart strain.  No pericardial effusion.    There is cardiomegaly.  Lungs: There are patchy bilateral groundglass opacities with interstitial thickening.  Lymph nodes: No pathologically enlarged thoracic lymph nodes.  Pleura: No pneumothorax or pleural effusion. Chest Wall: No chest wall contusion.  Bones: No acute fracture. Upper abdomen: No acute findings in the upper abdomen.     Impression: No pulmonary embolus.   Lung findings are compatible with Covid pneumonia. Authenticated and Electronically Signed by Myke Mcfarland MD on 12/28/2022 09:50:41 PM    I have reviewed the patient's radiology reports.    Medication Review:     I have reviewed the patient's active and prn medications.     Problem List:      Acute respiratory failure with hypoxia (HCC)    Type 2 diabetes mellitus (HCC)    Urinary tract infection without hematuria, site unspecified    COVID-19 virus  detected    Parkinson's disease dementia (HCC)      Assessment:    1. Acute UTI, POA  2. Acute Respiratory Failure with Hypoxia 2/2 # 3, POA  3. Covid 19 +, POA  4. Viral PNA 2/2 above, POA  5. Lactic Acidosis, POA, resolved  6. LAWSON, POA. resolved  7. Parkinsons Disease  8. Diabetes Mellitus Type 2 on oral anti-diabetics  9. Hyperlipidemia     Plan:     Acute UTI/ LAWSON/ Lactic Acidosis  -Completed Rocephin 1GM IV for 5 days  -Urine culture  E. coli and is sensitive to Rocephin  -Repeat Lactate-normal     Acute Respiratory Failure/ Covid 19+  -Supplemental oxygen to keep saturations >90%--requiring 6L on exam with saturations around 90%  -Continue Decadron 6mg daily for 10 days   - Remdesivir x 5 days  -Procalcitonin remains negative  - No indications for PNA at this time  -Supportive care   -Continue PT/OT  -CT to rule out PE negative     Diabetes Mellitus  -On oral anti-diabetics at home  -Cover with sliding scale 2/2 ongoing steroid use  -A1c-8     Bradycardia  - Resolved- Bisoprolol held.     Hypertension  - bisoprolol was stopped due to bradycardia and he has since been on lisinopril 20 mg daily. Increased to 40 mg with improvement.       Addendum- 1320- Updated wife on POC. Per [facility] can accept on Jan 1st per COVID policy. Patient continues to require 6 liters NC. Wife agreeable that patient will require STR. All questions answered.     DVT Prophylaxis: Lovenox  Code Status: Full Code  Diet: CC  Discharge Plan: ANDREA Dubose, APRN  12/29/22  14:54 EST    Dictated utilizing Dragon dictation.

## 2022-12-29 NOTE — CASE MANAGEMENT/SOCIAL WORK
Case Management/Social Work    Patient Name:  Ankur Camp  YOB: 1940  MRN: [id number]  Admit Date:  12/22/2022        SW received call from [facility], [facility] and [facility]'s . States pt has been clinically approved. Due to COVID status the earliest pt could admit is January 1. DEREK will continue to follow for admission to [facility].       Electronically signed by:  Kavita Germain  12/29/22 11:56 EST

## 2022-12-29 NOTE — PLAN OF CARE
Goal Outcome Evaluation:  Plan of Care Reviewed With: patient        Progress: no change  Outcome Evaluation: Pt received in bed on 6L O2 with sats 94%, pt sat eob with min assist and O2 dropped to 85%, O2 was increased to 10L with sats increasing to 92%.  Pt was assisted back to bed, O2 was titrated to 6L with pt satting 88-89%.  Pt limited with activity d/t decreasing O2.

## 2022-12-29 NOTE — PAYOR COMM NOTE
"To:  Darshan  From: Adele Fernandez RN  Phone: [phone]  Fax: [phone]  NPI: [id number]  TIN: [id number]    Ankur Olson (82 y.o. Male)     Date of Birth   1940    Social Security Number       Address   [address] [place] [id number]    Home Phone   [phone]    MRN   [id number]       Restoration   Jain    Marital Status                               Admission Date   12/22/22    Admission Type   Emergency    Admitting Provider       Attending Provider   Oral Cano DO    Department, Room/Bed   [facility] TELEMETRY 3, 326/1       Discharge Date       Discharge Disposition       Discharge Destination                               Attending Provider: Oral Cano DO    Allergies: No Known Allergies    Isolation: Enh Drop/Con   Infection: COVID (confirmed) (12/22/22)   Code Status: CPR    Ht: 167.6 cm (66\")   Wt: 68.8 kg (151 lb 10.8 oz)    Admission Cmt: None   Principal Problem: Urinary tract infection without hematuria, site unspecified [N39.0]                 Active Insurance as of 12/22/2022     Primary Coverage     Payor Plan Insurance Group Employer/Plan Group    ANTHEM MEDICARE REPLACEMENT ANTHEM MEDICARE ADVANTAGE [id number]     Payor Plan Address Payor Plan Phone Number Payor Plan Fax Number Effective Dates    [address] [phone]  1/1/2021 - None Entered    [facility] [id number]       Subscriber Name Subscriber Birth Date Member ID       ANKUR OLSON 1940 [id number]                 Emergency Contacts      (Rel.) Home Phone Work Phone Mobile Phone    Kianna Olson (Spouse) [phone] -- --    Elvi Moura (Daughter) -- -- [phone]            Vital Signs (last day)     Date/Time Temp Temp src Pulse Resp BP Patient Position SpO2    12/29/22 0820 98.3 (36.8) Axillary -- 18 148/88 Lying --    12/29/22 0500 98.2 (36.8) Oral 78 18 166/77 Lying 91    12/29/22 0013 98.5 (36.9) Axillary 84 19 152/95 Lying 90    " 12/28/22 2116 98 (36.7) Oral 86 18 165/86 Lying 93    12/28/22 1615 98.2 (36.8) Axillary -- 20 127/75 Lying --    12/28/22 1100 98 (36.7) Axillary -- 18 146/87 Lying --    12/28/22 0926 -- -- 77 -- -- -- --    12/28/22 0828 98.5 (36.9) Axillary -- 18 156/78 Lying --    12/28/22 0400 -- -- -- -- -- -- 91    12/28/22 0353 98.4 (36.9) Oral 57 18 153/85 Lying 89    12/28/22 0039 97.6 (36.4) Oral 60 18 150/84 Lying 94          Current Facility-Administered Medications   Medication Dose Route Frequency Provider Last Rate Last Admin   • acetaminophen (TYLENOL) tablet 650 mg  650 mg Oral Q4H PRN Pam Hallman APRN       • albuterol sulfate HFA (PROVENTIL HFA;VENTOLIN HFA;PROAIR HFA) inhaler 2 puff  2 puff Inhalation Q4H - RT Filipe Rodgers MD   2 puff at 12/29/22 1158   • aspirin chewable tablet 81 mg  81 mg Oral Daily Pam Hallman APRN   81 mg at 12/29/22 0947   • atorvastatin (LIPITOR) tablet 80 mg  80 mg Oral Daily Pam Hallman APRN   80 mg at 12/29/22 0947   • sennosides-docusate (PERICOLACE) 8.6-50 MG per tablet 2 tablet  2 tablet Oral BID Pam Hallman APRN   2 tablet at 12/29/22 0947    And   • polyethylene glycol (MIRALAX) packet 17 g  17 g Oral Daily PRN Pam Hallman APRN        And   • bisacodyl (DULCOLAX) EC tablet 5 mg  5 mg Oral Daily PRN Pam Hallman APRN        And   • bisacodyl (DULCOLAX) suppository 10 mg  10 mg Rectal Daily PRN aPm Hallman APRN   10 mg at 12/29/22 1004   • carbidopa-levodopa (SINEMET)  MG per tablet 1 tablet  1 tablet Oral TID Pam Hallman APRN   1 tablet at 12/29/22 0949   • dexamethasone (DECADRON) tablet 6 mg  6 mg Oral Daily With Breakfast Pam Hallman APRN   6 mg at 12/29/22 0947   • dextrose (D50W) (25 g/50 mL) IV injection 25 g  25 g Intravenous Q15 Min PRN Pam Hallman APRN       • dextrose (GLUTOSE) oral gel 15 g  15 g Oral Q15 Min PRN Pam Hallman APRN       • Enoxaparin Sodium (LOVENOX) syringe 40 mg  40 mg  Subcutaneous Nightly Pam Hallman APRN   40 mg at 12/28/22 2102   • glucagon (human recombinant) (GLUCAGEN DIAGNOSTIC) injection 1 mg  1 mg Intramuscular Q15 Min PRN Pam Hallman APRN       • Insulin Aspart (novoLOG) injection 0-9 Units  0-9 Units Subcutaneous TID AC Pam Hallman APRN   4 Units at 12/29/22 1158   • lisinopril (PRINIVIL,ZESTRIL) tablet 40 mg  40 mg Oral Q24H Pam Hallman APRN   40 mg at 12/29/22 0947   • metFORMIN (GLUCOPHAGE) tablet 850 mg  850 mg Oral BID With Meals Filipe Rodgers MD   850 mg at 12/28/22 1716   • ondansetron (ZOFRAN) tablet 4 mg  4 mg Oral Q6H PRN Pam Hallman APRN        Or   • ondansetron (ZOFRAN) injection 4 mg  4 mg Intravenous Q6H PRN Pam Hallman APRN       • Pharmacy Consult - Remdesivir   Does not apply Continuous PRN Filipe Rodgers MD       • Pharmacy to Dose enoxaparin (LOVENOX)   Does not apply Continuous PRN Pam Hallman APRN       • Potassium Replacement - Initiate Nurse/BPA Driven Protocol   Does not apply PRN Neva Dubose APRN       • remdesivir 100 mg in sodium chloride 0.9 % 270 mL IVPB (powder vial)  100 mg Intravenous Q24H Filipe Rodgers MD   100 mg at 12/28/22 1431   • sodium chloride 0.9 % flush 10 mL  10 mL Intravenous PRN Jeremy Huber MD       • sodium chloride 0.9 % flush 10 mL  10 mL Intravenous Q12H Pam Hallman APRN   10 mL at 12/29/22 0949   • sodium chloride 0.9 % flush 10 mL  10 mL Intravenous PRN Pam Hallman APRN   10 mL at 12/22/22 1634     Lab Results (last 24 hours)     Procedure Component Value Units Date/Time    POC Glucose Once [[id number]]  (Abnormal) Collected: 12/29/22 1121    Specimen: Blood Updated: 12/29/22 1125     Glucose 235 mg/dL      Comment: Serial Number: [id number]:  [id number]       Hepatic Function Panel [[id number]]  (Abnormal) Collected: 12/29/22 0711    Specimen: Blood Updated: 12/29/22 0736     Total Protein 5.5 g/dL      Albumin 2.4 g/dL      ALT  (SGPT) 14 U/L      AST (SGOT) 20 U/L      Alkaline Phosphatase 90 U/L      Total Bilirubin 0.7 mg/dL      Bilirubin, Direct 0.2 mg/dL      Bilirubin, Indirect 0.5 mg/dL     Basic Metabolic Panel [[id number]]  (Abnormal) Collected: 12/29/22 0711    Specimen: Blood Updated: 12/29/22 0736     Glucose 141 mg/dL      BUN 27 mg/dL      Creatinine 0.65 mg/dL      Sodium 146 mmol/L      Potassium 4.0 mmol/L      Chloride 113 mmol/L      CO2 23.4 mmol/L      Calcium 8.9 mg/dL      BUN/Creatinine Ratio 41.5     Anion Gap 9.6 mmol/L      eGFR 94.7 mL/min/1.73      Comment: National Kidney Foundation and American Society of Nephrology (ASN) Task Force recommended calculation based on the Chronic Kidney Disease Epidemiology Collaboration (CKD-EPI) equation refit without adjustment for race.       Narrative:      GFR Normal >60  Chronic Kidney Disease <60  Kidney Failure <15    The GFR formula is only valid for adults with stable renal function between ages 18 and 70.    CBC (No Diff) [[id number]]  (Normal) Collected: 12/29/22 0711    Specimen: Blood Updated: 12/29/22 0717     WBC 9.62 10*3/mm3      RBC 4.16 10*6/mm3      Hemoglobin 13.2 g/dL      Hematocrit 39.6 %      MCV 95.2 fL      MCH 31.7 pg      MCHC 33.3 g/dL      RDW 13.2 %      RDW-SD 45.2 fl      MPV 11.7 fL      Platelets 181 10*3/mm3     POC Glucose Once [[id number]]  (Abnormal) Collected: 12/29/22 0610    Specimen: Blood Updated: 12/29/22 0618     Glucose 152 mg/dL      Comment: Serial Number: [id number]:  [id number]       POC Glucose Once [[id number]]  (Normal) Collected: 12/28/22 2115    Specimen: Blood Updated: 12/28/22 2121     Glucose 127 mg/dL      Comment: Serial Number: [id number]:  [id number]           Imaging Results (Last 24 Hours)     Procedure Component Value Units Date/Time    CT Angiogram Chest Pulmonary Embolism [[id number]] Collected: 12/28/22 2150     Updated: 12/28/22 2151    Narrative:      FINAL REPORT    TECHNIQUE:  Multiple axial CT images  were obtained through the chest  following IV contrast using a CTA/PE protocol.  3D/MIP  reconstruction images were also performed. This study was  performed with techniques to keep radiation doses as low as  reasonably achievable (ALARA). Individualized dose reduction  techniques using automated exposure control or adjustment of mA  and/or kV according to the patient's size were employed.    CLINICAL HISTORY:  Covid--increased O2    FINDINGS:  PAs and aorta: No pulmonary embolus.  Thoracic aorta is  unremarkable.   There is coronary artery disease.  Heart/mediastinum: No evidence for right heart strain.  No  pericardial effusion.    There is cardiomegaly.  Lungs: There  are patchy bilateral groundglass opacities with interstitial  thickening.  Lymph nodes: No pathologically enlarged thoracic  lymph nodes.  Pleura: No pneumothorax or pleural effusion.  Chest Wall: No chest wall contusion.  Bones: No acute fracture.  Upper abdomen: No acute findings in the upper abdomen.      Impression:      No pulmonary embolus.   Lung findings are compatible with Covid  pneumonia.    Authenticated and Electronically Signed by Myke Mcfarland MD on  12/28/2022 09:50:41 PM        Orders (last 24 hrs)      Start     Ordered    12/29/22 1126  POC Glucose Once  PROCEDURE ONCE         12/29/22 1121    12/29/22 0900  lisinopril (PRINIVIL,ZESTRIL) tablet 40 mg  Every 24 Hours Scheduled         12/28/22 1156    12/29/22 0652  CBC (No Diff)  Morning Draw         12/28/22 1743    12/29/22 0652  Basic Metabolic Panel  Morning Draw         12/28/22 1743    12/29/22 0640  Potassium Replacement - Initiate Nurse/BPA Driven Protocol  As Needed         12/29/22 0640    12/29/22 0619  POC Glucose Once  PROCEDURE ONCE         12/29/22 0610    12/29/22 0600  Potassium  Morning Draw,   Status:  Canceled         12/28/22 0931    12/28/22 2145  iopamidol (ISOVUE-300) 61 % injection 100 mL  Once in Imaging         12/28/22 2047 12/28/22 2122  POC Glucose  Once  PROCEDURE ONCE         12/28/22 2115    12/28/22 1600  Dietary Nutrition Supplements Boost Glucose Control (Glucerna Shake)  3 Times Daily      Comments: Nectar thick    12/28/22 1455    12/28/22 1457  Dietary Nutrition Supplements Boost Pudding (Ensure Pudding)  Once         12/28/22 1456    12/28/22 1030  potassium chloride (MICRO-K) CR capsule 40 mEq  Every 4 Hours         12/28/22 0931    12/26/22 1353  remdesivir 100 mg in sodium chloride 0.9 % 270 mL IVPB (powder vial)  Every 24 Hours        See Hyperspace for full Linked Orders Report.    12/25/22 1309    12/26/22 1115  metFORMIN (GLUCOPHAGE) tablet 850 mg  2 Times Daily With Meals         12/26/22 1019    12/25/22 1310  Hepatic Function Panel  Daily       12/25/22 1309    12/25/22 1310  Creatinine Serum (kidney function) GFR component  Daily       12/25/22 1309    12/25/22 1308  Pharmacy Consult - Remdesivir  Continuous PRN         12/25/22 1309    12/25/22 1130  albuterol sulfate HFA (PROVENTIL HFA;VENTOLIN HFA;PROAIR HFA) inhaler 2 puff  Every 4 Hours - RT         12/25/22 1017    12/24/22 2000  Snack:  HS Snack       12/24/22 1025    12/24/22 1600  Dietary Nutrition Supplements Boost Glucose Control (Glucerna Shake)  3 Times Daily,   Status:  Canceled       12/24/22 1024    12/23/22 0942  Potassium Replacement - Initiate Nurse/BPA Driven Protocol  As Needed,   Status:  Discontinued         12/23/22 0943    12/23/22 0900  aspirin chewable tablet 81 mg  Daily         12/22/22 1557    12/23/22 0900  atorvastatin (LIPITOR) tablet 80 mg  Daily         12/22/22 1557    12/22/22 2100  sodium chloride 0.9 % flush 10 mL  Every 12 Hours Scheduled         12/22/22 1557    12/22/22 2100  sennosides-docusate (PERICOLACE) 8.6-50 MG per tablet 2 tablet  2 Times Daily        See Hyperspace for full Linked Orders Report.    12/22/22 1557    12/22/22 2100  Enoxaparin Sodium (LOVENOX) syringe 40 mg  Nightly         12/22/22 1600    12/22/22 1800  Oral Care  2 Times  Daily       12/22/22 1557    12/22/22 1800  Incentive Spirometry  Every 4 Hours While Awake       12/22/22 1557    12/22/22 1730  Insulin Aspart (novoLOG) injection 0-9 Units  3 Times Daily Before Meals         12/22/22 1557    12/22/22 1700  POC Glucose 4x Daily AC & at Bedtime  4 Times Daily Before Meals & at Bedtime       12/22/22 1557    12/22/22 1645  carbidopa-levodopa (SINEMET)  MG per tablet 1 tablet  3 Times Daily         12/22/22 1557    12/22/22 1645  dexamethasone (DECADRON) tablet 6 mg  Daily With Breakfast         12/22/22 1557    12/22/22 1600  Vital Signs  Every 4 Hours       12/22/22 1557    12/22/22 1558  Intake & Output  Every Shift       12/22/22 1557    12/22/22 1557  dextrose (GLUTOSE) oral gel 15 g  Every 15 Minutes PRN         12/22/22 1557    12/22/22 1557  dextrose (D50W) (25 g/50 mL) IV injection 25 g  Every 15 Minutes PRN         12/22/22 1557    12/22/22 1557  glucagon (human recombinant) (GLUCAGEN DIAGNOSTIC) injection 1 mg  Every 15 Minutes PRN         12/22/22 1557    12/22/22 1557  sodium chloride 0.9 % flush 10 mL  As Needed         12/22/22 1557    12/22/22 1557  Pharmacy to Dose enoxaparin (LOVENOX)  Continuous PRN         12/22/22 1557    12/22/22 1557  acetaminophen (TYLENOL) tablet 650 mg  Every 4 Hours PRN         12/22/22 1557    12/22/22 1557  polyethylene glycol (MIRALAX) packet 17 g  Daily PRN        See Hyperspace for full Linked Orders Report.    12/22/22 1557    12/22/22 1557  bisacodyl (DULCOLAX) EC tablet 5 mg  Daily PRN        See Hyperspace for full Linked Orders Report.    12/22/22 1557    12/22/22 1557  bisacodyl (DULCOLAX) suppository 10 mg  Daily PRN        See Hyperspace for full Linked Orders Report.    12/22/22 1557    12/22/22 1557  ondansetron (ZOFRAN) tablet 4 mg  Every 6 Hours PRN        See Hyperspace for full Linked Orders Report.    12/22/22 1557    12/22/22 1557  ondansetron (ZOFRAN) injection 4 mg  Every 6 Hours PRN        See Hyperspace for  full Linked Orders Report.    22 1557    22 1032  sodium chloride 0.9 % flush 10 mL  As Needed         22 1032    Unscheduled  Follow Hypoglycemia Standing Orders For Blood Glucose <70 & Notify Provider of Treatment  As Needed      Comments: Follow Hypoglycemia Orders As Outlined in Process Instructions (Open Order Report to View Full Instructions)  Notify Provider Any Time Hypoglycemia Treatment is Administered    22    Unscheduled  Up With Assistance  As Needed       22 1557    --  SCANNED - TELEMETRY           22 0000    --  SCANNED - TELEMETRY           22 0000    --  SCANNED - TELEMETRY           22 0000    --  SCANNED - TELEMETRY           22 0000    --  SCANNED - TELEMETRY           22 0000    --  SCANNED - TELEMETRY           22 0000    --  SCANNED - TELEMETRY           22 0000    --  SCANNED - TELEMETRY           22 0000    --  SCANNED - TELEMETRY           22 0000    --  SCANNED - TELEMETRY           22 0000    --  SCANNED - TELEMETRY           22 0000    --  SCANNED - TELEMETRY           22 0000    --  SCANNED - TELEMETRY           22 0000    --  SCANNED - TELEMETRY           22 0000    --  SCANNED - TELEMETRY           22 0000                   Physician Progress Notes (last 48 hours)      Pam Hallman APRN at 22 1150              [facility]    Name:  Ankur Camp   Age:  82 y.o.  Sex:  male  :  1940  MRN:  [id number]   Visit Number:  [id number]  Admission Date:  2022  Date Of Service:  22  Primary Care Physician:  Magaly Prabhakar APRN     LOS: 6 days :    Chief Complaint:      Fatigue    Subjective:    Patient seen and examined at bedside.  Patient states he is the same as yesterday.  He is pleasantly confused since admission.  Remains on 10L with saturations around 91%.  Patient has no  complaints on exam without any complaints of pain as well.  No home oxygen use.  Nursing unable to get appropriate IV access for CT of chest yesterday.  Seen by speech today with noted white patch to tongue.  Patient states he is unaware of this area and it does not hurt.  Blood pressure has been elevated.    Hospital Course:    Patient is an 82 year old male who presents to the hospital with complaints of weakness who was sent from PCP office after being diagnosed with UTI.  Patient wife advised on arrival that patient had had generalized weakness, less appetite with weight loss over the past 2 weeks.  Patient with past health history significant for diabetes mellitus type 2, hypertension, hyperlipidemia, and Parkinson's disease.  On exam, patient is resting in bed on 4L with sats in low 90s.  Denies any urinary symptoms.  Complains of worsening shortness of breath the past 2 days and cough.  Uses a walker/cane at baseline.     Work up in the emergency department noted troponin-0.033, BNP-2194, glucose-171, creatinine-1.31 with baseline 0.8, BUN-31,  albumin-3.3, CRP-9.9, CBC unremarkable, lactate-2.9 with repeat 2.1, procalcitonin-0.24.  Urinalysis noted infection with 2+ bacteria, 31-50 WBC, +nitrites, and +3 leuks.  CXR noted hypoinflation with scattered mild atelectasis. Covid 19 testing was positive.      Admitted to the hospital and continued on Rocephin for UTI.  Started on Decadron for Covid 19.  PT/OT consulted for evaluation of weakness.  Patient was still hypoxic not improving also on December 25 he has been started on remdesivir for the next 5 days.  Noted to have increased oxygen demands up to 10L.  CT chest to rule out PE ordered and still pending.  Repeat CXR noted concern for multifocal pneumonia.      Review of Systems:     All systems were reviewed and negative except as mentioned in subjective, assessment and plan.    Vital Signs:    Temp:  [97.1 °F (36.2 °C)-98.5 °F (36.9 °C)] 98.5 °F (36.9  °C)  Heart Rate:  [57-85] 77  Resp:  [18] 18  BP: (141-169)/(78-95) 156/78    Intake and output:    I/O last 3 completed shifts:  In: 60 [P.O.:60]  Out: -   I/O this shift:  In: 480 [P.O.:480]  Out: -     Physical Examination:  Examined again 12/28/2022    General Appearance:  Alert and cooperative, pleasantly confused on exam, no acute distress, elderly male   Head:  Atraumatic and normocephalic.   Eyes: Conjunctivae and sclerae normal, no icterus. No pallor.   Throat: No oral lesions, no thrush, oral mucosa moist.   Neck: Supple, trachea midline.   Lungs:   Breath sounds heard bilaterally equally but diminished.  Scattered wheezing without crackles. Unlabored at rest, utilizing 10L   Heart:  Normal S1 and S2, no murmur, no gallop, no rub. No JVD.   Abdomen:   Normal bowel sounds, no masses, no organomegaly. Soft, nontender, nondistended, no rebound tenderness.   Extremities: Supple, no edema, no cyanosis, no clubbing.   Skin: No bleeding or rash.   Neurologic: Alert and oriented x 3. No facial asymmetry. Moves all four limbs. No tremors.Generalized weakness is present.     Laboratory results:    Results from last 7 days   Lab Units 12/28/22  0731 12/27/22  0629 12/26/22  0639 12/25/22  1353 12/24/22  0600 12/23/22 2008 12/23/22  0303   SODIUM mmol/L 142  --   --   --  142  --  144   POTASSIUM mmol/L 3.3*  --   --   --  3.9 3.9 3.4*   CHLORIDE mmol/L 109*  --   --   --  109*  --  109*   CO2 mmol/L 24.6  --   --   --  23.3  --  24.8   BUN mg/dL 24*  --   --   --  21  --  24*   CREATININE mg/dL 0.68* 0.66* 0.76   < > 0.75*  --  0.86   CALCIUM mg/dL 8.9  --   --   --  8.9  --  8.0*   BILIRUBIN mg/dL 0.8 0.7 0.7   < >  --   --   --    ALK PHOS U/L 95 91 100   < >  --   --   --    ALT (SGPT) U/L 16 16 8   < >  --   --   --    AST (SGOT) U/L 22 36 30   < >  --   --   --    GLUCOSE mg/dL 162*  --   --   --  201*  --  147*    < > = values in this interval not displayed.     Results from last 7 days   Lab Units  12/28/22  0731 12/27/22  0629 12/24/22  0600   WBC 10*3/mm3 9.09 9.03 6.41   HEMOGLOBIN g/dL 13.2 12.8* 14.6   HEMATOCRIT % 39.5 38.3 43.2   PLATELETS 10*3/mm3 180 191 158         Results from last 7 days   Lab Units 12/22/22  1042   TROPONIN T ng/mL 0.033*     Results from last 7 days   Lab Units 12/22/22  1340 12/22/22  1225 12/22/22  1143   BLOODCX  No growth at 5 days No growth at 5 days  --    URINECX   --   --  >100,000 CFU/mL Escherichia coli*     No results for input(s): PHART, ASO9FON, PO2ART, OHW3UJH, BASEEXCESS in the last 8760 hours.   I have reviewed the patient's laboratory results.    Radiology results:    XR Chest 1 View    Result Date: 12/27/2022  CLINICAL INDICATION:  Covid/ SOA; N39.0-Urinary tract infection, site not specified; U07.1-COVID-19; J96.01-Acute respiratory failure with hypoxia; E87.20-Acidosis, unspecified; N28.9-Disorder of kidney and ureter, unspecified  EXAMINATION TECHNIQUE: XR CHEST 1 VW-  COMPARISON: Radiographs 12/22/2022.  FINDINGS: Bilateral multifocal patchy areas of groundglass opacities and airspace consolidations. No pneumothorax. No large pleural effusion. Probable trace bilateral pleural effusions. Heart is normal in size. Aortic atherosclerosis and tortuosity.      Impression: Bilateral multifocal patchy areas of airspace opacities, concerning for multifocal pneumonia/atypical viral pneumonia.    Images personally reviewed, interpreted and dictated by RICKI Ash.       This report was signed and finalized on 12/27/2022 9:32 AM by RICKI Ash.    I have reviewed the patient's radiology reports.    Medication Review:     I have reviewed the patient's active and prn medications.     Problem List:      Urinary tract infection without hematuria, site unspecified      Assessment:    1. Acute UTI  2. Acute Respiratory Failure with Hypoxia  3. Covid 19 +  4. Pneumonia, multifocal  5. Lactic Acidosis  6. LAWSON  7. Parkinsons Disease  8. Diabetes Mellitus Type  2 on oral anti-diabetics  9. Hyperlipidemia    Plan:    Acute UTI/ LAWSON/ Lactic Acidosis  -Completed Rocephin 1GM IV for 5 days  -Urine culture shows E. coli and is sensitive to Rocephin  -Repeat Lactate-normal    Acute Respiratory Failure with hypoxemia/ Covid 19+  -Supplemental oxygen to keep saturations >90%--still requiring 10L on exam with saturations around 90%  -Continue Decadron 6mg daily as patient is hypoxic for 10 days   -Started on Remdesivir for Covid for 5 days  -Procalcitonin negative on admission  -Recheck Procal  negative, no indication for antibiotics  -Supportive care   -Continue PT/OT  -Repeat CXR today concerning for multifocal pneumonia--atypical/ viral  -Noted to have significant desaturations with therapy and needing more oxygen today  -CT to rule out PE ordered and pending     Diabetes Mellitus  -On oral anti-diabetics at home  -Cover with sliding scale as it is high due to ongoing steroids  -A1c-8     Bradycardia- his bisoprolol has been stopped and it has improved to baseline     Hypertension- on  his bisoprolol was stopped due to bradycardia and he has since been on lisinopril 20 mg daily.  BP in 150s, increased Lisinopril to 40mg today    Discharge disposition still pending with STR vs Home Health    DVT Prophylaxis: Lovenox  Code Status: Full Code  Diet: CC  Discharge Plan: several days    JEANETTE Lange  22  11:50 EST    Dictated utilizing Dragon dictation.      Electronically signed by Pam Hallman APRN at 22 1156     Pam Hallman APRN at 22 1310              [facility]    Name:  Ankur Camp   Age:  82 y.o.  Sex:  male  :  1940  MRN:  [id number]   Visit Number:  [id number]  Admission Date:  2022  Date Of Service:  22  Primary Care Physician:  Magaly Prabhakar APRN     LOS: 5 days :    Chief Complaint:      Fatigue    Subjective:    Patient seen and examined at  bedside.  He is pleasantly confused in conversation.  States he is in the same place as yesterday.  Was noted to be on 5L this am which has been increased to 10L on my exam after working with therapy.  Patient has no complaints on exam, denies any pain.  No baseline oxygen use.    Hospital Course:    Patient is an 82 year old male who presents to the hospital with complaints of weakness who was sent from PCP office after being diagnosed with UTI.  Patient wife advised on arrival that patient had had generalized weakness, less appetite with weight loss over the past 2 weeks.  Patient with past health history significant for diabetes mellitus type 2, hypertension, hyperlipidemia, and Parkinson's disease.  On exam, patient is resting in bed on 4L with sats in low 90s.  Denies any urinary symptoms.  Complains of worsening shortness of breath the past 2 days and cough.  Uses a walker/cane at baseline.     Work up in the emergency department noted troponin-0.033, BNP-2194, glucose-171, creatinine-1.31 with baseline 0.8, BUN-31,  albumin-3.3, CRP-9.9, CBC unremarkable, lactate-2.9 with repeat 2.1, procalcitonin-0.24.  Urinalysis noted infection with 2+ bacteria, 31-50 WBC, +nitrites, and +3 leuks.  CXR noted hypoinflation with scattered mild atelectasis. Covid 19 testing was positive.      Admitted to the hospital and continued on Rocephin for UTI.  Started on Decadron for Covid 19.  PT/OT consulted for evaluation of weakness.  Patient was still hypoxic not improving also on December 25 he has been started on remdesivir for the next 5 days.  Noted to have increased oxygen demands up to 10L.  CT chest to rule out PE ordered.  Repeat CXR noted concern for multifocal pneumonia.    Review of Systems:     All systems were reviewed and negative except as mentioned in subjective, assessment and plan.    Vital Signs:    Temp:  [96.7 °F (35.9 °C)-98.2 °F (36.8 °C)] 96.7 °F (35.9 °C)  Heart Rate:  [55-90] 89  Resp:  [18-20] 18  BP:  (150-167)/(85-98) 159/94    Intake and output:    No intake/output data recorded.  No intake/output data recorded.    Physical Examination:    General Appearance:  Alert and cooperative, pleasantly confused on exam, no acute distress, elderly male   Head:  Atraumatic and normocephalic.   Eyes: Conjunctivae and sclerae normal, no icterus. No pallor.   Throat: No oral lesions, no thrush, oral mucosa moist.   Neck: Supple, trachea midline.   Lungs:   Breath sounds heard bilaterally equally but diminished.  Scattered wheezing without crackles. Unlabored at rest on 10L   Heart:  Normal S1 and S2, no murmur, no gallop, no rub. No JVD.   Abdomen:   Normal bowel sounds, no masses, no organomegaly. Soft, nontender, nondistended, no rebound tenderness.   Extremities: Supple, no edema, no cyanosis, no clubbing.   Skin: No bleeding or rash.   Neurologic: Alert and oriented x 3. No facial asymmetry. Moves all four limbs. No tremors.Generalized weakness is present.     Laboratory results:    Results from last 7 days   Lab Units 12/27/22  0629 12/26/22  0639 12/25/22  1353 12/24/22  0600 12/23/22  2008 12/23/22  0303 12/22/22  1042   SODIUM mmol/L  --   --   --  142  --  144 144   POTASSIUM mmol/L  --   --   --  3.9 3.9 3.4* 3.7   CHLORIDE mmol/L  --   --   --  109*  --  109* 105   CO2 mmol/L  --   --   --  23.3  --  24.8 24.8   BUN mg/dL  --   --   --  21  --  24* 31*   CREATININE mg/dL 0.66* 0.76 0.79 0.75*  --  0.86 1.31*   CALCIUM mg/dL  --   --   --  8.9  --  8.0* 9.1   BILIRUBIN mg/dL 0.7 0.7 0.7  --   --   --  1.2   ALK PHOS U/L 91 100 94  --   --   --  94   ALT (SGPT) U/L 16 8 8  --   --   --  <5   AST (SGOT) U/L 36 30 40  --   --   --  23   GLUCOSE mg/dL  --   --   --  201*  --  147* 171*     Results from last 7 days   Lab Units 12/27/22  0629 12/24/22  0600 12/23/22  0303   WBC 10*3/mm3 9.03 6.41 5.16   HEMOGLOBIN g/dL 12.8* 14.6 13.6   HEMATOCRIT % 38.3 43.2 40.1   PLATELETS 10*3/mm3 191 158 141         Results from  last 7 days   Lab Units 12/22/22  1042   TROPONIN T ng/mL 0.033*     Results from last 7 days   Lab Units 12/22/22  1340 12/22/22  1225 12/22/22  1143   BLOODCX  No growth at 5 days No growth at 5 days  --    URINECX   --   --  >100,000 CFU/mL Escherichia coli*     No results for input(s): PHART, JOE9DUG, PO2ART, VJE7DYR, BASEEXCESS in the last 8760 hours.   I have reviewed the patient's laboratory results.    Radiology results:    XR Chest 1 View    Result Date: 12/27/2022  CLINICAL INDICATION:  Covid/ SOA; N39.0-Urinary tract infection, site not specified; U07.1-COVID-19; J96.01-Acute respiratory failure with hypoxia; E87.20-Acidosis, unspecified; N28.9-Disorder of kidney and ureter, unspecified  EXAMINATION TECHNIQUE: XR CHEST 1 VW-  COMPARISON: Radiographs 12/22/2022.  FINDINGS: Bilateral multifocal patchy areas of groundglass opacities and airspace consolidations. No pneumothorax. No large pleural effusion. Probable trace bilateral pleural effusions. Heart is normal in size. Aortic atherosclerosis and tortuosity.      Impression: Bilateral multifocal patchy areas of airspace opacities, concerning for multifocal pneumonia/atypical viral pneumonia.    Images personally reviewed, interpreted and dictated by RICKI Ash.       This report was signed and finalized on 12/27/2022 9:32 AM by RICKI Ash.    I have reviewed the patient's radiology reports.    Medication Review:     I have reviewed the patient's active and prn medications.     Problem List:      Urinary tract infection without hematuria, site unspecified      Assessment:    10. Acute UTI  11. Acute Respiratory Failure with Hypoxia  12. Covid 19 +  13. Pneumonia, multifocal  14. Lactic Acidosis  15. LAWSON  16. Parkinsons Disease  17. Diabetes Mellitus Type 2 on oral anti-diabetics  18. Hyperlipidemia    Plan:    Acute UTI/ LAWSON/ Lactic Acidosis  -Completed Rocephin 1GM IV for 5 days  -Urine culture shows E. coli and is sensitive to  Rocephin  -Repeat Lactate-normal       Acute Respiratory Failure with hypoxemia/ Covid 19+  -Supplemental oxygen to keep saturations >90%--requiring 10L on exam with saturations around 90%  -Continue Decadron 6mg daily as patient is hypoxic   -Started on Remdesivir for Covid  -Procalcitonin negative on admission  -Recheck Procal in am  -Supportive care   -Continue PT/OT  -Repeat CXR today concerning for multifocal pneumonia--atypical/ viral  -Noted to have significant desaturations with therapy and needing more oxygen today--will order CT to rule out PE today       Diabetes Mellitus  -On oral anti-diabetics at home  -Cover with sliding scale as it is high due to ongoing steroids  -A1c-8     Bradycardia- his bisoprolol has been stopped and it has improved to baseline     Hypertension- on 24th his bisoprolol was stopped due to bradycardia and he has since been on lisinopril 20 mg daily.  His blood pressure is a lot stable now    Discharge disposition still pending with STR vs Home Health      DVT Prophylaxis: Lovenox  Code Status: Full Code  Diet: CC  Discharge Plan: several days    JEANETTE Lange  12/27/22  15:46 EST    Dictated utilizing Dragon dictation.      Electronically signed by Pam Hallman APRN at 12/27/22 1555       Consult Notes (last 48 hours)  Notes from 12/27/22 1200 through 12/29/22 1200   No notes of this type exist for this encounter.

## 2022-12-29 NOTE — PLAN OF CARE
Goal Outcome Evaluation:  Plan of Care Reviewed With: patient   No acute events this shift. Continues to require 6LHFO2. Denies c/o pain. Call light and nectar thickened fluids of choice within reach.     Progress: improving

## 2022-12-29 NOTE — THERAPY TREATMENT NOTE
Patient Name: Ankur Camp  : 1940    MRN: [id number]                              Today's Date: 2022       Admit Date: 2022    Visit Dx:     ICD-10-CM ICD-9-CM   1. Urinary tract infection without hematuria, site unspecified  N39.0 599.0   2. COVID-19  U07.1 079.89   3. Acute respiratory failure with hypoxia (HCC)  J96.01 518.81   4. Lactic acidosis  E87.20 276.2   5. Acute renal insufficiency  N28.9 593.9     Patient Active Problem List   Diagnosis   • Benign prostatic hypertrophy without urinary obstruction   • Hypertension   • Hyperlipidemia   • Type 2 diabetes mellitus (HCC)   • Vitamin D deficiency   • Colon cancer screening   • Coronary artery calcification   • Thoracic aortic aneurysm (TAA)   • Primary osteoarthritis of right knee   • Status post total knee replacement using cement, right   • Urinary tract infection without hematuria, site unspecified     Past Medical History:   Diagnosis Date   • Arthritis    • Cancer (HCC)     kidney/ prostate   • Diabetes mellitus (HCC)    • ED (erectile dysfunction)    • Enlarged prostate with lower urinary tract symptoms (LUTS)    • Full dentures    • History of fracture of pelvis 2019    after fall from ladder   • History of loss of consciousness 2019    after fall from ladder-flown to [facility]   • History of rib fracture 2019    after fall from ladder   • History of right shoulder fracture 1960    walking up hill, fell on gravel   • History of stress test    • History of torn meniscus of right knee    • [place] (hard of hearing)     Bilateral hearing aids    • Hypertension    • Laceration of head 2019    after fall from ladder-treated at [facility]   • Left shoulder strain 2017    after fall from ladder   • Lung injury 2019    punctured right lung after falling off ladder   • Renal disorder    • Stone in kidney    • Stroke (HCC)      Past Surgical History:   Procedure Laterality Date    • CHEST TUBE INSERTION  11/2019    rib fractures, punctured lung after fall from ladder-[facility]   • COLONOSCOPY     • COLONOSCOPY N/A 4/8/2019    Procedure: COLONOSCOPY;  Surgeon: Onesimo Arnold MD;  Location: [facility] ENDOSCOPY;  Service: Gastroenterology   • EYE SURGERY Right     cataract removal with IOL implant   • HERNIA REPAIR Right     multiple inguinal hernia repairs   • KNEE ARTHROSCOPY W/ MENISCECTOMY Right 05/10/2012    Partial medial and lateral menisectomy-Conrad Baird MD   • NEPHRECTOMY Right     partial right nephrectomy due to kidney stone   • PELVIC FRACTURE SURGERY Right 11/2019    multiple pelvic fractures after fall from [facility]   • SHOULDER SURGERY Right 1960    fracture surgery to remove a piece of bone- [place], [place]   • TOTAL KNEE ARTHROPLASTY Right 5/25/2021    Procedure: knee arthroplasty total;  Surgeon: Conrad Baird MD;  Location: [facility] OR;  Service: Orthopedics;  Laterality: Right;      General Information     Row Name 12/29/22 1255          OT Time and Intention    Document Type therapy note (daily note)  -     Mode of Treatment occupational therapy  -           User Key  (r) = Recorded By, (t) = Taken By, (c) = Cosigned By    Initials Name Provider Type    Mis Santos Occupational Therapist                 Mobility/ADL's     Row Name 12/29/22 1255          Bed Mobility    Supine-Sit [place] (Bed Mobility) minimum assist (75% patient effort)  -     Assistive Device (Bed Mobility) head of bed elevated  -     Comment, (Bed Mobility) pt only able to sit eob ~ 1 min d/t O2 sats dropping to 85%.  -           User Key  (r) = Recorded By, (t) = Taken By, (c) = Cosigned By    Initials Name Provider Type    Mis Santos Occupational Therapist               Obj/Interventions    No documentation.                Goals/Plan    No documentation.                Clinical Impression     Row Name 12/29/22 1257          Pain  Assessment    Pretreatment Pain Rating 0/10 - no pain  -     Posttreatment Pain Rating 0/10 - no pain  -     Row Name 12/29/22 1257          Plan of Care Review    Plan of Care Reviewed With patient  -     Progress no change  -     Outcome Evaluation Pt received in bed on 6L O2 with sats 94%, pt sat eob with min assist and O2 dropped to 85%, O2 was increased to 10L with sats increasing to 92%.  Pt was assisted back to bed, O2 was titrated to 6L with pt satting 88-89%.  Pt limited with activity d/t decreasing O2.  -     Row Name 12/29/22 1257          Vital Signs    Pre SpO2 (%) 94  -AH     O2 Delivery Pre Treatment supplemental O2  6L  -     Intra SpO2 (%) 85  -AH     O2 Delivery Intra Treatment supplemental O2  6L increased to 10L with sats improving to 92%.  -     Post SpO2 (%) 88  -     O2 Delivery Post Treatment supplemental O2  6L  -     Row Name 12/29/22 1257          Positioning and Restraints    Pre-Treatment Position in bed  -     Post Treatment Position bed  -     In Bed supine;call light within reach;encouraged to call for assist;exit alarm on  -           User Key  (r) = Recorded By, (t) = Taken By, (c) = Cosigned By    Initials Name Provider Type     Mis Warren Occupational Therapist               Outcome Measures     Row Name 12/29/22 1356          How much help from another is currently needed...    Putting on and taking off regular lower body clothing? 2  -AH     Bathing (including washing, rinsing, and drying) 2  -AH     Toileting (which includes using toilet bed pan or urinal) 2  -AH     Putting on and taking off regular upper body clothing 3  -AH     Taking care of personal grooming (such as brushing teeth) 3  -AH     Eating meals 3  -     AM-PAC 6 Clicks Score (OT) 15  -     Row Name 12/29/22 0800          How much help from another person do you currently need...    Turning from your back to your side while in flat bed without using bedrails? 3  -HE     Moving  from lying on back to sitting on the side of a flat bed without bedrails? 2  -HE     Moving to and from a bed to a chair (including a wheelchair)? 2  -HE     Standing up from a chair using your arms (e.g., wheelchair, bedside chair)? 2  -HE     Climbing 3-5 steps with a railing? 2  -HE     To walk in hospital room? 3  -HE     AM-PAC 6 Clicks Score (PT) 14  -HE     Highest level of mobility 4 --> Transferred to chair/commode  -HE     Row Name 12/29/22 1356          Functional Assessment    Outcome Measure Options AM-PAC 6 Clicks Daily Activity (OT)  -           User Key  (r) = Recorded By, (t) = Taken By, (c) = Cosigned By    Initials Name Provider Type     Mis Warren Occupational Therapist     Nay Shirley LPN Licensed Nurse                Occupational Therapy Education     Title: PT OT SLP Therapies (In Progress)     Topic: Occupational Therapy (In Progress)     Point: ADL training (Done)     Description:   Instruct learner(s) on proper safety adaptation and remediation techniques during self care or transfers.   Instruct in proper use of assistive devices.              Learning Progress Summary           Patient Acceptance, E,TB, VU by  at 12/29/2022 1358    Comment: benefit of activity    Acceptance, E,TB, VU by SD at 12/27/2022 1259    Comment: Breathing techniques    Acceptance, E,TB, VU by SD at 12/23/2022 1306    Comment: OT POC                   Point: Home exercise program (Not Started)     Description:   Instruct learner(s) on appropriate technique for monitoring, assisting and/or progressing therapeutic exercises/activities.              Learner Progress:  Not documented in this visit.          Point: Precautions (Done)     Description:   Instruct learner(s) on prescribed precautions during self-care and functional transfers.              Learning Progress Summary           Patient Acceptance, E,TB, VU by  at 12/29/2022 1358    Comment: benefit of activity                   Point: Body  mechanics (Not Started)     Description:   Instruct learner(s) on proper positioning and spine alignment during self-care, functional mobility activities and/or exercises.              Learner Progress:  Not documented in this visit.                      User Key     Initials Effective Dates Name Provider Type Discipline     06/16/21 -  Mis Warren Occupational Therapist OT    SD 06/16/21 -  Jyoti Rosario OT Occupational Therapist OT              OT Recommendation and Plan     Plan of Care Review  Plan of Care Reviewed With: patient  Progress: no change  Outcome Evaluation: Pt received in bed on 6L O2 with sats 94%, pt sat eob with min assist and O2 dropped to 85%, O2 was increased to 10L with sats increasing to 92%.  Pt was assisted back to bed, O2 was titrated to 6L with pt satting 88-89%.  Pt limited with activity d/t decreasing O2.     Time Calculation:    Time Calculation- OT     Row Name 12/29/22 1358             Time Calculation- OT    OT Start Time 1127  -      OT Stop Time 1144  -      OT Time Calculation (min) 17 min  -      OT Received On 12/29/22  -      OT Goal Re-Cert Due Date 01/04/23  -         Timed Charges    76614 - OT Therapeutic Activity Minutes 17  -         Total Minutes    Timed Charges Total Minutes 17  -       Total Minutes 17  -            User Key  (r) = Recorded By, (t) = Taken By, (c) = Cosigned By    Initials Name Provider Type     Mis Warren Occupational Therapist              Therapy Charges for Today     Code Description Service Date Service Provider Modifiers Qty    [id number]  OT THERAPEUTIC ACT EA 15 MIN 12/29/2022 Mis Warren GO 1               Mis Warren  12/29/2022

## 2022-12-29 NOTE — PLAN OF CARE
Goal Outcome Evaluation:  Plan of Care Reviewed With: patient        Progress: no change  Outcome Evaluation: Pt supine in beed and  willing to work [employer]. Pt was transferred to EOB with min a and was only able to tolerated approx 1 min d/t pt O2 saturations falling.  Pt at 6L at rest was noted to bed 94 but fell to 85% sitting EOB.  Pt was returned to supine where recovered to 92% on 10L O2 . Once recoverred pt waas titrated to 6L and was able to maintain 89-90% in supine. See flowsheet for details

## 2022-12-29 NOTE — THERAPY TREATMENT NOTE
Patient Name: Ankur Camp  : 1940    MRN: [id number]                              Today's Date: 2022       Admit Date: 2022    Visit Dx:     ICD-10-CM ICD-9-CM   1. Urinary tract infection without hematuria, site unspecified  N39.0 599.0   2. COVID-19  U07.1 079.89   3. Acute respiratory failure with hypoxia (HCC)  J96.01 518.81   4. Lactic acidosis  E87.20 276.2   5. Acute renal insufficiency  N28.9 593.9     Patient Active Problem List   Diagnosis   • Benign prostatic hypertrophy without urinary obstruction   • Hypertension   • Hyperlipidemia   • Type 2 diabetes mellitus (HCC)   • Vitamin D deficiency   • Colon cancer screening   • Coronary artery calcification   • Thoracic aortic aneurysm (TAA)   • Primary osteoarthritis of right knee   • Status post total knee replacement using cement, right   • Urinary tract infection without hematuria, site unspecified   • COVID-19 virus detected   • Acute respiratory failure with hypoxia (HCC)   • Parkinson's disease dementia (HCC)     Past Medical History:   Diagnosis Date   • Arthritis    • Cancer (HCC)     kidney/ prostate   • Diabetes mellitus (HCC)    • ED (erectile dysfunction)    • Enlarged prostate with lower urinary tract symptoms (LUTS)    • Full dentures    • History of fracture of pelvis 2019    after fall from ladder   • History of loss of consciousness 2019    after fall from ladder-flown to [facility]   • History of rib fracture 2019    after fall from ladder   • History of right shoulder fracture 1960    walking up hill, fell on gravel   • History of stress test    • History of torn meniscus of right knee    • [place] (hard of hearing)     Bilateral hearing aids    • Hypertension    • Laceration of head 2019    after fall from ladder-treated at [facility]   • Left shoulder strain 2017    after fall from ladder   • Lung injury 2019    punctured right lung after falling off ladder    • Renal disorder    • Stone in kidney    • Stroke (HCC)      Past Surgical History:   Procedure Laterality Date   • CHEST TUBE INSERTION  11/2019    rib fractures, punctured lung after fall from [facility]   • COLONOSCOPY     • COLONOSCOPY N/A 4/8/2019    Procedure: COLONOSCOPY;  Surgeon: Onesimo Arnold MD;  Location: [facility] ENDOSCOPY;  Service: Gastroenterology   • EYE SURGERY Right     cataract removal with IOL implant   • HERNIA REPAIR Right     multiple inguinal hernia repairs   • KNEE ARTHROSCOPY W/ MENISCECTOMY Right 05/10/2012    Partial medial and lateral menisectomy-Conrad Baird MD   • NEPHRECTOMY Right     partial right nephrectomy due to kidney stone   • PELVIC FRACTURE SURGERY Right 11/2019    multiple pelvic fractures after fall from [facility]   • SHOULDER SURGERY Right 1960    fracture surgery to remove a piece of bone- [place], [place]   • TOTAL KNEE ARTHROPLASTY Right 5/25/2021    Procedure: knee arthroplasty total;  Surgeon: Conrad Baird MD;  Location: [facility] OR;  Service: Orthopedics;  Laterality: Right;      General Information     Row Name 12/29/22 1553          Physical Therapy Time and Intention    Document Type therapy note (daily note)  -RM     Mode of Treatment physical therapy  -RM     Row Name 12/29/22 1555          General Information    Patient Profile Reviewed yes  -RM     Existing Precautions/Restrictions fall;oxygen therapy device and L/min  -RM     Row Name 12/29/22 1734          Cognition    Orientation Status (Cognition) oriented to;person  -RM     Row Name 12/29/22 1556          Safety Issues, Functional Mobility    Safety Issues Affecting Function (Mobility) awareness of need for assistance;impulsivity;insight into deficits/self-awareness;positioning of assistive device;safety precaution awareness;safety precautions follow-through/compliance  -RM     Impairments Affecting Function (Mobility)  balance;coordination;endurance/activity tolerance;motor control;postural/trunk control;strength  -RM     Cognitive Impairments, Mobility Safety/Performance awareness, need for assistance;insight into deficits/self-awareness;safety precaution follow-through;safety precaution awareness  -RM           User Key  (r) = Recorded By, (t) = Taken By, (c) = Cosigned By    Initials Name Provider Type    Santos Peña, PTA Physical Therapist Assistant               Mobility     Row Name 12/29/22 1554          Bed Mobility    Bed Mobility sit-supine  -RM     Supine-Sit [place] (Bed Mobility) minimum assist (75% patient effort)  -RM     Sit-Supine [place] (Bed Mobility) minimum assist (75% patient effort)  -RM     Assistive Device (Bed Mobility) bed rails;head of bed elevated  -RM           User Key  (r) = Recorded By, (t) = Taken By, (c) = Cosigned By    Initials Name Provider Type    Santos Peña, PTA Physical Therapist Assistant               Obj/Interventions    No documentation.                Goals/Plan    No documentation.                Clinical Impression     Row Name 12/29/22 1555          Pain    Pretreatment Pain Rating 0/10 - no pain  -RM     Posttreatment Pain Rating 0/10 - no pain  -RM     Row Name 12/29/22 1556          Plan of Care Review    Plan of Care Reviewed With patient  -RM     Progress no change  -RM     Outcome Evaluation Pt supine in beed and  willing to work ith [employer]. Pt was transferred to EOB with min a and was only able to tolerated approx 1 min d/t pt O2 saturations falling.  Pt at 6L at rest was noted to bed 94 but fell to 85% sitting EOB.  Pt was returned to supine where recovered to 92% on 10L O2 . Once recoverred pt waas titrated to 6L and was able to maintain 89-90% in supine. See flowsheet for details  -     Row Name 12/29/22 0187          Vital Signs    Pre SpO2 (%) 94  -RM     O2 Delivery Pre Treatment supplemental O2  6  -RM     Intra SpO2 (%) 85  -RM     O2  Delivery Intra Treatment supplemental O2  6  -RM     Post SpO2 (%) 90  -RM     O2 Delivery Post Treatment supplemental O2  6L  -RM     Pre Patient Position Supine  -RM     Intra Patient Position Sitting  -RM     Post Patient Position Supine  -RM     Row Name 12/29/22 1555          Positioning and Restraints    Pre-Treatment Position in bed  -RM     Post Treatment Position bed  -RM     In Bed fowlers;call light within reach;encouraged to call for assist;exit alarm on;notified nsg  -RM           User Key  (r) = Recorded By, (t) = Taken By, (c) = Cosigned By    Initials Name Provider Type    Santos Peña, EMMA Physical Therapist Assistant               Outcome Measures     Row Name 12/29/22 1559 12/29/22 0800       How much help from another person do you currently need...    Turning from your back to your side while in flat bed without using bedrails? 3  -RM 3  -HE    Moving from lying on back to sitting on the side of a flat bed without bedrails? 3  -RM 2  -HE    Moving to and from a bed to a chair (including a wheelchair)? 3  -RM 2  -HE    Standing up from a chair using your arms (e.g., wheelchair, bedside chair)? 2  -RM 2  -HE    Climbing 3-5 steps with a railing? 2  -RM 2  -HE    To walk in hospital room? 2  -RM 3  -HE    AM-PAC 6 Clicks Score (PT) 15  -RM 14  -HE    Highest level of mobility 4 --> Transferred to chair/commode  -RM 4 --> Transferred to chair/commode  -HE    Row Name 12/29/22 1559 12/29/22 1356       Functional Assessment    Outcome Measure Options AM-PAC 6 Clicks Basic Mobility (PT)  -RM AM-PAC 6 Clicks Daily Activity (OT)  -AH          User Key  (r) = Recorded By, (t) = Taken By, (c) = Cosigned By    Initials Name Provider Type    Mis Santos Occupational Therapist    Santos Peña, PTA Physical Therapist Assistant    Nay Banerjee LPN Licensed Nurse                             Physical Therapy Education     Title: PT OT SLP Therapies (In Progress)     Topic: Physical  Therapy (In Progress)     Point: Mobility training (Done)     Learning Progress Summary           Patient Acceptance, E,TB,D, VU,NR by  at 12/29/2022 1601    Comment: Need for assistance    Acceptance, E,TB, VU,NR by CC at 12/26/2022 1713    Comment: Importance of increasing mob and VC with demonstration for foot clearance    Acceptance, E,D, DU by  at 12/23/2022 1144    Comment: Pt education for improving technique with walker for transfers                   Point: Home exercise program (Done)     Learning Progress Summary           Patient Acceptance, E,TB, VU,NR by CC at 12/24/2022 1700    Comment: Importance of performing B LE ex btw therapy sessions                   Point: Body mechanics (Not Started)     Learner Progress:  Not documented in this visit.          Point: Precautions (Not Started)     Learner Progress:  Not documented in this visit.                      User Key     Initials Effective Dates Name Provider Type Discipline     06/16/21 -  Yesenia Gerardo, PTA Physical Therapist Assistant PT     06/16/21 -  Santos Garvin, PTA Physical Therapist Assistant PT    TW 06/16/21 -  Abby Agrawal, ADA Physical Therapist PT              PT Recommendation and Plan     Plan of Care Reviewed With: patient  Progress: no change  Outcome Evaluation: Pt supine in beed and  willing to work [employer]. Pt was transferred to EOB with min a and was only able to tolerated approx 1 min d/t pt O2 saturations falling.  Pt at 6L at rest was noted to bed 94 but fell to 85% sitting EOB.  Pt was returned to supine where recovered to 92% on 10L O2 . Once recoverred pt waas titrated to 6L and was able to maintain 89-90% in supine. See flowsheet for details     Time Calculation:    PT Charges     Row Name 12/29/22 1602             Time Calculation    Start Time 1128  -RM      Stop Time 1143  -RM      Time Calculation (min) 15 min  -RM      PT Received On 12/29/22  -RM      PT Goal Re-Cert Due Date 01/02/23  -RM          Time Calculation- PT    Total Timed Code Minutes- PT 15 minute(s)  -RM         Timed Charges    47310 - PT Therapeutic Activity Minutes 15  -RM         Total Minutes    Timed Charges Total Minutes 15  -RM       Total Minutes 15  -RM            User Key  (r) = Recorded By, (t) = Taken By, (c) = Cosigned By    Initials Name Provider Type    Santos Peña, EMMA Physical Therapist Assistant              Therapy Charges for Today     Code Description Service Date Service Provider Modifiers Qty    50492800210 HC PT THERAPEUTIC ACT EA 15 MIN 12/29/2022 Santos Garvni, EMMA GP 1          PT G-Codes  Outcome Measure Options: AM-PAC 6 Clicks Basic Mobility (PT)  AM-PAC 6 Clicks Score (PT): 15  AM-PAC 6 Clicks Score (OT): 15       Santos Garvin PTA  12/29/2022

## 2022-12-29 NOTE — PLAN OF CARE
Goal Outcome Evaluation:  Plan of Care Reviewed With: patient        Progress: improving  Outcome Evaluation: VSS, pt rested well, able to decrease NC to 6L and maintain o2 sats >90%

## 2022-12-30 LAB
ALBUMIN SERPL-MCNC: 2.6 G/DL (ref 3.5–5.2)
ALBUMIN/GLOB SERPL: 0.8 G/DL
ALP SERPL-CCNC: 104 U/L (ref 39–117)
ALT SERPL W P-5'-P-CCNC: 9 U/L (ref 1–41)
ANION GAP SERPL CALCULATED.3IONS-SCNC: 6.5 MMOL/L (ref 5–15)
AST SERPL-CCNC: 26 U/L (ref 1–40)
BASOPHILS # BLD AUTO: 0.02 10*3/MM3 (ref 0–0.2)
BASOPHILS NFR BLD AUTO: 0.2 % (ref 0–1.5)
BILIRUB SERPL-MCNC: 0.7 MG/DL (ref 0–1.2)
BUN SERPL-MCNC: 29 MG/DL (ref 8–23)
BUN/CREAT SERPL: 34.5 (ref 7–25)
CALCIUM SPEC-SCNC: 9 MG/DL (ref 8.6–10.5)
CHLORIDE SERPL-SCNC: 114 MMOL/L (ref 98–107)
CO2 SERPL-SCNC: 27.5 MMOL/L (ref 22–29)
CREAT SERPL-MCNC: 0.84 MG/DL (ref 0.76–1.27)
DEPRECATED RDW RBC AUTO: 45.1 FL (ref 37–54)
EGFRCR SERPLBLD CKD-EPI 2021: 87.6 ML/MIN/1.73
EOSINOPHIL # BLD AUTO: 0.03 10*3/MM3 (ref 0–0.4)
EOSINOPHIL NFR BLD AUTO: 0.3 % (ref 0.3–6.2)
ERYTHROCYTE [DISTWIDTH] IN BLOOD BY AUTOMATED COUNT: 13.3 % (ref 12.3–15.4)
GLOBULIN UR ELPH-MCNC: 3.3 GM/DL
GLUCOSE BLDC GLUCOMTR-MCNC: 185 MG/DL (ref 70–130)
GLUCOSE BLDC GLUCOMTR-MCNC: 216 MG/DL (ref 70–130)
GLUCOSE BLDC GLUCOMTR-MCNC: 294 MG/DL (ref 70–130)
GLUCOSE BLDC GLUCOMTR-MCNC: 72 MG/DL (ref 70–130)
GLUCOSE SERPL-MCNC: 166 MG/DL (ref 65–99)
HCT VFR BLD AUTO: 39 % (ref 37.5–51)
HGB BLD-MCNC: 13.2 G/DL (ref 13–17.7)
IMM GRANULOCYTES # BLD AUTO: 0.06 10*3/MM3 (ref 0–0.05)
IMM GRANULOCYTES NFR BLD AUTO: 0.6 % (ref 0–0.5)
LYMPHOCYTES # BLD AUTO: 0.6 10*3/MM3 (ref 0.7–3.1)
LYMPHOCYTES NFR BLD AUTO: 6.1 % (ref 19.6–45.3)
MCH RBC QN AUTO: 31.8 PG (ref 26.6–33)
MCHC RBC AUTO-ENTMCNC: 33.8 G/DL (ref 31.5–35.7)
MCV RBC AUTO: 94 FL (ref 79–97)
MONOCYTES # BLD AUTO: 0.55 10*3/MM3 (ref 0.1–0.9)
MONOCYTES NFR BLD AUTO: 5.6 % (ref 5–12)
NEUTROPHILS NFR BLD AUTO: 8.57 10*3/MM3 (ref 1.7–7)
NEUTROPHILS NFR BLD AUTO: 87.2 % (ref 42.7–76)
NRBC BLD AUTO-RTO: 0 /100 WBC (ref 0–0.2)
PLATELET # BLD AUTO: 230 10*3/MM3 (ref 140–450)
PMV BLD AUTO: 11.1 FL (ref 6–12)
POTASSIUM SERPL-SCNC: 4 MMOL/L (ref 3.5–5.2)
PROT SERPL-MCNC: 5.9 G/DL (ref 6–8.5)
RBC # BLD AUTO: 4.15 10*6/MM3 (ref 4.14–5.8)
SODIUM SERPL-SCNC: 148 MMOL/L (ref 136–145)
WBC NRBC COR # BLD: 9.83 10*3/MM3 (ref 3.4–10.8)

## 2022-12-30 PROCEDURE — 99232 SBSQ HOSP IP/OBS MODERATE 35: CPT | Performed by: NURSE PRACTITIONER

## 2022-12-30 PROCEDURE — 80053 COMPREHEN METABOLIC PANEL: CPT | Performed by: NURSE PRACTITIONER

## 2022-12-30 PROCEDURE — 63710000001 DEXAMETHASONE PER 0.25 MG: Performed by: NURSE PRACTITIONER

## 2022-12-30 PROCEDURE — 97530 THERAPEUTIC ACTIVITIES: CPT

## 2022-12-30 PROCEDURE — 63710000001 INSULIN ASPART PER 5 UNITS: Performed by: NURSE PRACTITIONER

## 2022-12-30 PROCEDURE — 97110 THERAPEUTIC EXERCISES: CPT

## 2022-12-30 PROCEDURE — 82962 GLUCOSE BLOOD TEST: CPT

## 2022-12-30 PROCEDURE — 25010000002 ENOXAPARIN PER 10 MG: Performed by: NURSE PRACTITIONER

## 2022-12-30 PROCEDURE — 85025 COMPLETE CBC W/AUTO DIFF WBC: CPT | Performed by: NURSE PRACTITIONER

## 2022-12-30 PROCEDURE — 63710000001 INSULIN DETEMIR PER 5 UNITS: Performed by: NURSE PRACTITIONER

## 2022-12-30 RX ORDER — DEXTROSE MONOHYDRATE 50 MG/ML
50 INJECTION, SOLUTION INTRAVENOUS CONTINUOUS
Status: DISCONTINUED | OUTPATIENT
Start: 2022-12-30 | End: 2023-01-01

## 2022-12-30 RX ORDER — INSULIN ASPART 100 [IU]/ML
0-24 INJECTION, SOLUTION INTRAVENOUS; SUBCUTANEOUS
Status: DISCONTINUED | OUTPATIENT
Start: 2022-12-30 | End: 2023-01-01

## 2022-12-30 RX ADMIN — ALBUTEROL SULFATE 2 PUFF: 90 AEROSOL, METERED RESPIRATORY (INHALATION) at 06:31

## 2022-12-30 RX ADMIN — ATORVASTATIN CALCIUM 80 MG: 80 TABLET, FILM COATED ORAL at 08:10

## 2022-12-30 RX ADMIN — INSULIN ASPART 12 UNITS: 100 INJECTION, SOLUTION INTRAVENOUS; SUBCUTANEOUS at 12:35

## 2022-12-30 RX ADMIN — ALBUTEROL SULFATE 2 PUFF: 90 AEROSOL, METERED RESPIRATORY (INHALATION) at 12:36

## 2022-12-30 RX ADMIN — ENOXAPARIN SODIUM 40 MG: 100 INJECTION SUBCUTANEOUS at 20:17

## 2022-12-30 RX ADMIN — SENNOSIDES AND DOCUSATE SODIUM 2 TABLET: 50; 8.6 TABLET ORAL at 08:10

## 2022-12-30 RX ADMIN — SENNOSIDES AND DOCUSATE SODIUM 2 TABLET: 50; 8.6 TABLET ORAL at 20:16

## 2022-12-30 RX ADMIN — CARBIDOPA AND LEVODOPA 1 TABLET: 25; 100 TABLET ORAL at 20:16

## 2022-12-30 RX ADMIN — DEXTROSE MONOHYDRATE 50 ML/HR: 50 INJECTION, SOLUTION INTRAVENOUS at 08:11

## 2022-12-30 RX ADMIN — CARBIDOPA AND LEVODOPA 1 TABLET: 25; 100 TABLET ORAL at 17:20

## 2022-12-30 RX ADMIN — ALBUTEROL SULFATE 2 PUFF: 90 AEROSOL, METERED RESPIRATORY (INHALATION) at 20:16

## 2022-12-30 RX ADMIN — Medication 10 ML: at 20:18

## 2022-12-30 RX ADMIN — LISINOPRIL 40 MG: 20 TABLET ORAL at 08:10

## 2022-12-30 RX ADMIN — Medication 10 ML: at 08:10

## 2022-12-30 RX ADMIN — ASPIRIN 81 MG CHEWABLE TABLET 81 MG: 81 TABLET CHEWABLE at 08:10

## 2022-12-30 RX ADMIN — INSULIN ASPART 8 UNITS: 100 INJECTION, SOLUTION INTRAVENOUS; SUBCUTANEOUS at 17:20

## 2022-12-30 RX ADMIN — ALBUTEROL SULFATE 2 PUFF: 90 AEROSOL, METERED RESPIRATORY (INHALATION) at 05:11

## 2022-12-30 RX ADMIN — CARBIDOPA AND LEVODOPA 1 TABLET: 25; 100 TABLET ORAL at 08:10

## 2022-12-30 RX ADMIN — INSULIN ASPART 2 UNITS: 100 INJECTION, SOLUTION INTRAVENOUS; SUBCUTANEOUS at 06:31

## 2022-12-30 RX ADMIN — DEXAMETHASONE 6 MG: 2 TABLET ORAL at 08:10

## 2022-12-30 RX ADMIN — INSULIN DETEMIR 10 UNITS: 100 INJECTION, SOLUTION SUBCUTANEOUS at 12:42

## 2022-12-30 RX ADMIN — ALBUTEROL SULFATE 2 PUFF: 90 AEROSOL, METERED RESPIRATORY (INHALATION) at 17:20

## 2022-12-30 NOTE — THERAPY TREATMENT NOTE
Patient Name: Ankur Camp  : 1940    MRN: [id number]                              Today's Date: 2022       Admit Date: 2022    Visit Dx:     ICD-10-CM ICD-9-CM   1. Urinary tract infection without hematuria, site unspecified  N39.0 599.0   2. COVID-19  U07.1 079.89   3. Acute respiratory failure with hypoxia (HCC)  J96.01 518.81   4. Lactic acidosis  E87.20 276.2   5. Acute renal insufficiency  N28.9 593.9     Patient Active Problem List   Diagnosis   • Benign prostatic hypertrophy without urinary obstruction   • Hypertension   • Hyperlipidemia   • Type 2 diabetes mellitus (HCC)   • Vitamin D deficiency   • Colon cancer screening   • Coronary artery calcification   • Thoracic aortic aneurysm (TAA)   • Primary osteoarthritis of right knee   • Status post total knee replacement using cement, right   • Urinary tract infection without hematuria, site unspecified   • COVID-19 virus detected   • Acute respiratory failure with hypoxia (HCC)   • Parkinson's disease dementia (HCC)     Past Medical History:   Diagnosis Date   • Arthritis    • Cancer (HCC)     kidney/ prostate   • Diabetes mellitus (HCC)    • ED (erectile dysfunction)    • Enlarged prostate with lower urinary tract symptoms (LUTS)    • Full dentures    • History of fracture of pelvis 2019    after fall from ladder   • History of loss of consciousness 2019    after fall from ladder-flown to [facility]   • History of rib fracture 2019    after fall from ladder   • History of right shoulder fracture 1960    walking up hill, fell on gravel   • History of stress test    • History of torn meniscus of right knee    • [place] (hard of hearing)     Bilateral hearing aids    • Hypertension    • Laceration of head 2019    after fall from ladder-treated at [facility]   • Left shoulder strain 2017    after fall from ladder   • Lung injury 2019    punctured right lung after falling off ladder    • Renal disorder    • Stone in kidney    • Stroke (HCC)      Past Surgical History:   Procedure Laterality Date   • CHEST TUBE INSERTION  11/2019    rib fractures, punctured lung after fall from [facility]   • COLONOSCOPY     • COLONOSCOPY N/A 4/8/2019    Procedure: COLONOSCOPY;  Surgeon: Onesimo Arnold MD;  Location: [facility] ENDOSCOPY;  Service: Gastroenterology   • EYE SURGERY Right     cataract removal with IOL implant   • HERNIA REPAIR Right     multiple inguinal hernia repairs   • KNEE ARTHROSCOPY W/ MENISCECTOMY Right 05/10/2012    Partial medial and lateral menisectomy-Conrad Baird MD   • NEPHRECTOMY Right     partial right nephrectomy due to kidney stone   • PELVIC FRACTURE SURGERY Right 11/2019    multiple pelvic fractures after fall from [facility]   • SHOULDER SURGERY Right 1960    fracture surgery to remove a piece of bone- [place], [place]   • TOTAL KNEE ARTHROPLASTY Right 5/25/2021    Procedure: knee arthroplasty total;  Surgeon: Conrad Baird MD;  Location: [facility] OR;  Service: Orthopedics;  Laterality: Right;      General Information     Row Name 12/30/22 1554          OT Time and Intention    Document Type therapy note (daily note)  -     Mode of Treatment occupational therapy  -           User Key  (r) = Recorded By, (t) = Taken By, (c) = Cosigned By    Initials Name Provider Type     Mis Warren Occupational Therapist                 Mobility/ADL's    No documentation.                Obj/Interventions     Row Name 12/30/22 1555          Shoulder (Therapeutic Exercise)    Shoulder (Therapeutic Exercise) AAROM (active assistive range of motion)  -     Shoulder AAROM (Therapeutic Exercise) bilateral;flexion;extension;horizontal aBduction/aDduction;supine;10 repetitions  -     Row Name 12/30/22 1555          Elbow/Forearm (Therapeutic Exercise)    Elbow/Forearm (Therapeutic Exercise) AAROM (active assistive range of motion)  -      Elbow/Forearm AAROM (Therapeutic Exercise) bilateral;flexion;extension;10 repetitions  -[facility] Name 12/30/22 1326          Motor Skills    Therapeutic Exercise shoulder;elbow/forearm  -           User Key  (r) = Recorded By, (t) = Taken By, (c) = Cosigned By    Initials Name Provider Type    Mis Santos Occupational Therapist               Goals/Plan    No documentation.                Clinical Impression     Row Name 12/30/22 1557          Pain Assessment    Pretreatment Pain Rating 0/10 - no pain  -     Posttreatment Pain Rating 0/10 - no pain  -     Row Name 12/30/22 1557          Plan of Care Review    Plan of Care Reviewed With patient  -     Progress improving  -     Outcome Evaluation Pt seen for therapy today.  Pt able to perform AA/ROM ex as per flow sheet with BUEs.  Pt on 6L O2 initially, but waws titrated to 4L with sats staying >92%.  Pt reports no pain today.  Cont OT per POC  -[facility] Name 12/30/22 1557          Vital Signs    Pre SpO2 (%) 94  -     O2 Delivery Pre Treatment supplemental O2  6L  -     Intra SpO2 (%) 92  -AH     O2 Delivery Intra Treatment supplemental O2  4L  -AH     Post SpO2 (%) 92  -AH     O2 Delivery Post Treatment supplemental O2  -     Row Name 12/30/22 1104          Positioning and Restraints    Pre-Treatment Position in bed  -     Post Treatment Position bed  -     In Bed supine;call light within reach;encouraged to call for assist;exit alarm on  -           User Key  (r) = Recorded By, (t) = Taken By, (c) = Cosigned By    Initials Name Provider Type    Mis Santos Occupational Therapist               Outcome Measures     Row Name 12/30/22 1601          How much help from another is currently needed...    Putting on and taking off regular lower body clothing? 2  -AH     Bathing (including washing, rinsing, and drying) 2  -AH     Toileting (which includes using toilet bed pan or urinal) 2  -AH     Putting on and taking off regular upper  body clothing 3  -     Taking care of personal grooming (such as brushing teeth) 3  -     Eating meals 3  -     AM-PAC 6 Clicks Score (OT) 15  -     Row Name 12/30/22 0815          How much help from another person do you currently need...    Turning from your back to your side while in flat bed without using bedrails? 3  -MR     Moving from lying on back to sitting on the side of a flat bed without bedrails? 3  -MR     Moving to and from a bed to a chair (including a wheelchair)? 3  -MR     Standing up from a chair using your arms (e.g., wheelchair, bedside chair)? 2  -MR     Climbing 3-5 steps with a railing? 2  -MR     To walk in hospital room? 2  -MR     AM-PAC 6 Clicks Score (PT) 15  -MR     Highest level of mobility 4 --> Transferred to chair/commode  -MR           User Key  (r) = Recorded By, (t) = Taken By, (c) = Cosigned By    Initials Name Provider Type    Mis Santos Occupational Therapist    Tiffany Pierson, RN Registered Nurse                Occupational Therapy Education     Title: PT OT SLP Therapies (In Progress)     Topic: Occupational Therapy (In Progress)     Point: ADL training (Done)     Description:   Instruct learner(s) on proper safety adaptation and remediation techniques during self care or transfers.   Instruct in proper use of assistive devices.              Learning Progress Summary           Patient Acceptance, E,TB, VU by  at 12/29/2022 1358    Comment: benefit of activity    Acceptance, E,TB, VU by SD at 12/27/2022 1259    Comment: Breathing techniques    Acceptance, E,TB, VU by SD at 12/23/2022 1306    Comment: OT POC                   Point: Home exercise program (Done)     Description:   Instruct learner(s) on appropriate technique for monitoring, assisting and/or progressing therapeutic exercises/activities.              Learning Progress Summary           Patient Acceptance, E,TB, VU by  at 12/30/2022 1601                   Point: Precautions (Done)      Description:   Instruct learner(s) on prescribed precautions during self-care and functional transfers.              Learning Progress Summary           Patient Acceptance, E,TB, VU by  at 12/29/2022 1358    Comment: benefit of activity                   Point: Body mechanics (Not Started)     Description:   Instruct learner(s) on proper positioning and spine alignment during self-care, functional mobility activities and/or exercises.              Learner Progress:  Not documented in this visit.                      User Key     Initials Effective Dates Name Provider Type [facility] 06/16/21 -  Mis Warren Occupational Therapist OT    SD 06/16/21 -  Jyoti Rosario OT Occupational Therapist OT              OT Recommendation and Plan     Plan of Care Review  Plan of Care Reviewed With: patient  Progress: improving  Outcome Evaluation: Pt seen for therapy today.  Pt able to perform AA/ROM ex as per flow sheet with BUEs.  Pt on 6L O2 initially, but waws titrated to 4L with sats staying >92%.  Pt reports no pain today.  Cont OT per POC     Time Calculation:    Time Calculation- OT     Row Name 12/30/22 1602             Time Calculation-     OT Start Time 1545  -      OT Stop Time 1602  -      OT Time Calculation (min) 17 min  -      OT Received On 12/30/22  -      OT Goal Re-Cert Due Date 01/04/23  -         Timed Charges    54748 - OT Therapeutic Exercise Minutes 17  -         Total Minutes    Timed Charges Total Minutes 17  -       Total Minutes 17  -            User Key  (r) = Recorded By, (t) = Taken By, (c) = Cosigned By    Initials Name Provider Type     Mis Warren Occupational Therapist              Therapy Charges for Today     Code Description Service Date Service Provider Modifiers Qty    53427444017 HC OT THERAPEUTIC ACT EA 15 MIN 12/29/2022 Mis Warren 1    63818051720 HC OT THER PROC EA 15 MIN 12/30/2022 Mis Warren 1               Mis   Cool  12/30/2022

## 2022-12-30 NOTE — PLAN OF CARE
Goal Outcome Evaluation:  Plan of Care Reviewed With: patient        Progress: improving  Outcome Evaluation: Pt supine in bed and willing to work with therapy. Pt was able to transfer to EOB and sit with only cga to mainaintain stting balance. Pt required mod a with rw to perform sit to stand transfer from EOB . Pt was unable to take steps to HOB of or to perform standing wt shifts.  Pt did  have a drop in O2 saturation but only to 87%.  see flowsheet for details

## 2022-12-30 NOTE — THERAPY TREATMENT NOTE
Patient Name: Ankur Camp  : 1940    MRN: [id number]                              Today's Date: 2022       Admit Date: 2022    Visit Dx:     ICD-10-CM ICD-9-CM   1. Urinary tract infection without hematuria, site unspecified  N39.0 599.0   2. COVID-19  U07.1 079.89   3. Acute respiratory failure with hypoxia (HCC)  J96.01 518.81   4. Lactic acidosis  E87.20 276.2   5. Acute renal insufficiency  N28.9 593.9     Patient Active Problem List   Diagnosis   • Benign prostatic hypertrophy without urinary obstruction   • Hypertension   • Hyperlipidemia   • Type 2 diabetes mellitus (HCC)   • Vitamin D deficiency   • Colon cancer screening   • Coronary artery calcification   • Thoracic aortic aneurysm (TAA)   • Primary osteoarthritis of right knee   • Status post total knee replacement using cement, right   • Urinary tract infection without hematuria, site unspecified   • COVID-19 virus detected   • Acute respiratory failure with hypoxia (HCC)   • Parkinson's disease dementia (HCC)     Past Medical History:   Diagnosis Date   • Arthritis    • Cancer (HCC)     kidney/ prostate   • Diabetes mellitus (HCC)    • ED (erectile dysfunction)    • Enlarged prostate with lower urinary tract symptoms (LUTS)    • Full dentures    • History of fracture of pelvis 2019    after fall from ladder   • History of loss of consciousness 2019    after fall from ladder-flown to [facility]   • History of rib fracture 2019    after fall from ladder   • History of right shoulder fracture 1960    walking up hill, fell on gravel   • History of stress test    • History of torn meniscus of right knee    • [place] (hard of hearing)     Bilateral hearing aids    • Hypertension    • Laceration of head 2019    after fall from ladder-treated at [facility]   • Left shoulder strain 2017    after fall from ladder   • Lung injury 2019    punctured right lung after falling off ladder    • Renal disorder    • Stone in kidney    • Stroke (HCC)      Past Surgical History:   Procedure Laterality Date   • CHEST TUBE INSERTION  11/2019    rib fractures, punctured lung after fall from [facility]   • COLONOSCOPY     • COLONOSCOPY N/A 4/8/2019    Procedure: COLONOSCOPY;  Surgeon: Onesimo Arnold MD;  Location: [facility] ENDOSCOPY;  Service: Gastroenterology   • EYE SURGERY Right     cataract removal with IOL implant   • HERNIA REPAIR Right     multiple inguinal hernia repairs   • KNEE ARTHROSCOPY W/ MENISCECTOMY Right 05/10/2012    Partial medial and lateral menisectomy-Conrad Baird MD   • NEPHRECTOMY Right     partial right nephrectomy due to kidney stone   • PELVIC FRACTURE SURGERY Right 11/2019    multiple pelvic fractures after fall from [facility]   • SHOULDER SURGERY Right 1960    fracture surgery to remove a piece of bone- [place], [place]   • TOTAL KNEE ARTHROPLASTY Right 5/25/2021    Procedure: knee arthroplasty total;  Surgeon: Conrad Baird MD;  Location: [facility] OR;  Service: Orthopedics;  Laterality: Right;      General Information     Row Name 12/30/22 1657          Physical Therapy Time and Intention    Document Type therapy note (daily note)  -RM     Mode of Treatment physical therapy  -RM     Row Name 12/30/22 1657          General Information    Patient Profile Reviewed yes  -RM     Existing Precautions/Restrictions fall;oxygen therapy device and L/min  -RM     Row Name 12/30/22 1657          Cognition    Orientation Status (Cognition) oriented to;person  -RM     Row Name 12/30/22 1657          Safety Issues, Functional Mobility    Safety Issues Affecting Function (Mobility) positioning of assistive device;safety precaution awareness;problem-solving;safety precautions follow-through/compliance;sequencing abilities  -RM     Impairments Affecting Function (Mobility) balance;coordination;endurance/activity tolerance;motor control;postural/trunk  control;strength  -RM           User Key  (r) = Recorded By, (t) = Taken By, (c) = Cosigned By    Initials Name Provider Type    Santos Peña, EMMA Physical Therapist Assistant               Mobility     Row Name 12/30/22 1658          Bed Mobility    Supine-Sit [place] (Bed Mobility) minimum assist (75% patient effort);verbal cues  -RM     Sit-Supine [place] (Bed Mobility) minimum assist (75% patient effort);verbal cues  -RM     Assistive Device (Bed Mobility) bed rails;head of bed elevated  -RM     Row Name 12/30/22 1658          Sit-Stand Transfer    Sit-Stand [place] (Transfers) moderate assist (50% patient effort);maximum assist (25% patient effort);verbal cues;nonverbal cues (demo/gesture)  -RM     Assistive Device (Sit-Stand Transfers) walker, front-wheeled  -RM     Comment, (Sit-Stand Transfer) x2  -RM     Row Name 12/30/22 1658          Gait/Stairs (Locomotion)    [place] Level (Gait) not tested  -RM           User Key  (r) = Recorded By, (t) = Taken By, (c) = Cosigned By    Initials Name Provider Type    Santos Peña, EMMA Physical Therapist Assistant               Obj/Interventions    No documentation.                Goals/Plan    No documentation.                Clinical Impression     Row Name 12/30/22 1658          Pain    Pretreatment Pain Rating 0/10 - no pain  -RM     Posttreatment Pain Rating 0/10 - no pain  -RM     Row Name 12/30/22 1658          Plan of Care Review    Plan of Care Reviewed With patient  -RM     Progress improving  -RM     Outcome Evaluation Pt supine in bed and willing to work with therapy. Pt was able to transfer to EOB and sit with only cga to mainaintain stting balance. Pt required mod a with rw to perform sit to stand transfer from EOB . Pt was unable to take steps to HOB of or to perform standing wt shifts.  Pt did  have a drop in O2 saturation but only to 87%.  see flowsheet for details  -RM     Row Name 12/30/22 1658          Vital Signs     Pre SpO2 (%) 95  -RM     O2 Delivery Pre Treatment supplemental O2  -RM     Intra SpO2 (%) 87  -RM     O2 Delivery Intra Treatment supplemental O2  -RM     Post SpO2 (%) 90  -RM     Pre Patient Position Supine  -RM     Intra Patient Position Standing  -RM     Post Patient Position Sitting  -RM     Row Name 12/30/22 1658          Positioning and Restraints    Pre-Treatment Position in bed  -RM     Post Treatment Position bed  -RM     In Bed fowlers;call light within reach;encouraged to call for assist;exit alarm on;notified nsg  -RM           User Key  (r) = Recorded By, (t) = Taken By, (c) = Cosigned By    Initials Name Provider Type    Santos Peña, EMMA Physical Therapist Assistant               Outcome Measures     Row Name 12/30/22 1703 12/30/22 0815       How much help from another person do you currently need...    Turning from your back to your side while in flat bed without using bedrails? 3  -RM 3  -MR    Moving from lying on back to sitting on the side of a flat bed without bedrails? 2  -RM 3  -MR    Moving to and from a bed to a chair (including a wheelchair)? 1  -RM 3  -MR    Standing up from a chair using your arms (e.g., wheelchair, bedside chair)? 2  -RM 2  -MR    Climbing 3-5 steps with a railing? 1  -RM 2  -MR    To walk in hospital room? 1  -RM 2  -MR    AM-PAC 6 Clicks Score (PT) 10  -RM 15  -MR    Highest level of mobility 4 --> Transferred to chair/commode  -RM 4 --> Transferred to chair/commode  -MR    Row Name 12/30/22 1701          Functional Assessment    Outcome Measure Options AM-PAC 6 Clicks Basic Mobility (PT)  -RM           User Key  (r) = Recorded By, (t) = Taken By, (c) = Cosigned By    Initials Name Provider Type    Santos Peña, EMMA Physical Therapist Assistant    Tiffany Pierson RN Registered Nurse                             Physical Therapy Education     Title: PT OT SLP Therapies (In Progress)     Topic: Physical Therapy (In Progress)     Point: Mobility  training (Done)     Learning Progress Summary           Patient Acceptance, E,TB,D, VU,NR by  at 12/30/2022 1703    Comment: safety with mobility    Acceptance, E,TB,D, VU,NR by  at 12/29/2022 1601    Comment: Need for assistance    Acceptance, E,TB, VU,NR by CC at 12/26/2022 1713    Comment: Importance of increasing mob and VC with demonstration for foot clearance    Acceptance, E,D, DU by  at 12/23/2022 1144    Comment: Pt education for improving technique with walker for transfers                   Point: Home exercise program (Done)     Learning Progress Summary           Patient Acceptance, E,TB, VU,NR by CC at 12/24/2022 1700    Comment: Importance of performing B LE ex btw therapy sessions                   Point: Body mechanics (Not Started)     Learner Progress:  Not documented in this visit.          Point: Precautions (Not Started)     Learner Progress:  Not documented in this visit.                      User Key     Initials Effective Dates Name Provider Type Discipline    CC 06/16/21 -  Yesenia Gerardo, PTA Physical Therapist Assistant PT     06/16/21 -  Santos Garvin, PTA Physical Therapist Assistant PT     06/16/21 -  Abby Agrawal, PT Physical Therapist PT              PT Recommendation and Plan     Plan of Care Reviewed With: patient  Progress: improving  Outcome Evaluation: Pt supine in bed and willing to work with therapy. Pt was able to transfer to EOB and sit with only cga to mainaintain stting balance. Pt required mod a with rw to perform sit to stand transfer from EOB . Pt was unable to take steps to HOB of or to perform standing wt shifts.  Pt did  have a drop in O2 saturation but only to 87%.  see flowsheet for details     Time Calculation:    PT Charges     Row Name 12/30/22 1704             Time Calculation    Start Time 1456  -RM      Stop Time 1516  -RM      Time Calculation (min) 20 min  -RM      PT Received On 12/30/22  -RM      PT Goal Re-Cert Due Date 01/02/23  -RM          Time Calculation- PT    Total Timed Code Minutes- PT 20 minute(s)  -RM         Timed Charges    94127 - PT Therapeutic Activity Minutes 20  -RM         Total Minutes    Timed Charges Total Minutes 20  -RM       Total Minutes 20  -RM            User Key  (r) = Recorded By, (t) = Taken By, (c) = Cosigned By    Initials Name Provider Type    Santos Peña, EMMA Physical Therapist Assistant              Therapy Charges for Today     Code Description Service Date Service Provider Modifiers Qty    26161129725 HC PT THERAPEUTIC ACT EA 15 MIN 12/29/2022 Santos Garvin, EMMA GP 1    29286192584 HC PT THERAPEUTIC ACT EA 15 MIN 12/30/2022 Santos Garvin, PTA GP 1          PT G-Codes  Outcome Measure Options: AM-PAC 6 Clicks Basic Mobility (PT)  AM-PAC 6 Clicks Score (PT): 10  AM-PAC 6 Clicks Score (OT): 15       Santos Garvin PTA  12/30/2022

## 2022-12-30 NOTE — PROGRESS NOTES
[facility] HOSPITALIST    PROGRESS NOTE    Name:  Ankur Camp   Age:  82 y.o.  Sex:  male  :  1940  MRN:  [id number]   Visit Number:  [id number]  Admission Date:  2022  Date Of Service:  22  Primary Care Physician:  Magaly Prabhakar APRN     LOS: 8 days :    Chief Complaint:      Shortness of air/fatigue    Subjective:    Patient seen and examined.  Lying in bed on 4 L nasal cannula without distress.  Encouraged to increase his water intake as sodium remains elevated.  Updated that we would start him on D5W today in order to decrease his sodium of 148.  However, patient also diabetic and on steroids for acute COVID-19 infection.  Updated that he could go to [facility] and rehab likely on Monday if his sodium has improved and he remains on 4 L nasal cannula.    Hospital Course:    Patient is an 82 year old male who presented to the hospital with complaints of weakness who was sent from PCP office after being diagnosed with UTI.  Patient wife advised on arrival that patient had generalized weakness, less appetite with weight loss over the past 2 weeks.  Patient with past health history significant for diabetes mellitus type 2, hypertension, hyperlipidemia, and Parkinson's disease. Denied any urinary symptoms.       Work up in the emergency department noted troponin-0.033, BNP-2194, glucose-171, creatinine-1.31 with baseline 0.8, BUN-31,  albumin-3.3, CRP-9.9, CBC unremarkable, lactate-2.9 with repeat 2.1, procalcitonin-0.24.  Urinalysis noted infection with 2+ bacteria, 31-50 WBC, +nitrites, and +3 leuks.  CXR noted hypoinflation with scattered mild atelectasis. Covid 19 testing was positive.      Admitted to the hospital and continued on Rocephin for UTI.  Started on Decadron for Covid 19.  PT/OT consulted for evaluation of weakness.  Patient remained hypoxic with increased oxygen demands. Patient was initiated on remdesivir for 5 days.  Noted to have increased  oxygen demands up to 10L.  CT chest to rule out PE ordered and was negative. Oxygen demand decreased to 6 liters.  Patient noted to have hypernatremia likely secondary to poor p.o. intake with sodium of 148.  Patient initiated on D5W with high-dose sliding scale insulin adjusted.    Review of Systems:     All systems were reviewed and negative except as mentioned in subjective, assessment and plan.    Vital Signs:    Temp:  [97.5 °F (36.4 °C)-98.9 °F (37.2 °C)] 98.9 °F (37.2 °C)  Heart Rate:  [73-79] 78  Resp:  [17-20] 18  BP: (131-160)/(71-86) 147/86    Intake and output:    I/O last 3 completed shifts:  In: 360 [P.O.:360]  Out: -   I/O this shift:  In: 240 [P.O.:240]  Out: -     Physical Examination:    General Appearance:  Alert and cooperative. Chronically ill/frail appearing elderly male.   Head:  Atraumatic and normocephalic.   Eyes: Conjunctivae and sclerae normal, no icterus. No pallor.   Throat: No oral lesions, no thrush, oral mucosa moist.   Neck: Supple, trachea midline, no thyromegaly.   Lungs:   Breath sounds heard bilaterally equally.  No wheezing or crackles. No Pleural rub or bronchial breathing. Mild coarseness throughout.  Clears mostly after coughing.  Unlabored. On 4 liters with saturations 88-90%   Heart:  Normal S1 and S2, no murmur, no gallop, no rub. No JVD.   Abdomen:   Normal bowel sounds, no masses, no organomegaly. Soft, nontender, nondistended, no rebound tenderness.   Extremities: Supple, no edema, no cyanosis, no clubbing.   Skin: No bleeding or rash.   Neurologic: Alert and oriented x 2. No facial asymmetry. Moves all four limbs. No tremors. Generalized weakness noted.     Laboratory results:    Results from last 7 days   Lab Units 12/30/22  0658 12/29/22  0711 12/28/22  0731   SODIUM mmol/L 148* 146* 142   POTASSIUM mmol/L 4.0 4.0 3.3*   CHLORIDE mmol/L 114* 113* 109*   CO2 mmol/L 27.5 23.4 24.6   BUN mg/dL 29* 27* 24*   CREATININE mg/dL 0.84 0.65* 0.68*   CALCIUM mg/dL 9.0 8.9 8.9    BILIRUBIN mg/dL 0.7 0.7 0.8   ALK PHOS U/L 104 90 95   ALT (SGPT) U/L 9 14 16   AST (SGOT) U/L 26 20 22   GLUCOSE mg/dL 166* 141* 162*     Results from last 7 days   Lab Units 12/30/22  0657 12/29/22  0711 12/28/22  0731   WBC 10*3/mm3 9.83 9.62 9.09   HEMOGLOBIN g/dL 13.2 13.2 13.2   HEMATOCRIT % 39.0 39.6 39.5   PLATELETS 10*3/mm3 230 181 180                 No results for input(s): PHART, RLM3PEE, PO2ART, ZMW2ZGI, BASEEXCESS in the last 8760 hours.   I have reviewed the patient's laboratory results.    Radiology results:    CT Angiogram Chest Pulmonary Embolism    Result Date: 12/28/2022  FINAL REPORT TECHNIQUE: Multiple axial CT images were obtained through the chest following IV contrast using a CTA/PE protocol.  3D/MIP reconstruction images were also performed. This study was performed with techniques to keep radiation doses as low as reasonably achievable (ALARA). Individualized dose reduction techniques using automated exposure control or adjustment of mA and/or kV according to the patient's size were employed. CLINICAL HISTORY: Covid--increased O2 FINDINGS: PAs and aorta: No pulmonary embolus.  Thoracic aorta is unremarkable.   There is coronary artery disease. Heart/mediastinum: No evidence for right heart strain.  No pericardial effusion.    There is cardiomegaly.  Lungs: There are patchy bilateral groundglass opacities with interstitial thickening.  Lymph nodes: No pathologically enlarged thoracic lymph nodes.  Pleura: No pneumothorax or pleural effusion. Chest Wall: No chest wall contusion.  Bones: No acute fracture. Upper abdomen: No acute findings in the upper abdomen.     Impression: No pulmonary embolus.   Lung findings are compatible with Covid pneumonia. Authenticated and Electronically Signed by Myke Mcfarland MD on 12/28/2022 09:50:41 PM    I have reviewed the patient's radiology reports.    Medication Review:     I have reviewed the patient's active and prn medications.     Problem List:       Acute respiratory failure with hypoxia (HCC)    Type 2 diabetes mellitus (HCC)    Urinary tract infection without hematuria, site unspecified    COVID-19 virus detected    Parkinson's disease dementia (HCC)      Assessment:    1. Acute UTI, POA  2. Acute Respiratory Failure with Hypoxia 2/2 # 3, POA  3. Covid 19 +, POA  4. Viral PNA 2/2 above, POA  5. Hypernatremia secondary to poor p.o. intake  6. Lactic Acidosis, POA, resolved  7. LAWSON, POA. resolved  8. Parkinsons Disease  9. Diabetes Mellitus Type 2 on oral anti-diabetics  10. Hyperlipidemia     Plan:     Acute UTI/ LAWSON/ Lactic Acidosis  -Completed Rocephin 1GM IV for 5 days  -Urine culture  E. coli and is sensitive to Rocephin  -Repeat Lactate-normal    Hypernatremia secondary to poor p.o. intake  -Gentle D5W initiated secondary to underlying diabetes and concurrent steroid use  -Adjusted sliding scale insulin to high dose  -Will add basal insulin if hyperglycemia worsening.  -Encouraged patient to increase p.o. intake.     Acute Respiratory Failure/ Covid 19+  -Supplemental oxygen to keep saturations >90%--requiring 4L on exam with saturations around 90%  -Continue Decadron 6mg daily for 10 days   - Remdesivir x 5 days  -Procalcitonin remains negative  - No indications for antibiotics at this time  -Supportive care   -Continue PT/OT  -CT to rule out PE negative     Diabetes Mellitus  -On oral anti-diabetics at home  -Cover with sliding scale 2/2 ongoing steroid use  -A1c-8     Bradycardia  - Resolved- Bisoprolol held.     Hypertension  - bisoprolol was stopped due to bradycardia and he has since been on lisinopril 20 mg daily. Increased to 40 mg with improvement.        DVT Prophylaxis: Lovenox  Code Status: Full Code  Diet: CC  Discharge Plan: Plan to DC to [facility] and rehab on January 1    Neva Dubose, APRN  12/30/22  10:21 EST    Dictated utilizing Dragon dictation.

## 2022-12-30 NOTE — PLAN OF CARE
Goal Outcome Evaluation:  Plan of Care Reviewed With: patient        Progress: no change  Outcome Evaluation: VSS, able to decrease NC down to 4.5L and maintain 02 sats > 88%

## 2022-12-30 NOTE — PLAN OF CARE
Goal Outcome Evaluation:  Plan of Care Reviewed With: patient        Progress: improving  Outcome Evaluation: Pt seen for therapy today.  Pt able to perform AA/ROM ex as per flow sheet with BUEs.  Pt on 6L O2 initially, but waws titrated to 4L with sats staying >92%.  Pt reports no pain today.  Cont OT per POC

## 2022-12-30 NOTE — CASE MANAGEMENT/SOCIAL WORK
Case Management/Social Work    Patient Name:  Ankur Camp  YOB: 1940  MRN: [id number]  Admit Date:  12/22/2022        [facility] continues to follow pt for admission.       Electronically signed by:  Kavita Germain  12/30/22 16:11 EST

## 2022-12-30 NOTE — PLAN OF CARE
Goal Outcome Evaluation:      No acute events this shift. Medications administered per orders.

## 2022-12-31 LAB
ANION GAP SERPL CALCULATED.3IONS-SCNC: 9 MMOL/L (ref 5–15)
BUN SERPL-MCNC: 23 MG/DL (ref 8–23)
BUN/CREAT SERPL: 31.9 (ref 7–25)
CALCIUM SPEC-SCNC: 8.8 MG/DL (ref 8.6–10.5)
CHLORIDE SERPL-SCNC: 112 MMOL/L (ref 98–107)
CO2 SERPL-SCNC: 26 MMOL/L (ref 22–29)
CREAT SERPL-MCNC: 0.72 MG/DL (ref 0.76–1.27)
EGFRCR SERPLBLD CKD-EPI 2021: 91.8 ML/MIN/1.73
GLUCOSE BLDC GLUCOMTR-MCNC: 170 MG/DL (ref 70–130)
GLUCOSE BLDC GLUCOMTR-MCNC: 207 MG/DL (ref 70–130)
GLUCOSE BLDC GLUCOMTR-MCNC: 74 MG/DL (ref 70–130)
GLUCOSE BLDC GLUCOMTR-MCNC: 80 MG/DL (ref 70–130)
GLUCOSE SERPL-MCNC: 78 MG/DL (ref 65–99)
POTASSIUM SERPL-SCNC: 3.5 MMOL/L (ref 3.5–5.2)
PROCALCITONIN SERPL-MCNC: 0.07 NG/ML (ref 0–0.25)
SODIUM SERPL-SCNC: 147 MMOL/L (ref 136–145)

## 2022-12-31 PROCEDURE — 82962 GLUCOSE BLOOD TEST: CPT

## 2022-12-31 PROCEDURE — 99232 SBSQ HOSP IP/OBS MODERATE 35: CPT | Performed by: NURSE PRACTITIONER

## 2022-12-31 PROCEDURE — 25010000002 ENOXAPARIN PER 10 MG: Performed by: NURSE PRACTITIONER

## 2022-12-31 PROCEDURE — 80048 BASIC METABOLIC PNL TOTAL CA: CPT | Performed by: NURSE PRACTITIONER

## 2022-12-31 PROCEDURE — 63710000001 INSULIN ASPART PER 5 UNITS: Performed by: NURSE PRACTITIONER

## 2022-12-31 PROCEDURE — 84145 PROCALCITONIN (PCT): CPT | Performed by: NURSE PRACTITIONER

## 2022-12-31 PROCEDURE — 63710000001 DEXAMETHASONE PER 0.25 MG: Performed by: NURSE PRACTITIONER

## 2022-12-31 PROCEDURE — 63710000001 INSULIN DETEMIR PER 5 UNITS: Performed by: NURSE PRACTITIONER

## 2022-12-31 RX ORDER — POTASSIUM CHLORIDE 750 MG/1
40 CAPSULE, EXTENDED RELEASE ORAL EVERY 4 HOURS
Status: COMPLETED | OUTPATIENT
Start: 2022-12-31 | End: 2022-12-31

## 2022-12-31 RX ADMIN — ALBUTEROL SULFATE 2 PUFF: 90 AEROSOL, METERED RESPIRATORY (INHALATION) at 21:02

## 2022-12-31 RX ADMIN — INSULIN ASPART 4 UNITS: 100 INJECTION, SOLUTION INTRAVENOUS; SUBCUTANEOUS at 16:31

## 2022-12-31 RX ADMIN — DEXAMETHASONE 6 MG: 2 TABLET ORAL at 09:05

## 2022-12-31 RX ADMIN — ALBUTEROL SULFATE 2 PUFF: 90 AEROSOL, METERED RESPIRATORY (INHALATION) at 06:44

## 2022-12-31 RX ADMIN — POTASSIUM CHLORIDE 40 MEQ: 10 CAPSULE, COATED, EXTENDED RELEASE ORAL at 11:46

## 2022-12-31 RX ADMIN — ASPIRIN 81 MG CHEWABLE TABLET 81 MG: 81 TABLET CHEWABLE at 09:05

## 2022-12-31 RX ADMIN — ALBUTEROL SULFATE 2 PUFF: 90 AEROSOL, METERED RESPIRATORY (INHALATION) at 16:28

## 2022-12-31 RX ADMIN — Medication 10 ML: at 21:02

## 2022-12-31 RX ADMIN — ALBUTEROL SULFATE 2 PUFF: 90 AEROSOL, METERED RESPIRATORY (INHALATION) at 22:31

## 2022-12-31 RX ADMIN — DEXTROSE MONOHYDRATE 50 ML/HR: 50 INJECTION, SOLUTION INTRAVENOUS at 22:24

## 2022-12-31 RX ADMIN — SENNOSIDES AND DOCUSATE SODIUM 2 TABLET: 50; 8.6 TABLET ORAL at 09:05

## 2022-12-31 RX ADMIN — CARBIDOPA AND LEVODOPA 1 TABLET: 25; 100 TABLET ORAL at 21:01

## 2022-12-31 RX ADMIN — LISINOPRIL 40 MG: 20 TABLET ORAL at 09:05

## 2022-12-31 RX ADMIN — ALBUTEROL SULFATE 2 PUFF: 90 AEROSOL, METERED RESPIRATORY (INHALATION) at 11:49

## 2022-12-31 RX ADMIN — ENOXAPARIN SODIUM 40 MG: 100 INJECTION SUBCUTANEOUS at 21:01

## 2022-12-31 RX ADMIN — Medication 10 ML: at 09:06

## 2022-12-31 RX ADMIN — ATORVASTATIN CALCIUM 80 MG: 80 TABLET, FILM COATED ORAL at 09:05

## 2022-12-31 RX ADMIN — POTASSIUM CHLORIDE 40 MEQ: 10 CAPSULE, COATED, EXTENDED RELEASE ORAL at 16:28

## 2022-12-31 RX ADMIN — CARBIDOPA AND LEVODOPA 1 TABLET: 25; 100 TABLET ORAL at 09:05

## 2022-12-31 RX ADMIN — INSULIN ASPART 8 UNITS: 100 INJECTION, SOLUTION INTRAVENOUS; SUBCUTANEOUS at 11:46

## 2022-12-31 RX ADMIN — CARBIDOPA AND LEVODOPA 1 TABLET: 25; 100 TABLET ORAL at 17:00

## 2022-12-31 RX ADMIN — SENNOSIDES AND DOCUSATE SODIUM 2 TABLET: 50; 8.6 TABLET ORAL at 21:01

## 2022-12-31 NOTE — PLAN OF CARE
Goal Outcome Evaluation:              Outcome Evaluation: Pt resting well this shift.  VSS.  Maintaining oxygen levels above 90% with O2 at 8 LPM/nc this shift. No distress noted.  FSBS with insulin coverage as ordered.  Anticipate pt being able to go to [facility] in 1-2 days upon discharge.

## 2022-12-31 NOTE — PAYOR COMM NOTE
"Ankur Olson (82 y.o. Male)     Date of Birth   1940    Social Security Number       Address   [address] [place] [address]    Home Phone   [phone]    MRN   [id number]       Restoration   [facility], [place], operated by [facility]    Marital Status                               Admission Date   12/22/22    Admission Type   Emergency    Admitting Provider       Attending Provider   Oral Cano DO    [facility]       Discharge Date       Discharge Disposition       Discharge Destination                               Attending Provider: Oral Cano DO    Allergies: No Known Allergies    Isolation: Enh Drop/Con   Infection: COVID (confirmed) (12/22/22)   Code Status: CPR    Ht: 167.6 cm (66\")   Wt: 66.2 kg (145 lb 15.1 oz)    Admission Cmt: None   Principal Problem: Acute respiratory failure with hypoxia (HCC) [J96.01]                 Active Insurance as of 12/22/2022     Primary Coverage     Payor Plan Insurance Group Employer/Plan Group    ANTHEM MEDICARE REPLACEMENT ANTHEM MEDICARE ADVANTAGE [id number]     Payor Plan Address Payor Plan Phone Number Payor Plan Fax Number Effective Dates    [address] [phone]  1/1/2021 - None Entered    [facility] [id number]       Subscriber Name Subscriber Birth Date Member ID       ANKUR OLSON 1940 [id number]                 Emergency Contacts      (Rel.) Home Phone Work Phone Mobile Phone    Kianna Olson (Spouse) [phone] -- --    Martell Elvi (Daughter) -- -- [phone]        Continued stay    Lab Results (last 48 hours)     Procedure Component Value Units Date/Time    POC Glucose Once [[id number]]  (Abnormal) Collected: 12/31/22 1035    Specimen: Blood Updated: 12/31/22 1048     Glucose 207 mg/dL      Comment: Serial Number: [id number]:  [id number]       Procalcitonin [[id number]]  (Normal) Collected: 12/31/22 0641    Specimen: Blood Updated: 12/31/22 0732     " "Procalcitonin 0.07 ng/mL     Narrative:      As a Marker for Sepsis (Non-Neonates):    1. <0.5 ng/mL represents a low risk of severe sepsis and/or septic shock.  2. >2 ng/mL represents a high risk of severe sepsis and/or septic shock.    As a Marker for Lower Respiratory Tract Infections that require antibiotic therapy:    PCT on Admission    Antibiotic Therapy       6-12 Hrs later    >0.5                Strongly Recommended  >0.25 - <0.5        Recommended   0.1 - 0.25          Discouraged              Remeasure/reassess PCT  <0.1                Strongly Discouraged     Remeasure/reassess PCT    As 28 day mortality risk marker: \"Change in Procalcitonin Result\" (>80% or <=80%) if Day 0 (or Day 1) and Day 4 values are available. Refer to http://www.[employer]-calculator.com    Change in PCT <=80%  A decrease of PCT levels below or equal to 80% defines a positive change in PCT test result representing a higher risk for 28-day all-cause mortality of patients diagnosed with severe sepsis for septic shock.    Change in PCT >80%  A decrease of PCT levels of more than 80% defines a negative change in PCT result representing a lower risk for 28-day all-cause mortality of patients diagnosed with severe sepsis or septic shock.       Basic Metabolic Panel [[id number]]  (Abnormal) Collected: 12/31/22 0640    Specimen: Blood Updated: 12/31/22 0722     Glucose 78 mg/dL      BUN 23 mg/dL      Creatinine 0.72 mg/dL      Sodium 147 mmol/L      Potassium 3.5 mmol/L      Chloride 112 mmol/L      CO2 26.0 mmol/L      Calcium 8.8 mg/dL      BUN/Creatinine Ratio 31.9     Anion Gap 9.0 mmol/L      eGFR 91.8 mL/min/1.73      Comment: National Kidney Foundation and American Society of Nephrology (ASN) Task Force recommended calculation based on the Chronic Kidney Disease Epidemiology Collaboration (CKD-EPI) equation refit without adjustment for race.       Narrative:      GFR Normal >60  Chronic Kidney Disease <60  Kidney Failure <15    The " GFR formula is only valid for adults with stable renal function between ages 18 and 70.    POC Glucose Once [[id number]]  (Normal) Collected: 12/31/22 0603    Specimen: Blood Updated: 12/31/22 0609     Glucose 80 mg/dL      Comment: Serial Number: [id number]:  [id number]       POC Glucose Once [[id number]]  (Normal) Collected: 12/30/22 2003    Specimen: Blood Updated: 12/30/22 2021     Glucose 72 mg/dL      Comment: Serial Number: [id number]:  [id number]       POC Glucose Once [[id number]]  (Abnormal) Collected: 12/30/22 1615    Specimen: Blood Updated: 12/30/22 1625     Glucose 216 mg/dL      Comment: Serial Number: [id number]:  [id number]       POC Glucose Once [[id number]]  (Abnormal) Collected: 12/30/22 1208    Specimen: Blood Updated: 12/30/22 1218     Glucose 294 mg/dL      Comment: Serial Number: [id number]:  [id number]       Comprehensive Metabolic Panel [[id number]]  (Abnormal) Collected: 12/30/22 0658    Specimen: Blood Updated: 12/30/22 0727     Glucose 166 mg/dL      BUN 29 mg/dL      Creatinine 0.84 mg/dL      Sodium 148 mmol/L      Potassium 4.0 mmol/L      Comment: Slight hemolysis detected by analyzer. Results may be affected.        Chloride 114 mmol/L      CO2 27.5 mmol/L      Calcium 9.0 mg/dL      Total Protein 5.9 g/dL      Albumin 2.6 g/dL      ALT (SGPT) 9 U/L      AST (SGOT) 26 U/L      Alkaline Phosphatase 104 U/L      Total Bilirubin 0.7 mg/dL      Globulin 3.3 gm/dL      A/G Ratio 0.8 g/dL      BUN/Creatinine Ratio 34.5     Anion Gap 6.5 mmol/L      eGFR 87.6 mL/min/1.73      Comment: National Kidney Foundation and American Society of Nephrology (ASN) Task Force recommended calculation based on the Chronic Kidney Disease Epidemiology Collaboration (CKD-EPI) equation refit without adjustment for race.       Narrative:      GFR Normal >60  Chronic Kidney Disease <60  Kidney Failure <15    The GFR formula is only valid for adults with stable renal function between ages 18 and 70.     CBC & Differential [[id number]]  (Abnormal) Collected: 12/30/22 0657    Specimen: Blood Updated: 12/30/22 0706    Narrative:      The following orders were created for panel order CBC & Differential.  Procedure                               Abnormality         Status                     ---------                               -----------         ------                     CBC Auto Differential[[id number]]        Abnormal            Final result                 Please view results for these tests on the individual orders.    CBC Auto Differential [[id number]]  (Abnormal) Collected: 12/30/22 0657    Specimen: Blood Updated: 12/30/22 0706     WBC 9.83 10*3/mm3      RBC 4.15 10*6/mm3      Hemoglobin 13.2 g/dL      Hematocrit 39.0 %      MCV 94.0 fL      MCH 31.8 pg      MCHC 33.8 g/dL      RDW 13.3 %      RDW-SD 45.1 fl      MPV 11.1 fL      Platelets 230 10*3/mm3      Neutrophil % 87.2 %      Lymphocyte % 6.1 %      Monocyte % 5.6 %      Eosinophil % 0.3 %      Basophil % 0.2 %      Immature Grans % 0.6 %      Neutrophils, Absolute 8.57 10*3/mm3      Lymphocytes, Absolute 0.60 10*3/mm3      Monocytes, Absolute 0.55 10*3/mm3      Eosinophils, Absolute 0.03 10*3/mm3      Basophils, Absolute 0.02 10*3/mm3      Immature Grans, Absolute 0.06 10*3/mm3      nRBC 0.0 /100 WBC     POC Glucose Once [[id number]]  (Abnormal) Collected: 12/30/22 0620    Specimen: Blood Updated: 12/30/22 0622     Glucose 185 mg/dL      Comment: Serial Number: [id number]:  [id number]       POC Glucose Once [[id number]]  (Abnormal) Collected: 12/29/22 2034    Specimen: Blood Updated: 12/29/22 2047     Glucose 248 mg/dL      Comment: Serial Number: [id number]:  [id number]             Imaging Results (Last 48 Hours)     ** No results found for the last 48 hours. **           Physician Progress Notes (last 48 hours)      Neva Dubose APRN at 12/31/22 0952              [facility]    Name:  Ankur Gillespie  Zoë   Age:  82 y.o.  Sex:  male  :  1940  MRN:  [id number]   Visit Number:  [id number]  Admission Date:  2022  Date Of Service:  22  Primary Care Physician:  Magaly Prabhakar APRN     LOS: 9 days :    Chief Complaint:      Shortness of air/fatigue    Subjective:    Patient seen and examined.  Lying in bed on 5 L nasal cannula without distress.  RN at bedside. Slight hypoglycemia noted. Levemir discontinued for now.  Will transition high scale insulin to low-moderate as patient completes Decadron. He is able to tell me his name this morning. No distress noted. Seems to be eating breakfast well. Did not receive D5W for most of the night due to  IV access.    Hospital Course:    Patient is an 82 year old male who presented to the hospital with complaints of weakness who was sent from PCP office after being diagnosed with UTI.  Patient wife advised on arrival that patient had generalized weakness, less appetite with weight loss over the past 2 weeks.  Patient with past health history significant for diabetes mellitus type 2, hypertension, hyperlipidemia, and Parkinson's disease.     Work up in the emergency department noted troponin-0.033, BNP-2194, glucose-171, creatinine-1.31 with baseline 0.8, BUN-31,  albumin-3.3, CRP-9.9, CBC unremarkable, lactate-2.9 with repeat 2.1, procalcitonin-0.24.  Urinalysis noted infection with 2+ bacteria, 31-50 WBC, +nitrites, and +3 leuks.  CXR noted hypoinflation with scattered mild atelectasis. Covid 19 testing was positive.      Admitted to the hospital and continued on Rocephin for UTI.  Started on Decadron for Covid 19.  PT/OT consulted for evaluation of weakness.  Patient remained hypoxic with increased oxygen demands. Patient was initiated on remdesivir for 5 days.  Noted to have increased oxygen demands up to 10L.  CT chest to rule out PE ordered and was negative. Oxygen demand decreased to 6 liters.  Patient noted to have hypernatremia likely  secondary to poor p.o. intake with sodium of 148.  Patient initiated on D5W with high-dose sliding scale insulin adjusted.    Review of Systems:     All systems were reviewed and negative except as mentioned in subjective, assessment and plan.    Vital Signs:    Temp:  [97.8 °F (36.6 °C)-99.9 °F (37.7 °C)] 97.8 °F (36.6 °C)  Heart Rate:  [72-82] 82  Resp:  [16-20] 16  BP: (142-159)/(83-96) 159/96    Intake and output:    I/O last 3 completed shifts:  In: 1245 [P.O.:580; I.V.:665]  Out: -   I/O this shift:  In: 237 [P.O.:237]  Out: -     Physical Examination:    General Appearance:  Alert and cooperative. Chronically ill/frail appearing elderly male.   Head:  Atraumatic and normocephalic.   Eyes: Conjunctivae and sclerae normal, no icterus. No pallor.   Throat: No oral lesions, no thrush, oral mucosa moist.   Neck: Supple, trachea midline, no thyromegaly.   Lungs:   Breath sounds heard bilaterally equally.  No wheezing or crackles. No Pleural rub or bronchial breathing. Mild coarseness throughout.  Clears mostly after coughing.  Unlabored. On 5 liters.   Heart:  Normal S1 and S2, no murmur, no gallop, no rub. No JVD.   Abdomen:   Normal bowel sounds, no masses, no organomegaly. Soft, nontender, nondistended, no rebound tenderness.   Extremities: Supple, no edema, no cyanosis, no clubbing.   Skin: No bleeding or rash.   Neurologic: Alert and oriented x 2. No facial asymmetry. Moves all four limbs. No tremors. Generalized weakness noted.     Laboratory results:    Results from last 7 days   Lab Units 12/31/22  0640 12/30/22  0658 12/29/22  0711 12/28/22  0731   SODIUM mmol/L 147* 148* 146* 142   POTASSIUM mmol/L 3.5 4.0 4.0 3.3*   CHLORIDE mmol/L 112* 114* 113* 109*   CO2 mmol/L 26.0 27.5 23.4 24.6   BUN mg/dL 23 29* 27* 24*   CREATININE mg/dL 0.72* 0.84 0.65* 0.68*   CALCIUM mg/dL 8.8 9.0 8.9 8.9   BILIRUBIN mg/dL  --  0.7 0.7 0.8   ALK PHOS U/L  --  104 90 95   ALT (SGPT) U/L  --  9 14 16   AST (SGOT) U/L  --  26 20  22   GLUCOSE mg/dL 78 166* 141* 162*     Results from last 7 days   Lab Units 12/30/22  0657 12/29/22  0711 12/28/22  0731   WBC 10*3/mm3 9.83 9.62 9.09   HEMOGLOBIN g/dL 13.2 13.2 13.2   HEMATOCRIT % 39.0 39.6 39.5   PLATELETS 10*3/mm3 230 181 180                 No results for input(s): PHART, XZP1FGD, PO2ART, BMT2VGT, BASEEXCESS in the last 8760 hours.   I have reviewed the patient's laboratory results.    Radiology results:    No radiology results from the last 24 hrs  I have reviewed the patient's radiology reports.    Medication Review:     I have reviewed the patient's active and prn medications.     Problem List:      Acute respiratory failure with hypoxia (HCC)    Type 2 diabetes mellitus (HCC)    Urinary tract infection without hematuria, site unspecified    COVID-19 virus detected    Parkinson's disease dementia (HCC)      Assessment:    1. Acute UTI, POA  2. Acute Respiratory Failure with Hypoxia 2/2 # 3, POA  3. Covid 19 +, POA  4. Viral PNA 2/2 above, POA  5. Hypernatremia secondary to poor p.o. intake  6. Lactic Acidosis, POA, resolved  7. LAWSON, POA. resolved  8. Parkinsons Disease  9. Diabetes Mellitus Type 2 on oral anti-diabetics  10. Hyperlipidemia     Plan:     Acute UTI/ LAWSON/ Lactic Acidosis  -Completed Rocephin 1GM IV for 5 days  -Urine culture  E. coli and is sensitive to Rocephin  -Repeat Lactate-normal    Hypernatremia secondary to poor p.o. intake  -Gentle D5W initiated  - Mild improvement today. Will repeat BMP in AM.  -Encouraged patient to increase p.o. intake.     Acute Respiratory Failure/ Covid 19+  -Supplemental oxygen to keep saturations >90%--requiring 5L on exam with saturations around 90%  -Continue Decadron 6mg daily for 10 days   - Remdesivir x 5 days  -Procalcitonin remains negative  - No indications for antibiotics at this time  -Supportive care   -Continue PT/OT  -CT to rule out PE negative     Diabetes Mellitus  -On oral anti-diabetics at home  -Cover with sliding scale 2/2  ongoing steroid use  -A1c-8     Bradycardia  - Resolved- Bisoprolol held.     Hypertension  - bisoprolol was stopped due to bradycardia and he has since been on lisinopril 20 mg daily. Increased to 40 mg with improvement.        DVT Prophylaxis: Lovenox  Code Status: Full Code  Diet: CC  Discharge Plan: Plan to DC to [facility] and rehab on     JEANETTE Duckworth  22  09:52 EST    Dictated utilizing Dragon dictation.      Electronically signed by Neva Dubose APRN at 22 1000     Neva Dubose APRN at 22 1021              [facility]    Name:  Ankur Camp   Age:  82 y.o.  Sex:  male  :  1940  MRN:  [id number]   Visit Number:  [id number]  Admission Date:  2022  Date Of Service:  22  Primary Care Physician:  Magaly Prabhakar APRN     LOS: 8 days :    Chief Complaint:      Shortness of air/fatigue    Subjective:    Patient seen and examined.  Lying in bed on 4 L nasal cannula without distress.  Encouraged to increase his water intake as sodium remains elevated.  Updated that we would start him on D5W today in order to decrease his sodium of 148.  However, patient also diabetic and on steroids for acute COVID-19 infection.  Updated that he could go to [facility] and rehab likely on Monday if his sodium has improved and he remains on 4 L nasal cannula.    Hospital Course:    Patient is an 82 year old male who presented to the hospital with complaints of weakness who was sent from PCP office after being diagnosed with UTI.  Patient wife advised on arrival that patient had generalized weakness, less appetite with weight loss over the past 2 weeks.  Patient with past health history significant for diabetes mellitus type 2, hypertension, hyperlipidemia, and Parkinson's disease. Denied any urinary symptoms.       Work up in the emergency department noted troponin-0.033, BNP-2194, glucose-171, creatinine-1.31  with baseline 0.8, BUN-31,  albumin-3.3, CRP-9.9, CBC unremarkable, lactate-2.9 with repeat 2.1, procalcitonin-0.24.  Urinalysis noted infection with 2+ bacteria, 31-50 WBC, +nitrites, and +3 leuks.  CXR noted hypoinflation with scattered mild atelectasis. Covid 19 testing was positive.      Admitted to the hospital and continued on Rocephin for UTI.  Started on Decadron for Covid 19.  PT/OT consulted for evaluation of weakness.  Patient remained hypoxic with increased oxygen demands. Patient was initiated on remdesivir for 5 days.  Noted to have increased oxygen demands up to 10L.  CT chest to rule out PE ordered and was negative. Oxygen demand decreased to 6 liters.  Patient noted to have hypernatremia likely secondary to poor p.o. intake with sodium of 148.  Patient initiated on D5W with high-dose sliding scale insulin adjusted.    Review of Systems:     All systems were reviewed and negative except as mentioned in subjective, assessment and plan.    Vital Signs:    Temp:  [97.5 °F (36.4 °C)-98.9 °F (37.2 °C)] 98.9 °F (37.2 °C)  Heart Rate:  [73-79] 78  Resp:  [17-20] 18  BP: (131-160)/(71-86) 147/86    Intake and output:    I/O last 3 completed shifts:  In: 360 [P.O.:360]  Out: -   I/O this shift:  In: 240 [P.O.:240]  Out: -     Physical Examination:    General Appearance:  Alert and cooperative. Chronically ill/frail appearing elderly male.   Head:  Atraumatic and normocephalic.   Eyes: Conjunctivae and sclerae normal, no icterus. No pallor.   Throat: No oral lesions, no thrush, oral mucosa moist.   Neck: Supple, trachea midline, no thyromegaly.   Lungs:   Breath sounds heard bilaterally equally.  No wheezing or crackles. No Pleural rub or bronchial breathing. Mild coarseness throughout.  Clears mostly after coughing.  Unlabored. On 4 liters with saturations 88-90%   Heart:  Normal S1 and S2, no murmur, no gallop, no rub. No JVD.   Abdomen:   Normal bowel sounds, no masses, no organomegaly. Soft, nontender,  nondistended, no rebound tenderness.   Extremities: Supple, no edema, no cyanosis, no clubbing.   Skin: No bleeding or rash.   Neurologic: Alert and oriented x 2. No facial asymmetry. Moves all four limbs. No tremors. Generalized weakness noted.     Laboratory results:    Results from last 7 days   Lab Units 12/30/22  0658 12/29/22  0711 12/28/22  0731   SODIUM mmol/L 148* 146* 142   POTASSIUM mmol/L 4.0 4.0 3.3*   CHLORIDE mmol/L 114* 113* 109*   CO2 mmol/L 27.5 23.4 24.6   BUN mg/dL 29* 27* 24*   CREATININE mg/dL 0.84 0.65* 0.68*   CALCIUM mg/dL 9.0 8.9 8.9   BILIRUBIN mg/dL 0.7 0.7 0.8   ALK PHOS U/L 104 90 95   ALT (SGPT) U/L 9 14 16   AST (SGOT) U/L 26 20 22   GLUCOSE mg/dL 166* 141* 162*     Results from last 7 days   Lab Units 12/30/22  0657 12/29/22  0711 12/28/22  0731   WBC 10*3/mm3 9.83 9.62 9.09   HEMOGLOBIN g/dL 13.2 13.2 13.2   HEMATOCRIT % 39.0 39.6 39.5   PLATELETS 10*3/mm3 230 181 180                 No results for input(s): PHART, OCE7HXS, PO2ART, TQB6MUF, BASEEXCESS in the last 8760 hours.   I have reviewed the patient's laboratory results.    Radiology results:    CT Angiogram Chest Pulmonary Embolism    Result Date: 12/28/2022  FINAL REPORT TECHNIQUE: Multiple axial CT images were obtained through the chest following IV contrast using a CTA/PE protocol.  3D/MIP reconstruction images were also performed. This study was performed with techniques to keep radiation doses as low as reasonably achievable (ALARA). Individualized dose reduction techniques using automated exposure control or adjustment of mA and/or kV according to the patient's size were employed. CLINICAL HISTORY: Covid--increased O2 FINDINGS: PAs and aorta: No pulmonary embolus.  Thoracic aorta is unremarkable.   There is coronary artery disease. Heart/mediastinum: No evidence for right heart strain.  No pericardial effusion.    There is cardiomegaly.  Lungs: There are patchy bilateral groundglass opacities with interstitial  thickening.  Lymph nodes: No pathologically enlarged thoracic lymph nodes.  Pleura: No pneumothorax or pleural effusion. Chest Wall: No chest wall contusion.  Bones: No acute fracture. Upper abdomen: No acute findings in the upper abdomen.     Impression: No pulmonary embolus.   Lung findings are compatible with Covid pneumonia. Authenticated and Electronically Signed by Myke Mcfarland MD on 12/28/2022 09:50:41 PM    I have reviewed the patient's radiology reports.    Medication Review:     I have reviewed the patient's active and prn medications.     Problem List:      Acute respiratory failure with hypoxia (HCC)    Type 2 diabetes mellitus (HCC)    Urinary tract infection without hematuria, site unspecified    COVID-19 virus detected    Parkinson's disease dementia (HCC)      Assessment:    11. Acute UTI, POA  12. Acute Respiratory Failure with Hypoxia 2/2 # 3, POA  13. Covid 19 +, POA  14. Viral PNA 2/2 above, POA  15. Hypernatremia secondary to poor p.o. intake  16. Lactic Acidosis, POA, resolved  17. LAWSON, POA. resolved  18. Parkinsons Disease  19. Diabetes Mellitus Type 2 on oral anti-diabetics  20. Hyperlipidemia     Plan:     Acute UTI/ LAWSON/ Lactic Acidosis  -Completed Rocephin 1GM IV for 5 days  -Urine culture  E. coli and is sensitive to Rocephin  -Repeat Lactate-normal    Hypernatremia secondary to poor p.o. intake  -Gentle D5W initiated secondary to underlying diabetes and concurrent steroid use  -Adjusted sliding scale insulin to high dose  -Will add basal insulin if hyperglycemia worsening.  -Encouraged patient to increase p.o. intake.     Acute Respiratory Failure/ Covid 19+  -Supplemental oxygen to keep saturations >90%--requiring 4L on exam with saturations around 90%  -Continue Decadron 6mg daily for 10 days   - Remdesivir x 5 days  -Procalcitonin remains negative  - No indications for antibiotics at this time  -Supportive care   -Continue PT/OT  -CT to rule out PE negative     Diabetes Mellitus  -On  oral anti-diabetics at home  -Cover with sliding scale 2/2 ongoing steroid use  -A1c-8     Bradycardia  - Resolved- Bisoprolol held.     Hypertension  - bisoprolol was stopped due to bradycardia and he has since been on lisinopril 20 mg daily. Increased to 40 mg with improvement.        DVT Prophylaxis: Lovenox  Code Status: Full Code  Diet: CC  Discharge Plan: Plan to DC to [facility] and rehab on January 1    JEANETTE Duckworth  12/30/22  10:21 EST    Dictated utilizing Dragon dictation.      Electronically signed by Neva Dubose APRN at 12/30/22 1026       Consult Notes (last 24 hours)  Notes from 12/30/22 1336 through 12/31/22 1336   No notes of this type exist for this encounter.

## 2022-12-31 NOTE — PROGRESS NOTES
[facility]    Name:  Ankur Camp   Age:  82 y.o.  Sex:  male  :  1940  MRN:  [id number]   Visit Number:  [id number]  Admission Date:  2022  Date Of Service:  22  Primary Care Physician:  Magaly Prabhakar APRN     LOS: 9 days :    Chief Complaint:      Shortness of air/fatigue    Subjective:    Patient seen and examined.  Lying in bed on 5 L nasal cannula without distress.  RN at bedside. Slight hypoglycemia noted. Levemir discontinued for now.  Will transition high scale insulin to low-moderate as patient completes Decadron. He is able to tell me his name this morning. No distress noted. Seems to be eating breakfast well. Did not receive D5W for most of the night due to  IV access.    Hospital Course:    Patient is an 82 year old male who presented to the hospital with complaints of weakness who was sent from PCP office after being diagnosed with UTI.  Patient wife advised on arrival that patient had generalized weakness, less appetite with weight loss over the past 2 weeks.  Patient with past health history significant for diabetes mellitus type 2, hypertension, hyperlipidemia, and Parkinson's disease.     Work up in the emergency department noted troponin-0.033, BNP-2194, glucose-171, creatinine-1.31 with baseline 0.8, BUN-31,  albumin-3.3, CRP-9.9, CBC unremarkable, lactate-2.9 with repeat 2.1, procalcitonin-0.24.  Urinalysis noted infection with 2+ bacteria, 31-50 WBC, +nitrites, and +3 leuks.  CXR noted hypoinflation with scattered mild atelectasis. Covid 19 testing was positive.      Admitted to the hospital and continued on Rocephin for UTI.  Started on Decadron for Covid 19.  PT/OT consulted for evaluation of weakness.  Patient remained hypoxic with increased oxygen demands. Patient was initiated on remdesivir for 5 days.  Noted to have increased oxygen demands up to 10L.  CT chest to rule out PE ordered and was negative. Oxygen  demand decreased to 6 liters.  Patient noted to have hypernatremia likely secondary to poor p.o. intake with sodium of 148.  Patient initiated on D5W with high-dose sliding scale insulin adjusted.    Review of Systems:     All systems were reviewed and negative except as mentioned in subjective, assessment and plan.    Vital Signs:    Temp:  [97.8 °F (36.6 °C)-99.9 °F (37.7 °C)] 97.8 °F (36.6 °C)  Heart Rate:  [72-82] 82  Resp:  [16-20] 16  BP: (142-159)/(83-96) 159/96    Intake and output:    I/O last 3 completed shifts:  In: 1245 [P.O.:580; I.V.:665]  Out: -   I/O this shift:  In: 237 [P.O.:237]  Out: -     Physical Examination:    General Appearance:  Alert and cooperative. Chronically ill/frail appearing elderly male.   Head:  Atraumatic and normocephalic.   Eyes: Conjunctivae and sclerae normal, no icterus. No pallor.   Throat: No oral lesions, no thrush, oral mucosa moist.   Neck: Supple, trachea midline, no thyromegaly.   Lungs:   Breath sounds heard bilaterally equally.  No wheezing or crackles. No Pleural rub or bronchial breathing. Mild coarseness throughout.  Clears mostly after coughing.  Unlabored. On 5 liters.   Heart:  Normal S1 and S2, no murmur, no gallop, no rub. No JVD.   Abdomen:   Normal bowel sounds, no masses, no organomegaly. Soft, nontender, nondistended, no rebound tenderness.   Extremities: Supple, no edema, no cyanosis, no clubbing.   Skin: No bleeding or rash.   Neurologic: Alert and oriented x 2. No facial asymmetry. Moves all four limbs. No tremors. Generalized weakness noted.     Laboratory results:    Results from last 7 days   Lab Units 12/31/22  0640 12/30/22  0658 12/29/22  0711 12/28/22  0731   SODIUM mmol/L 147* 148* 146* 142   POTASSIUM mmol/L 3.5 4.0 4.0 3.3*   CHLORIDE mmol/L 112* 114* 113* 109*   CO2 mmol/L 26.0 27.5 23.4 24.6   BUN mg/dL 23 29* 27* 24*   CREATININE mg/dL 0.72* 0.84 0.65* 0.68*   CALCIUM mg/dL 8.8 9.0 8.9 8.9   BILIRUBIN mg/dL  --  0.7 0.7 0.8   ALK PHOS U/L   --  104 90 95   ALT (SGPT) U/L  --  9 14 16   AST (SGOT) U/L  --  26 20 22   GLUCOSE mg/dL 78 166* 141* 162*     Results from last 7 days   Lab Units 12/30/22  0657 12/29/22  0711 12/28/22  0731   WBC 10*3/mm3 9.83 9.62 9.09   HEMOGLOBIN g/dL 13.2 13.2 13.2   HEMATOCRIT % 39.0 39.6 39.5   PLATELETS 10*3/mm3 230 181 180                 No results for input(s): PHART, OWO7YXD, PO2ART, PKI6ZPM, BASEEXCESS in the last 8760 hours.   I have reviewed the patient's laboratory results.    Radiology results:    No radiology results from the last 24 hrs  I have reviewed the patient's radiology reports.    Medication Review:     I have reviewed the patient's active and prn medications.     Problem List:      Acute respiratory failure with hypoxia (HCC)    Type 2 diabetes mellitus (HCC)    Urinary tract infection without hematuria, site unspecified    COVID-19 virus detected    Parkinson's disease dementia (HCC)      Assessment:    1. Acute UTI, POA  2. Acute Respiratory Failure with Hypoxia 2/2 # 3, POA  3. Covid 19 +, POA  4. Viral PNA 2/2 above, POA  5. Hypernatremia secondary to poor p.o. intake  6. Lactic Acidosis, POA, resolved  7. LAWSON, POA. resolved  8. Parkinsons Disease  9. Diabetes Mellitus Type 2 on oral anti-diabetics  10. Hyperlipidemia     Plan:     Acute UTI/ LAWSON/ Lactic Acidosis  -Completed Rocephin 1GM IV for 5 days  -Urine culture  E. coli and is sensitive to Rocephin  -Repeat Lactate-normal    Hypernatremia secondary to poor p.o. intake  -Gentle D5W initiated  - Mild improvement today. Will repeat BMP in AM.  -Encouraged patient to increase p.o. intake.     Acute Respiratory Failure/ Covid 19+  -Supplemental oxygen to keep saturations >90%--requiring 5L on exam with saturations around 90%  -Continue Decadron 6mg daily for 10 days   - Remdesivir x 5 days  -Procalcitonin remains negative  - No indications for antibiotics at this time  -Supportive care   -Continue PT/OT  -CT to rule out PE negative     Diabetes  Mellitus  -On oral anti-diabetics at home  -Cover with sliding scale 2/2 ongoing steroid use  -A1c-8     Bradycardia  - Resolved- Bisoprolol held.     Hypertension  - bisoprolol was stopped due to bradycardia and he has since been on lisinopril 20 mg daily. Increased to 40 mg with improvement.        DVT Prophylaxis: Lovenox  Code Status: Full Code  Diet: CC  Discharge Plan: Plan to DC to [facility] and rehab on January 1    JEANETTE Duckworth  12/31/22  09:52 EST    Dictated utilizing Dragon dictation.

## 2022-12-31 NOTE — PLAN OF CARE
Goal Outcome Evaluation:            Patient titrated down to 4L NC. K 3.5 replaced. Medications administered per orders. No acute events.

## 2023-01-01 LAB
ANION GAP SERPL CALCULATED.3IONS-SCNC: 8.5 MMOL/L (ref 5–15)
BUN SERPL-MCNC: 21 MG/DL (ref 8–23)
BUN/CREAT SERPL: 32.8 (ref 7–25)
CALCIUM SPEC-SCNC: 8.6 MG/DL (ref 8.6–10.5)
CHLORIDE SERPL-SCNC: 112 MMOL/L (ref 98–107)
CO2 SERPL-SCNC: 23.5 MMOL/L (ref 22–29)
CREAT SERPL-MCNC: 0.64 MG/DL (ref 0.76–1.27)
EGFRCR SERPLBLD CKD-EPI 2021: 95.1 ML/MIN/1.73
GLUCOSE BLDC GLUCOMTR-MCNC: 166 MG/DL (ref 70–130)
GLUCOSE BLDC GLUCOMTR-MCNC: 249 MG/DL (ref 70–130)
GLUCOSE BLDC GLUCOMTR-MCNC: 263 MG/DL (ref 70–130)
GLUCOSE BLDC GLUCOMTR-MCNC: 96 MG/DL (ref 70–130)
GLUCOSE SERPL-MCNC: 111 MG/DL (ref 65–99)
POTASSIUM SERPL-SCNC: 4.1 MMOL/L (ref 3.5–5.2)
SODIUM SERPL-SCNC: 144 MMOL/L (ref 136–145)

## 2023-01-01 PROCEDURE — 99232 SBSQ HOSP IP/OBS MODERATE 35: CPT | Performed by: NURSE PRACTITIONER

## 2023-01-01 PROCEDURE — 97110 THERAPEUTIC EXERCISES: CPT

## 2023-01-01 PROCEDURE — 97530 THERAPEUTIC ACTIVITIES: CPT

## 2023-01-01 PROCEDURE — 25010000002 ENOXAPARIN PER 10 MG: Performed by: NURSE PRACTITIONER

## 2023-01-01 PROCEDURE — 63710000001 DEXAMETHASONE PER 0.25 MG: Performed by: NURSE PRACTITIONER

## 2023-01-01 PROCEDURE — 82962 GLUCOSE BLOOD TEST: CPT

## 2023-01-01 PROCEDURE — 63710000001 INSULIN ASPART PER 5 UNITS: Performed by: NURSE PRACTITIONER

## 2023-01-01 PROCEDURE — 80048 BASIC METABOLIC PNL TOTAL CA: CPT | Performed by: NURSE PRACTITIONER

## 2023-01-01 RX ORDER — INSULIN ASPART 100 [IU]/ML
0-9 INJECTION, SOLUTION INTRAVENOUS; SUBCUTANEOUS
Status: DISCONTINUED | OUTPATIENT
Start: 2023-01-01 | End: 2023-01-04 | Stop reason: HOSPADM

## 2023-01-01 RX ADMIN — ASPIRIN 81 MG CHEWABLE TABLET 81 MG: 81 TABLET CHEWABLE at 08:18

## 2023-01-01 RX ADMIN — LISINOPRIL 40 MG: 20 TABLET ORAL at 08:18

## 2023-01-01 RX ADMIN — ENOXAPARIN SODIUM 40 MG: 100 INJECTION SUBCUTANEOUS at 20:10

## 2023-01-01 RX ADMIN — DEXAMETHASONE 6 MG: 2 TABLET ORAL at 08:18

## 2023-01-01 RX ADMIN — CARBIDOPA AND LEVODOPA 1 TABLET: 25; 100 TABLET ORAL at 20:10

## 2023-01-01 RX ADMIN — CARBIDOPA AND LEVODOPA 1 TABLET: 25; 100 TABLET ORAL at 08:18

## 2023-01-01 RX ADMIN — SENNOSIDES AND DOCUSATE SODIUM 2 TABLET: 50; 8.6 TABLET ORAL at 20:10

## 2023-01-01 RX ADMIN — POLYETHYLENE GLYCOL 3350 17 G: 17 POWDER, FOR SOLUTION ORAL at 08:17

## 2023-01-01 RX ADMIN — INSULIN ASPART 6 UNITS: 100 INJECTION, SOLUTION INTRAVENOUS; SUBCUTANEOUS at 11:08

## 2023-01-01 RX ADMIN — ALBUTEROL SULFATE 2 PUFF: 90 AEROSOL, METERED RESPIRATORY (INHALATION) at 06:34

## 2023-01-01 RX ADMIN — CARBIDOPA AND LEVODOPA 1 TABLET: 25; 100 TABLET ORAL at 17:12

## 2023-01-01 RX ADMIN — ATORVASTATIN CALCIUM 80 MG: 80 TABLET, FILM COATED ORAL at 08:18

## 2023-01-01 RX ADMIN — ALBUTEROL SULFATE 2 PUFF: 90 AEROSOL, METERED RESPIRATORY (INHALATION) at 03:48

## 2023-01-01 RX ADMIN — ALBUTEROL SULFATE 2 PUFF: 90 AEROSOL, METERED RESPIRATORY (INHALATION) at 20:09

## 2023-01-01 RX ADMIN — Medication 10 ML: at 08:18

## 2023-01-01 RX ADMIN — SENNOSIDES AND DOCUSATE SODIUM 2 TABLET: 50; 8.6 TABLET ORAL at 08:18

## 2023-01-01 RX ADMIN — ALBUTEROL SULFATE 2 PUFF: 90 AEROSOL, METERED RESPIRATORY (INHALATION) at 11:08

## 2023-01-01 RX ADMIN — INSULIN ASPART 4 UNITS: 100 INJECTION, SOLUTION INTRAVENOUS; SUBCUTANEOUS at 17:12

## 2023-01-01 RX ADMIN — ALBUTEROL SULFATE 2 PUFF: 90 AEROSOL, METERED RESPIRATORY (INHALATION) at 22:38

## 2023-01-01 NOTE — THERAPY TREATMENT NOTE
Patient Name: Ankur Camp  : 1940    MRN: [id number]                              Today's Date: 2023       Admit Date: 2022    Visit Dx:     ICD-10-CM ICD-9-CM   1. Urinary tract infection without hematuria, site unspecified  N39.0 599.0   2. COVID-19  U07.1 079.89   3. Acute respiratory failure with hypoxia (HCC)  J96.01 518.81   4. Lactic acidosis  E87.20 276.2   5. Acute renal insufficiency  N28.9 593.9     Patient Active Problem List   Diagnosis   • Benign prostatic hypertrophy without urinary obstruction   • Hypertension   • Hyperlipidemia   • Type 2 diabetes mellitus (HCC)   • Vitamin D deficiency   • Colon cancer screening   • Coronary artery calcification   • Thoracic aortic aneurysm (TAA)   • Primary osteoarthritis of right knee   • Status post total knee replacement using cement, right   • Urinary tract infection without hematuria, site unspecified   • COVID-19 virus detected   • Acute respiratory failure with hypoxia (HCC)   • Parkinson's disease dementia (HCC)     Past Medical History:   Diagnosis Date   • Arthritis    • Cancer (HCC)     kidney/ prostate   • Diabetes mellitus (HCC)    • ED (erectile dysfunction)    • Enlarged prostate with lower urinary tract symptoms (LUTS)    • Full dentures    • History of fracture of pelvis 2019    after fall from ladder   • History of loss of consciousness 2019    after fall from ladder-flown to [facility]   • History of rib fracture 2019    after fall from ladder   • History of right shoulder fracture 1960    walking up hill, fell on gravel   • History of stress test    • History of torn meniscus of right knee    • [place] (hard of hearing)     Bilateral hearing aids    • Hypertension    • Laceration of head 2019    after fall from ladder-treated at [facility]   • Left shoulder strain 2017    after fall from ladder   • Lung injury 2019    punctured right lung after falling off ladder    • Renal disorder    • Stone in kidney    • Stroke (HCC)      Past Surgical History:   Procedure Laterality Date   • CHEST TUBE INSERTION  11/2019    rib fractures, punctured lung after fall from [facility]   • COLONOSCOPY     • COLONOSCOPY N/A 4/8/2019    Procedure: COLONOSCOPY;  Surgeon: Onesimo Arnold MD;  Location: [facility] ENDOSCOPY;  Service: Gastroenterology   • EYE SURGERY Right     cataract removal with IOL implant   • HERNIA REPAIR Right     multiple inguinal hernia repairs   • KNEE ARTHROSCOPY W/ MENISCECTOMY Right 05/10/2012    Partial medial and lateral menisectomy-Conrad Baird MD   • NEPHRECTOMY Right     partial right nephrectomy due to kidney stone   • PELVIC FRACTURE SURGERY Right 11/2019    multiple pelvic fractures after fall from [facility]   • SHOULDER SURGERY Right 1960    fracture surgery to remove a piece of bone- [place], [place]   • TOTAL KNEE ARTHROPLASTY Right 5/25/2021    Procedure: knee arthroplasty total;  Surgeon: Conrad Biard MD;  Location: [facility] OR;  Service: Orthopedics;  Laterality: Right;      General Information     Row Name 01/01/23 1827          Physical Therapy Time and Intention    Document Type therapy note (daily note)  -     Mode of Treatment physical therapy  -     Row Name 01/01/23 1827          General Information    Patient Profile Reviewed yes  -CC     Existing Precautions/Restrictions fall;oxygen therapy device and L/min  -CC     Row Name 01/01/23 1827          Safety Issues, Functional Mobility    Safety Issues Affecting Function (Mobility) positioning of assistive device;problem-solving;safety precautions follow-through/compliance  -CC     Impairments Affecting Function (Mobility) balance;coordination;endurance/activity tolerance;motor control;postural/trunk control;strength  -CC           User Key  (r) = Recorded By, (t) = Taken By, (c) = Cosigned By    Initials Name Provider Type    CC Yesenia Gerardo, PTA  Physical Therapist Assistant               Mobility    No documentation.                Obj/Interventions    No documentation.                Goals/Plan    No documentation.                Clinical Impression     Row Name 01/01/23 1827          Pain    Pretreatment Pain Rating 0/10 - no pain  -CC     Posttreatment Pain Rating 0/10 - no pain  -CC     Additional Documentation Pain Scale: Numbers Pre/Post-Treatment (Group)  -CC     Row Name 01/01/23 1827          Plan of Care Review    Plan of Care Reviewed With patient  -CC     Progress improving  -CC     Outcome Evaluation Pt in supine and agreeable to participate with therapy. Performed rolling side to side to change soiled brief and bridging to position brief, supine to sit with CGA/min A, scooting to EOB with CGA and VC, sit <->stand with min A and amb with RW with shuffling gait pattern with VC for increased foot clearance and min/CGA x4 feet. Performed B LE ex in sitting AP, LAQ, hip abd, marching 1x10 reps. Pt's O2 SSATS on $ L O2 88-93% after placing pulse oximetry on pt's ear vs finger d/t inconsistent fluccuating reading on finger. Con't with PT POC and progress as tolerated  -CC     Row Name 01/01/23 1827          Positioning and Restraints    Pre-Treatment Position in bed  -CC     Post Treatment Position chair  -CC     In Chair reclined;call light within reach;encouraged to call for assist;exit alarm on  -CC           User Key  (r) = Recorded By, (t) = Taken By, (c) = Cosigned By    Initials Name Provider Type    CC Yesenia Gerardo, PTA Physical Therapist Assistant               Outcome Measures     Row Name 01/01/23 1837 01/01/23 0845       How much help from another person do you currently need...    Turning from your back to your side while in flat bed without using bedrails? 3  -CC 3  -MR    Moving from lying on back to sitting on the side of a flat bed without bedrails? 3  -CC 3  -MR    Moving to and from a bed to a chair (including a wheelchair)? 2   -CC 1  -MR    Standing up from a chair using your arms (e.g., wheelchair, bedside chair)? 2  -CC 1  -MR    Climbing 3-5 steps with a railing? 1  -CC 1  -MR    To walk in hospital room? 2  -CC 1  -MR    AM-PAC 6 Clicks Score (PT) 13  -CC 10  -MR    Highest level of mobility 4 --> Transferred to chair/commode  -CC 4 --> Transferred to chair/commode  -MR    Row Name 01/01/23 1837          Functional Assessment    Outcome Measure Options AM-PAC 6 Clicks Basic Mobility (PT)  -           User Key  (r) = Recorded By, (t) = Taken By, (c) = Cosigned By    Initials Name Provider Type    CC Yesenia Gerardo, EMMA Physical Therapist Assistant     Tiffany Carrera RN Registered Nurse                             Physical Therapy Education     Title: PT OT SLP Therapies (In Progress)     Topic: Physical Therapy (In Progress)     Point: Mobility training (Done)     Learning Progress Summary           Patient Acceptance, E,TB,D, VU,NR by  at 12/30/2022 1703    Comment: safety with mobility    Acceptance, E,TB,D, VU,NR by  at 12/29/2022 1601    Comment: Need for assistance    Acceptance, E,TB, VU,NR by  at 12/26/2022 1713    Comment: Importance of increasing mob and VC with demonstration for foot clearance    Acceptance, E,D, DU by  at 12/23/2022 1144    Comment: Pt education for improving technique with walker for transfers                   Point: Home exercise program (Done)     Learning Progress Summary           Patient Acceptance, E,TB, VU,NR by  at 12/24/2022 1700    Comment: Importance of performing B LE ex btw therapy sessions                   Point: Body mechanics (Not Started)     Learner Progress:  Not documented in this visit.          Point: Precautions (Not Started)     Learner Progress:  Not documented in this visit.                      User Key     Initials Effective Dates Name Provider Type Discipline     06/16/21 -  Yeseina Gerardo PTA Physical Therapist Assistant PT     06/16/21 -  Bharathi  Santos VICK PTA Physical Therapist Assistant PT    TW 06/16/21 -  Abby Agrawal PT Physical Therapist PT              PT Recommendation and Plan     Plan of Care Reviewed With: patient  Progress: improving  Outcome Evaluation: Pt in supine and agreeable to participate with therapy. Performed rolling side to side to change soiled brief and bridging to position brief, supine to sit with CGA/min A, scooting to EOB with CGA and VC, sit <->stand with min A and amb with RW with shuffling gait pattern with VC for increased foot clearance and min/CGA x4 feet. Performed B LE ex in sitting AP, LAQ, hip abd, marching 1x10 reps. Pt's O2 SSATS on $ L O2 88-93% after placing pulse oximetry on pt's ear vs finger d/t inconsistent fluccuating reading on finger. Con't with PT POC and progress as tolerated     Time Calculation:    PT Charges     Row Name 01/01/23 1838             Time Calculation    PT Received On 01/01/23  -CC      PT Goal Re-Cert Due Date 01/02/23  -CC         Time Calculation- PT    Total Timed Code Minutes- PT 39 minute(s)  -CC         Timed Charges    96234 - PT Therapeutic Exercise Minutes 15  -CC      62231 - PT Therapeutic Activity Minutes 24  -CC         Total Minutes    Timed Charges Total Minutes 39  -CC       Total Minutes 39  -CC            User Key  (r) = Recorded By, (t) = Taken By, (c) = Cosigned By    Initials Name Provider Type    CC Yesenia Gerardo PTA Physical Therapist Assistant              Therapy Charges for Today     Code Description Service Date Service Provider Modifiers Qty    14169993571 HC PT THER PROC EA 15 MIN 1/1/2023 Yesenia Gerardo PTA GP 1    56535405430 HC PT THERAPEUTIC ACT EA 15 MIN 1/1/2023 Yesenia Gerardo PTA GP 2          PT G-Codes  Outcome Measure Options: AM-PAC 6 Clicks Basic Mobility (PT)  AM-PAC 6 Clicks Score (PT): 13  AM-PAC 6 Clicks Score (OT): 15       Yesenia Gerardo PTA  1/1/2023

## 2023-01-01 NOTE — PROGRESS NOTES
[facility]    Name:  Ankur Camp   Age:  82 y.o.  Sex:  male  :  1940  MRN:  [id number]   Visit Number:  [id number]  Admission Date:  2022  Date Of Service:  23  Primary Care Physician:  Magaly Prabhakar APRN     LOS: 10 days :    Chief Complaint:      Shortness of air/fatigue    Subjective:    Patient seen and examined.  Lying in bed on 4 L nasal cannula without distress. He is able to tell me his name this morning. No distress noted.  Updated that he may be going to rehab tomorrow if he is able to maintain appropriate saturations during activity today.Updated that he completes his Decadron today.  Sodium improved.    Hospital Course:    Patient is an 82 year old male who presented to the hospital with complaints of weakness who was sent from PCP office after being diagnosed with UTI.  Patient wife advised on arrival that patient had generalized weakness, less appetite with weight loss over the past 2 weeks.  Patient with past health history significant for diabetes mellitus type 2, hypertension, hyperlipidemia, and Parkinson's disease.     Work up in the emergency department noted troponin-0.033, BNP-2194, glucose-171, creatinine-1.31 with baseline 0.8, BUN-31,  albumin-3.3, CRP-9.9, CBC unremarkable, lactate-2.9 with repeat 2.1, procalcitonin-0.24.  Urinalysis noted infection with 2+ bacteria, 31-50 WBC, +nitrites, and +3 leuks.  CXR noted hypoinflation with scattered mild atelectasis. Covid 19 testing was positive.      Admitted to the hospital and continued on Rocephin for UTI.  Started on Decadron for Covid 19.  PT/OT consulted for evaluation of weakness.  Patient remained hypoxic with increased oxygen demands. Patient was initiated on remdesivir for 5 days.  Noted to have increased oxygen demands up to 10L.  CT chest to rule out PE ordered and was negative. Oxygen demand decreased to 6 liters.  Patient noted to have hypernatremia  Schedule a Vaccine  When you qualify to receive the vaccine, call the [facility]) COVID-19 Vaccination Hotline to schedule your appointment or to get additional information about the [facility]) locations which are offering the COVID-19 vaccine. To be 94% effective, it's important that you receive two doses of one of the COVID-19 vaccines. -If you are receiving the [place] vaccine, your second shot will be scheduled as close to 21 days after the first shot as possible. -If you are receiving the Moderna vaccine, your second shot will be scheduled as close to 28 days after the first shot as possible. [facility]) COVID-19 Vaccination Hotline: [phone]    Links to [facility]) website and [facility] website:    [employer]/[facility]-monitoring-coronavirus-covid-19/covid-19-vaccine/[place]/eng-vaccine    [email or web address] likely secondary to poor p.o. intake with sodium of 148.  Patient initiated on D5W with high-dose sliding scale insulin adjusted.  Hypernatremia resolved prior to discharge.    Review of Systems:     All systems were reviewed and negative except as mentioned in subjective, assessment and plan.    Vital Signs:    Temp:  [98 °F (36.7 °C)-99 °F (37.2 °C)] 98 °F (36.7 °C)  Heart Rate:  [60-81] 81  Resp:  [18-19] 18  BP: (139-157)/(75-96) 139/88    Intake and output:    I/O last 3 completed shifts:  In: 1933.2 [P.O.:517; I.V.:1416.2]  Out: -   No intake/output data recorded.    Physical Examination:    General Appearance:  Alert and cooperative. Chronically ill/frail appearing elderly male.   Head:  Atraumatic and normocephalic.   Eyes: Conjunctivae and sclerae normal, no icterus. No pallor.   Throat: No oral lesions, no thrush, oral mucosa moist.   Neck: Supple, trachea midline, no thyromegaly.   Lungs:   Breath sounds heard bilaterally equally.  No wheezing or crackles. No Pleural rub or bronchial breathing. Mild coarseness throughout.  Clears mostly after coughing.  Unlabored. On 5 liters.   Heart:  Normal S1 and S2, no murmur, no gallop, no rub. No JVD.   Abdomen:   Normal bowel sounds, no masses, no organomegaly. Soft, nontender, nondistended, no rebound tenderness.   Extremities: Supple, no edema, no cyanosis, no clubbing.   Skin: No bleeding or rash.   Neurologic: Alert and oriented x 2. No facial asymmetry. Moves all four limbs. No tremors. Generalized weakness noted.     Laboratory results:    Results from last 7 days   Lab Units 01/01/23  0639 12/31/22  0640 12/30/22  0658 12/29/22  0711 12/28/22  0731   SODIUM mmol/L 144 147* 148* 146* 142   POTASSIUM mmol/L 4.1 3.5 4.0 4.0 3.3*   CHLORIDE mmol/L 112* 112* 114* 113* 109*   CO2 mmol/L 23.5 26.0 27.5 23.4 24.6   BUN mg/dL 21 23 29* 27* 24*   CREATININE mg/dL 0.64* 0.72* 0.84 0.65* 0.68*   CALCIUM mg/dL 8.6 8.8 9.0 8.9 8.9   BILIRUBIN mg/dL  --   --  0.7 0.7 0.8    ALK PHOS U/L  --   --  104 90 95   ALT (SGPT) U/L  --   --  9 14 16   AST (SGOT) U/L  --   --  26 20 22   GLUCOSE mg/dL 111* 78 166* 141* 162*     Results from last 7 days   Lab Units 12/30/22  0657 12/29/22  0711 12/28/22  0731   WBC 10*3/mm3 9.83 9.62 9.09   HEMOGLOBIN g/dL 13.2 13.2 13.2   HEMATOCRIT % 39.0 39.6 39.5   PLATELETS 10*3/mm3 230 181 180                 No results for input(s): PHART, RFS0VUD, PO2ART, QAO1ALR, BASEEXCESS in the last 8760 hours.   I have reviewed the patient's laboratory results.    Radiology results:    No radiology results from the last 24 hrs  I have reviewed the patient's radiology reports.    Medication Review:     I have reviewed the patient's active and prn medications.     Problem List:      Acute respiratory failure with hypoxia (HCC)    Type 2 diabetes mellitus (HCC)    Urinary tract infection without hematuria, site unspecified    COVID-19 virus detected    Parkinson's disease dementia (HCC)      Assessment:    1. Acute UTI, POA  2. Acute Respiratory Failure with Hypoxia 2/2 # 3, POA  3. Covid 19 +, POA  4. Viral PNA 2/2 above, POA  5. Hypernatremia secondary to poor p.o. intake  6. Lactic Acidosis, POA, resolved  7. LAWSON, POA. resolved  8. Parkinsons Disease  9. Diabetes Mellitus Type 2 on oral anti-diabetics  10. Hyperlipidemia     Plan:     Acute UTI/ LAWSON/ Lactic Acidosis  -Completed Rocephin 1GM IV for 5 days  -Urine culture  E. coli and is sensitive to Rocephin  -Repeat Lactate-normal    Hypernatremia secondary to poor p.o. intake-resolved  -Gentle D5W initiated  -Resolved. Will repeat BMP in AM.  -Encouraged patient to increase p.o. intake.     Acute Respiratory Failure/ Covid 19+  -Supplemental oxygen to keep saturations >90%--requiring 5L on exam with saturations around 90%  -Continue Decadron 6mg daily for 10 days/Remdesivir x 5 days-completed  -Procalcitonin remains negative  - No indications for antibiotics at this time  -Supportive care   -Continue PT/OT  -CT to  rule out PE negative     Diabetes Mellitus  -On oral anti-diabetics at home  -Cover with sliding scale 2/2 ongoing steroid use  -A1c-8     Bradycardia  - Resolved- Bisoprolol held.     Hypertension  - bisoprolol was stopped due to bradycardia and he has since been on lisinopril 20 mg daily. Increased to 40 mg with improvement.     Addendum- Wife updated patient may be transferred to [facility] rehab over the next 1-2 days as long as he can maintain appropriate saturations on 4-5 liters. All questions answered.     DVT Prophylaxis: Lovenox  Code Status: Full Code  Diet: CC  Discharge Plan: Plan to DC to [facility] and rehab within 24 hours      Neva Dubose, JEANETTE  01/01/23  10:14 EST    Dictated utilizing Dragon dictation.

## 2023-01-01 NOTE — PLAN OF CARE
Goal Outcome Evaluation:         VSS, pt on 4L NC. Lost IV access, attempted to get a new IV with no success. Medications administered per orders.

## 2023-01-01 NOTE — PLAN OF CARE
Goal Outcome Evaluation:  Plan of Care Reviewed With: patient        Progress: improving  Outcome Evaluation: Pt in supine and agreeable to participate with therapy. Performed rolling side to side to change soiled brief and bridging to position brief, supine to sit with CGA/min A, scooting to EOB with CGA and VC, sit <->stand with min A and amb with RW with shuffling gait pattern with VC for increased foot clearance and min/CGA x4 feet. Performed B LE ex in sitting AP, LAQ, hip abd, marching 1x10 reps. Pt's O2 SSATS on $ L O2 88-93% after placing pulse oximetry on pt's ear vs finger d/t inconsistent fluccuating reading on finger. Con't with PT POC and progress as tolerated

## 2023-01-01 NOTE — THERAPY TREATMENT NOTE
Patient Name: Ankur Camp  : 1940    MRN: [id number]                              Today's Date: 2023       Admit Date: 2022    Visit Dx:     ICD-10-CM ICD-9-CM   1. Urinary tract infection without hematuria, site unspecified  N39.0 599.0   2. COVID-19  U07.1 079.89   3. Acute respiratory failure with hypoxia (HCC)  J96.01 518.81   4. Lactic acidosis  E87.20 276.2   5. Acute renal insufficiency  N28.9 593.9     Patient Active Problem List   Diagnosis   • Benign prostatic hypertrophy without urinary obstruction   • Hypertension   • Hyperlipidemia   • Type 2 diabetes mellitus (HCC)   • Vitamin D deficiency   • Colon cancer screening   • Coronary artery calcification   • Thoracic aortic aneurysm (TAA)   • Primary osteoarthritis of right knee   • Status post total knee replacement using cement, right   • Urinary tract infection without hematuria, site unspecified   • COVID-19 virus detected   • Acute respiratory failure with hypoxia (HCC)   • Parkinson's disease dementia (HCC)     Past Medical History:   Diagnosis Date   • Arthritis    • Cancer (HCC)     kidney/ prostate   • Diabetes mellitus (HCC)    • ED (erectile dysfunction)    • Enlarged prostate with lower urinary tract symptoms (LUTS)    • Full dentures    • History of fracture of pelvis 2019    after fall from ladder   • History of loss of consciousness 2019    after fall from ladder-flown to [facility]   • History of rib fracture 2019    after fall from ladder   • History of right shoulder fracture 1960    walking up hill, fell on gravel   • History of stress test    • History of torn meniscus of right knee    • [place] (hard of hearing)     Bilateral hearing aids    • Hypertension    • Laceration of head 2019    after fall from ladder-treated at [facility]   • Left shoulder strain 2017    after fall from ladder   • Lung injury 2019    punctured right lung after falling off ladder    • Renal disorder    • Stone in kidney    • Stroke (HCC)      Past Surgical History:   Procedure Laterality Date   • CHEST TUBE INSERTION  11/2019    rib fractures, punctured lung after fall from [facility]   • COLONOSCOPY     • COLONOSCOPY N/A 4/8/2019    Procedure: COLONOSCOPY;  Surgeon: Onesimo Arnold MD;  Location: [facility] ENDOSCOPY;  Service: Gastroenterology   • EYE SURGERY Right     cataract removal with IOL implant   • HERNIA REPAIR Right     multiple inguinal hernia repairs   • KNEE ARTHROSCOPY W/ MENISCECTOMY Right 05/10/2012    Partial medial and lateral menisectomy-Conrad Baird MD   • NEPHRECTOMY Right     partial right nephrectomy due to kidney stone   • PELVIC FRACTURE SURGERY Right 11/2019    multiple pelvic fractures after fall from [facility]   • SHOULDER SURGERY Right 1960    fracture surgery to remove a piece of bone- [place], [place]   • TOTAL KNEE ARTHROPLASTY Right 5/25/2021    Procedure: knee arthroplasty total;  Surgeon: Conrad Baird MD;  Location: [facility] OR;  Service: Orthopedics;  Laterality: Right;      General Information     Row Name 01/01/23 1827          Physical Therapy Time and Intention    Document Type therapy note (daily note)  -     Mode of Treatment physical therapy  -     Row Name 01/01/23 1827          General Information    Patient Profile Reviewed yes  -CC     Existing Precautions/Restrictions fall;oxygen therapy device and L/min  -CC     Row Name 01/01/23 1827          Safety Issues, Functional Mobility    Safety Issues Affecting Function (Mobility) positioning of assistive device;problem-solving;safety precautions follow-through/compliance  -CC     Impairments Affecting Function (Mobility) balance;coordination;endurance/activity tolerance;motor control;postural/trunk control;strength  -CC           User Key  (r) = Recorded By, (t) = Taken By, (c) = Cosigned By    Initials Name Provider Type    CC Yesenia Gerardo, PTA  Physical Therapist Assistant               Mobility    No documentation.                Obj/Interventions    No documentation.                Goals/Plan    No documentation.                Clinical Impression     Row Name 01/01/23 1827          Pain    Pretreatment Pain Rating 0/10 - no pain  -CC     Posttreatment Pain Rating 0/10 - no pain  -CC     Additional Documentation Pain Scale: Numbers Pre/Post-Treatment (Group)  -CC     Row Name 01/01/23 1827          Plan of Care Review    Plan of Care Reviewed With patient  -CC     Progress improving  -CC     Outcome Evaluation Pt in supine and agreeable to participate with therapy. Performed rolling side to side to change soiled brief and bridging to position brief, supine to sit with CGA/min A, scooting to EOB with CGA and VC, sit <->stand with min A and amb with RW with shuffling gait pattern with VC for increased foot clearance and min/CGA x4 feet. Performed B LE ex in sitting AP, LAQ, hip abd, marching 1x10 reps. Pt's O2 SSATS on $ L O2 88-93% after placing pulse oximetry on pt's ear vs finger d/t inconsistent fluccuating reading on finger. Con't with PT POC and progress as tolerated  -CC     Row Name 01/01/23 1827          Positioning and Restraints    Pre-Treatment Position in bed  -CC     Post Treatment Position chair  -CC     In Chair reclined;call light within reach;encouraged to call for assist;exit alarm on  -CC           User Key  (r) = Recorded By, (t) = Taken By, (c) = Cosigned By    Initials Name Provider Type    CC Yesenia Gerardo, PTA Physical Therapist Assistant               Outcome Measures     Row Name 01/01/23 1837 01/01/23 0845       How much help from another person do you currently need...    Turning from your back to your side while in flat bed without using bedrails? 3  -CC 3  -MR    Moving from lying on back to sitting on the side of a flat bed without bedrails? 3  -CC 3  -MR    Moving to and from a bed to a chair (including a wheelchair)? 2   -CC 1  -MR    Standing up from a chair using your arms (e.g., wheelchair, bedside chair)? 2  -CC 1  -MR    Climbing 3-5 steps with a railing? 1  -CC 1  -MR    To walk in hospital room? 2  -CC 1  -MR    AM-PAC 6 Clicks Score (PT) 13  -CC 10  -MR    Highest level of mobility 4 --> Transferred to chair/commode  -CC 4 --> Transferred to chair/commode  -MR    Row Name 01/01/23 1837          Functional Assessment    Outcome Measure Options AM-PAC 6 Clicks Basic Mobility (PT)  -           User Key  (r) = Recorded By, (t) = Taken By, (c) = Cosigned By    Initials Name Provider Type    CC Yesenia Gerardo, EMMA Physical Therapist Assistant     Tiffany Carrera RN Registered Nurse                             Physical Therapy Education     Title: PT OT SLP Therapies (In Progress)     Topic: Physical Therapy (In Progress)     Point: Mobility training (Done)     Learning Progress Summary           Patient Acceptance, E,TB,D, VU,NR by  at 12/30/2022 1703    Comment: safety with mobility    Acceptance, E,TB,D, VU,NR by  at 12/29/2022 1601    Comment: Need for assistance    Acceptance, E,TB, VU,NR by  at 12/26/2022 1713    Comment: Importance of increasing mob and VC with demonstration for foot clearance    Acceptance, E,D, DU by  at 12/23/2022 1144    Comment: Pt education for improving technique with walker for transfers                   Point: Home exercise program (Done)     Learning Progress Summary           Patient Acceptance, E,TB, VU,NR by  at 12/24/2022 1700    Comment: Importance of performing B LE ex btw therapy sessions                   Point: Body mechanics (Not Started)     Learner Progress:  Not documented in this visit.          Point: Precautions (Not Started)     Learner Progress:  Not documented in this visit.                      User Key     Initials Effective Dates Name Provider Type Discipline     06/16/21 -  Yesenia Gerardo PTA Physical Therapist Assistant PT     06/16/21 -  Bharathi  Santos VICK PTA Physical Therapist Assistant PT    TW 06/16/21 -  Abby Agrawal PT Physical Therapist PT              PT Recommendation and Plan     Plan of Care Reviewed With: patient  Progress: improving  Outcome Evaluation: Pt in supine and agreeable to participate with therapy. Performed rolling side to side to change soiled brief and bridging to position brief, supine to sit with CGA/min A, scooting to EOB with CGA and VC, sit <->stand with min A and amb with RW with shuffling gait pattern with VC for increased foot clearance and min/CGA x4 feet. Performed B LE ex in sitting AP, LAQ, hip abd, marching 1x10 reps. Pt's O2 SSATS on $ L O2 88-93% after placing pulse oximetry on pt's ear vs finger d/t inconsistent fluccuating reading on finger. Con't with PT POC and progress as tolerated     Time Calculation:    PT Charges     Row Name 01/01/23 1840 01/01/23 1838          Time Calculation    PT Received On -- 01/01/23  -CC     PT Goal Re-Cert Due Date 01/04/23  -CC 01/02/23  -CC        Time Calculation- PT    Total Timed Code Minutes- PT -- 39 minute(s)  -CC        Timed Charges    98365 - PT Therapeutic Exercise Minutes -- 15  -CC     11266 - PT Therapeutic Activity Minutes -- 24  -CC        Total Minutes    Timed Charges Total Minutes -- 39  -CC      Total Minutes -- 39  -CC           User Key  (r) = Recorded By, (t) = Taken By, (c) = Cosigned By    Initials Name Provider Type    CC Yesenia Gerardo PTA Physical Therapist Assistant              Therapy Charges for Today     Code Description Service Date Service Provider Modifiers Qty    94422550861 HC PT THER PROC EA 15 MIN 1/1/2023 Yesenia Gerardo PTA GP 1    40035123335 HC PT THERAPEUTIC ACT EA 15 MIN 1/1/2023 Yesenia Gerardo PTA GP 2          PT G-Codes  Outcome Measure Options: AM-PAC 6 Clicks Basic Mobility (PT)  AM-PAC 6 Clicks Score (PT): 13  AM-PAC 6 Clicks Score (OT): 15       Yesenia Gerardo PTA  1/1/2023

## 2023-01-02 PROBLEM — D89.832 CYTOKINE RELEASE SYNDROME, GRADE 2: Status: ACTIVE | Noted: 2023-01-02

## 2023-01-02 LAB
ANION GAP SERPL CALCULATED.3IONS-SCNC: 8.1 MMOL/L (ref 5–15)
BUN SERPL-MCNC: 25 MG/DL (ref 8–23)
BUN/CREAT SERPL: 34.2 (ref 7–25)
CALCIUM SPEC-SCNC: 8.7 MG/DL (ref 8.6–10.5)
CHLORIDE SERPL-SCNC: 109 MMOL/L (ref 98–107)
CO2 SERPL-SCNC: 25.9 MMOL/L (ref 22–29)
CREAT SERPL-MCNC: 0.73 MG/DL (ref 0.76–1.27)
DEPRECATED RDW RBC AUTO: 45.4 FL (ref 37–54)
EGFRCR SERPLBLD CKD-EPI 2021: 91.4 ML/MIN/1.73
ERYTHROCYTE [DISTWIDTH] IN BLOOD BY AUTOMATED COUNT: 13.6 % (ref 12.3–15.4)
GLUCOSE BLDC GLUCOMTR-MCNC: 106 MG/DL (ref 70–130)
GLUCOSE BLDC GLUCOMTR-MCNC: 180 MG/DL (ref 70–130)
GLUCOSE BLDC GLUCOMTR-MCNC: 229 MG/DL (ref 70–130)
GLUCOSE BLDC GLUCOMTR-MCNC: 271 MG/DL (ref 70–130)
GLUCOSE SERPL-MCNC: 131 MG/DL (ref 65–99)
HCT VFR BLD AUTO: 40.8 % (ref 37.5–51)
HGB BLD-MCNC: 13.5 G/DL (ref 13–17.7)
MCH RBC QN AUTO: 31.5 PG (ref 26.6–33)
MCHC RBC AUTO-ENTMCNC: 33.1 G/DL (ref 31.5–35.7)
MCV RBC AUTO: 95.3 FL (ref 79–97)
PLATELET # BLD AUTO: 106 10*3/MM3 (ref 140–450)
PMV BLD AUTO: 12.3 FL (ref 6–12)
POTASSIUM SERPL-SCNC: 4.2 MMOL/L (ref 3.5–5.2)
RBC # BLD AUTO: 4.28 10*6/MM3 (ref 4.14–5.8)
SODIUM SERPL-SCNC: 143 MMOL/L (ref 136–145)
WBC NRBC COR # BLD: 8.23 10*3/MM3 (ref 3.4–10.8)

## 2023-01-02 PROCEDURE — 80048 BASIC METABOLIC PNL TOTAL CA: CPT | Performed by: NURSE PRACTITIONER

## 2023-01-02 PROCEDURE — 25010000002 ENOXAPARIN PER 10 MG: Performed by: NURSE PRACTITIONER

## 2023-01-02 PROCEDURE — 82962 GLUCOSE BLOOD TEST: CPT

## 2023-01-02 PROCEDURE — 99232 SBSQ HOSP IP/OBS MODERATE 35: CPT | Performed by: NURSE PRACTITIONER

## 2023-01-02 PROCEDURE — 63710000001 INSULIN ASPART PER 5 UNITS: Performed by: NURSE PRACTITIONER

## 2023-01-02 PROCEDURE — 85027 COMPLETE CBC AUTOMATED: CPT | Performed by: NURSE PRACTITIONER

## 2023-01-02 PROCEDURE — 97110 THERAPEUTIC EXERCISES: CPT

## 2023-01-02 PROCEDURE — 97116 GAIT TRAINING THERAPY: CPT

## 2023-01-02 RX ORDER — LISINOPRIL 40 MG/1
40 TABLET ORAL
Start: 2023-01-02

## 2023-01-02 RX ADMIN — LISINOPRIL 40 MG: 20 TABLET ORAL at 09:14

## 2023-01-02 RX ADMIN — ALBUTEROL SULFATE 2 PUFF: 90 AEROSOL, METERED RESPIRATORY (INHALATION) at 06:32

## 2023-01-02 RX ADMIN — ATORVASTATIN CALCIUM 80 MG: 80 TABLET, FILM COATED ORAL at 09:14

## 2023-01-02 RX ADMIN — CARBIDOPA AND LEVODOPA 1 TABLET: 25; 100 TABLET ORAL at 17:13

## 2023-01-02 RX ADMIN — SENNOSIDES AND DOCUSATE SODIUM 2 TABLET: 50; 8.6 TABLET ORAL at 09:14

## 2023-01-02 RX ADMIN — ALBUTEROL SULFATE 2 PUFF: 90 AEROSOL, METERED RESPIRATORY (INHALATION) at 04:02

## 2023-01-02 RX ADMIN — CARBIDOPA AND LEVODOPA 1 TABLET: 25; 100 TABLET ORAL at 21:04

## 2023-01-02 RX ADMIN — ENOXAPARIN SODIUM 40 MG: 100 INJECTION SUBCUTANEOUS at 21:03

## 2023-01-02 RX ADMIN — ALBUTEROL SULFATE 2 PUFF: 90 AEROSOL, METERED RESPIRATORY (INHALATION) at 21:05

## 2023-01-02 RX ADMIN — CARBIDOPA AND LEVODOPA 1 TABLET: 25; 100 TABLET ORAL at 09:14

## 2023-01-02 RX ADMIN — ALBUTEROL SULFATE 2 PUFF: 90 AEROSOL, METERED RESPIRATORY (INHALATION) at 11:53

## 2023-01-02 RX ADMIN — SENNOSIDES AND DOCUSATE SODIUM 2 TABLET: 50; 8.6 TABLET ORAL at 21:04

## 2023-01-02 RX ADMIN — ASPIRIN 81 MG CHEWABLE TABLET 81 MG: 81 TABLET CHEWABLE at 09:14

## 2023-01-02 RX ADMIN — INSULIN ASPART 2 UNITS: 100 INJECTION, SOLUTION INTRAVENOUS; SUBCUTANEOUS at 11:53

## 2023-01-02 RX ADMIN — INSULIN ASPART 6 UNITS: 100 INJECTION, SOLUTION INTRAVENOUS; SUBCUTANEOUS at 17:12

## 2023-01-02 NOTE — SIGNIFICANT NOTE
Dr. Carrera informed numerous unsuccessful attempts, including day shift, to replace IV access.  Per Dr. Carrera order for IV access to be discontinued.

## 2023-01-02 NOTE — DISCHARGE INSTRUCTIONS
Patient to be transferred to [facility] today.  Continue nasal cannula 4 liters to maintain saturations.  Return to ED for worsening SOA/fever/or other concerns.  Hold Zebeta- due to bradycardia.  Replaced with Lisinopril 40 mg.

## 2023-01-02 NOTE — PLAN OF CARE
Goal Outcome Evaluation:           Progress: improving  Outcome Evaluation: VSS with oxygenation maintained on 4L O2 via humidified, high flow nasal cannula.  Order obtain for no IV access following multiple, unsuccessful attempts.  Cardiac and pulse oximetry monitoring and accu-checks continued per order.  Isolation maintained per protocol.  No acute events noted during shift.  Will continue to monitor patient.

## 2023-01-02 NOTE — DISCHARGE SUMMARY
[facility]   DISCHARGE SUMMARY      Name:  Ankur Camp   Age:  82 y.o.  Sex:  male  :  1940  MRN:  [id number]   Visit Number:  [id number]    Admission Date:  2022  Date of Discharge:  2023  Primary Care Physician:  Magaly Prabhakar APRN    Important issues to note:    - Patient admitted secondary to respiratory failure due to COVID-19. Currently on 4 liters NC with 95% pulse ox.  - Given Rocephin for UTI x 5 days.  - Poor po intake with hypernatremia- resolved with increased intake and D5W.  - Remains alert and oriented to self and place. 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  - Recommend repeat BMP in 3 days once metformin reinitiated.  - Hold Bisoprolol due to bradycardia- Lisinopril 40 mg daily tolerated well.    Discharge Diagnoses:     1. Acute UTI, POA  2. Acute Respiratory Failure with Hypoxia 2/2 # 3, POA  3. Covid 19 +, POA  4. Viral PNA 2/2 above, POA  5. Hypernatremia secondary to poor p.o. intake  6. Lactic Acidosis, POA, resolved  7. LAWSON, POA. resolved  8. Parkinsons Disease  9. Diabetes Mellitus Type 2 on oral anti-diabetics  10. 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     Presenting Problem:    Chief Complaint   Patient presents with   • Shortness of Breath      Consults:     Consulting Physician(s)     Provider Relationship Specialty    Filipe Rodgers MD Consulting Physician Internal Medicine        Procedures Performed:        History of presenting illness/Hospital Course:    Patient is an 82 year  old male who presented to the hospital with complaints of weakness who was sent from PCP office after being diagnosed with UTI.  Patient wife advised on arrival that patient had generalized weakness, less appetite with weight loss over the past 2 weeks.  Patient with past health history significant for diabetes mellitus type 2, hypertension, hyperlipidemia, and Parkinson's disease.     Work up in the emergency department noted troponin-0.033, BNP-2194, glucose-171, creatinine-1.31 with baseline 0.8, BUN-31,  albumin-3.3, CRP-9.9, CBC unremarkable, lactate-2.9 with repeat 2.1, procalcitonin-0.24.  Urinalysis noted infection with 2+ bacteria, 31-50 WBC, +nitrites, and +3 leuks.  CXR noted hypoinflation with scattered mild atelectasis. Covid 19 testing was positive.      Admitted to the hospital and continued on Rocephin for UTI.  Started on Decadron for Covid 19.  PT/OT consulted for evaluation of weakness.  Patient remained hypoxic with increased oxygen demands. Patient was initiated on remdesivir for 5 days.  Noted to have increased oxygen demands up to 10L.  CT chest to rule out PE ordered and was negative. Oxygen demand decreased to 4 liters.  Patient noted to have hypernatremia likely secondary to poor p.o. intake with sodium of 148.  Patient initiated on D5W with high-dose sliding scale insulin adjusted.  Hypernatremia resolved prior to discharge. Overall appetite improved. Patient did require a feeder. Has remained on 4 liters with appropriate saturations over the past 2 days. Patient to be transferred to [facility] today for STR. Discharge instructions given to wife. All questions answered. Strict return precautions advised.    Vital Signs:    Temp:  [97.4 °F (36.3 °C)-98.2 °F (36.8 °C)] 97.5 °F (36.4 °C)  Heart Rate:  [56-79] 78  Resp:  [16-20] 16  BP: (134-154)/(76-94) 154/90    Physical Exam:    General Appearance:  Alert and cooperative. Chronically ill appearing elderly male.   Head:   Atraumatic and normocephalic.   Eyes: Conjunctivae and sclerae normal, no icterus. No pallor.   Ears:  Ears with no abnormalities noted.   Throat: No oral lesions, no thrush, oral mucosa moist.   Neck: Supple, trachea midline, no thyromegaly.   Back:   No kyphoscoliosis present. No tenderness to palpation.   Lungs:   Breath sounds heard bilaterally equally.  Mild coarseness throughout that clears with coughing- on 4 liters with spo2 95% Unlabored.   Heart:  Normal S1 and S2, no murmur, no gallop, no rub. No JVD.   Abdomen:   Normal bowel sounds, no masses, no organomegaly. Soft, nontender, nondistended, no rebound tenderness.   Extremities: Supple, no edema, no cyanosis, no clubbing.   Pulses: Pulses palpable bilaterally.   Skin: No bleeding or rash.   Neurologic: Alert and oriented x 2. No facial asymmetry. Moves all four limbs. No tremors. Generalize weakness. Flat affect.     Pertinent Lab Results:     Results from last 7 days   Lab Units 01/02/23  0750 01/01/23  0639 12/31/22  0640 12/30/22  0658 12/29/22  0711 12/28/22  0731   SODIUM mmol/L 143 144 147* 148* 146* 142   POTASSIUM mmol/L 4.2 4.1 3.5 4.0 4.0 3.3*   CHLORIDE mmol/L 109* 112* 112* 114* 113* 109*   CO2 mmol/L 25.9 23.5 26.0 27.5 23.4 24.6   BUN mg/dL 25* 21 23 29* 27* 24*   CREATININE mg/dL 0.73* 0.64* 0.72* 0.84 0.65* 0.68*   CALCIUM mg/dL 8.7 8.6 8.8 9.0 8.9 8.9   BILIRUBIN mg/dL  --   --   --  0.7 0.7 0.8   ALK PHOS U/L  --   --   --  104 90 95   ALT (SGPT) U/L  --   --   --  9 14 16   AST (SGOT) U/L  --   --   --  26 20 22   GLUCOSE mg/dL 131* 111* 78 166* 141* 162*     Results from last 7 days   Lab Units 01/02/23  0650 12/30/22  0657 12/29/22  0711   WBC 10*3/mm3 8.23 9.83 9.62   HEMOGLOBIN g/dL 13.5 13.2 13.2   HEMATOCRIT % 40.8 39.0 39.6   PLATELETS 10*3/mm3 106* 230 181                                   Pertinent Radiology Results:    Imaging Results (All)     Procedure Component Value Units Date/Time    CT Angiogram Chest Pulmonary Embolism  [[id number]] Collected: 12/28/22 2150     Updated: 12/28/22 2151    Narrative:      FINAL REPORT    TECHNIQUE:  Multiple axial CT images were obtained through the chest  following IV contrast using a CTA/PE protocol.  3D/MIP  reconstruction images were also performed. This study was  performed with techniques to keep radiation doses as low as  reasonably achievable (ALARA). Individualized dose reduction  techniques using automated exposure control or adjustment of mA  and/or kV according to the patient's size were employed.    CLINICAL HISTORY:  Covid--increased O2    FINDINGS:  PAs and aorta: No pulmonary embolus.  Thoracic aorta is  unremarkable.   There is coronary artery disease.  Heart/mediastinum: No evidence for right heart strain.  No  pericardial effusion.    There is cardiomegaly.  Lungs: There  are patchy bilateral groundglass opacities with interstitial  thickening.  Lymph nodes: No pathologically enlarged thoracic  lymph nodes.  Pleura: No pneumothorax or pleural effusion.  Chest Wall: No chest wall contusion.  Bones: No acute fracture.  Upper abdomen: No acute findings in the upper abdomen.      Impression:      No pulmonary embolus.   Lung findings are compatible with Covid  pneumonia.    Authenticated and Electronically Signed by Myke Mcfarland MD on  12/28/2022 09:50:41 PM    XR Chest 1 View [[id number]] Collected: 12/27/22 0929     Updated: 12/27/22 0934    Narrative:      CLINICAL INDICATION:    Covid/ SOA; N39.0-Urinary tract infection, site not specified;  U07.1-COVID-19; J96.01-Acute respiratory failure with hypoxia;  E87.20-Acidosis, unspecified; N28.9-Disorder of kidney and ureter,  unspecified     EXAMINATION TECHNIQUE:   XR CHEST 1 VW-     COMPARISON:  Radiographs 12/22/2022.     FINDINGS:  Bilateral multifocal patchy areas of groundglass opacities and airspace  consolidations. No pneumothorax. No large pleural effusion. Probable  trace bilateral pleural effusions. Heart is normal in size.  Aortic  atherosclerosis and tortuosity.       Impression:      Bilateral multifocal patchy areas of airspace opacities, concerning for  multifocal pneumonia/atypical viral pneumonia.           Images personally reviewed, interpreted and dictated by RICKI Ash.                This report was signed and finalized on 12/27/2022 9:32 AM by RICKI Ash.    XR Chest 1 View [[id number]] Collected: 12/22/22 1132     Updated: 12/22/22 1135    Narrative:      PROCEDURE: XR CHEST 1 VW-     HISTORY: SOA      COMPARISON: 07/20/2022.     FINDINGS: The heart is normal in size. The mediastinum is unremarkable.  The lungs are hypoinflated with scattered mild atelectasis. There is no  pneumothorax.  There are no acute osseous abnormalities.       Impression:      Hypoinflation with scattered mild atelectasis.              Images were reviewed, interpreted, and dictated by Dr. Abdiaziz Ahumada MD  Transcribed by Shalom Lennon PA-C.     This report was signed and finalized on 12/22/2022 11:33 AM by Abdiaziz Ahumada MD.          Echo:    Results for orders placed during the hospital encounter of 07/20/22    Adult Transthoracic Echo Complete W/ Cont if Necessary Per Protocol    Interpretation Summary  · Estimated right ventricular systolic pressure from tricuspid regurgitation is normal (<35 mmHg).  · Left ventricular wall thickness is consistent with mild to moderate concentric hypertrophy.  · Estimated left ventricular EF = 54% Left ventricular ejection fraction appears to be 51 - 55%. Left ventricular systolic function is normal.  · Left ventricular diastolic function is consistent with (grade I) impaired relaxation.  · Left atrial volume is moderately increased.  · Saline test results are negative.    Condition on Discharge:      Stable.    Code status during the hospital stay:    Code Status and Medical Interventions:   Ordered at: 12/22/22 1529     Level Of Support Discussed With:    Patient     Code Status  (Patient has no pulse and is not breathing):    CPR (Attempt to Resuscitate)     Medical Interventions (Patient has pulse or is breathing):    Full Support     Discharge Disposition:    Rehab Facility or Unit (DC - External)    Discharge Medications:       Discharge Medications      New Medications      Instructions Start Date   lisinopril 40 MG tablet  Commonly known as: PRINIVIL,ZESTRIL   40 mg, Oral, Every 24 Hours Scheduled         Continue These Medications      Instructions Start Date   ascorbic acid 1000 MG tablet  Commonly known as: VITAMIN C   1,000 mg, Oral, Daily      aspirin 81 MG chewable tablet   81 mg, Oral, Daily      atorvastatin 80 MG tablet  Commonly known as: LIPITOR   80 mg, Oral, Daily      carbidopa-levodopa  MG per tablet  Commonly known as: SINEMET   1 tablet, Oral, 3 Times Daily      cholecalciferol 25 MCG (1000 UT) tablet  Commonly known as: VITAMIN D3   1,000 Units, Oral, Daily      erythromycin 5 MG/GM ophthalmic ointment  Commonly known as: ROMYCIN   1 application, Both Eyes, Nightly, Apply a thin layer to bottom of both eyelids every night at bedtime      glucose blood test strip   1 each by In Vitro route daily Daily testing, DX:250.00      metFORMIN 500 MG tablet  Commonly known as: GLUCOPHAGE   500 mg, Oral, Daily With Breakfast      vitamin B-12 1000 MCG tablet  Commonly known as: CYANOCOBALAMIN   1,000 mcg, Oral, Daily         Stop These Medications    bisoprolol 5 MG tablet  Commonly known as: ZEBeta          Discharge Diet:     Diet Instructions     Diet: Consistent Carbohydrate, Cardiac, Dysphagia; Nectar / Syrup Thick Liquids; Mechanical Soft; Nectar / Syrup Thick      Discharge Diet:  Consistent Carbohydrate  Cardiac  Dysphagia       Fluid Consistency: Nectar / Syrup Thick Liquids    Pureed Options: Mechanical Soft    Fluid Consistency: Nectar / Syrup Thick    Requires feeding assistance        Activity at Discharge:     Activity Instructions     Activity as Tolerated           Follow-up Appointments:     Follow-up Information     Magaly Prabhakar, JEANETTE Follow up in 1 week(s).    Specialty: Family Medicine  Contact information:  [address] [id number]  [phone]                       Future Appointments   Date Time Provider Department Center   2/21/2023  9:45 AM Cliff Lema III, MD [facility]   3/13/2023  9:10 AM Jose Fernandez MD [facility]   3/28/2023  9:30 AM Cuca Woodard, APRN [facility]   5/9/2023  8:30 AM Benjie Garrison PA-C [facility]     Test Results Pending at Discharge:           Neva Dubose, JEANETTE  01/02/23  08:59 EST    Time: I spent 35 minutes on this discharge activity which included: face-to-face encounter with the patient, reviewing the data in the system, coordination of the care with the nursing staff as well as consultants, documentation, and entering orders.     Dictated utilizing Dragon dictation.

## 2023-01-02 NOTE — PLAN OF CARE
Goal Outcome Evaluation:  Plan of Care Reviewed With: patient        Progress: improving  Outcome Evaluation: Pt supine in bed and willing to particiapte with treatment.  Pt perfomed bed mobility with min a. Pt performed STS from EOB with min/mod a and attenpted sitde stepping to R to reach HOB mod/max a with rw.  Pt also performed B LE exercise. see flowsheet for details

## 2023-01-02 NOTE — THERAPY TREATMENT NOTE
Pt just finished getting a bath from [facility], declined working with OT d/t being too tired.  Will follow up with pt tomorrow.

## 2023-01-02 NOTE — PAYOR COMM NOTE
"  UPDATED CLINICALS AND D/C SUMMARY  UR MANAGER; MATT TERESA [phone] AND [phone]    Ankur Olson (82 y.o. Male)     Date of Birth   1940    Social Security Number       Address   [address] [place] [id number]    Home Phone   [phone]    MRN   [id number]       Catholic   Orthodox    Marital Status                               Admission Date   12/22/22    Admission Type   Emergency    Admitting Provider       Attending Provider   Oral Cano DO    Department, Room/Bed   [facility] TELEMETRY 3, 326/1       Discharge Date       Discharge Disposition   Rehab Facility or Unit (DC - External)    Discharge Destination                               Attending Provider: Oral Cano DO    Allergies: No Known Allergies    Isolation: Enh Drop/Con   Infection: COVID (confirmed) (12/22/22)   Code Status: CPR    Ht: 167.6 cm (66\")   Wt: 65.6 kg (144 lb 10 oz)    Admission Cmt: None   Principal Problem: Acute respiratory failure with hypoxia (HCC) [J96.01]                 Active Insurance as of 12/22/2022     Primary Coverage     Payor Plan Insurance Group Employer/Plan Group    ANTHEM MEDICARE REPLACEMENT ANTHEM MEDICARE ADVANTAGE [id number]     Payor Plan Address Payor Plan Phone Number Payor Plan Fax Number Effective Dates    [address] [phone]  1/1/2021 - None Entered    [facility] [id number]       Subscriber Name Subscriber Birth Date Member ID       ANKUR OLSON 1940 [id number]                 Emergency Contacts      (Rel.) Home Phone Work Phone Mobile Phone    Kianna Olson (Spouse) [phone] -- --    Martell Elvi (Daughter) -- -- [phone]            Physician Progress Notes (last 24 hours)  Notes from 01/01/23 1024 through 01/02/23 1024   No notes of this type exist for this encounter.            Discharge Summary      Neva Dubose APRN at 01/02/23 0858              [facility]   [facility]      Name:  Ankur Camp   Age:  82 y.o.  Sex:  male  :  1940  MRN:  [id number]   Visit Number:  [id number]    Admission Date:  2022  Date of Discharge:  2023  Primary Care Physician:  Magaly Prabhakar APRN    Important issues to note:    - Patient admitted secondary to respiratory failure due to COVID-19. Currently on 4 liters NC with 95% pulse ox.  - Given Rocephin for UTI x 5 days.  - Poor po intake with hypernatremia- resolved with increased intake and D5W.  - Remains alert and oriented to self and place. 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  - Recommend repeat BMP in 3 days once metformin reinitiated.  - Hold Bisoprolol due to bradycardia- Lisinopril 40 mg daily tolerated well.    Discharge Diagnoses:     1. Acute UTI, POA  2. Acute Respiratory Failure with Hypoxia 2/2 # 3, POA  3. Covid 19 +, POA  4. Viral PNA 2/2 above, POA  5. Hypernatremia secondary to poor p.o. intake  6. Lactic Acidosis, POA, resolved  7. LAWSON, POA. resolved  8. Parkinsons Disease  9. Diabetes Mellitus Type 2 on oral anti-diabetics  10. 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     Presenting Problem:    Chief Complaint   Patient presents with   • Shortness of Breath      Consults:     Consulting Physician(s)     Provider Relationship Specialty    Filipe Rodgers MD Consulting Physician Internal Medicine        Procedures Performed:        History of presenting illness/Hospital Course:    Patient is an 82 year old male who presented to the hospital  with complaints of weakness who was sent from PCP office after being diagnosed with UTI.  Patient wife advised on arrival that patient had generalized weakness, less appetite with weight loss over the past 2 weeks.  Patient with past health history significant for diabetes mellitus type 2, hypertension, hyperlipidemia, and Parkinson's disease.     Work up in the emergency department noted troponin-0.033, BNP-2194, glucose-171, creatinine-1.31 with baseline 0.8, BUN-31,  albumin-3.3, CRP-9.9, CBC unremarkable, lactate-2.9 with repeat 2.1, procalcitonin-0.24.  Urinalysis noted infection with 2+ bacteria, 31-50 WBC, +nitrites, and +3 leuks.  CXR noted hypoinflation with scattered mild atelectasis. Covid 19 testing was positive.      Admitted to the hospital and continued on Rocephin for UTI.  Started on Decadron for Covid 19.  PT/OT consulted for evaluation of weakness.  Patient remained hypoxic with increased oxygen demands. Patient was initiated on remdesivir for 5 days.  Noted to have increased oxygen demands up to 10L.  CT chest to rule out PE ordered and was negative. Oxygen demand decreased to 4 liters.  Patient noted to have hypernatremia likely secondary to poor p.o. intake with sodium of 148.  Patient initiated on D5W with high-dose sliding scale insulin adjusted.  Hypernatremia resolved prior to discharge. Overall appetite improved. Patient did require a feeder. Has remained on 4 liters with appropriate saturations over the past 2 days. Patient to be transferred to [facility] today for STR. Discharge instructions given to wife. All questions answered. Strict return precautions advised.    Vital Signs:    Temp:  [97.4 °F (36.3 °C)-98.2 °F (36.8 °C)] 97.5 °F (36.4 °C)  Heart Rate:  [56-79] 78  Resp:  [16-20] 16  BP: (134-154)/(76-94) 154/90    Physical Exam:    General Appearance:  Alert and cooperative. Chronically ill appearing elderly male.   Head:  Atraumatic and normocephalic.   Eyes:  Conjunctivae and sclerae normal, no icterus. No pallor.   Ears:  Ears with no abnormalities noted.   Throat: No oral lesions, no thrush, oral mucosa moist.   Neck: Supple, trachea midline, no thyromegaly.   Back:   No kyphoscoliosis present. No tenderness to palpation.   Lungs:   Breath sounds heard bilaterally equally.  Mild coarseness throughout that clears with coughing- on 4 liters with spo2 95% Unlabored.   Heart:  Normal S1 and S2, no murmur, no gallop, no rub. No JVD.   Abdomen:   Normal bowel sounds, no masses, no organomegaly. Soft, nontender, nondistended, no rebound tenderness.   Extremities: Supple, no edema, no cyanosis, no clubbing.   Pulses: Pulses palpable bilaterally.   Skin: No bleeding or rash.   Neurologic: Alert and oriented x 2. No facial asymmetry. Moves all four limbs. No tremors. Generalize weakness. Flat affect.     Pertinent Lab Results:     Results from last 7 days   Lab Units 01/02/23  0750 01/01/23  0639 12/31/22  0640 12/30/22  0658 12/29/22  0711 12/28/22  0731   SODIUM mmol/L 143 144 147* 148* 146* 142   POTASSIUM mmol/L 4.2 4.1 3.5 4.0 4.0 3.3*   CHLORIDE mmol/L 109* 112* 112* 114* 113* 109*   CO2 mmol/L 25.9 23.5 26.0 27.5 23.4 24.6   BUN mg/dL 25* 21 23 29* 27* 24*   CREATININE mg/dL 0.73* 0.64* 0.72* 0.84 0.65* 0.68*   CALCIUM mg/dL 8.7 8.6 8.8 9.0 8.9 8.9   BILIRUBIN mg/dL  --   --   --  0.7 0.7 0.8   ALK PHOS U/L  --   --   --  104 90 95   ALT (SGPT) U/L  --   --   --  9 14 16   AST (SGOT) U/L  --   --   --  26 20 22   GLUCOSE mg/dL 131* 111* 78 166* 141* 162*     Results from last 7 days   Lab Units 01/02/23  0650 12/30/22  0657 12/29/22  0711   WBC 10*3/mm3 8.23 9.83 9.62   HEMOGLOBIN g/dL 13.5 13.2 13.2   HEMATOCRIT % 40.8 39.0 39.6   PLATELETS 10*3/mm3 106* 230 181                                   Pertinent Radiology Results:    Imaging Results (All)     Procedure Component Value Units Date/Time    CT Angiogram Chest Pulmonary Embolism [[id number]] Collected: 12/28/22 2653      Updated: 12/28/22 2151    Narrative:      FINAL REPORT    TECHNIQUE:  Multiple axial CT images were obtained through the chest  following IV contrast using a CTA/PE protocol.  3D/MIP  reconstruction images were also performed. This study was  performed with techniques to keep radiation doses as low as  reasonably achievable (ALARA). Individualized dose reduction  techniques using automated exposure control or adjustment of mA  and/or kV according to the patient's size were employed.    CLINICAL HISTORY:  Covid--increased O2    FINDINGS:  PAs and aorta: No pulmonary embolus.  Thoracic aorta is  unremarkable.   There is coronary artery disease.  Heart/mediastinum: No evidence for right heart strain.  No  pericardial effusion.    There is cardiomegaly.  Lungs: There  are patchy bilateral groundglass opacities with interstitial  thickening.  Lymph nodes: No pathologically enlarged thoracic  lymph nodes.  Pleura: No pneumothorax or pleural effusion.  Chest Wall: No chest wall contusion.  Bones: No acute fracture.  Upper abdomen: No acute findings in the upper abdomen.      Impression:      No pulmonary embolus.   Lung findings are compatible with Covid  pneumonia.    Authenticated and Electronically Signed by Myke Mcfarland MD on  12/28/2022 09:50:41 PM    XR Chest 1 View [[id number]] Collected: 12/27/22 0929     Updated: 12/27/22 0934    Narrative:      CLINICAL INDICATION:    Covid/ SOA; N39.0-Urinary tract infection, site not specified;  U07.1-COVID-19; J96.01-Acute respiratory failure with hypoxia;  E87.20-Acidosis, unspecified; N28.9-Disorder of kidney and ureter,  unspecified     EXAMINATION TECHNIQUE:   XR CHEST 1 VW-     COMPARISON:  Radiographs 12/22/2022.     FINDINGS:  Bilateral multifocal patchy areas of groundglass opacities and airspace  consolidations. No pneumothorax. No large pleural effusion. Probable  trace bilateral pleural effusions. Heart is normal in size. Aortic  atherosclerosis and  tortuosity.       Impression:      Bilateral multifocal patchy areas of airspace opacities, concerning for  multifocal pneumonia/atypical viral pneumonia.           Images personally reviewed, interpreted and dictated by RICKI Ash.                This report was signed and finalized on 12/27/2022 9:32 AM by RICKI Ash.    XR Chest 1 View [[id number]] Collected: 12/22/22 1132     Updated: 12/22/22 1135    Narrative:      PROCEDURE: XR CHEST 1 VW-     HISTORY: SOA      COMPARISON: 07/20/2022.     FINDINGS: The heart is normal in size. The mediastinum is unremarkable.  The lungs are hypoinflated with scattered mild atelectasis. There is no  pneumothorax.  There are no acute osseous abnormalities.       Impression:      Hypoinflation with scattered mild atelectasis.              Images were reviewed, interpreted, and dictated by Dr. Abdiaziz Ahumada MD  Transcribed by Shalom Lennon PA-C.     This report was signed and finalized on 12/22/2022 11:33 AM by Abdiaziz Ahumada MD.          Echo:    Results for orders placed during the hospital encounter of 07/20/22    Adult Transthoracic Echo Complete W/ Cont if Necessary Per Protocol    Interpretation Summary  · Estimated right ventricular systolic pressure from tricuspid regurgitation is normal (<35 mmHg).  · Left ventricular wall thickness is consistent with mild to moderate concentric hypertrophy.  · Estimated left ventricular EF = 54% Left ventricular ejection fraction appears to be 51 - 55%. Left ventricular systolic function is normal.  · Left ventricular diastolic function is consistent with (grade I) impaired relaxation.  · Left atrial volume is moderately increased.  · Saline test results are negative.    Condition on Discharge:      Stable.    Code status during the hospital stay:    Code Status and Medical Interventions:   Ordered at: 12/22/22 1529     Level Of Support Discussed With:    Patient     Code Status (Patient has no pulse and is not  breathing):    CPR (Attempt to Resuscitate)     Medical Interventions (Patient has pulse or is breathing):    Full Support     Discharge Disposition:    Rehab Facility or Unit (DC - External)    Discharge Medications:       Discharge Medications      New Medications      Instructions Start Date   lisinopril 40 MG tablet  Commonly known as: PRINIVIL,ZESTRIL   40 mg, Oral, Every 24 Hours Scheduled         Continue These Medications      Instructions Start Date   ascorbic acid 1000 MG tablet  Commonly known as: VITAMIN C   1,000 mg, Oral, Daily      aspirin 81 MG chewable tablet   81 mg, Oral, Daily      atorvastatin 80 MG tablet  Commonly known as: LIPITOR   80 mg, Oral, Daily      carbidopa-levodopa  MG per tablet  Commonly known as: SINEMET   1 tablet, Oral, 3 Times Daily      cholecalciferol 25 MCG (1000 UT) tablet  Commonly known as: VITAMIN D3   1,000 Units, Oral, Daily      erythromycin 5 MG/GM ophthalmic ointment  Commonly known as: ROMYCIN   1 application, Both Eyes, Nightly, Apply a thin layer to bottom of both eyelids every night at bedtime      glucose blood test strip   1 each by In Vitro route daily Daily testing, DX:250.00      metFORMIN 500 MG tablet  Commonly known as: GLUCOPHAGE   500 mg, Oral, Daily With Breakfast      vitamin B-12 1000 MCG tablet  Commonly known as: CYANOCOBALAMIN   1,000 mcg, Oral, Daily         Stop These Medications    bisoprolol 5 MG tablet  Commonly known as: ZEBeta          Discharge Diet:     Diet Instructions     Diet: Consistent Carbohydrate, Cardiac, Dysphagia; Nectar / Syrup Thick Liquids; Mechanical Soft; Nectar / Syrup Thick      Discharge Diet:  Consistent Carbohydrate  Cardiac  Dysphagia       Fluid Consistency: Nectar / Syrup Thick Liquids    Pureed Options: Mechanical Soft    Fluid Consistency: Nectar / Syrup Thick    Requires feeding assistance        Activity at Discharge:     Activity Instructions     Activity as Tolerated          Follow-up  Appointments:     Follow-up Information     Magaly Prabhakar APRN Follow up in 1 week(s).    Specialty: Family Medicine  Contact information:  [address] [id number]  [phone]                       Future Appointments   Date Time Provider Department Center   2/21/2023  9:45 AM Cliff Lema III, MD [facility]   3/13/2023  9:10 AM Jose Fernandez MD [facility] ([facility]   3/28/2023  9:30 AM Cuca Woodard APRN [facility]   5/9/2023  8:30 AM Benjie Garrison PA-C [facility]     Test Results Pending at Discharge:           JEANETTE Duckworth  01/02/23  08:59 EST    Time: I spent 35 minutes on this discharge activity which included: face-to-face encounter with the patient, reviewing the data in the system, coordination of the care with the nursing staff as well as consultants, documentation, and entering orders.     Dictated utilizing Dragon dictation.      Electronically signed by Neva Dubose APRN at 01/02/23 0921

## 2023-01-02 NOTE — CASE MANAGEMENT/SOCIAL WORK
Case Management/Social Work    Patient Name:  Ankur Camp  YOB: 1940  MRN: [id number]  Admit Date:  12/22/2022        Plan is to d/c to [facility]. DEREK attempted to contact Lakesha/Eryn, who did not answer due to being off for the holiday. DEREK then contacted the facility and spoke with JACKELINE who states she has to look into referral and make sure pt is able to come to facility today. Earliest date pt able to admit was 1/1/23 due to COVID quarantine. DEREK following and will update when able. DEREK updated APRN, RN and d/c Flow RN via secure chat.       12:46 EST DEREK received call from JACKELINE at [facility] who states pt will need a pre-cert before coming to facility which could take 1-2 days. DEREK updated APRN, RN and D/c flow RN via secure chat. DEREK attempted to call pts spouse via telephone, no answer but left message for return call back. DEREK following and will update when able.     14:22 EST SW received call from spouse to update on going to facility. Pt will not be d/c'ing today. Spouse understood.       Electronically signed by:  KYLE Andersen  01/02/23 09:49 EST

## 2023-01-02 NOTE — THERAPY TREATMENT NOTE
Patient Name: Ankur Camp  : 1940    MRN: [id number]                              Today's Date: 2023       Admit Date: 2022    Visit Dx:     ICD-10-CM ICD-9-CM   1. Urinary tract infection without hematuria, site unspecified  N39.0 599.0   2. COVID-19  U07.1 079.89   3. Acute respiratory failure with hypoxia (HCC)  J96.01 518.81   4. Lactic acidosis  E87.20 276.2   5. Acute renal insufficiency  N28.9 593.9     Patient Active Problem List   Diagnosis   • Benign prostatic hypertrophy without urinary obstruction   • Hypertension   • Hyperlipidemia   • Type 2 diabetes mellitus (HCC)   • Vitamin D deficiency   • Colon cancer screening   • Coronary artery calcification   • Thoracic aortic aneurysm (TAA)   • Primary osteoarthritis of right knee   • Status post total knee replacement using cement, right   • Urinary tract infection without hematuria, site unspecified   • COVID-19 virus detected   • Acute respiratory failure with hypoxia (HCC)   • Parkinson's disease dementia (HCC)   • Cytokine release syndrome, grade 2     Past Medical History:   Diagnosis Date   • Arthritis    • Cancer (HCC)     kidney/ prostate   • Diabetes mellitus (HCC)    • ED (erectile dysfunction)    • Enlarged prostate with lower urinary tract symptoms (LUTS)    • Full dentures    • History of fracture of pelvis 2019    after fall from ladder   • History of loss of consciousness 2019    after fall from ladder-flown to [facility]   • History of rib fracture 2019    after fall from ladder   • History of right shoulder fracture 1960    walking up hill, fell on gravel   • History of stress test    • History of torn meniscus of right knee    • [place] (hard of hearing)     Bilateral hearing aids    • Hypertension    • Laceration of head 2019    after fall from ladder-treated at [facility]   • Left shoulder strain 2017    after fall from ladder   • Lung injury 2019    punctured  right lung after falling off ladder   • Renal disorder    • Stone in kidney    • Stroke (HCC)      Past Surgical History:   Procedure Laterality Date   • CHEST TUBE INSERTION  11/2019    rib fractures, punctured lung after fall from [facility]   • COLONOSCOPY     • COLONOSCOPY N/A 4/8/2019    Procedure: COLONOSCOPY;  Surgeon: Onesimo Arnold MD;  Location: [facility] ENDOSCOPY;  Service: Gastroenterology   • EYE SURGERY Right     cataract removal with IOL implant   • HERNIA REPAIR Right     multiple inguinal hernia repairs   • KNEE ARTHROSCOPY W/ MENISCECTOMY Right 05/10/2012    Partial medial and lateral menisectomy-Conrad Baird MD   • NEPHRECTOMY Right     partial right nephrectomy due to kidney stone   • PELVIC FRACTURE SURGERY Right 11/2019    multiple pelvic fractures after fall from [facility]   • SHOULDER SURGERY Right 1960    fracture surgery to remove a piece of bone- [place]   • TOTAL KNEE ARTHROPLASTY Right 5/25/2021    Procedure: knee arthroplasty total;  Surgeon: Conrad Baird MD;  Location: [facility] OR;  Service: Orthopedics;  Laterality: Right;      General Information     Row Name 01/02/23 1544          Physical Therapy Time and Intention    Document Type therapy note (daily note)  -RM     Mode of Treatment physical therapy  -     Row Name 01/02/23 1544          General Information    Patient Profile Reviewed yes  -RM     Existing Precautions/Restrictions fall;oxygen therapy device and L/min  5L  -     Row Name 01/02/23 1544          Cognition    Orientation Status (Cognition) oriented to;person  -     Row Name 01/02/23 1544          Safety Issues, Functional Mobility    Safety Issues Affecting Function (Mobility) positioning of assistive device;problem-solving;sequencing abilities;safety precautions follow-through/compliance;insight into deficits/self-awareness  -     Impairments Affecting Function (Mobility)  balance;coordination;endurance/activity tolerance;motor control;postural/trunk control;strength  -RM           User Key  (r) = Recorded By, (t) = Taken By, (c) = Cosigned By    Initials Name Provider Type    Santos Peña PTA Physical Therapist Assistant               Mobility     Row Name 01/02/23 1544          Bed Mobility    Supine-Sit [place] (Bed Mobility) minimum assist (75% patient effort);verbal cues  -RM     Sit-Supine [place] (Bed Mobility) minimum assist (75% patient effort);verbal cues  -RM     Assistive Device (Bed Mobility) bed rails;head of bed elevated  -RM     Row Name 01/02/23 1544          Sit-Stand Transfer    Sit-Stand [place] (Transfers) minimum assist (75% patient effort);moderate assist (50% patient effort);verbal cues;nonverbal cues (demo/gesture)  -RM     Assistive Device (Sit-Stand Transfers) walker, front-wheeled  -RM     Comment, (Sit-Stand Transfer) x2  -RM     Row Name 01/02/23 1544          Gait/Stairs (Locomotion)    [place] Level (Gait) moderate assist (50% patient effort);verbal cues;maximum assist (25% patient effort)  -RM     Assistive Device (Gait) walker, front-wheeled  -RM     Distance in Feet (Gait) side step to R to HOB 3'  -RM     Deviations/Abnormal Patterns (Gait) base of support, wide;weight shifting decreased  -RM           User Key  (r) = Recorded By, (t) = Taken By, (c) = Cosigned By    Initials Name Provider Type    Santos Peña PTA Physical Therapist Assistant               Obj/Interventions     Row Name 01/02/23 1546          Motor Skills    Therapeutic Exercise hip;knee;ankle  -RM     Row Name 01/02/23 1546          Hip (Therapeutic Exercise)    Hip (Therapeutic Exercise) strengthening exercise  -RM     Hip Strengthening (Therapeutic Exercise) bilateral;heel slides;10 repetitions;aBduction;aDduction  -RM     Row Name 01/02/23 1546          Knee (Therapeutic Exercise)    Knee (Therapeutic Exercise) AAROM (active assistive range  of motion)  -RM     Knee AAROM (Therapeutic Exercise) bilateral;flexion;extension;10 repetitions  -RM     Row Name 01/02/23 1546          Ankle (Therapeutic Exercise)    Ankle (Therapeutic Exercise) AAROM (active assistive range of motion)  -RM     Ankle AAROM (Therapeutic Exercise) bilateral;dorsiflexion;plantarflexion;10 repetitions  -RM           User Key  (r) = Recorded By, (t) = Taken By, (c) = Cosigned By    Initials Name Provider Type    Santos Peña, EMMA Physical Therapist Assistant               Goals/Plan    No documentation.                Clinical Impression     Row Name 01/02/23 1547          Pain    Pretreatment Pain Rating 0/10 - no pain  -RM     Posttreatment Pain Rating 0/10 - no pain  -RM     Row Name 01/02/23 1547          Plan of Care Review    Plan of Care Reviewed With patient  -RM     Progress improving  -RM     Outcome Evaluation Pt supine in bed and willing to particiapte with treatment.  Pt perfomed bed mobility with min a. Pt performed STS from EOB with min/mod a and attenpted sitde stepping to R to reach HOB mod/max a with rw.  Pt also performed B LE exercise. see flowsheet for details  -RM     Row Name 01/02/23 1547          Vital Signs    Pre SpO2 (%) 95  -RM     O2 Delivery Pre Treatment supplemental O2  5L  -RM     Intra SpO2 (%) 81  -RM     O2 Delivery Intra Treatment supplemental O2  -RM     Post SpO2 (%) 92  -RM     O2 Delivery Post Treatment supplemental O2  -RM     Pre Patient Position Supine  -RM     Intra Patient Position Standing  -RM     Post Patient Position Sitting  -RM     Row Name 01/02/23 1547          Positioning and Restraints    Pre-Treatment Position in bed  -RM     Post Treatment Position bed  -RM     In Bed fowlers;call light within reach;encouraged to call for assist;exit alarm on;notified nsg  -RM           User Key  (r) = Recorded By, (t) = Taken By, (c) = Cosigned By    Initials Name Provider Type    Santos Peña, EMMA Physical Therapist  Assistant               Outcome Measures     Row Name 01/02/23 1550 01/02/23 0825       How much help from another person do you currently need...    Turning from your back to your side while in flat bed without using bedrails? 3  -RM 3  -MR    Moving from lying on back to sitting on the side of a flat bed without bedrails? 3  -RM 3  -MR    Moving to and from a bed to a chair (including a wheelchair)? 2  -RM 2  -MR    Standing up from a chair using your arms (e.g., wheelchair, bedside chair)? 2  -RM 2  -MR    Climbing 3-5 steps with a railing? 1  -RM 1  -MR    To walk in hospital room? 1  -RM 2  -MR    AM-PAC 6 Clicks Score (PT) 12  -RM 13  -MR    Highest level of mobility 4 --> Transferred to chair/commode  -RM 4 --> Transferred to chair/commode  -MR    Row Name 01/02/23 1550          Functional Assessment    Outcome Measure Options AM-PAC 6 Clicks Basic Mobility (PT)  -           User Key  (r) = Recorded By, (t) = Taken By, (c) = Cosigned By    Initials Name Provider Type    RM Santos Garvin, PTA Physical Therapist Assistant    MR Tiffany Carrera, RN Registered Nurse                             Physical Therapy Education     Title: PT OT SLP Therapies (In Progress)     Topic: Physical Therapy (In Progress)     Point: Mobility training (Done)     Learning Progress Summary           Patient Acceptance, E,TB,D, VU,NR by  at 1/2/2023 1551    Acceptance, E,TB,D, VU,NR by  at 12/30/2022 1703    Comment: safety with mobility    Acceptance, E,TB,D, VU,NR by  at 12/29/2022 1601    Comment: Need for assistance    Acceptance, E,TB, VU,NR by CC at 12/26/2022 1713    Comment: Importance of increasing mob and VC with demonstration for foot clearance    Acceptance, E,D, DU by  at 12/23/2022 1144    Comment: Pt education for improving technique with walker for transfers                   Point: Home exercise program (Done)     Learning Progress Summary           Patient Acceptance, E,TB,D, VU,NR by  at 1/2/2023 1551     Acceptance, E,TB, VU,NR by  at 12/24/2022 1700    Comment: Importance of performing B LE ex btw therapy sessions                   Point: Body mechanics (Not Started)     Learner Progress:  Not documented in this visit.          Point: Precautions (Not Started)     Learner Progress:  Not documented in this visit.                      User Key     Initials Effective Dates Name Provider Type Discipline     06/16/21 -  Yesenia Gerardo PTA Physical Therapist Assistant PT     06/16/21 -  Santos Garvin, EMMA Physical Therapist Assistant PT     06/16/21 -  Abby Agrawal, ADA Physical Therapist PT              PT Recommendation and Plan     Plan of Care Reviewed With: patient  Progress: improving  Outcome Evaluation: Pt supine in bed and willing to particiapte with treatment.  Pt perfomed bed mobility with min a. Pt performed STS from EOB with min/mod a and attenpted sitde stepping to R to reach HOB mod/max a with rw.  Pt also performed B LE exercise. see flowsheet for details     Time Calculation:    PT Charges     Row Name 01/02/23 1551             Time Calculation    Start Time 1333  -RM      Stop Time 1358  -RM      Time Calculation (min) 25 min  -RM      PT Received On 01/02/23  -RM      PT Goal Re-Cert Due Date 01/04/23  -RM         Time Calculation- PT    Total Timed Code Minutes- PT 25 minute(s)  -RM         Timed Charges    34336 - PT Therapeutic Exercise Minutes 10  -RM      83534 - Gait Training Minutes  15  -RM         Total Minutes    Timed Charges Total Minutes 25  -RM       Total Minutes 25  -RM            User Key  (r) = Recorded By, (t) = Taken By, (c) = Cosigned By    Initials Name Provider Type     Santos Garvin, EMMA Physical Therapist Assistant              Therapy Charges for Today     Code Description Service Date Service Provider Modifiers Qty    77753393076 HC PT THER PROC EA 15 MIN 1/2/2023 Santos Garvin, PTA GP 1    14323313468 HC GAIT TRAINING EA 15 MIN 1/2/2023 Bharathi  Santos VICK, PTA GP 1          PT G-Codes  Outcome Measure Options: AM-PAC 6 Clicks Basic Mobility (PT)  AM-PAC 6 Clicks Score (PT): 12  AM-PAC 6 Clicks Score (OT): 15       Santos Garvin, PTA  1/2/2023

## 2023-01-02 NOTE — PLAN OF CARE
Goal Outcome Evaluation:   Patient being discharged to [facility]

## 2023-01-03 LAB
GLUCOSE BLDC GLUCOMTR-MCNC: 120 MG/DL (ref 70–130)
GLUCOSE BLDC GLUCOMTR-MCNC: 148 MG/DL (ref 70–130)
GLUCOSE BLDC GLUCOMTR-MCNC: 166 MG/DL (ref 70–130)

## 2023-01-03 PROCEDURE — 82962 GLUCOSE BLOOD TEST: CPT

## 2023-01-03 PROCEDURE — 63710000001 INSULIN ASPART PER 5 UNITS: Performed by: NURSE PRACTITIONER

## 2023-01-03 PROCEDURE — 99232 SBSQ HOSP IP/OBS MODERATE 35: CPT | Performed by: NURSE PRACTITIONER

## 2023-01-03 PROCEDURE — 97535 SELF CARE MNGMENT TRAINING: CPT

## 2023-01-03 PROCEDURE — 97110 THERAPEUTIC EXERCISES: CPT

## 2023-01-03 PROCEDURE — 25010000002 ENOXAPARIN PER 10 MG: Performed by: NURSE PRACTITIONER

## 2023-01-03 RX ADMIN — CARBIDOPA AND LEVODOPA 1 TABLET: 25; 100 TABLET ORAL at 09:26

## 2023-01-03 RX ADMIN — LISINOPRIL 40 MG: 20 TABLET ORAL at 09:26

## 2023-01-03 RX ADMIN — ATORVASTATIN CALCIUM 80 MG: 80 TABLET, FILM COATED ORAL at 09:26

## 2023-01-03 RX ADMIN — ENOXAPARIN SODIUM 40 MG: 100 INJECTION SUBCUTANEOUS at 21:00

## 2023-01-03 RX ADMIN — SENNOSIDES AND DOCUSATE SODIUM 2 TABLET: 50; 8.6 TABLET ORAL at 09:26

## 2023-01-03 RX ADMIN — CARBIDOPA AND LEVODOPA 1 TABLET: 25; 100 TABLET ORAL at 16:49

## 2023-01-03 RX ADMIN — BISACODYL 10 MG: 10 SUPPOSITORY RECTAL at 09:26

## 2023-01-03 RX ADMIN — ALBUTEROL SULFATE 2 PUFF: 90 AEROSOL, METERED RESPIRATORY (INHALATION) at 12:33

## 2023-01-03 RX ADMIN — ALBUTEROL SULFATE 2 PUFF: 90 AEROSOL, METERED RESPIRATORY (INHALATION) at 21:05

## 2023-01-03 RX ADMIN — INSULIN ASPART 2 UNITS: 100 INJECTION, SOLUTION INTRAVENOUS; SUBCUTANEOUS at 16:49

## 2023-01-03 RX ADMIN — ASPIRIN 81 MG CHEWABLE TABLET 81 MG: 81 TABLET CHEWABLE at 09:26

## 2023-01-03 RX ADMIN — INSULIN ASPART 4 UNITS: 100 INJECTION, SOLUTION INTRAVENOUS; SUBCUTANEOUS at 12:33

## 2023-01-03 RX ADMIN — CARBIDOPA AND LEVODOPA 1 TABLET: 25; 100 TABLET ORAL at 21:00

## 2023-01-03 RX ADMIN — SENNOSIDES AND DOCUSATE SODIUM 2 TABLET: 50; 8.6 TABLET ORAL at 21:00

## 2023-01-03 NOTE — CASE MANAGEMENT/SOCIAL WORK
Case Management/Social Work    Patient Name:  Ankur Camp  YOB: 1940  MRN: [id number]  Admit Date:  12/22/2022        DEREK received call from Calista MCCLELLAND who states pt is able to come to facility tomorrow 1/4/23 for STR. Pt will need EMS transport. Prior Auth for transport has been approved. SW updated RN, D/C Flow RN and APRN in secure chat. Wife will need to be updated when pt is d/c'd.       Electronically signed by:  KYLE Andersen  01/03/23 11:32 EST

## 2023-01-03 NOTE — PLAN OF CARE
Goal Outcome Evaluation:         VSS. Pt currently on 4L NC. Pt has not had BM since 12/29, suppository given. Patient remains alert-disoriented. No acute events.

## 2023-01-03 NOTE — PROGRESS NOTES
[facility]    Name:  Ankur Camp   Age:  82 y.o.  Sex:  male  :  1940  MRN:  [id number]   Visit Number:  [id number]  Admission Date:  2022  Date Of Service:  23  Primary Care Physician:  Magaly Prabhakar APRN     LOS: 12 days :    Chief Complaint:      Shortness of air/fatigue    Subjective:    Patient seen and examined.  Lying in bed on 4 L nasal cannula without distress. He is able to tell me his name this morning. No distress noted.  Updated that he may be going to rehab tomorrow.  RN at bedside.  Updated that patient will need suppository today as no recent bowel movement.  Able to titrate patient down to 3 L nasal cannula during my assessment with appropriate saturations maintained.    Hospital Course:    Patient is an 82 year old male who presented to the hospital with complaints of weakness who was sent from PCP office after being diagnosed with UTI.  Patient wife advised on arrival that patient had generalized weakness, less appetite with weight loss over the past 2 weeks.  Patient with past health history significant for diabetes mellitus type 2, hypertension, hyperlipidemia, and Parkinson's disease.     Work up in the emergency department noted troponin-0.033, BNP-2194, glucose-171, creatinine-1.31 with baseline 0.8, BUN-31,  albumin-3.3, CRP-9.9, CBC unremarkable, lactate-2.9 with repeat 2.1, procalcitonin-0.24.  Urinalysis noted infection with 2+ bacteria, 31-50 WBC, +nitrites, and +3 leuks.  CXR noted hypoinflation with scattered mild atelectasis. Covid 19 testing was positive.      Admitted to the hospital and continued on Rocephin for UTI.  Started on Decadron for Covid 19.  PT/OT consulted for evaluation of weakness.  Patient remained hypoxic with increased oxygen demands. Patient was initiated on remdesivir for 5 days.  Noted to have increased oxygen demands up to 10L.  CT chest to rule out PE ordered and was negative.  Oxygen demand decreased to 6 liters.  Patient noted to have hypernatremia likely secondary to poor p.o. intake with sodium of 148.  Patient initiated on D5W with high-dose sliding scale insulin adjusted.  Hypernatremia resolved.  Completed 5 days of remdesivir and 10 days of Decadron.  Overall improvement with patient currently requiring 3 L nasal cannula without distress.    Review of Systems:     All systems were reviewed and negative except as mentioned in subjective, assessment and plan.    Vital Signs:    Temp:  [97 °F (36.1 °C)-98.1 °F (36.7 °C)] 98.1 °F (36.7 °C)  Heart Rate:  [51-73] 57  Resp:  [18-20] 18  BP: (143-158)/(79-95) 158/95    Intake and output:    I/O last 3 completed shifts:  In: 480 [P.O.:480]  Out: -   I/O this shift:  In: 240 [P.O.:240]  Out: -     Physical Examination:    General Appearance:  Alert and cooperative. Chronically ill/frail appearing elderly male.   Head:  Atraumatic and normocephalic.   Eyes: Conjunctivae and sclerae normal, no icterus. No pallor.   Throat: No oral lesions, no thrush, oral mucosa moist.   Neck: Supple, trachea midline, no thyromegaly.   Lungs:   Breath sounds heard bilaterally equally.  No wheezing or crackles. No Pleural rub or bronchial breathing. Mild coarseness throughout.  Clears mostly after coughing.  Unlabored. On 3 liters.   Heart:  Normal S1 and S2, no murmur, no gallop, no rub. No JVD.   Abdomen:   Normal bowel sounds, no masses, no organomegaly. Soft, nontender, nondistended, no rebound tenderness.   Extremities: Supple, no edema, no cyanosis, no clubbing.   Skin: No bleeding or rash.   Neurologic: Alert and oriented x 2. No facial asymmetry. Moves all four limbs. No tremors. Generalized weakness noted.     Laboratory results:    Results from last 7 days   Lab Units 01/02/23  0750 01/01/23  0639 12/31/22  0640 12/30/22  0658 12/29/22  0711 12/28/22  0731   SODIUM mmol/L 143 144 147* 148* 146* 142   POTASSIUM mmol/L 4.2 4.1 3.5 4.0 4.0 3.3*    CHLORIDE mmol/L 109* 112* 112* 114* 113* 109*   CO2 mmol/L 25.9 23.5 26.0 27.5 23.4 24.6   BUN mg/dL 25* 21 23 29* 27* 24*   CREATININE mg/dL 0.73* 0.64* 0.72* 0.84 0.65* 0.68*   CALCIUM mg/dL 8.7 8.6 8.8 9.0 8.9 8.9   BILIRUBIN mg/dL  --   --   --  0.7 0.7 0.8   ALK PHOS U/L  --   --   --  104 90 95   ALT (SGPT) U/L  --   --   --  9 14 16   AST (SGOT) U/L  --   --   --  26 20 22   GLUCOSE mg/dL 131* 111* 78 166* 141* 162*     Results from last 7 days   Lab Units 01/02/23  0650 12/30/22  0657 12/29/22  0711   WBC 10*3/mm3 8.23 9.83 9.62   HEMOGLOBIN g/dL 13.5 13.2 13.2   HEMATOCRIT % 40.8 39.0 39.6   PLATELETS 10*3/mm3 106* 230 181                 No results for input(s): PHART, EMY9KAC, PO2ART, IQH1NLJ, BASEEXCESS in the last 8760 hours.   I have reviewed the patient's laboratory results.    Radiology results:    No radiology results from the last 24 hrs  I have reviewed the patient's radiology reports.    Medication Review:     I have reviewed the patient's active and prn medications.     Problem List:      Acute respiratory failure with hypoxia (HCC)    Type 2 diabetes mellitus (HCC)    Urinary tract infection without hematuria, site unspecified    COVID-19 virus detected    Parkinson's disease dementia (HCC)    Cytokine release syndrome, grade 2      Assessment:    1. Acute UTI, POA  2. Acute Respiratory Failure with Hypoxia 2/2 # 3, POA  3. Covid 19 +, POA  4. Viral PNA 2/2 above, POA  5. Hypernatremia secondary to poor p.o. intake  6. Lactic Acidosis, POA, resolved  7. [place], POA. resolved  8. Parkinsons Disease  9. Diabetes Mellitus Type 2 on oral anti-diabetics  10. Hyperlipidemia     Plan:     Acute UTI/ LAWSON/ Lactic Acidosis  -Completed Rocephin 1GM IV for 5 days  -Urine culture  E. coli and  sensitive to Rocephin  -Repeat Lactate-normal    Hypernatremia secondary to poor p.o. intake-resolved  -Gentle D5W initiated  -Resolved. Will repeat BMP in AM.  -Encouraged patient to increase p.o. intake.     Acute  Respiratory Failure/ Covid 19+  -Supplemental oxygen to keep saturations >90%--requiring 5L on exam with saturations around 90%  -Continue Decadron 6mg daily for 10 days/Remdesivir x 5 days-completed  -Procalcitonin remains negative  - No indications for antibiotics at this time  -Supportive care   -Continue PT/OT  -CT to rule out PE negative     Diabetes Mellitus  -On oral anti-diabetics at home  -Cover with sliding scale 2/2 ongoing steroid use  -A1c-8     Bradycardia  - Resolved- Bisoprolol held.     Hypertension  - bisoprolol was stopped due to bradycardia and he has since been on lisinopril 40 mg daily.      -Patient accepted to transfer to [facility] tomorrow.     DVT Prophylaxis: Lovenox  Code Status: Full Code  Diet: CC  Discharge Plan: Plan to DC to [facility] within 24 hours      Neva Dubose, APRN  01/03/23  11:59 EST    Dictated utilizing Dragon dictation.

## 2023-01-03 NOTE — PLAN OF CARE
Goal Outcome Evaluation:                 No acute events during shift. VSS, SA on tele. 4L HFNC. Pt rested throughout shift. No other changes in pt condition. Will continue to monitor.

## 2023-01-03 NOTE — PAYOR COMM NOTE
"  UPDATE; PT DID NOT DISCHARGE, AWAITING PRECERT FROM [employer]  UR MANAGER; MATT MOORE; [phone] AND [phone]    Ankur Olson (82 y.o. Male)     Date of Birth   1940    Social Security Number       Address   [address] [place] [address]    Home Phone   [phone]    MRN   [id number]       Yazidi   Sabianism    Marital Status                               Admission Date   12/22/22    Admission Type   Emergency    Admitting Provider       Attending Provider   Oral Cano DO    [facility]       Discharge Date       Discharge Disposition       Discharge Destination                               Attending Provider: Oral Cano DO    Allergies: No Known Allergies    Isolation: Enh Drop/Con   Infection: COVID (confirmed) (12/22/22)   Code Status: CPR    Ht: 167.6 cm (66\")   Wt: 65.9 kg (145 lb 4.5 oz)    Admission Cmt: None   Principal Problem: Acute respiratory failure with hypoxia (HCC) [J96.01]                 Active Insurance as of 12/22/2022     Primary Coverage     Payor Plan Insurance Group Employer/Plan Group    ANTHEM MEDICARE REPLACEMENT ANTHEM MEDICARE ADVANTAGE [id number]     Payor Plan Address Payor Plan Phone Number Payor Plan Fax Number Effective Dates    [address] [phone]  1/1/2021 - None Entered    [facility] [address]       Subscriber Name Subscriber Birth Date Member ID       ANKUR OLSON 1940 [id number]                 Emergency Contacts      (Rel.) Home Phone Work Phone Mobile Phone    Kianna Olson (Spouse) [phone] -- --    MartellElvi (Daughter) -- -- [phone]               Physical Therapy Notes (last 24 hours)      Santos Garvin, PTA at 01/02/23 1551  Version 1 of 1       Goal Outcome Evaluation:  Plan of Care Reviewed With: patient        Progress: improving  Outcome Evaluation: Pt supine in bed and willing to particiapte " with treatment.  Pt perfomed bed mobility with min a. Pt performed STS from EOB with min/mod a and attenpted sitde stepping to R to reach HOB mod/max a with rw.  Pt also performed B LE exercise. see flowsheet for details    Electronically signed by Santos Garvin, PTA at 23     Santos Garvin, PTA at 23 1552  Version 1 of 1         Patient Name: Ankur Camp  : 1940    MRN: [id number]                              Today's Date: 2023       Admit Date: 2022    Visit Dx:     ICD-10-CM ICD-9-CM   1. Urinary tract infection without hematuria, site unspecified  N39.0 599.0   2. COVID-19  U07.1 079.89   3. Acute respiratory failure with hypoxia (HCC)  J96.01 518.81   4. Lactic acidosis  E87.20 276.2   5. Acute renal insufficiency  N28.9 593.9     Patient Active Problem List   Diagnosis   • Benign prostatic hypertrophy without urinary obstruction   • Hypertension   • Hyperlipidemia   • Type 2 diabetes mellitus (HCC)   • Vitamin D deficiency   • Colon cancer screening   • Coronary artery calcification   • Thoracic aortic aneurysm (TAA)   • Primary osteoarthritis of right knee   • Status post total knee replacement using cement, right   • Urinary tract infection without hematuria, site unspecified   • COVID-19 virus detected   • Acute respiratory failure with hypoxia (HCC)   • Parkinson's disease dementia (HCC)   • Cytokine release syndrome, grade 2     Past Medical History:   Diagnosis Date   • Arthritis    • Cancer (HCC)     kidney/ prostate   • Diabetes mellitus (HCC)    • ED (erectile dysfunction)    • Enlarged prostate with lower urinary tract symptoms (LUTS)    • Full dentures    • History of fracture of pelvis 2019    after fall from ladder   • History of loss of consciousness 2019    after fall from ladder-flown to [facility]   • History of rib fracture 2019    after fall from ladder   • History of right shoulder fracture 1960    walking up  hill, fell on gravel   • History of stress test    • History of torn meniscus of right knee 2012   • [place] (hard of hearing)     Bilateral hearing aids    • Hypertension    • Laceration of head 11/14/2019    after fall from ladder-treated at [facility]   • Left shoulder strain 2017    after fall from ladder   • Lung injury 11/14/2019    punctured right lung after falling off ladder   • Renal disorder    • Stone in kidney    • Stroke (HCC)      Past Surgical History:   Procedure Laterality Date   • CHEST TUBE INSERTION  11/2019    rib fractures, punctured lung after fall from ladder-[facility]   • COLONOSCOPY     • COLONOSCOPY N/A 4/8/2019    Procedure: COLONOSCOPY;  Surgeon: Onesimo Arnold MD;  Location: [facility] ENDOSCOPY;  Service: Gastroenterology   • EYE SURGERY Right     cataract removal with IOL implant   • HERNIA REPAIR Right     multiple inguinal hernia repairs   • KNEE ARTHROSCOPY W/ MENISCECTOMY Right 05/10/2012    Partial medial and lateral menisectomy-Conrad Baird MD   • NEPHRECTOMY Right     partial right nephrectomy due to kidney stone   • PELVIC FRACTURE SURGERY Right 11/2019    multiple pelvic fractures after fall from [facility]   • SHOULDER SURGERY Right 1960    fracture surgery to remove a piece of bone- [place], [place]   • TOTAL KNEE ARTHROPLASTY Right 5/25/2021    Procedure: knee arthroplasty total;  Surgeon: Conrad Baird MD;  Location: [facility] OR;  Service: Orthopedics;  Laterality: Right;      General Information     Row Name 01/02/23 1544          Physical Therapy Time and Intention    Document Type therapy note (daily note)  -RM     Mode of Treatment physical therapy  -RM     Row Name 01/02/23 1544          General Information    Patient Profile Reviewed yes  -RM     Existing Precautions/Restrictions fall;oxygen therapy device and L/min  5L  -RM     Row Name 01/02/23 1547          Cognition    Orientation Status (Cognition) oriented  to;person  -RM     Row Name 01/02/23 1544          Safety Issues, Functional Mobility    Safety Issues Affecting Function (Mobility) positioning of assistive device;problem-solving;sequencing abilities;safety precautions follow-through/compliance;insight into deficits/self-awareness  -RM     Impairments Affecting Function (Mobility) balance;coordination;endurance/activity tolerance;motor control;postural/trunk control;strength  -RM           User Key  (r) = Recorded By, (t) = Taken By, (c) = Cosigned By    Initials Name Provider Type    Santos Peña PTA Physical Therapist Assistant               Mobility     Row Name 01/02/23 1544          Bed Mobility    Supine-Sit [place] (Bed Mobility) minimum assist (75% patient effort);verbal cues  -RM     Sit-Supine [place] (Bed Mobility) minimum assist (75% patient effort);verbal cues  -RM     Assistive Device (Bed Mobility) bed rails;head of bed elevated  -RM     Row Name 01/02/23 1544          Sit-Stand Transfer    Sit-Stand [place] (Transfers) minimum assist (75% patient effort);moderate assist (50% patient effort);verbal cues;nonverbal cues (demo/gesture)  -RM     Assistive Device (Sit-Stand Transfers) walker, front-wheeled  -RM     Comment, (Sit-Stand Transfer) x2  -RM     Row Name 01/02/23 1544          Gait/Stairs (Locomotion)    [place] Level (Gait) moderate assist (50% patient effort);verbal cues;maximum assist (25% patient effort)  -RM     Assistive Device (Gait) walker, front-wheeled  -RM     Distance in Feet (Gait) side step to R to HOB 3'  -RM     Deviations/Abnormal Patterns (Gait) base of support, wide;weight shifting decreased  -RM           User Key  (r) = Recorded By, (t) = Taken By, (c) = Cosigned By    Initials Name Provider Type    Santos Peña PTA Physical Therapist Assistant               Obj/Interventions     Row Name 01/02/23 1546          Motor Skills    Therapeutic Exercise hip;knee;ankle  -RM     Row Name  01/02/23 1546          Hip (Therapeutic Exercise)    Hip (Therapeutic Exercise) strengthening exercise  -RM     Hip Strengthening (Therapeutic Exercise) bilateral;heel slides;10 repetitions;aBduction;aDduction  -RM     Row Name 01/02/23 1546          Knee (Therapeutic Exercise)    Knee (Therapeutic Exercise) AAROM (active assistive range of motion)  -RM     Knee AAROM (Therapeutic Exercise) bilateral;flexion;extension;10 repetitions  -RM     Row Name 01/02/23 1546          Ankle (Therapeutic Exercise)    Ankle (Therapeutic Exercise) AAROM (active assistive range of motion)  -RM     Ankle AAROM (Therapeutic Exercise) bilateral;dorsiflexion;plantarflexion;10 repetitions  -RM           User Key  (r) = Recorded By, (t) = Taken By, (c) = Cosigned By    Initials Name Provider Type    Santos Peña, PTA Physical Therapist Assistant               Goals/Plan    No documentation.                Clinical Impression     Row Name 01/02/23 1547          Pain    Pretreatment Pain Rating 0/10 - no pain  -RM     Posttreatment Pain Rating 0/10 - no pain  -RM     Row Name 01/02/23 1547          Plan of Care Review    Plan of Care Reviewed With patient  -RM     Progress improving  -RM     Outcome Evaluation Pt supine in bed and willing to particiapte with treatment.  Pt perfomed bed mobility with min a. Pt performed STS from EOB with min/mod a and attenpted sitde stepping to R to reach HOB mod/max a with rw.  Pt also performed B LE exercise. see flowsheet for details  -RM     Row Name 01/02/23 1547          Vital Signs    Pre SpO2 (%) 95  -RM     O2 Delivery Pre Treatment supplemental O2  5L  -RM     Intra SpO2 (%) 81  -RM     O2 Delivery Intra Treatment supplemental O2  -RM     Post SpO2 (%) 92  -RM     O2 Delivery Post Treatment supplemental O2  -RM     Pre Patient Position Supine  -RM     Intra Patient Position Standing  -RM     Post Patient Position Sitting  -RM     Row Name 01/02/23 154          Positioning and Restraints     Pre-Treatment Position in bed  -RM     Post Treatment Position bed  -RM     In Bed fowlers;call light within reach;encouraged to call for assist;exit alarm on;notified nsg  -RM           User Key  (r) = Recorded By, (t) = Taken By, (c) = Cosigned By    Initials Name Provider Type    Santos Peña, EMMA Physical Therapist Assistant               Outcome Measures     Row Name 01/02/23 1550 01/02/23 0825       How much help from another person do you currently need...    Turning from your back to your side while in flat bed without using bedrails? 3  -RM 3  -MR    Moving from lying on back to sitting on the side of a flat bed without bedrails? 3  -RM 3  -MR    Moving to and from a bed to a chair (including a wheelchair)? 2  -RM 2  -MR    Standing up from a chair using your arms (e.g., wheelchair, bedside chair)? 2  -RM 2  -MR    Climbing 3-5 steps with a railing? 1  -RM 1  -MR    To walk in hospital room? 1  -RM 2  -MR    AM-PAC 6 Clicks Score (PT) 12  -RM 13  -MR    Highest level of mobility 4 --> Transferred to chair/commode  -RM 4 --> Transferred to chair/commode  -MR    Row Name 01/02/23 1550          Functional Assessment    Outcome Measure Options AM-PAC 6 Clicks Basic Mobility (PT)  -RM           User Key  (r) = Recorded By, (t) = Taken By, (c) = Cosigned By    Initials Name Provider Type    Santos Peña, EMMA Physical Therapist Assistant    Tiffany Pierson RN Registered Nurse                             Physical Therapy Education     Title: PT OT SLP Therapies (In Progress)     Topic: Physical Therapy (In Progress)     Point: Mobility training (Done)     Learning Progress Summary           Patient Acceptance, E,TB,D, VU,NR by  at 1/2/2023 1551    Acceptance, E,TB,D, VU,NR by  at 12/30/2022 1703    Comment: safety with mobility    Acceptance, E,TB,D, VU,NR by  at 12/29/2022 1601    Comment: Need for assistance    Acceptance, E,TB, VU,NR by  at 12/26/2022 1713    Comment: Importance of  increasing mob and VC with demonstration for foot clearance    Acceptance, E,D, DU by  at 12/23/2022 1144    Comment: Pt education for improving technique with walker for transfers                   Point: Home exercise program (Done)     Learning Progress Summary           Patient Acceptance, E,TB,D, VU,NR by  at 1/2/2023 1551    Acceptance, E,TB, VU,NR by  at 12/24/2022 1700    Comment: Importance of performing B LE ex btw therapy sessions                   Point: Body mechanics (Not Started)     Learner Progress:  Not documented in this visit.          Point: Precautions (Not Started)     Learner Progress:  Not documented in this visit.                      User Key     Initials Effective Dates Name Provider Type Discipline    CC 06/16/21 -  Yesenia Gerardo, PTA Physical Therapist Assistant PT     06/16/21 -  Santos Garvin, PTA Physical Therapist Assistant PT     06/16/21 -  Abby Agrawal, PT Physical Therapist PT              PT Recommendation and Plan     Plan of Care Reviewed With: patient  Progress: improving  Outcome Evaluation: Pt supine in bed and willing to particiapte with treatment.  Pt perfomed bed mobility with min a. Pt performed STS from EOB with min/mod a and attenpted sitde stepping to R to reach HOB mod/max a with rw.  Pt also performed B LE exercise. see flowsheet for details     Time Calculation:    PT Charges     Row Name 01/02/23 1551             Time Calculation    Start Time 1333  -RM      Stop Time 1358  -RM      Time Calculation (min) 25 min  -RM      PT Received On 01/02/23  -RM      PT Goal Re-Cert Due Date 01/04/23  -RM         Time Calculation- PT    Total Timed Code Minutes- PT 25 minute(s)  -RM         Timed Charges    02513 - PT Therapeutic Exercise Minutes 10  -RM      95453 - Gait Training Minutes  15  -RM         Total Minutes    Timed Charges Total Minutes 25  -RM       Total Minutes 25  -RM            User Key  (r) = Recorded By, (t) = Taken By, (c) = Cosigned  By    Initials Name Provider Type     Santos Garvin PTA Physical Therapist Assistant              Therapy Charges for Today     Code Description Service Date Service Provider Modifiers Qty    39898517498 HC PT THER PROC EA 15 MIN 1/2/2023 Santos Garvin, EMMA GP 1    61412105836 HC GAIT TRAINING EA 15 MIN 1/2/2023 Santos Garvin, EMMA GP 1          PT G-Codes  Outcome Measure Options: AM-PAC 6 Clicks Basic Mobility (PT)  AM-PAC 6 Clicks Score (PT): 12  AM-PAC 6 Clicks Score (OT): 15       Santos Garvin PTA  1/2/2023      Electronically signed by Santos Garvin PTA at 01/02/23 1635

## 2023-01-03 NOTE — THERAPY TREATMENT NOTE
Patient Name: Ankur Camp  : 1940    MRN: [id number]                              Today's Date: 1/3/2023       Admit Date: 2022    Visit Dx:     ICD-10-CM ICD-9-CM   1. Urinary tract infection without hematuria, site unspecified  N39.0 599.0   2. COVID-19  U07.1 079.89   3. Acute respiratory failure with hypoxia (HCC)  J96.01 518.81   4. Lactic acidosis  E87.20 276.2   5. Acute renal insufficiency  N28.9 593.9     Patient Active Problem List   Diagnosis   • Benign prostatic hypertrophy without urinary obstruction   • Hypertension   • Hyperlipidemia   • Type 2 diabetes mellitus (HCC)   • Vitamin D deficiency   • Colon cancer screening   • Coronary artery calcification   • Thoracic aortic aneurysm (TAA)   • Primary osteoarthritis of right knee   • Status post total knee replacement using cement, right   • Urinary tract infection without hematuria, site unspecified   • COVID-19 virus detected   • Acute respiratory failure with hypoxia (HCC)   • Parkinson's disease dementia (HCC)   • Cytokine release syndrome, grade 2     Past Medical History:   Diagnosis Date   • Arthritis    • Cancer (HCC)     kidney/ prostate   • Diabetes mellitus (HCC)    • ED (erectile dysfunction)    • Enlarged prostate with lower urinary tract symptoms (LUTS)    • Full dentures    • History of fracture of pelvis 2019    after fall from ladder   • History of loss of consciousness 2019    after fall from ladder-flown to [facility]   • History of rib fracture 2019    after fall from ladder   • History of right shoulder fracture 1960    walking up hill, fell on gravel   • History of stress test    • History of torn meniscus of right knee    • [place] (hard of hearing)     Bilateral hearing aids    • Hypertension    • Laceration of head 2019    after fall from ladder-treated at [facility]   • Left shoulder strain 2017    after fall from ladder   • Lung injury 2019    punctured  right lung after falling off ladder   • Renal disorder    • Stone in kidney    • Stroke (HCC)      Past Surgical History:   Procedure Laterality Date   • CHEST TUBE INSERTION  11/2019    rib fractures, punctured lung after fall from [facility]   • COLONOSCOPY     • COLONOSCOPY N/A 4/8/2019    Procedure: COLONOSCOPY;  Surgeon: Onesimo Arnold MD;  Location: [facility] ENDOSCOPY;  Service: Gastroenterology   • EYE SURGERY Right     cataract removal with IOL implant   • HERNIA REPAIR Right     multiple inguinal hernia repairs   • KNEE ARTHROSCOPY W/ MENISCECTOMY Right 05/10/2012    Partial medial and lateral menisectomy-Conrad Baird MD   • NEPHRECTOMY Right     partial right nephrectomy due to kidney stone   • PELVIC FRACTURE SURGERY Right 11/2019    multiple pelvic fractures after fall from [facility]   • SHOULDER SURGERY Right 1960    fracture surgery to remove a piece of bone- [place]   • TOTAL KNEE ARTHROPLASTY Right 5/25/2021    Procedure: knee arthroplasty total;  Surgeon: Conrad Baird MD;  Location: [facility] OR;  Service: Orthopedics;  Laterality: Right;      General Information     Row Name 01/03/23 1220          OT Time and Intention    Document Type therapy note (daily note)  -SD     Mode of Treatment occupational therapy  -SD     Row Name 01/03/23 1220          General Information    Patient Profile Reviewed yes  -SD           User Key  (r) = Recorded By, (t) = Taken By, (c) = Cosigned By    Initials Name Provider Type    SD Jyoti Rosario OT Occupational Therapist                 Mobility/ADL's     Row Name 01/03/23 1220          Bed Mobility    Bed Mobility supine-sit  -SD     Supine-Sit [place] (Bed Mobility) minimum assist (75% patient effort)  -SD     Assistive Device (Bed Mobility) bed rails;head of bed elevated  -SD     Row Name 01/03/23 1220          Bed-Chair Transfer    Bed-Chair [place] (Transfers) minimum assist (75% patient effort)   -SD     Assistive Device (Bed-Chair Transfers) walker, front-wheeled  -SD     Row Name 01/03/23 1220          Sit-Stand Transfer    Sit-Stand [place] (Transfers) contact guard  -SD     Assistive Device (Sit-Stand Transfers) walker, front-wheeled  -SD     Row Name 01/03/23 1220          Lower Body Dressing Assessment/Training    [place] Level (Lower Body Dressing) don;pants/bottoms;moderate assist (50% patient effort)  -SD     Position (Lower Body Dressing) edge of bed sitting;supported standing  -SD     Row Name 01/03/23 1220          Grooming Assessment/Training    [place] Level (Grooming) hair care, combing/brushing;wash face, hands;contact guard assist  -SD     Row Name 01/03/23 1220          Toileting Assessment/Training    [place] Level (Toileting) change pad/brief;perform perineal hygiene;maximum assist (25% patient effort)  -SD           User Key  (r) = Recorded By, (t) = Taken By, (c) = Cosigned By    Initials Name Provider Type    Jyoti Pearl OT Occupational Therapist               Obj/Interventions     Row Name 01/03/23 1221          Motor Skills    Therapeutic Exercise shoulder;elbow/forearm;wrist;hand  BUE AAROM x 10  -SD           User Key  (r) = Recorded By, (t) = Taken By, (c) = Cosigned By    Initials Name Provider Type    Jyoti Pearl OT Occupational Therapist               Goals/Plan    No documentation.                Clinical Impression     Row Name 01/03/23 1221          Pain Assessment    Pretreatment Pain Rating 0/10 - no pain  -SD     Posttreatment Pain Rating 0/10 - no pain  -SD     Row Name 01/03/23 1221          Plan of Care Review    Plan of Care Reviewed With patient  -SD     Progress improving  -SD     Outcome Evaluation OT tx completed. Patient supine in bed, on 3L O2 satting 94%. Patient moved to EOB min A. required mod A for LBD and perineal hygiene. Patient completed sit to stand CGA, min A to move to chair, O2 dropped to 84%, increased to 4L  and returned to 90% within 2 mins. Notified RN  -SD     Row Name 01/03/23 1221          Vital Signs    Pre SpO2 (%) 94  -SD     O2 Delivery Pre Treatment hi-flow  -SD     Intra SpO2 (%) 84  -SD     O2 Delivery Intra Treatment hi-flow  -SD     Post SpO2 (%) 91  -SD     O2 Delivery Post Treatment hi-flow  -SD     Row Name 01/03/23 1221          Positioning and Restraints    Pre-Treatment Position in bed  -SD     Post Treatment Position chair  -SD     In Chair reclined;call light within reach;encouraged to call for assist;notified nsg  -SD           User Key  (r) = Recorded By, (t) = Taken By, (c) = Cosigned By    Initials Name Provider Type    Jyoti Pearl OT Occupational Therapist               Outcome Measures     Row Name 01/03/23 1223          How much help from another is currently needed...    Putting on and taking off regular lower body clothing? 2  -SD     Bathing (including washing, rinsing, and drying) 2  -SD     Toileting (which includes using toilet bed pan or urinal) 2  -SD     Putting on and taking off regular upper body clothing 3  -SD     Taking care of personal grooming (such as brushing teeth) 3  -SD     Eating meals 3  -SD     AM-PAC 6 Clicks Score (OT) 15  -SD     Row Name 01/03/23 0830          How much help from another person do you currently need...    Turning from your back to your side while in flat bed without using bedrails? 3  -MR     Moving from lying on back to sitting on the side of a flat bed without bedrails? 3  -MR     Moving to and from a bed to a chair (including a wheelchair)? 2  -MR     Standing up from a chair using your arms (e.g., wheelchair, bedside chair)? 2  -MR     Climbing 3-5 steps with a railing? 1  -MR     To walk in hospital room? 1  -MR     AM-PAC 6 Clicks Score (PT) 12  -MR     Highest level of mobility 4 --> Transferred to chair/commode  -MR     Row Name 01/03/23 1223          Functional Assessment    Outcome Measure Options AM-PAC 6 Clicks Daily Activity  (OT)  -SD           User Key  (r) = Recorded By, (t) = Taken By, (c) = Cosigned By    Initials Name Provider Type    Jyoti Pearl OT Occupational Therapist     Tiffany Carrera, RN Registered Nurse                Occupational Therapy Education     Title: PT OT SLP Therapies (In Progress)     Topic: Occupational Therapy (In Progress)     Point: ADL training (Done)     Description:   Instruct learner(s) on proper safety adaptation and remediation techniques during self care or transfers.   Instruct in proper use of assistive devices.              Learning Progress Summary           Patient Acceptance, E,TB, VU by SD at 1/3/2023 1224    Comment: Breathing techniques    Acceptance, E,TB, VU by  at 12/29/2022 1358    Comment: benefit of activity    Acceptance, E,TB, VU by SD at 12/27/2022 1259    Comment: Breathing techniques    Acceptance, E,TB, VU by SD at 12/23/2022 1306    Comment: OT POC                   Point: Home exercise program (Done)     Description:   Instruct learner(s) on appropriate technique for monitoring, assisting and/or progressing therapeutic exercises/activities.              Learning Progress Summary           Patient Acceptance, E,TB, VU by  at 12/30/2022 1601                   Point: Precautions (Done)     Description:   Instruct learner(s) on prescribed precautions during self-care and functional transfers.              Learning Progress Summary           Patient Acceptance, E,TB, VU by  at 12/29/2022 1358    Comment: benefit of activity                   Point: Body mechanics (Not Started)     Description:   Instruct learner(s) on proper positioning and spine alignment during self-care, functional mobility activities and/or exercises.              Learner Progress:  Not documented in this visit.                      User Key     Initials Effective Dates Name Provider Type [facility] 06/16/21 -  Mis Warren Occupational Therapist \[facility] [place] [facility]\"" 06/16/21 -  Jyoti Rosario OT  Occupational Therapist OT              OT Recommendation and Plan  Planned Therapy Interventions (OT): activity tolerance training, adaptive equipment training, BADL retraining, patient/caregiver education/training, ROM/therapeutic exercise, strengthening exercise, transfer/mobility retraining  Therapy Frequency (OT): 3 times/wk (5 times if indicated)  Plan of Care Review  Plan of Care Reviewed With: patient  Progress: improving  Outcome Evaluation: OT tx completed. Patient supine in bed, on 3L O2 satting 94%. Patient moved to EOB min A. required mod A for LBD and perineal hygiene. Patient completed sit to stand CGA, min A to move to chair, O2 dropped to 84%, increased to 4L and returned to 90% within 2 mins. Notified RN     Time Calculation:    Time Calculation- OT     Row Name 01/03/23 1224             Time Calculation- OT    OT Start Time 1050  -SD      OT Stop Time 1119  -SD      OT Time Calculation (min) 29 min  -SD      OT Received On 01/03/23  -SD      OT Goal Re-Cert Due Date 01/04/23  -SD         Timed Charges    02403 - OT Therapeutic Exercise Minutes 10  -SD      03566 - OT Therapeutic Activity Minutes 8  -SD      14689 - OT Self Care/Mgmt Minutes 11  -SD         Total Minutes    Timed Charges Total Minutes 29  -SD       Total Minutes 29  -SD            User Key  (r) = Recorded By, (t) = Taken By, (c) = Cosigned By    Initials Name Provider Type    SD Jyoti Rosario OT Occupational Therapist              Therapy Charges for Today     Code Description Service Date Service Provider Modifiers Qty    01644604577  OT THER PROC EA 15 MIN 1/3/2023 Jyoti Rosario OT GO 1    [id number]  OT SELF CARE/MGMT/TRAIN EA 15 MIN 1/3/2023 Jyoti Rosario OT GO 1               Jyoti Rosario OT  1/3/2023

## 2023-01-03 NOTE — PROGRESS NOTES
Adult Nutrition  Assessment/PES    Patient Name:  Ankur Camp  YOB: 1940  MRN: [id number]  Admit Date:  12/22/2022    Assessment Date:  1/3/2023    Comments:      Po intakes improving, averaging 56%. Supplements provided.      1. Continue current diet order as medically appropriate.   2. Encourage PO intake to meet 50% of estimated needs.   3. Continue Boost Glucose Control TID.   4. Continue Ensure pudding qd.   5. Encourage intake of supplement ordered.      Will follow-up and available PRN.      Reason for Assessment     Row Name 01/03/23 1100          Reason for Assessment    Reason For Assessment follow-up protocol                  Labs/Tests/Procedures/Meds     Row Name 01/03/23 1100          Labs/Procedures/Meds    Lab Results Reviewed reviewed, pertinent     Lab Results Comments High glu, BUN   Low creat        Medications    Pertinent Medications Reviewed reviewed, pertinent     Pertinent Medications Comments lipitor, insulin, lisinopril, lovenox                Physical Findings     Row Name 01/03/23 1102          Physical Findings    Overall Physical Appearance normal wt for age                Estimated/Assessed Needs - Anthropometrics     Row Name 01/03/23 0448          Anthropometrics    Weight 65.9 kg (145 lb 4.5 oz)                Nutrition Prescription Ordered     Row Name 01/03/23 1102          Nutrition Prescription PO    Current PO Diet Soft Texture     Texture Ground     Fluid Consistency Nectar/syrup thick     Supplement Boost Pudding (Ensure Pudding);Boost Glucose Control (Glucerna Shake)     Supplement Frequency 3 times a day;Daily     Common Modifiers Cardiac;Consistent Carbohydrate                Evaluation of Received Nutrient/Fluid Intake     Row Name 01/03/23 1104          Intake Assessment    Energy/Calorie Requirement Assessment meeting needs     Protein Requirement Assessment meeting needs     Fluid Requirement Assessment meeting needs     Tolerance tolerating         PO Evaluation    Number of Days PO Intake Evaluated 2 days     Number of Meals 4     % PO Intake 56                   Problem/Interventions:   Problem 1     Row Name 01/03/23 1104          Nutrition Diagnoses Problem 1    Problem 1 Increased Nutrient Needs     Macronutrient Kcal;Protein     Etiology (related to) Factors Affecting Nutrition     Poor Intake of --  pt w/ reported poor oral intake and recent wt loss     Signs/Symptoms (evidenced by) Other (comment)  MST score of 3 and need for oral nutrition supplement to encourage PO intake                      Intervention Goal     Row Name 01/03/23 1105          Intervention Goal    General Maintain nutrition;Reduce/improve symptoms;Improved nutrition related lab(s);Meet nutritional needs for age/condition;Disease management/therapy     PO Maintain intake;Tolerate PO;Meet estimated needs     Weight Maintain weight                Nutrition Intervention     Row Name 01/03/23 1102          Nutrition Intervention    RD/Tech Action Follow Tx progress;Encourage intake;Recommend/ordered     Recommended/Ordered Supplement                Nutrition Prescription     Row Name 01/03/23 1108          Nutrition Prescription PO    New PO Prescription Ordered? No, recommended        Other Orders    Obtain Weight Daily     Obtain Weight Ordered? No, recommended     Supplement Vitamin mineral supplement     Supplement Ordered? No, recommended     Labs Mg++;Na+;Phos;K+     Labs Ordered? No, recommended                Education/Evaluation     Row Name 01/03/23 1106          Education    Education Will Instruct as appropriate        Monitor/Evaluation    Monitor Per protocol;PO intake;Supplement intake;Pertinent labs;Weight;Skin status;Symptoms                 Electronically signed by:  Minal Woods RD  01/03/23 11:08 EST

## 2023-01-03 NOTE — PLAN OF CARE
Goal Outcome Evaluation:  Plan of Care Reviewed With: patient        Progress: improving  Outcome Evaluation: OT tx completed. Patient supine in bed, on 3L O2 satting 94%. Patient moved to EOB min A. required mod A for LBD and perineal hygiene. Patient completed sit to stand CGA, min A to move to chair, O2 dropped to 84%, increased to 4L and returned to 90% within 2 mins. Notified RN

## 2023-01-04 VITALS
SYSTOLIC BLOOD PRESSURE: 153 MMHG | OXYGEN SATURATION: 92 % | WEIGHT: 146.39 LBS | HEART RATE: 85 BPM | DIASTOLIC BLOOD PRESSURE: 93 MMHG | HEIGHT: 66 IN | TEMPERATURE: 97.2 F | RESPIRATION RATE: 18 BRPM | BODY MASS INDEX: 23.53 KG/M2

## 2023-01-04 LAB
ANION GAP SERPL CALCULATED.3IONS-SCNC: 7 MMOL/L (ref 5–15)
BASOPHILS # BLD AUTO: 0.02 10*3/MM3 (ref 0–0.2)
BASOPHILS NFR BLD AUTO: 0.3 % (ref 0–1.5)
BUN SERPL-MCNC: 19 MG/DL (ref 8–23)
BUN/CREAT SERPL: 25.3 (ref 7–25)
CALCIUM SPEC-SCNC: 8.8 MG/DL (ref 8.6–10.5)
CHLORIDE SERPL-SCNC: 110 MMOL/L (ref 98–107)
CO2 SERPL-SCNC: 29 MMOL/L (ref 22–29)
CREAT SERPL-MCNC: 0.75 MG/DL (ref 0.76–1.27)
DEPRECATED RDW RBC AUTO: 45.7 FL (ref 37–54)
EGFRCR SERPLBLD CKD-EPI 2021: 90.7 ML/MIN/1.73
EOSINOPHIL # BLD AUTO: 0.05 10*3/MM3 (ref 0–0.4)
EOSINOPHIL NFR BLD AUTO: 0.7 % (ref 0.3–6.2)
ERYTHROCYTE [DISTWIDTH] IN BLOOD BY AUTOMATED COUNT: 13.4 % (ref 12.3–15.4)
GLUCOSE BLDC GLUCOMTR-MCNC: 118 MG/DL (ref 70–130)
GLUCOSE BLDC GLUCOMTR-MCNC: 186 MG/DL (ref 70–130)
GLUCOSE SERPL-MCNC: 125 MG/DL (ref 65–99)
HCT VFR BLD AUTO: 40.1 % (ref 37.5–51)
HGB BLD-MCNC: 13.4 G/DL (ref 13–17.7)
IMM GRANULOCYTES # BLD AUTO: 0.04 10*3/MM3 (ref 0–0.05)
IMM GRANULOCYTES NFR BLD AUTO: 0.5 % (ref 0–0.5)
LYMPHOCYTES # BLD AUTO: 0.56 10*3/MM3 (ref 0.7–3.1)
LYMPHOCYTES NFR BLD AUTO: 7.6 % (ref 19.6–45.3)
MCH RBC QN AUTO: 31.4 PG (ref 26.6–33)
MCHC RBC AUTO-ENTMCNC: 33.4 G/DL (ref 31.5–35.7)
MCV RBC AUTO: 93.9 FL (ref 79–97)
MONOCYTES # BLD AUTO: 0.39 10*3/MM3 (ref 0.1–0.9)
MONOCYTES NFR BLD AUTO: 5.3 % (ref 5–12)
NEUTROPHILS NFR BLD AUTO: 6.28 10*3/MM3 (ref 1.7–7)
NEUTROPHILS NFR BLD AUTO: 85.6 % (ref 42.7–76)
NRBC BLD AUTO-RTO: 0 /100 WBC (ref 0–0.2)
PLATELET # BLD AUTO: 189 10*3/MM3 (ref 140–450)
PMV BLD AUTO: 11.6 FL (ref 6–12)
POTASSIUM SERPL-SCNC: 3.8 MMOL/L (ref 3.5–5.2)
RBC # BLD AUTO: 4.27 10*6/MM3 (ref 4.14–5.8)
SODIUM SERPL-SCNC: 146 MMOL/L (ref 136–145)
WBC NRBC COR # BLD: 7.34 10*3/MM3 (ref 3.4–10.8)

## 2023-01-04 PROCEDURE — 63710000001 INSULIN ASPART PER 5 UNITS: Performed by: NURSE PRACTITIONER

## 2023-01-04 PROCEDURE — 99239 HOSP IP/OBS DSCHRG MGMT >30: CPT | Performed by: NURSE PRACTITIONER

## 2023-01-04 PROCEDURE — 85025 COMPLETE CBC W/AUTO DIFF WBC: CPT | Performed by: NURSE PRACTITIONER

## 2023-01-04 PROCEDURE — 80048 BASIC METABOLIC PNL TOTAL CA: CPT | Performed by: NURSE PRACTITIONER

## 2023-01-04 PROCEDURE — 82962 GLUCOSE BLOOD TEST: CPT

## 2023-01-04 RX ADMIN — SENNOSIDES AND DOCUSATE SODIUM 2 TABLET: 50; 8.6 TABLET ORAL at 08:25

## 2023-01-04 RX ADMIN — ALBUTEROL SULFATE 2 PUFF: 90 AEROSOL, METERED RESPIRATORY (INHALATION) at 12:12

## 2023-01-04 RX ADMIN — BISACODYL 10 MG: 10 SUPPOSITORY RECTAL at 08:26

## 2023-01-04 RX ADMIN — ASPIRIN 81 MG CHEWABLE TABLET 81 MG: 81 TABLET CHEWABLE at 08:25

## 2023-01-04 RX ADMIN — ALBUTEROL SULFATE 2 PUFF: 90 AEROSOL, METERED RESPIRATORY (INHALATION) at 08:26

## 2023-01-04 RX ADMIN — INSULIN ASPART 2 UNITS: 100 INJECTION, SOLUTION INTRAVENOUS; SUBCUTANEOUS at 12:12

## 2023-01-04 RX ADMIN — ALBUTEROL SULFATE 2 PUFF: 90 AEROSOL, METERED RESPIRATORY (INHALATION) at 16:37

## 2023-01-04 RX ADMIN — ATORVASTATIN CALCIUM 80 MG: 80 TABLET, FILM COATED ORAL at 08:25

## 2023-01-04 RX ADMIN — CARBIDOPA AND LEVODOPA 1 TABLET: 25; 100 TABLET ORAL at 16:37

## 2023-01-04 RX ADMIN — CARBIDOPA AND LEVODOPA 1 TABLET: 25; 100 TABLET ORAL at 08:25

## 2023-01-04 RX ADMIN — LISINOPRIL 40 MG: 20 TABLET ORAL at 08:26

## 2023-01-04 RX ADMIN — ALBUTEROL SULFATE 2 PUFF: 90 AEROSOL, METERED RESPIRATORY (INHALATION) at 05:40

## 2023-01-04 NOTE — PLAN OF CARE
Goal Outcome Evaluation:         Pleasantly confused patient with bed alarm on at all times-medications given per hospitalist's orders-Neva REID saw patient today-monitor blood sugar with coverage per protocol-monitor labs and continue to monitor patient-discharge to [facility] [[email or web address]] 01--Located within [facility]

## 2023-01-04 NOTE — PROGRESS NOTES
Enter Query Response Below      Query Response:     No  Electronically signed by Oral Cano DO, 23, 11:40 PM EST.           If applicable, please update the problem list.           Patient: Ankur Camp        : 1940  Account: [id number]           Admit Date:         How to Respond to this query:       a. Click New Note     b. Answer query within the yellow box.                c. Update the Problem List, if applicable.      If you have any questions about this query contact me at: [phone]     Dr. Cano:      History and physical states  work up in emergency room noted lactate 2.9, with repeat 2.1, lactic acidosis, gently iv hydration, repeat lactate.   Hospitalist progress note states lactic acidosis, repeat lactate normal.  at 0303 lactate 1.7.     After study was lactic acidosis clinically supported during this admission?    Yes, please include additional clinical indicators_____________________________.  No  Other______________________.  Unable to determine.     By submitting this query, we are merely seeking further clarification of documentation to accurately reflect all conditions that you are monitoring, evaluating, treating or that extend the hospitalization or utilize additional resources of care. Please utilize your independent clinical judgment when addressing the question(s) above.     This query and your response, once completed, will be entered into the legal medical record.    Sincerely,  Belinda GAVIRIA RN BSN   Clinical Documentation Integrity Program   [email or web address]

## 2023-01-04 NOTE — DISCHARGE SUMMARY
[facility]   DISCHARGE SUMMARY      Name:  Ankur Capm   Age:  82 y.o.  Sex:  male  :  1940  MRN:  [id number]   Visit Number:  [id number]    Admission Date:  2022  Date of Discharge:  2023  Primary Care Physician:  Magaly Prabhakar APRN    Important issues to note:    1.  Admitted to the hospital for UTI and found to be Covid + with increased generalized weakness.    2.  Treated with Rocephin and Decadron--completed treatment.    3.  Noted to have some hypernatremia during admission with D5 initiated.  Encouraged oral intake.  Sodium-146 today which appears to be close to baseline.      4.  Stable for discharge to short term rehab today.  Will recommend 1L water intake daily spaced throughout the day and continue to encourage oral intake including supplements (Boost) as tolerated.  Continue with oxygen to keep saturations >90%, wean as tolerated.  Return to the hospital as needed, recheck BMP in one week and as needed.    Discharge Diagnoses:     1. Acute UTI, POA  2. Acute Respiratory Failure with Hypoxia 2/2 # 3, POA  3. Covid 19 +, POA  4. Viral PNA 2/2 above, POA  5. Hypernatremia secondary to poor p.o. intake  6. Lactic Acidosis, POA, resolved  7. LAWSON, POA. resolved  8. Parkinsons Disease  9. Diabetes Mellitus Type 2 on oral anti-diabetics  10. 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     Presenting Problem:    Chief Complaint   Patient presents with   • Shortness of Breath      Consults:     Consulting  Physician(s)     Provider Relationship Specialty    Filipe Rodgers MD Consulting Physician Internal Medicine        History of presenting illness/Hospital Course:    Patient is an 82 year old male who presented to the hospital with complaints of weakness 12/22/2022 who was sent from PCP office after being diagnosed with UTI.  Patient wife advised on arrival that patient had generalized weakness, less appetite with weight loss over the past 2 weeks.  Patient with past health history significant for diabetes mellitus type 2, hypertension, hyperlipidemia, and Parkinson's disease.     Work up in the emergency department noted troponin-0.033, BNP-2194, glucose-171, creatinine-1.31 with baseline 0.8, BUN-31,  albumin-3.3, CRP-9.9, CBC unremarkable, lactate-2.9 with repeat 2.1, procalcitonin-0.24.  Urinalysis noted infection with 2+ bacteria, 31-50 WBC, +nitrites, and +3 leuks.  CXR noted hypoinflation with scattered mild atelectasis. Covid 19 testing was positive.      Admitted to the hospital and continued on Rocephin for UTI.  Started on Decadron for Covid 19 infection.  PT/OT consulted for evaluation of weakness.  Patient remained hypoxic with increased oxygen demands. Patient was initiated on remdesivir for 5 days.  Noted to have increased oxygen demands up to 10L.  CT chest to rule out PE ordered and was negative. Oxygen demand decreased to 6 liters. Patient noted to have hypernatremia likely secondary to poor oral intake with sodium of 148.  Patient initiated on D5W with high-dose sliding scale insulin adjusted.  Hypernatremia resolved.  Completed 5 days of remdesivir and 10 days of Decadron.  Overall improvement with patient currently requiring 4 L nasal cannula without distress.    Patient stable to proceed to short term rehab today.  Continue with supplemental oxygen as needed to keep saturations >90%.  Currently utilizing 4L, wean as tolerated.  Sodium on high side of normal on day of discharge.  Recommend  encouraging oral intake and supplements (Boost) as tolerated.  Encourage 1L water intake daily spaced out through day.  Recheck BMP in one week and as needed.  Medications as reconciled.  Return to the hospital as needed.    Vital Signs:    Temp:  [96.8 °F (36 °C)-98.8 °F (37.1 °C)] 97.1 °F (36.2 °C)  Heart Rate:  [68-84] 73  Resp:  [16-18] 18  BP: (131-169)/(63-98) 131/63    Physical Exam:    General Appearance:  Alert and cooperative, appears chronically ill and frail, elderly male, no acute distress, pleasantly confused   Head:  Atraumatic and normocephalic.   Eyes: Conjunctivae and sclerae normal, no icterus. No pallor.   Ears:  Ears with no abnormalities noted.   Throat: No oral lesions, no thrush, oral mucosa moist.   Neck: Supple, trachea midline   Back:   No kyphoscoliosis present. No tenderness to palpation.   Lungs:   Breath sounds heard bilaterally equally.  No crackles or wheezing. Unlabored at rest on 4L   Heart:  Normal S1 and S2, no murmur, no gallop, no rub. No JVD.   Abdomen:   Normal bowel sounds, no masses, no organomegaly. Soft, nontender, nondistended, no rebound tenderness.   Extremities: Supple, no edema, no cyanosis, no clubbing.   Pulses: Pulses palpable bilaterally.   Skin: No bleeding or rash.   Neurologic: Alert and oriented to self. No facial asymmetry. Moves all four limbs. Generalized weakness noted.     Pertinent Lab Results:     Results from last 7 days   Lab Units 01/04/23  0654 01/02/23  0750 01/01/23  0639 12/31/22  0640 12/30/22  0658 12/29/22  0711   SODIUM mmol/L 146* 143 144   < > 148* 146*   POTASSIUM mmol/L 3.8 4.2 4.1   < > 4.0 4.0   CHLORIDE mmol/L 110* 109* 112*   < > 114* 113*   CO2 mmol/L 29.0 25.9 23.5   < > 27.5 23.4   BUN mg/dL 19 25* 21   < > 29* 27*   CREATININE mg/dL 0.75* 0.73* 0.64*   < > 0.84 0.65*   CALCIUM mg/dL 8.8 8.7 8.6   < > 9.0 8.9   BILIRUBIN mg/dL  --   --   --   --  0.7 0.7   ALK PHOS U/L  --   --   --   --  104 90   ALT (SGPT) U/L  --   --   --   --   9 14   AST (SGOT) U/L  --   --   --   --  26 20   GLUCOSE mg/dL 125* 131* 111*   < > 166* 141*    < > = values in this interval not displayed.     Results from last 7 days   Lab Units 01/04/23  0653 01/02/23  0650 12/30/22  0657   WBC 10*3/mm3 7.34 8.23 9.83   HEMOGLOBIN g/dL 13.4 13.5 13.2   HEMATOCRIT % 40.1 40.8 39.0   PLATELETS 10*3/mm3 189 106* 230                                   Pertinent Radiology Results:    Imaging Results (All)     Procedure Component Value Units Date/Time    CT Angiogram Chest Pulmonary Embolism [[id number]] Collected: 12/28/22 2150     Updated: 12/28/22 2151    Narrative:      FINAL REPORT    TECHNIQUE:  Multiple axial CT images were obtained through the chest  following IV contrast using a CTA/PE protocol.  3D/MIP  reconstruction images were also performed. This study was  performed with techniques to keep radiation doses as low as  reasonably achievable (ALARA). Individualized dose reduction  techniques using automated exposure control or adjustment of mA  and/or kV according to the patient's size were employed.    CLINICAL HISTORY:  Covid--increased O2    FINDINGS:  PAs and aorta: No pulmonary embolus.  Thoracic aorta is  unremarkable.   There is coronary artery disease.  Heart/mediastinum: No evidence for right heart strain.  No  pericardial effusion.    There is cardiomegaly.  Lungs: There  are patchy bilateral groundglass opacities with interstitial  thickening.  Lymph nodes: No pathologically enlarged thoracic  lymph nodes.  Pleura: No pneumothorax or pleural effusion.  Chest Wall: No chest wall contusion.  Bones: No acute fracture.  Upper abdomen: No acute findings in the upper abdomen.      Impression:      No pulmonary embolus.   Lung findings are compatible with Covid  pneumonia.    Authenticated and Electronically Signed by Myke Mcfarland MD on  12/28/2022 09:50:41 PM    XR Chest 1 View [[id number]] Collected: 12/27/22 0929     Updated: 12/27/22 0934    Narrative:       CLINICAL INDICATION:    Covid/ SOA; N39.0-Urinary tract infection, site not specified;  U07.1-COVID-19; J96.01-Acute respiratory failure with hypoxia;  E87.20-Acidosis, unspecified; N28.9-Disorder of kidney and ureter,  unspecified     EXAMINATION TECHNIQUE:   XR CHEST 1 VW-     COMPARISON:  Radiographs 12/22/2022.     FINDINGS:  Bilateral multifocal patchy areas of groundglass opacities and airspace  consolidations. No pneumothorax. No large pleural effusion. Probable  trace bilateral pleural effusions. Heart is normal in size. Aortic  atherosclerosis and tortuosity.       Impression:      Bilateral multifocal patchy areas of airspace opacities, concerning for  multifocal pneumonia/atypical viral pneumonia.           Images personally reviewed, interpreted and dictated by RICKI Ash.                This report was signed and finalized on 12/27/2022 9:32 AM by RICKI Ash.    XR Chest 1 View [[id number]] Collected: 12/22/22 1132     Updated: 12/22/22 1135    Narrative:      PROCEDURE: XR CHEST 1 VW-     HISTORY: SOA      COMPARISON: 07/20/2022.     FINDINGS: The heart is normal in size. The mediastinum is unremarkable.  The lungs are hypoinflated with scattered mild atelectasis. There is no  pneumothorax.  There are no acute osseous abnormalities.       Impression:      Hypoinflation with scattered mild atelectasis.              Images were reviewed, interpreted, and dictated by Dr. Abdiaziz Ahumada MD  Transcribed by Shalom Lennon PA-C.     This report was signed and finalized on 12/22/2022 11:33 AM by Abdiaziz Ahumada MD.          Echo:    Results for orders placed during the hospital encounter of 07/20/22    Adult Transthoracic Echo Complete W/ Cont if Necessary Per Protocol    Interpretation Summary  · Estimated right ventricular systolic pressure from tricuspid regurgitation is normal (<35 mmHg).  · Left ventricular wall thickness is consistent with mild to moderate concentric hypertrophy.  ·  Estimated left ventricular EF = 54% Left ventricular ejection fraction appears to be 51 - 55%. Left ventricular systolic function is normal.  · Left ventricular diastolic function is consistent with (grade I) impaired relaxation.  · Left atrial volume is moderately increased.  · Saline test results are negative.    Condition on Discharge:      Stable.    Code status during the hospital stay:    Code Status and Medical Interventions:   Ordered at: 12/22/22 5922     Level Of Support Discussed With:    Patient     Code Status (Patient has no pulse and is not breathing):    CPR (Attempt to Resuscitate)     Medical Interventions (Patient has pulse or is breathing):    Full Support     Discharge Disposition:    Rehab Facility or Unit (DC - External)    Discharge Medications:       Discharge Medications      New Medications      Instructions Start Date   lisinopril 40 MG tablet  Commonly known as: PRINIVIL,ZESTRIL   40 mg, Oral, Every 24 Hours Scheduled         Continue These Medications      Instructions Start Date   ascorbic acid 1000 MG tablet  Commonly known as: VITAMIN C   1,000 mg, Oral, Daily      aspirin 81 MG chewable tablet   81 mg, Oral, Daily      atorvastatin 80 MG tablet  Commonly known as: LIPITOR   80 mg, Oral, Daily      carbidopa-levodopa  MG per tablet  Commonly known as: SINEMET   1 tablet, Oral, 3 Times Daily      cholecalciferol 25 MCG (1000 UT) tablet  Commonly known as: VITAMIN D3   1,000 Units, Oral, Daily      erythromycin 5 MG/GM ophthalmic ointment  Commonly known as: ROMYCIN   1 application, Both Eyes, Nightly, Apply a thin layer to bottom of both eyelids every night at bedtime      glucose blood test strip   1 each by In Vitro route daily Daily testing, DX:250.00      metFORMIN 500 MG tablet  Commonly known as: GLUCOPHAGE   500 mg, Oral, Daily With Breakfast      vitamin B-12 1000 MCG tablet  Commonly known as: CYANOCOBALAMIN   1,000 mcg, Oral, Daily         Stop These Medications     bisoprolol 5 MG tablet  Commonly known as: ZEBeta          Discharge Diet:     Diet Instructions     Diet: Consistent Carbohydrate, Cardiac, Dysphagia; Nectar / Syrup Thick Liquids; Mechanical Soft; Nectar / Syrup Thick      Discharge Diet:  Consistent Carbohydrate  Cardiac  Dysphagia       Fluid Consistency: Nectar / Syrup Thick Liquids    Pureed Options: Mechanical Soft    Fluid Consistency: Nectar / Syrup Thick    Requires feeding assistance        Activity at Discharge:     Activity Instructions     Activity as Tolerated          Follow-up Appointments:     Contact information for follow-up providers     Magaly Prabhakar APRN Follow up in 1 week(s).    Specialty: Family Medicine  Contact information:  [address] [facility] [address] [phone]                   Contact information for after-discharge care     Destination     [facility] [place] .    Service: Skilled Nursing  Contact information:  [address]  [facility] [address] [phone]                           Future Appointments   Date Time Provider Department Center   2/21/2023  9:45 AM Cliff Lema III, MD [facility]   3/13/2023  9:10 AM Jose Fernandez MD [facility] (   3/28/2023  9:30 AM [facility] Woodard[facility]N [facility]   5/9/2023  8:30 AM Benjie Garrison PA-C [facility]          JEANETTE Lange  01/04/23  14:15 EST    Time: I spent 35 minutes on this discharge activity which included: face-to-face encounter with the patient, reviewing the data in the system, coordination of the care with the nursing staff as well as consultants, documentation, and entering orders.     Dictated utilizing Dragon dictation.

## 2023-01-04 NOTE — CASE MANAGEMENT/SOCIAL WORK
Case Management/Social Work    Patient Name:  Ankur Camp  YOB: 1940  MRN: [id number]  Admit Date:  12/22/2022        SW spoke with Calista MCCLELLAND regarding pts d/c. Pt is able to come to facility whenever has BM. Facility aware. Facility updated wife when wife was present signing paperwork.       Electronically signed by:  KYLE Andersen  01/04/23 11:12 EST

## 2023-01-04 NOTE — CASE MANAGEMENT/SOCIAL WORK
Case Management Discharge Note        Pt is being d/c'd to [facility] today. Pt had a BM. SW updated facility. SW attempted to contact wife, no answer. RN can call report to [phone]. Fax DC summary to [phone]. EMS transport.     Provided Post Acute Provider List?: N/A  Provided Post Acute Provider Quality & Resource List?: N/A    Selected Continued Care - Admitted Since 12/22/2022     Destination Coordination complete.    Service Provider Selected Services Address Phone Fax Patient Preferred    [facility] [address] [place] [address] [phone] --          Durable Medical Equipment    No services have been selected for the patient.              Dialysis/Infusion    No services have been selected for the patient.              Home Medical Care    No services have been selected for the patient.              Therapy    No services have been selected for the patient.              Community Resources    No services have been selected for the patient.              Community & DME    No services have been selected for the patient.                  Transportation Services  Ambulance: [facility]    Final Discharge Disposition Code: 03 - skilled nursing facility (SNF)

## 2023-01-06 LAB — GLUCOSE BLDC GLUCOMTR-MCNC: 214 MG/DL (ref 70–130)

## 2023-01-21 ENCOUNTER — APPOINTMENT (OUTPATIENT)
Dept: CT IMAGING | Facility: HOSPITAL | Age: 83
End: 2023-01-21
Payer: MEDICARE

## 2023-01-21 ENCOUNTER — APPOINTMENT (OUTPATIENT)
Dept: GENERAL RADIOLOGY | Facility: HOSPITAL | Age: 83
End: 2023-01-21
Payer: MEDICARE

## 2023-01-21 ENCOUNTER — HOSPITAL ENCOUNTER (EMERGENCY)
Facility: HOSPITAL | Age: 83
Discharge: OTHER INSTITUTION | End: 2023-01-21
Attending: HOSPITALIST
Payer: MEDICARE

## 2023-01-21 VITALS
DIASTOLIC BLOOD PRESSURE: 61 MMHG | BODY MASS INDEX: 20.32 KG/M2 | HEART RATE: 105 BPM | SYSTOLIC BLOOD PRESSURE: 101 MMHG | TEMPERATURE: 98.3 F | HEIGHT: 72 IN | RESPIRATION RATE: 20 BRPM | OXYGEN SATURATION: 99 % | WEIGHT: 150 LBS

## 2023-01-21 DIAGNOSIS — R41.89 UNRESPONSIVE: ICD-10-CM

## 2023-01-21 DIAGNOSIS — J18.9 PNEUMONIA OF BOTH LOWER LOBES DUE TO INFECTIOUS ORGANISM: ICD-10-CM

## 2023-01-21 DIAGNOSIS — N30.00 ACUTE CYSTITIS WITHOUT HEMATURIA: ICD-10-CM

## 2023-01-21 DIAGNOSIS — J96.01 ACUTE RESPIRATORY FAILURE WITH HYPOXIA (HCC): ICD-10-CM

## 2023-01-21 DIAGNOSIS — N17.9 ACUTE RENAL FAILURE, UNSPECIFIED ACUTE RENAL FAILURE TYPE (HCC): ICD-10-CM

## 2023-01-21 DIAGNOSIS — A41.9 SEPTICEMIA (HCC): Primary | ICD-10-CM

## 2023-01-21 LAB
A/G RATIO: 0.7 (ref 0.8–2)
ALBUMIN SERPL-MCNC: 2.4 G/DL (ref 3.4–4.8)
ALP BLD-CCNC: 117 U/L (ref 25–100)
ALT SERPL-CCNC: 41 U/L (ref 4–36)
AMPHETAMINE SCREEN, URINE: POSITIVE
ANION GAP SERPL CALCULATED.3IONS-SCNC: 26 MMOL/L (ref 3–16)
AST SERPL-CCNC: 67 U/L (ref 8–33)
BACTERIA: ABNORMAL /HPF
BARBITURATE SCREEN URINE: ABNORMAL
BASE EXCESS ARTERIAL: -8.8 MMOL/L (ref -3–3)
BASE EXCESS ARTERIAL: -9.3 MMOL/L (ref -3–3)
BASOPHILS ABSOLUTE: 0.1 K/UL (ref 0–0.1)
BASOPHILS RELATIVE PERCENT: 0.3 %
BENZODIAZEPINE SCREEN, URINE: ABNORMAL
BILIRUB SERPL-MCNC: 0.5 MG/DL (ref 0.3–1.2)
BILIRUBIN URINE: NEGATIVE
BLOOD, URINE: ABNORMAL
BUN BLDV-MCNC: 42 MG/DL (ref 6–20)
BUPRENORPHINE QUAL, URINE: ABNORMAL
CALCIUM SERPL-MCNC: 9.4 MG/DL (ref 8.5–10.5)
CANNABINOID SCREEN URINE: ABNORMAL
CHLORIDE BLD-SCNC: 108 MMOL/L (ref 98–107)
CLARITY: CLEAR
CO2: 14 MMOL/L (ref 20–30)
COCAINE METABOLITE SCREEN URINE: ABNORMAL
COLOR: YELLOW
CREAT SERPL-MCNC: 2.6 MG/DL (ref 0.4–1.2)
EOSINOPHILS ABSOLUTE: 0.1 K/UL (ref 0–0.4)
EOSINOPHILS RELATIVE PERCENT: 0.5 %
EPITHELIAL CELLS, UA: ABNORMAL /HPF (ref 0–5)
FIO2: 0.4 %
FIO2: 0.8 %
GFR SERPL CREATININE-BSD FRML MDRD: 24 ML/MIN/{1.73_M2}
GLOBULIN: 3.6 G/DL
GLUCOSE BLD-MCNC: 174 MG/DL (ref 74–106)
GLUCOSE URINE: NEGATIVE MG/DL
HCO3 ARTERIAL: 14.4 MMOL/L (ref 22–26)
HCO3 ARTERIAL: 15.5 MMOL/L (ref 22–26)
HCT VFR BLD CALC: 42.8 % (ref 40–54)
HEMOGLOBIN: 12.9 G/DL (ref 13–18)
IMMATURE GRANULOCYTES #: 1.4 K/UL
IMMATURE GRANULOCYTES %: 5.5 % (ref 0–5)
KETONES, URINE: NEGATIVE MG/DL
LACTIC ACID, SEPSIS: 11.4 MMOL/L (ref 0.4–1.9)
LACTIC ACID, SEPSIS: 13.6 MMOL/L (ref 0.4–1.9)
LEUKOCYTE ESTERASE, URINE: NEGATIVE
LYMPHOCYTES ABSOLUTE: 0.7 K/UL (ref 1.5–4)
LYMPHOCYTES RELATIVE PERCENT: 2.8 %
Lab: ABNORMAL
MCH RBC QN AUTO: 31.5 PG (ref 27–32)
MCHC RBC AUTO-ENTMCNC: 30.1 G/DL (ref 31–35)
MCV RBC AUTO: 104.6 FL (ref 80–100)
METHADONE SCREEN, URINE: ABNORMAL
METHAMPHETAMINE, URINE: ABNORMAL
MICROSCOPIC EXAMINATION: YES
MONOCYTES ABSOLUTE: 0.7 K/UL (ref 0.2–0.8)
MONOCYTES RELATIVE PERCENT: 2.9 %
NEUTROPHILS ABSOLUTE: 21.8 K/UL (ref 2–7.5)
NEUTROPHILS RELATIVE PERCENT: 88 %
NITRITE, URINE: NEGATIVE
O2 SAT, ARTERIAL: 84.9 %
O2 SAT, ARTERIAL: 91.5 %
O2 THERAPY: ABNORMAL
O2 THERAPY: ABNORMAL
OPIATE SCREEN URINE: ABNORMAL
PCO2 ARTERIAL: 25.3 MMHG (ref 35–45)
PCO2 ARTERIAL: 28.3 MMHG (ref 35–45)
PDW BLD-RTO: 16.2 % (ref 11–16)
PH ARTERIAL: 7.36 (ref 7.35–7.45)
PH ARTERIAL: 7.37 (ref 7.35–7.45)
PH UA: 5.5 (ref 5–8)
PHENCYCLIDINE SCREEN URINE: ABNORMAL
PLATELET # BLD: 150 K/UL (ref 150–400)
PMV BLD AUTO: 12.1 FL (ref 6–10)
PO2 ARTERIAL: 51.9 MMHG (ref 80–100)
PO2 ARTERIAL: 68.3 MMHG (ref 80–100)
POTASSIUM REFLEX MAGNESIUM: 5 MMOL/L (ref 3.4–5.1)
PRO-BNP: 7992 PG/ML (ref 0–1800)
PROPOXYPHENE SCREEN, URINE: ABNORMAL
PROTEIN UA: ABNORMAL MG/DL
RAPID INFLUENZA  B AGN: NEGATIVE
RAPID INFLUENZA A AGN: NEGATIVE
RBC # BLD: 4.09 M/UL (ref 4.5–6)
RBC UA: ABNORMAL /HPF (ref 0–4)
SARS-COV-2, NAAT: NOT DETECTED
SODIUM BLD-SCNC: 148 MMOL/L (ref 136–145)
SPECIFIC GRAVITY UA: 1.02 (ref 1–1.03)
TCO2 ARTERIAL: 15.1 MMOL/L (ref 24–30)
TCO2 ARTERIAL: 16.4 MMOL/L (ref 24–30)
TOTAL PROTEIN: 6 G/DL (ref 6.4–8.3)
TRICYCLIC, URINE: ABNORMAL
TROPONIN: <0.3 NG/ML
UR OXYCODONE RAPID SCREEN: ABNORMAL
URINE REFLEX TO CULTURE: YES
URINE TYPE: ABNORMAL
UROBILINOGEN, URINE: 1 E.U./DL
WBC # BLD: 24.7 K/UL (ref 4–11)
WBC UA: ABNORMAL /HPF (ref 0–5)

## 2023-01-21 PROCEDURE — 87635 SARS-COV-2 COVID-19 AMP PRB: CPT

## 2023-01-21 PROCEDURE — 2500000003 HC RX 250 WO HCPCS: Performed by: HOSPITALIST

## 2023-01-21 PROCEDURE — 80053 COMPREHEN METABOLIC PANEL: CPT

## 2023-01-21 PROCEDURE — 96361 HYDRATE IV INFUSION ADD-ON: CPT

## 2023-01-21 PROCEDURE — 70450 CT HEAD/BRAIN W/O DYE: CPT

## 2023-01-21 PROCEDURE — 99285 EMERGENCY DEPT VISIT HI MDM: CPT

## 2023-01-21 PROCEDURE — 71045 X-RAY EXAM CHEST 1 VIEW: CPT

## 2023-01-21 PROCEDURE — 2580000003 HC RX 258: Performed by: HOSPITALIST

## 2023-01-21 PROCEDURE — 82803 BLOOD GASES ANY COMBINATION: CPT

## 2023-01-21 PROCEDURE — 87086 URINE CULTURE/COLONY COUNT: CPT

## 2023-01-21 PROCEDURE — 84484 ASSAY OF TROPONIN QUANT: CPT

## 2023-01-21 PROCEDURE — 87186 SC STD MICRODIL/AGAR DIL: CPT

## 2023-01-21 PROCEDURE — 87040 BLOOD CULTURE FOR BACTERIA: CPT

## 2023-01-21 PROCEDURE — 93005 ELECTROCARDIOGRAM TRACING: CPT

## 2023-01-21 PROCEDURE — 85025 COMPLETE CBC W/AUTO DIFF WBC: CPT

## 2023-01-21 PROCEDURE — 96365 THER/PROPH/DIAG IV INF INIT: CPT

## 2023-01-21 PROCEDURE — 96367 TX/PROPH/DG ADDL SEQ IV INF: CPT

## 2023-01-21 PROCEDURE — 2500000003 HC RX 250 WO HCPCS

## 2023-01-21 PROCEDURE — 87077 CULTURE AEROBIC IDENTIFY: CPT

## 2023-01-21 PROCEDURE — 94660 CPAP INITIATION&MGMT: CPT

## 2023-01-21 PROCEDURE — 36600 WITHDRAWAL OF ARTERIAL BLOOD: CPT

## 2023-01-21 PROCEDURE — 96366 THER/PROPH/DIAG IV INF ADDON: CPT

## 2023-01-21 PROCEDURE — 74176 CT ABD & PELVIS W/O CONTRAST: CPT

## 2023-01-21 PROCEDURE — 81001 URINALYSIS AUTO W/SCOPE: CPT

## 2023-01-21 PROCEDURE — 36415 COLL VENOUS BLD VENIPUNCTURE: CPT

## 2023-01-21 PROCEDURE — 6360000002 HC RX W HCPCS: Performed by: HOSPITALIST

## 2023-01-21 PROCEDURE — 94002 VENT MGMT INPAT INIT DAY: CPT

## 2023-01-21 PROCEDURE — 83605 ASSAY OF LACTIC ACID: CPT

## 2023-01-21 PROCEDURE — 96375 TX/PRO/DX INJ NEW DRUG ADDON: CPT

## 2023-01-21 PROCEDURE — 31500 INSERT EMERGENCY AIRWAY: CPT

## 2023-01-21 PROCEDURE — 87804 INFLUENZA ASSAY W/OPTIC: CPT

## 2023-01-21 PROCEDURE — 80307 DRUG TEST PRSMV CHEM ANLYZR: CPT

## 2023-01-21 PROCEDURE — 83880 ASSAY OF NATRIURETIC PEPTIDE: CPT

## 2023-01-21 RX ORDER — KETAMINE HYDROCHLORIDE 50 MG/ML
1 INJECTION, SOLUTION, CONCENTRATE INTRAMUSCULAR; INTRAVENOUS ONCE
Status: COMPLETED | OUTPATIENT
Start: 2023-01-21 | End: 2023-01-21

## 2023-01-21 RX ORDER — SODIUM CHLORIDE, SODIUM LACTATE, POTASSIUM CHLORIDE, CALCIUM CHLORIDE 600; 310; 30; 20 MG/100ML; MG/100ML; MG/100ML; MG/100ML
INJECTION, SOLUTION INTRAVENOUS CONTINUOUS
Status: DISCONTINUED | OUTPATIENT
Start: 2023-01-21 | End: 2023-01-21 | Stop reason: HOSPADM

## 2023-01-21 RX ORDER — LISINOPRIL 40 MG/1
40 TABLET ORAL DAILY
COMMUNITY

## 2023-01-21 RX ORDER — KETAMINE HYDROCHLORIDE 50 MG/ML
INJECTION, SOLUTION, CONCENTRATE INTRAMUSCULAR; INTRAVENOUS
Status: COMPLETED
Start: 2023-01-21 | End: 2023-01-21

## 2023-01-21 RX ORDER — 0.9 % SODIUM CHLORIDE 0.9 %
30 INTRAVENOUS SOLUTION INTRAVENOUS ONCE
Status: COMPLETED | OUTPATIENT
Start: 2023-01-21 | End: 2023-01-21

## 2023-01-21 RX ORDER — CYPROHEPTADINE HYDROCHLORIDE 4 MG/1
4 TABLET ORAL 3 TIMES DAILY
COMMUNITY

## 2023-01-21 RX ADMIN — KETAMINE HYDROCHLORIDE 0.05 MG/KG/HR: 50 INJECTION, SOLUTION INTRAMUSCULAR; INTRAVENOUS at 16:25

## 2023-01-21 RX ADMIN — AZITHROMYCIN DIHYDRATE 500 MG: 500 INJECTION, POWDER, LYOPHILIZED, FOR SOLUTION INTRAVENOUS at 11:10

## 2023-01-21 RX ADMIN — CEFTRIAXONE 1000 MG: 1 INJECTION, POWDER, FOR SOLUTION INTRAMUSCULAR; INTRAVENOUS at 10:21

## 2023-01-21 RX ADMIN — KETAMINE HYDROCHLORIDE 70 MG: 50 INJECTION INTRAMUSCULAR; INTRAVENOUS at 09:46

## 2023-01-21 RX ADMIN — SODIUM CHLORIDE, POTASSIUM CHLORIDE, SODIUM LACTATE AND CALCIUM CHLORIDE: 600; 310; 30; 20 INJECTION, SOLUTION INTRAVENOUS at 11:00

## 2023-01-21 RX ADMIN — SODIUM CHLORIDE 2040 ML: 9 INJECTION, SOLUTION INTRAVENOUS at 09:10

## 2023-01-21 RX ADMIN — KETAMINE HYDROCHLORIDE 70 MG: 50 INJECTION, SOLUTION, CONCENTRATE INTRAMUSCULAR; INTRAVENOUS at 09:46

## 2023-01-21 RX ADMIN — KETAMINE HYDROCHLORIDE 0.05 MG/KG/HR: 50 INJECTION, SOLUTION INTRAMUSCULAR; INTRAVENOUS at 09:52

## 2023-01-21 ASSESSMENT — PAIN - FUNCTIONAL ASSESSMENT: PAIN_FUNCTIONAL_ASSESSMENT: 0-10

## 2023-01-21 NOTE — ED NOTES
Called [facility] dispatch to advise EMS of report, spoke with Josette Russ.       Jere Barr RN  01/21/23 3799

## 2023-01-21 NOTE — ED NOTES
I spoke with Chana RN at [facility] who stated that she called [facility] and they have not gotten back with her, [facility] has no beds.  We asked her to try [facility] and [facility]. ADELINA,RRT

## 2023-01-21 NOTE — ED NOTES
Called to give report, RN tied up, will call back to ED for Pt report.       Temo Qiu RN  01/21/23 2032

## 2023-01-21 NOTE — ED NOTES
MAC on the phone with Dr. Saida Argueta from [facility] for Dr. Lovette Schwab.  DW,RRT

## 2023-01-21 NOTE — ED NOTES
Patient arrives to ED with [facility]- BP 75/52, , SPO2 87% on NRB. . Patient's eyes open but does not respond with any purpose, no response to verbal / tactile stimulation. RR 18 non labored. Lower abd / bladder distended / hard. IV attempts pta to ED made without success.      Ave Sprague RN  01/21/23 3585

## 2023-01-21 NOTE — ED NOTES
Bed assignment at [facility] - [address], RN  01/21/23 8047

## 2023-01-21 NOTE — ED NOTES
Patient report called from [address] [place] [address] to ED- patient was found  Unresponsive around -0810, usually orientated to self. SPO2 is 65%, BP 80/56,  Has not been eating well x 3 days. Patient is a full code. Spouse was notified by [facility].      Ca De Leon RN  01/21/23 5137

## 2023-01-21 NOTE — ED NOTES
Called ALONSO as Dr. Jose Miguel Weeks had not heard back from [facility]. MAC will contact [place] [phone] at this time for transfer.       Rama Rhodes RN  01/21/23 1282

## 2023-01-21 NOTE — ED NOTES
Report to Ian Xiao with [facility].      Jere Barr RN  01/21/23 3640

## 2023-01-21 NOTE — ED NOTES
I spoke with Lucero Albright at [facility] in regards to possible transfer to [facility] ICU. She will reach out to them and call us back.  DW, RRT

## 2023-01-21 NOTE — ED NOTES
After F/C inserted and urine return, patient's abd / bladder is less distended and soft.      Thuan Hood RN  01/21/23 3518

## 2023-01-21 NOTE — ED NOTES
I spoke to the house supervisor at [facility] who stated that they are moving 2 patients out of ICU and they will have him a bed as soon as they do that. I also called the Juana Sparks at [facility] and let them know that we would not need a bed there.  DW, RRT

## 2023-01-21 NOTE — ED NOTES
Patient's upper and lower dentures placed in denture cup and given to is spouse. She is updated on plan of care / transfer and she requests to call [facility].  Dr Mansi Mack updated on her request.     Humaira Shah RN  01/21/23 6092

## 2023-01-21 NOTE — ED PROVIDER NOTES
[address] ENCOUNTER      Pt Name: Sergei Kim  MRN: [id number]  YOB: 1940  Date of evaluation: 1/21/2023  Provider: Zackary Hudson DO    CHIEF COMPLAINT       Chief Complaint   Patient presents with    Altered Mental Status         HISTORY OF PRESENT ILLNESS  (Location/Symptom, Timing/Onset, Context/Setting, Quality, Duration, Modifying Factors, Severity.)   Sergei Kim is a 81 y.o. male who presents to the emergency department for altered mental status. Patient was brought in from the local nursing facility. He does have a history of Parkinson's disease. Patient's wife is here at the hospital in the lobby waiting on him. She states that he actually has an appointment [facility] coming up Monday to see why he has been having difficulty swallowing. Patient's Parkinson disease has progressed and she states that he did not want to be placed on a ventilator or bradycardia machine for a prolonged period of time because he is already been on that once and does not want have to go through it again however she states this is something that he needs right now to go ahead and put him on breathing machine and then they would make that decision later if it look like it was will be something that would be a prolonged stay that they would make the decision to extubate at that time. He was a full code on his paperwork from the nursing from home. Upon arrival here he was unresponsive he was satting 87% on a nonrebreather. He had no gag reflex or response to tactile verbal/painful stimuli. Decision to intubate was made at that time since the patient cannot protect his airway. He had equal breath sounds bilaterally but had distended lower abdomen over the bladder area concern for possible obstruction. Patient was very warm to touch concern for sepsis he is hypotensive upon arrival blood pressure was in the 63S systolic.   He did have 2 20-gauge IVs established in the bilateral arms. Patient unable provide any type of review of systems secondary to his altered mental status. What we did obtain was secondary to the notes from the nursing facility and what his wife gave us and a very brief description when we were discussing CODE STATUS with her. States that she saw him yesterday and last time that she saw him was around dinnertime. She states that she fed him lunch yesterday at the nursing facility and he was fine however when she fed him dinner he really would not eat much and he seemed rather sleepy and was not as interactive as he normally was. Call came out this morning that the patient was found to be unresponsive in his room with pulse ox in the 60% range. Patient usually not on oxygen. Nursing notes were reviewed. REVIEW OFSYSTEMS    (2-9 systems for level 4, 10 or more for level 5)   ROS:    Review of systems cannot be obtained secondary to patient's altered mental status except for that was obtained from nursing notes and from patient's wife. Except as noted above the remainder of the review of systems was reviewed and negative. PAST MEDICAL HISTORY     Past Medical History:   Diagnosis Date    Chronic kidney disease     Hyperlipidemia     Hypertension     Type 2 diabetes mellitus without complication ([facility] [address]          SURGICAL HISTORY       Past Surgical History:   Procedure Laterality Date    EYE SURGERY      laser    FRACTURE SURGERY      pelvic    HERNIA REPAIR      times 7    KIDNEY REMOVAL      1/2 kidney removal right    KNEE ARTHROPLASTY Right 05/2021    Dr Charity Burt Right          CURRENT MEDICATIONS       Previous Medications    ASCORBIC ACID (VITAMIN C) 500 MG TABLET    Take 1,000 mg by mouth daily. ASPIRIN 81 MG TABLET    Take 81 mg by mouth daily. ATORVASTATIN (LIPITOR) 80 MG TABLET    Take 80 mg by mouth daily    BISOPROLOL (ZEBETA) 5 MG TABLET    TAKE 1 TABLET BY MOUTH DAILY. BLOOD GLUCOSE MONITOR STRIPS    Test 3 times a day & as needed for symptoms of irregular blood glucose. Dx E11. 9. Breeze 2    CARBIDOPA-LEVODOPA (SINEMET)  MG PER TABLET    TAKE TWO TABLETS BY MOUTH THREE TIMES A DAY    CYPROHEPTADINE (PERIACTIN) 4 MG TABLET    Take 4 mg by mouth 3 times daily    GLUCOSE BLOOD DISK    1 each by In Vitro route 3 times daily KX    LANCETS MISC    1 each by Does not apply route 2 times daily Dx E11.9 KS    LISINOPRIL (PRINIVIL;ZESTRIL) 40 MG TABLET    Take 40 mg by mouth daily    METFORMIN (GLUCOPHAGE) 500 MG TABLET    TAKE ONE TABLET BY MOUTH EVERY DAY WITH BREAKFAST    VITAMIN B-12 (CYANOCOBALAMIN) 1000 MCG TABLET    Take 1,000 mcg by mouth daily. VITAMIN D (CHOLECALCIFEROL) 1000 UNITS CAPS CAPSULE    Take 1,000 Units by mouth daily. ALLERGIES     Patient has no known allergies.     FAMILY HISTORY       Family History   Problem Relation Age of Onset    Cancer Mother         breast    Diabetes Father     High Blood Pressure Father     Diabetes Brother           SOCIAL HISTORY       Social History     Socioeconomic History    Marital status:      Spouse name: None    Number of children: None    Years of education: None    Highest education level: None   Tobacco Use    Smoking status: Every Day     Packs/day: 1.00     Years: 40.00     Pack years: 40.00     Types: Pipe, Cigars, Cigarettes    Smokeless tobacco: Never   Substance and Sexual Activity    Alcohol use: No    Drug use: No     Social Determinants of Health     Financial Resource Strain: Low Risk     Difficulty of Paying Living Expenses: Not hard at all   Food Insecurity: No Food Insecurity    Worried About Running Out of Food in the Last Year: Never true    Ran Out of Food in the Last Year: Never true   Physical Activity: Unknown    Days of Exercise per Week: 0 days         PHYSICAL EXAM    (up to 7 for level 4, 8 or more for level 5)     ED Triage Vitals [01/21/23 0934]   BP Temp Temp src Pulse Resp SpO2 Height Weight   -- -- -- -- -- -- 6' (1.829 m) 150 lb (68 kg)       Physical Exam  General :Patient is unresponsive to tactile/verbal/painful stimuli. HEENT: Pupils are equally round and reactive to light, EOMI, conjunctivae clear. Oral mucosa is dry, no exudate. Uvula is midline. No gag reflex noted. Cardiac: Heart regular rate, rhythm, no murmurs, rubs, or gallops  Lungs: Lungs are clear to auscultation, there is no wheezing, rhonchi, or rales. There is no use of accessory muscles. Abdomen: Abdomen is soft, nontender, there was distended to the lower portion abdomen midline to the suprapubic area concerning for palpable bladder which was hard did resolve after placement of Pack catheter. There is no firm or pulsatile masses, no rebound rigidity or guarding. Musculoskeletal: Unable to truly assess patient is moving extremities on his own. Is not responding to stimuli  Neuro: Motor intact, sensory intact, level of consciousness is decreased. Patient is unresponsive he does have spontaneous respirations. Dermatology: Skin is warm and dry  Psych: Unable to assess secondary to his altered mental status      DIAGNOSTIC RESULTS     EKG: All EKG's are interpreted by the Emergency Department Physician who either signs or Co-signs this chart in the [address] a cardiologist.    The EKG interpreted by me shows sinus tachycardia. Borderline right bundle branch block. Borderline prolonged QT interval.  Heart rate is 120 bpm, ND and was 139 ms, QRS durations 129, QT is 342 and QTC is 484 ms. No acute ST elevations concerning for acute myocardial infarction. No ST depressions concerning for acute myocardial ischemia.     RADIOLOGY:   Non-plain film images such as CT, Ultrasound and MRI are read by the radiologist. Plain radiographic images are visualized and preliminarily interpreted by the emergency physician with the below findings:      [] Radiologist's Report Reviewed:  CT ABDOMEN PELVIS WO CONTRAST Additional Contrast? None   Final Result      Multifocal airspace disease in the visualized lung bases worrisome for pneumonia. No acute intra-abdominal process. CT Head WO Contrast   Final Result      No acute intracranial process. Chronic small vessel ischemic changes. Remote right cerebellar infarct. XR CHEST PORTABLE   Final Result      Endotracheal tube in appropriate position. Patchy bilateral airspace disease right greater than left.             ED BEDSIDE ULTRASOUND:   Performed by ED Physician - none    LABS:    I have reviewed and interpreted all of the currently available lab results from this visit (ifapplicable):  Results for orders placed or performed during the hospital encounter of 01/21/23   COVID-19, Rapid    Specimen: Nasopharyngeal Swab   Result Value Ref Range    SARS-CoV-2, NAAT Not Detected Not Detected   Rapid Influenza A/B Antigens    Specimen: Nasopharyngeal   Result Value Ref Range    Rapid Influenza A Ag Negative Negative    Rapid Influenza B Ag Negative Negative   Blood Gas, Arterial   Result Value Ref Range    pH, Arterial 7.372 7.350 - 7.450    pCO2, Arterial 25.3 (L) 35.0 - 45.0 mmHg    pO2, Arterial 51.9 (L) 80.0 - 100.0 mmHg    HCO3, Arterial 14.4 (L) 22.0 - 26.0 mmol/L    Base Excess, Arterial -9.3 (L) -3.0 - 3.0 mmol/L    O2 Sat, Arterial 84.9 (LL) >92 %    TCO2, Arterial 15.1 (L) 24.0 - 30.0 mmol/L    O2 Therapy Unknown     FIO2 0.80 Not Established %   Lactate, Sepsis   Result Value Ref Range    Lactic Acid, Sepsis 13.6 (HH) 0.4 - 1.9 mmol/L   Lactate, Sepsis   Result Value Ref Range    Lactic Acid, Sepsis 11.4 (HH) 0.4 - 1.9 mmol/L   CBC with Auto Differential   Result Value Ref Range    WBC 24.7 (H) 4.0 - 11.0 K/uL    RBC 4.09 (L) 4.50 - 6.00 M/uL    Hemoglobin 12.9 (L) 13.0 - 18.0 g/dL    Hematocrit 42.8 40.0 - 54.0 %    .6 (H) 80.0 - 100.0 fL    MCH 31.5 27.0 - 32.0 pg    MCHC 30.1 (L) 31.0 - 35.0 g/dL    RDW 16.2 (H) 11.0 - 16.0 %    Platelets 756 150 - 400 K/uL    MPV 12.1 (H) 6.0 - 10.0 fL    Neutrophils % 88.0 %    Immature Granulocytes % 5.5 (H) 0.0 - 5.0 %    Lymphocytes % 2.8 %    Monocytes % 2.9 %    Eosinophils % 0.5 %    Basophils % 0.3 %    Neutrophils Absolute 21.8 (H) 2.0 - 7.5 K/uL    Immature Granulocytes # 1.4 K/uL    Lymphocytes Absolute 0.7 (L) 1.5 - 4.0 K/uL    Monocytes Absolute 0.7 0.2 - 0.8 K/uL    Eosinophils Absolute 0.1 0.0 - 0.4 K/uL    Basophils Absolute 0.1 0.0 - 0.1 K/uL   Comprehensive Metabolic Panel w/ Reflex to MG   Result Value Ref Range    Sodium 148 (H) 136 - 145 mmol/L    Potassium reflex Magnesium 5.0 3.4 - 5.1 mmol/L    Chloride 108 (H) 98 - 107 mmol/L    CO2 14 (L) 20 - 30 mmol/L    Anion Gap 26 (H) 3 - 16    Glucose 174 (H) 74 - 106 mg/dL    BUN 42 (H) 6 - 20 mg/dL    Creatinine 2.6 (H) 0.4 - 1.2 mg/dL    Est, Glom Filt Rate 24 (L) >59    Calcium 9.4 8.5 - 10.5 mg/dL    Total Protein 6.0 (L) 6.4 - 8.3 g/dL    Albumin 2.4 (L) 3.4 - 4.8 g/dL    Albumin/Globulin Ratio 0.7 (L) 0.8 - 2.0    Total Bilirubin 0.5 0.3 - 1.2 mg/dL    Alkaline Phosphatase 117 (H) 25 - 100 U/L    ALT 41 (H) 4 - 36 U/L    AST 67 (H) 8 - 33 U/L    Globulin 3.6 Not Established g/dL   Troponin   Result Value Ref Range    Troponin <0.30 <0.30 ng/mL   Brain Natriuretic Peptide   Result Value Ref Range    Pro-BNP 7,992 (H) 0 - 1,800 pg/mL   Urinalysis with Reflex to Culture    Specimen: Urine   Result Value Ref Range    Color, UA Yellow Straw/Yellow    Clarity, UA Clear Clear    Glucose, Ur Negative Negative mg/dL    Bilirubin Urine Negative Negative    Ketones, Urine Negative Negative mg/dL    Specific Gravity, UA 1.025 1.005 - 1.030    Blood, Urine SMALL (A) Negative    pH, UA 5.5 5.0 - 8.0    Protein, UA TRACE (A) Negative mg/dL    Urobilinogen, Urine 1.0 <2.0 E.U./dL    Nitrite, Urine Negative Negative    Leukocyte Esterase, Urine Negative Negative    Microscopic Examination YES     Urine Type Catheter     Urine Reflex to Culture Yes    Drug Screen, Multiple, Urine   Result Value Ref Range    PCP Screen, Urine Neg Negative <25 ng/mL    Benzodiazepine Screen, Urine Neg Negative <300 ng/mL    Cocaine Metabolite Screen, Urine Neg Negative <300 ng/mL    Amphetamine Screen, Urine POSITIVE (A) Negative <1000 ng/mL    Cannabinoid Scrn, Ur Neg Negative <50 ng/mL    Opiate Scrn, Ur Neg Negative <300 ng/mL    Barbiturate Screen, Ur Neg Negative <134 ng/mL    Tricyclic Neg Negative <690 ng/mL    Methadone Screen, Urine Neg Negative <300 ng/ml    Propoxyphene Screen, Urine Neg Negative <300 ng/mL    Methamphetamine, Urine Neg Negative <1000 ng/mL    UR Oxycodone Rapid Screen Neg Negative <100 ng/mL    Buprenorphine Qual, Urine Neg Negative <25 ng/mL    Drug Screen Comment: see below    Microscopic Urinalysis   Result Value Ref Range    WBC, UA  (A) 0 - 5 /HPF    RBC, UA 3-4 0 - 4 /HPF    Epithelial Cells, UA 0-1 0 - 5 /HPF    Bacteria, UA 2+ (A) None Seen /HPF   Blood Gas, Arterial   Result Value Ref Range    pH, Arterial 7.357 7.350 - 7.450    pCO2, Arterial 28.3 (L) 35.0 - 45.0 mmHg    pO2, Arterial 68.3 (L) 80.0 - 100.0 mmHg    HCO3, Arterial 15.5 (L) 22.0 - 26.0 mmol/L    Base Excess, Arterial -8.8 (L) -3.0 - 3.0 mmol/L    O2 Sat, Arterial 91.5 >92 %    TCO2, Arterial 16.4 (L) 24.0 - 30.0 mmol/L    O2 Therapy Unknown     FIO2 0.40 Not Established %        All other labs were within normal range or not returned as of this dictation.     EMERGENCY DEPARTMENT COURSE and DIFFERENTIAL DIAGNOSIS/MDM:   Vitals:    Vitals:    01/21/23 1335 01/21/23 1345 01/21/23 1355 01/21/23 1405   BP: 98/61 111/62 106/70 105/68   Pulse: (!) 103 (!) 102 100 (!) 102   Resp: 22 21 20 21   Temp:       TempSrc:       SpO2: 100% 100% 100% 100%   Weight:       Height:           MEDICATIONS ADMINISTERED IN ED:  Medications   ketamine (KETALAR) 50 mg in dextrose 5 % 50 mL infusion (0.05 mg/kg/hr × 68 kg IntraVENous New Bag 1/21/23 5576)   dextrose 5 % infusion (  Not Given 1/21/23 2055) lactated ringers IV soln infusion ( IntraVENous New Bag 1/21/23 1100)   0.9 % sodium chloride bolus (0 mLs IntraVENous Stopped 1/21/23 1050)   ketamine (KETALAR) injection 70 mg (70 mg IntraVENous Given 1/21/23 0946)   cefTRIAXone (ROCEPHIN) 1,000 mg in dextrose 5 % 50 mL IVPB mini-bag (0 mg IntraVENous Stopped 1/21/23 1056)   azithromycin (ZITHROMAX) 500 mg in dextrose 5 % 250 mL IVPB (Kait9Ted) (0 mg IntraVENous Stopped 1/21/23 1215)       Initial evaluation examination I did have conversation with the patient's wife about upcoming plan, treatment possible disposition which she was agreeable to the times dictation since the patient was unresponsive and cannot discussed with him. Advised that I do believe he would require intubation even though his oxygen saturation has improved with the use of the nonrebreather at 100% he is still completely unresponsive he has no gag reflex and cannot protect his airway patient's wife was agreeable intubation but she did advise that he did not want to be on a breathing machine for a prolonged period of time. But she advised do what we need to do right now and then they can make a decision later on down the road if it looks like he was going be stuck on the machine. Patient had IVs established 20-gauge bilaterally was started on 30 mg/kg fluid bolus of normal saline. Patient on examination had distended and palpable bladder would have Pack catheter placed and got 1200 urine out immediately which was dark in color and then started to turn more purulent and thicker. Suspect the patient had urinary obstruction and UTI. Patient was immediately started on Rocephin because of his hypotension and concern for severe sepsis versus septic shock. Patient was warm to touch upon arrival concern for possible fever. Patient was intubated did not require any sedation or paralytic. After intubation he was given a small bolus of ketamine and then placed on ketamine drip for sedation. Patient's oxygen saturation has improved on the ventilator he was initially on 100% FiO2 and is now been turned down to 80% FiO2. Patient's initial blood gas showed a PO2 of 51.9 but PCO2 of 25.3 so he was in acute hypoxic respiratory failure but was trying to compensate with respiratory status. pH was 7.372 which is in normal range. Bicarb is 14.4 which is consistent with a metabolic process and respiratory compensation. Patient will have portable chest radiograph performed after intubation make sure a placement of tube is appropriate. We will also perform CT scan of the head make sure there is no acute intracranial process such as stroke going on since the patient is unresponsive and no gag reflex. However this could just be secondary to overwhelming sepsis. Patient had blood cultures x2 and a lactic acid sepsis protocol performed. Patient will have CBC, CMP, troponin, proBNP, UA, urine drug screen and twelve-lead EKG performed. Patient's final disposition return once his radiological diagnostic studies been performed reviewed with the patient will require hospitalization will need to transfer to facility higher level of care. Patient found to be most likely severe sepsis or septic shock at 09 40 with administration of antibiotics in the full 30 mg/kg fluid bolus. Patient will have rapid COVID and influenza swab performed. Rapid COVID screen was negative    Rapid influenza screen was negative    Blood work showed white count 24,700, hemoglobin is 12.9, hematocrit 42.8, platelet count is 768. Comprehensive metabolic panel was benign except for sodium of 148 which is elevated, chloride of 108 elevated, CO2 of 14. Glucose is 174. Patient's anion gap is elevated at 26. BUN is 42 and creatinine is 2.6. Patient's alkaline phosphatase elevated 117, ALT of 41 and AST of 67. Troponin was nondetectable less than 0.30. proBNP was elevated at 7992. Lactic acid of 13.6.   Lactic acid after 3 hours is trending down at 11.4. Patient had a BMP on 1/4/2023 which showed a creatinine of 0.75 and a BUN of 19    ABG showed pH 7.372, PCO2 of 25.3, PO2 of 51.9, bicarb of 14.4, base excess of -9.3. Repeat ABG after being on ventilator for over an hour showed pH 7.357, PCO2 of 28.3, PO2 of 68.3, bicarb of 15.5, base excess of -8.8. Was on FiO2 of 40% he was increased to 45% FiO2. UA small amount blood and trace protein. Microscopy showed  white cells, 3-4 red cells, 0-1 epithelials and +2 bacteria. Urine drug screen positive for amphetamine. However the patient is on levodopa/carbidopa. Portable chest radiograph read by radiology as endotracheal tube in appropriate position. Patchy bilateral airspace disease right leg greater than left. CT scan of the head without contrast read by radiology as patient's radiological diagnostic studies were discussed with his wife and she does state her understanding. Advised that we have concerned that he has urinary tract infection which I believe is probably the culprit for the majority of the patient's symptoms but he also has a small underlying pneumonia bibasilarly which we are going to treat with Rocephin and Zithromax that we cover urine and lung at the same time. Patient will also be switched from normal saline after his 30 mg/kg bolus to lactated Ringer's. Patient's blood pressure is now in the low 100s he is no longer hypotensive after the initial fluid bolus. We will place him on LR at 100 an hour. Patient had analysis of his blood so he needed to redraw. We are in the process now drawing secondary laboratory studies because of critical values based on the hemolysis. I suspect the patient is going to have critical values anyway but redraw was required.       CT scan of the abdomen pelvis without contrast read by radiology as multifocal airspace disease in the visualized lung bases worrisome for pneumonia no acute intra-abdominal process    Discussed the findings with the patient's wife she states that he has been to [facility] and to [facility] on different occurrences. Advised that we would attempt to contact [facility] to see if to have ICU bed available for this patient for transfer there if not then we would then try at [address] [place] [address] but we will continue to try other facilities if there is no bed availability for this patient unfortunately will require an ICU really since he is intubated and septic. Case discussed with Dr. Marychuy Marshall at [facility]. He did accept the patient in transfer there but they do not have any ICU beds available at this time will be placed on a wait list.  He did request that we perform a CT scan of the abdomen pelvis just for evaluation of the urinary retention to make sure there was nothing else going on in the abdomen. We will repeat the ABG since he has been on the ventilator for over an hour. He also advised thinking about maybe placement of a central line however at this time I do not believe the patient requires placement of a central line his blood pressures been running in the upper 90s to low 838C systolics has been running with a MAP of 70-75 with this. The patient weighs 150 pounds and is 6 foot tall so he is rather thin and lanky however if his blood pressure begins to tank or lower and cannot be sustained with continued fluids then we would suspect or think about placing a central line at that time if we think he would require pressors but at this time he is not requiring any type of supportive care for pressors for blood pressure besides fluid resuscitation. [facility] is now trying to contact [address] [place] [address] to see if they have a open ICU bed for the patient be transferred there. If not the family would like at [facility] contacted to see if there would be a ICU bed available there.     Discussed with Dr. Izaguirre at [facility] transfer center. Is that I do not have any ICU beds available at this time but I will place them on a wait list.  We will still continue to call other facility to see if we can get him transferred to a facility with ICU capability. [facility] is in the process of contacting Brandon Uribe in [facility] at this time. Discussed with Dr. Renetta Hector at [facility] and she was agreeable to accept the patient transfer there apparently they must have an ICU bed available because she stated to bring him the patient on over but we do have to wait for a bed assignment so I am assuming they have a bed available for this patient. Patient's radiological diagnostic studies will accompany him at time of transfer to their facility. I will discuss this with the wife to make sure that she is agreeable for him to go there we have had several facilities contacted has not had bed availability but have been placed on wait list is the first hospital we spoke to that sounds like they actually have a bed available for the patient to be transferred to. [facility] Dr. Jadiel Culp called back to see if we had found a bed for this patient. Advised that he had been accepted at [facility] DR MORELIA LANDEROS and sound like they were going to have a bed available for them and the patient's wife states that that would be convenient for her because she [place]. He advised to go ahead and stay with the bed at [facility] but advised that if it did not materialize he could have a bed available for the patient to be transferred there if [facility] bed availability does not pan out for the day. Is this patient to be included in the SEP-1 Core Measure due to severe sepsis or septic shock?    Yes   SEP-1 CORE MEASURE DATA      Sepsis Criteria   Severe Sepsis Criteria   Septic Shock Criteria     Must be confirmed or suspected to move forward with diagnosis of sepsis. Must meet 2:    [] Temperature > 100.9 F (38.3 C)        or < 96.8 F (36 C)  [] HR > 90  [] RR > 20  [] WBC > 12 or < 4 or 10% bands      AND:      [] Infection Confirmed or        Suspected. Must meet 1:    [] Lactate > 2       or   [] Signs of Organ Dysfunction:    - SBP < 90 or MAP < 65  - Altered mental status  - Creatinine > 2 or increased from      baseline  - Urine Output < 0.5 ml/kg/hr  - Bilirubin > 2  - INR > 1.5 (not anticoagulated)  - Platelets < 684,083  - Acute Respiratory Failure as     evidenced by new need for NIPPV     or mechanical ventilation      [] No criteria met for Severe Sepsis. Must meet 1:    [x] Lactate > 4        or   [] SBP < 90 or MAP < 65 for at        least two readings in the first        hour after fluid bolus        administration      [] Vasopressors initiated (if hypotension persists after fluid resuscitation)        [] No criteria met for Septic Shock.    Patient Vitals for the past 6 hrs:   BP Temp Pulse Resp SpO2 Height Weight Weight Method Percent Weight Change   01/21/23 0902 -- -- -- -- (!) 80 % -- -- -- --   01/21/23 0903 -- -- -- -- (!) 82 % -- -- -- --   01/21/23 0904 (!) 71/44 -- -- -- (!) 83 % -- -- -- --   01/21/23 0905 -- -- (!) 120 23 (!) 86 % -- -- -- --   01/21/23 0909 -- -- (!) 118 22 (!) 85 % -- -- -- --   01/21/23 0910 -- -- (!) 116 21 (!) 85 % -- -- -- --   01/21/23 0912 -- -- -- -- (!) 86 % -- -- -- --   01/21/23 0915 (!) 72/40 -- (!) 113 21 93 % -- -- -- --   01/21/23 0919 -- -- (!) 112 20 90 % -- -- -- --   01/21/23 0920 (!) 78/49 -- (!) 112 20 90 % -- -- -- --   01/21/23 0925 (!) 80/54 -- (!) 111 21 91 % -- -- -- --   01/21/23 0929 -- -- (!) 114 22 -- -- -- -- --   01/21/23 0930 94/74 -- (!) 114 15 -- -- -- -- --   01/21/23 0934 -- -- -- -- -- 6' (1.829 m) 150 lb (68 kg) Estimated 0   01/21/23 0935 -- -- (!) 119 16 (!) 76 % -- -- -- --   01/21/23 0939 -- -- (!) 116 21 -- -- -- -- --   01/21/23 0940 (!) 85/56 -- (!) 118 20 -- -- -- -- --   01/21/23 0945 (!) 95/44 -- (!) 118 23 -- -- -- -- --   01/21/23 0949 -- -- (!) 114 18 -- -- -- -- --   01/21/23 0950 (!) 69/35 -- (!) 111 18 -- -- -- -- --   01/21/23 0955 -- -- (!) 109 17 -- -- -- -- --   01/21/23 1009 108/60 -- (!) 115 19 -- -- -- -- --   01/21/23 1015 -- -- (!) 116 18 99 % -- -- -- --   01/21/23 1019 104/60 -- (!) 116 18 99 % -- -- -- --   01/21/23 1025 -- -- (!) 119 18 99 % -- -- -- --   01/21/23 1029 (!) 111/56 -- (!) 119 17 100 % -- -- -- --   01/21/23 1034 -- 98.3 °F (36.8 °C) -- -- -- -- -- -- --   01/21/23 1035 -- -- (!) 118 18 100 % -- -- -- --   01/21/23 1039 108/69 -- (!) 117 18 100 % -- -- -- --   01/21/23 1045 -- -- (!) 117 17 100 % -- -- -- --   01/21/23 1049 113/69 -- (!) 118 17 100 % -- -- -- --   01/21/23 1055 -- -- (!) 117 17 100 % -- -- -- --   01/21/23 1059 116/69 -- (!) 116 17 100 % -- -- -- --   01/21/23 1105 -- -- (!) 115 18 100 % -- -- -- --   01/21/23 1109 113/72 -- (!) 117 17 99 % -- -- -- --   01/21/23 1115 -- -- (!) 117 18 99 % -- -- -- --   01/21/23 1119 (!) 96/59 -- (!) 117 19 98 % -- -- -- --   01/21/23 1125 -- -- (!) 112 18 98 % -- -- -- --   01/21/23 1129 (!) 97/56 -- (!) 116 19 99 % -- -- -- --   01/21/23 1135 96/61 -- (!) 111 19 95 % -- -- -- --   01/21/23 1145 92/63 -- (!) 109 20 96 % -- -- -- --   01/21/23 1155 (!) 102/59 -- (!) 111 20 97 % -- -- -- --   01/21/23 1205 96/64 -- (!) 108 20 97 % -- -- -- --   01/21/23 1215 -- -- (!) 111 21 98 % -- -- -- --   01/21/23 1225 106/71 -- (!) 114 19 99 % -- -- -- --   01/21/23 1245 101/67 -- -- -- -- -- -- -- --   01/21/23 1255 (!) 86/52 -- (!) 108 20 100 % -- -- -- --   01/21/23 1305 97/60 -- (!) 107 22 100 % -- -- -- --   01/21/23 1315 104/67 -- (!) 106 21 100 % -- -- -- --   01/21/23 1325 (!) 93/56 -- (!) 102 20 100 % -- -- -- --   01/21/23 1335 98/61 -- (!) 103 22 100 % -- -- -- --   01/21/23 1345 111/62 -- (!) 102 21 100 % -- -- -- --   01/21/23 1355 106/70 -- 100 20 100 % -- -- -- --   01/21/23 1405 105/68 -- (!) 102 21 100 % -- -- -- --      Recent Labs     01/21/23  0920   WBC 24.7*   CREATININE 2.6*   BILITOT 0.5            Time Severe Sepsis Identified: 0940    Fluid Resuscitation Rational: at least 30mL/kg based on ideal body weight due to obesity defined as BMI >30 (patient's BMI is Body mass index is 20.34 kg/m². and IBW is Ideal body weight: 77.6 kg (171 lb 1.2 oz))    Repeat lactate level: Trending down from 13.6-11.4 on redraw. Reassessment Exam:   Reassessed the patient he does have a better perfusion at this time blood pressure is now been running in the upper 44D systolic in the low 073D. Tachycardia has mildly improved is now down to the 114 range. Pulse ox is 99% on 60% FiO2 we will still continue to wean this down. Respiratory rate is at 18. Patient's overall improved from his initial presentation. ED crit    CRITICAL CARE:  The high probability of sudden, clinically significant deterioration in the patient's condition required the highest level of my preparedness to intervene urgently. The services I provided to this patient were to treat and/or prevent clinically significant deterioration that could result in: Worsening cardiopulmonary/renal symptoms/compromise/demise. Services included the following: chart data review, reviewing nursing notes and/or old charts, documentation time, consultant collaboration regarding findings and treatment options, medication orders and management, direct patient care, re-evaluations, vital sign assessments and ordering, interpreting and reviewing diagnostic studies/lab tests. Aggregate critical care time was 45 minutes, which includes only time during which I was engaged in work directly related to the patient's care, as described above, whether at the bedside or elsewhere in the Emergency Department.   It did not include time spent performing other reported procedures or the services of residents, students, nurses or physician assistants. CONSULTS:  None    PROCEDURES:  Intubation    Date/Time: 1/21/2023 9:29 AM  Performed by: Bobby Saucedo DO  Authorized by: Bobby Saucedo DO     Consent:     Consent obtained:  Emergent situation and verbal    Consent given by:  Spouse    Risks, benefits, and alternatives were discussed: yes      Risks discussed:  Aspiration, brain injury, dental trauma, laryngeal injury, pneumothorax, hypoxia, death and bleeding    Alternatives discussed:  No treatment  Universal protocol:     Procedure explained and questions answered to patient or proxy's satisfaction: yes      Patient identity confirmed:  Arm band  Pre-procedure details:     Indication: failure to oxygenate and failure to protect airway      Patient status:  Unresponsive    Look externally: no concerns      Mouth opening - incisor distance:  3 or more finger widths    Hyoid-mental distance: 3 or more finger widths      Hyoid-thyroid distance: 2 or more finger widths      Mallampati score: I    Obstruction: none      Neck mobility: reduced      Pharmacologic strategy: none      Induction agents:  None    Paralytics:  None  Procedure details:     Preoxygenation:  BiLevel    CPR in progress: no      Intubation method:  Oral    Intubation technique: direct      Laryngoscope blade: Mac 4    Bougie used: no      Tube size (mm):  7.5    Tube type:  Cuffed    Number of attempts:  1    Tube visualized through cords: yes    Placement assessment:     ETT at teeth/gumline (cm):  23    Tube secured with:  ETT le    Breath sounds:  Equal    Placement verification: chest rise, colorimetric ETCO2, CXR verification and equal breath sounds      CXR findings:  Appropriate position  Post-procedure details:     Procedure completion:  Tolerated well, no immediate complications    CRITICAL CARE TIME    Total Critical Care time was 0 minutes, excluding separately reportable procedures.    There was a high probability of clinically significant/life threatening deterioration in the patient's condition which required my urgent intervention. FINAL IMPRESSION      1. Septicemia ([address]    2. Unresponsive    3. Acute cystitis without hematuria    4. Pneumonia of both lower lobes due to infectious organism    5. Acute respiratory failure with hypoxia (HCC)    6. Acute renal failure, unspecified acute renal failure type [facility])          DISPOSITION/PLAN   DISPOSITION Decision To Transfer 01/21/2023 02:38:47 PM      PATIENT REFERRED TO:  No follow-up provider specified. DISCHARGE MEDICATIONS:  New Prescriptions    No medications on file       Comment: Please note this report has been produced using speech recognition software and may contain errorsrelated to that system including errors in grammar, punctuation, and spelling, as well as words and phrases that may be inappropriate. If there are any questions or concerns please feel free to contact the dictating providerfor clarification.     Delvis العراقي DO  Attending Emergency Physician               Delvis العراقي, DO  01/21/23 129 [facility], DO  01/21/23 [id number]

## 2023-01-21 NOTE — ED NOTES
Dr. Torsten Dorantes on the phone with [facility] at this time, Call transferred from [facility].       Jere Barr RN  01/21/23 [id number]

## 2023-01-22 LAB
BLOOD CULTURE, ROUTINE: NORMAL
CULTURE, BLOOD 2: NORMAL
ORGANISM: ABNORMAL
URINE CULTURE, ROUTINE: ABNORMAL

## 2023-01-23 LAB
ORGANISM: ABNORMAL
URINE CULTURE, ROUTINE: ABNORMAL

## 2023-01-25 LAB
BLOOD CULTURE, ROUTINE: NORMAL
CULTURE, BLOOD 2: NORMAL

## 2023-12-28 NOTE — PLAN OF CARE
Goal Outcome Evaluation:  Plan of Care Reviewed With: patient        Progress: no change  Outcome Evaluation: No acute events. VSS. Continues to require 5L NC. Will continue to monitor.   deferred

## (undated) DEVICE — MEDI-VAC NON-CONDUCTIVE SUCTION TUBING: Brand: CARDINAL HEALTH

## (undated) DEVICE — SYR LUERLOK 20CC BX/50

## (undated) DEVICE — ENDOGATOR AUXILIARY WATER JET CONNECTOR: Brand: ENDOGATOR

## (undated) DEVICE — SLV SCD CALF HEMOFORCE DVT THERP REPROC MD

## (undated) DEVICE — HANDPIECE SET WITH HIGH FLOW TIP AND SUCTION TUBE: Brand: INTERPULSE

## (undated) DEVICE — PENCL ES MEGADINE EZ/CLEAN BUTN W/HOLSTR 10FT

## (undated) DEVICE — PATIENT RETURN ELECTRODE, SINGLE-USE, CONTACT QUALITY MONITORING, ADULT, WITH 9FT CORD, FOR PATIENTS WEIGING OVER 33LBS. (15KG): Brand: MEGADYNE

## (undated) DEVICE — BOWL AND CEMENT CARTRIDGE WITH BREAKAWAY FEMORAL NOZZLE AND MEDIUM PRESSURIZER: Brand: ACM

## (undated) DEVICE — BLD CLIP UNIV SURG GRY

## (undated) DEVICE — DISPOSABLE TOURNIQUET CUFF SINGLE BLADDER, SINGLE PORT AND QUICK CONNECT CONNECTOR: Brand: COLOR CUFF

## (undated) DEVICE — KT ORCA VLV SXN AIR/H2O W/SEAL 1P/U STRL

## (undated) DEVICE — 3M™ STERI-DRAPE™ U-DRAPE 1015: Brand: STERI-DRAPE™

## (undated) DEVICE — SOL IRR NACL 0.9PCT 3000ML

## (undated) DEVICE — SYR LUER SLPTP 50ML

## (undated) DEVICE — PAD GRND REM POLYHESIVE A/ DISP

## (undated) DEVICE — FLEXIBLE YANKAUER,MEDIUM TIP, NO VACUUM CONTROL: Brand: ARGYLE

## (undated) DEVICE — WRAP KNEE COLD THERAPY

## (undated) DEVICE — SHEET,DRAPE,70X100,STERILE: Brand: MEDLINE

## (undated) DEVICE — GLV SURG SENSICARE W/ALOE PF LF 7.5 STRL

## (undated) DEVICE — GLV SURG TRIUMPH ORTHO W/ALOE PF LTX 8 STRL

## (undated) DEVICE — 2108 SERIES SAGITTAL BLADE, NO OFFSET  (24.8 X 1.24 X 80.1MM)

## (undated) DEVICE — VIOLET BRAIDED (POLYGLACTIN 910), SYNTHETIC ABSORBABLE SUTURE: Brand: COATED VICRYL

## (undated) DEVICE — JELLY,LUBE,STERILE,FLIP TOP,TUBE,2-OZ: Brand: MEDLINE

## (undated) DEVICE — DRSNG SURG AQUACEL AG 9X25CM

## (undated) DEVICE — NEEDLE, QUINCKE, 18GX3.5": Brand: MEDLINE

## (undated) DEVICE — SUT VIC 2/0 CT1 27IN J259H

## (undated) DEVICE — PK KN TOTL 20

## (undated) DEVICE — GLV SURG SENSICARE SLT PF LF 8 STRL

## (undated) DEVICE — Device